# Patient Record
Sex: FEMALE | Race: WHITE | Employment: OTHER | URBAN - METROPOLITAN AREA
[De-identification: names, ages, dates, MRNs, and addresses within clinical notes are randomized per-mention and may not be internally consistent; named-entity substitution may affect disease eponyms.]

---

## 2016-12-07 LAB
CHOLESTEROL, TOTAL: 166 MG/DL
CHOLESTEROL/HDL RATIO: 3.2
HDLC SERPL-MCNC: 52 MG/DL (ref 35–70)
LDL CHOLESTEROL CALCULATED: 76 MG/DL (ref 0–160)
TRIGL SERPL-MCNC: 189 MG/DL
VLDLC SERPL CALC-MCNC: 38 MG/DL

## 2017-03-22 RX ORDER — CITALOPRAM 20 MG/1
20 TABLET ORAL DAILY
Qty: 30 TABLET | Refills: 0 | Status: SHIPPED | OUTPATIENT
Start: 2017-03-22 | End: 2017-05-01 | Stop reason: SDUPTHER

## 2017-04-10 ENCOUNTER — OFFICE VISIT (OUTPATIENT)
Dept: PRIMARY CARE CLINIC | Age: 77
End: 2017-04-10
Payer: MEDICARE

## 2017-04-10 VITALS
DIASTOLIC BLOOD PRESSURE: 72 MMHG | BODY MASS INDEX: 39.2 KG/M2 | WEIGHT: 213 LBS | HEART RATE: 64 BPM | RESPIRATION RATE: 14 BRPM | HEIGHT: 62 IN | SYSTOLIC BLOOD PRESSURE: 112 MMHG

## 2017-04-10 DIAGNOSIS — Z13.0 SCREENING FOR DEFICIENCY ANEMIA: ICD-10-CM

## 2017-04-10 DIAGNOSIS — Z00.00 ENCOUNTER FOR GENERAL ADULT MEDICAL EXAMINATION W/O ABNORMAL FINDINGS: Primary | ICD-10-CM

## 2017-04-10 DIAGNOSIS — E53.8 VITAMIN B 12 DEFICIENCY: ICD-10-CM

## 2017-04-10 DIAGNOSIS — R73.09 ELEVATED GLUCOSE: ICD-10-CM

## 2017-04-10 DIAGNOSIS — F41.8 DEPRESSION WITH ANXIETY: ICD-10-CM

## 2017-04-10 DIAGNOSIS — R53.83 OTHER FATIGUE: ICD-10-CM

## 2017-04-10 DIAGNOSIS — E78.2 MIXED HYPERLIPIDEMIA: ICD-10-CM

## 2017-04-10 DIAGNOSIS — I10 ESSENTIAL HYPERTENSION: ICD-10-CM

## 2017-04-10 PROCEDURE — 99204 OFFICE O/P NEW MOD 45 MIN: CPT | Performed by: NURSE PRACTITIONER

## 2017-04-10 RX ORDER — LISINOPRIL 20 MG/1
20 TABLET ORAL DAILY
Qty: 90 TABLET | Refills: 3 | Status: SHIPPED | OUTPATIENT
Start: 2017-04-10 | End: 2017-10-30 | Stop reason: SDUPTHER

## 2017-04-10 RX ORDER — IBUPROFEN 800 MG/1
800 TABLET ORAL EVERY 8 HOURS PRN
Qty: 120 TABLET | Refills: 3 | Status: SHIPPED | OUTPATIENT
Start: 2017-04-10 | End: 2018-06-04 | Stop reason: SDUPTHER

## 2017-04-10 RX ORDER — FERROUS SULFATE 325(65) MG
325 TABLET ORAL
COMMUNITY
End: 2017-07-31 | Stop reason: SDUPTHER

## 2017-04-10 RX ORDER — FUROSEMIDE 40 MG/1
TABLET ORAL
COMMUNITY
Start: 2017-03-18 | End: 2017-06-09 | Stop reason: SDUPTHER

## 2017-04-10 RX ORDER — CYANOCOBALAMIN 1000 UG/ML
1000 INJECTION INTRAMUSCULAR; SUBCUTANEOUS ONCE
COMMUNITY
End: 2017-08-21 | Stop reason: SDUPTHER

## 2017-04-10 RX ORDER — IBUPROFEN 800 MG/1
800 TABLET ORAL EVERY 6 HOURS PRN
COMMUNITY
End: 2017-04-10 | Stop reason: SDUPTHER

## 2017-04-10 RX ORDER — DICYCLOMINE HYDROCHLORIDE 10 MG/1
10 CAPSULE ORAL
COMMUNITY
End: 2018-05-18 | Stop reason: ALTCHOICE

## 2017-04-10 RX ORDER — PRAVASTATIN SODIUM 40 MG
TABLET ORAL
COMMUNITY
Start: 2017-04-01 | End: 2017-06-09 | Stop reason: SDUPTHER

## 2017-04-10 RX ORDER — POTASSIUM CHLORIDE 1500 MG/1
TABLET, EXTENDED RELEASE ORAL
COMMUNITY
Start: 2017-03-30 | End: 2017-06-22 | Stop reason: SDUPTHER

## 2017-04-10 RX ORDER — LISINOPRIL 20 MG/1
TABLET ORAL
COMMUNITY
Start: 2017-03-17 | End: 2017-04-10 | Stop reason: SDUPTHER

## 2017-04-10 RX ORDER — ATENOLOL 50 MG/1
TABLET ORAL
COMMUNITY
Start: 2017-03-17 | End: 2017-09-08 | Stop reason: ALTCHOICE

## 2017-04-10 ASSESSMENT — PATIENT HEALTH QUESTIONNAIRE - PHQ9
2. FEELING DOWN, DEPRESSED OR HOPELESS: 0
SUM OF ALL RESPONSES TO PHQ QUESTIONS 1-9: 0
1. LITTLE INTEREST OR PLEASURE IN DOING THINGS: 0
SUM OF ALL RESPONSES TO PHQ9 QUESTIONS 1 & 2: 0

## 2017-04-10 ASSESSMENT — ENCOUNTER SYMPTOMS
SHORTNESS OF BREATH: 0
BACK PAIN: 0
ORTHOPNEA: 0
BLURRED VISION: 0
COUGH: 0
ABDOMINAL PAIN: 0

## 2017-04-21 ENCOUNTER — TELEPHONE (OUTPATIENT)
Dept: PRIMARY CARE CLINIC | Age: 77
End: 2017-04-21

## 2017-05-03 RX ORDER — CITALOPRAM 20 MG/1
20 TABLET ORAL DAILY
Qty: 30 TABLET | Refills: 1 | Status: SHIPPED | OUTPATIENT
Start: 2017-05-03 | End: 2017-06-09 | Stop reason: SDUPTHER

## 2017-06-07 ENCOUNTER — HOSPITAL ENCOUNTER (OUTPATIENT)
Age: 77
Discharge: HOME | End: 2017-06-07
Payer: MEDICARE

## 2017-06-07 DIAGNOSIS — I10 ESSENTIAL HYPERTENSION: ICD-10-CM

## 2017-06-07 DIAGNOSIS — R73.09 ELEVATED GLUCOSE: ICD-10-CM

## 2017-06-07 DIAGNOSIS — Z13.0 SCREENING FOR DEFICIENCY ANEMIA: ICD-10-CM

## 2017-06-07 DIAGNOSIS — R53.83 OTHER FATIGUE: ICD-10-CM

## 2017-06-07 DIAGNOSIS — E78.2 MIXED HYPERLIPIDEMIA: ICD-10-CM

## 2017-06-07 DIAGNOSIS — E53.8 VITAMIN B 12 DEFICIENCY: ICD-10-CM

## 2017-06-07 DIAGNOSIS — Z00.00 ENCOUNTER FOR GENERAL ADULT MEDICAL EXAMINATION W/O ABNORMAL FINDINGS: ICD-10-CM

## 2017-06-07 LAB
ALBUMIN SERPL-MCNC: 3.6 G/DL (ref 3.5–5.2)
ALBUMIN/GLOBULIN RATIO: 1.2 (ref 1–2.5)
ALP BLD-CCNC: 105 U/L (ref 35–104)
ALT SERPL-CCNC: 7 U/L (ref 5–33)
ANION GAP SERPL CALCULATED.3IONS-SCNC: 12 MMOL/L (ref 9–17)
AST SERPL-CCNC: 13 U/L
BILIRUB SERPL-MCNC: 0.48 MG/DL (ref 0.3–1.2)
BUN BLDV-MCNC: 13 MG/DL (ref 8–23)
BUN/CREAT BLD: ABNORMAL (ref 9–20)
CALCIUM SERPL-MCNC: 8.6 MG/DL (ref 8.6–10.4)
CHLORIDE BLD-SCNC: 98 MMOL/L (ref 98–107)
CHOLESTEROL/HDL RATIO: 3.3
CHOLESTEROL: 160 MG/DL
CO2: 26 MMOL/L (ref 20–31)
CREAT SERPL-MCNC: 0.96 MG/DL (ref 0.5–0.9)
ESTIMATED AVERAGE GLUCOSE: 128 MG/DL
GFR AFRICAN AMERICAN: >60 ML/MIN
GFR NON-AFRICAN AMERICAN: 56 ML/MIN
GFR SERPL CREATININE-BSD FRML MDRD: ABNORMAL ML/MIN/{1.73_M2}
GFR SERPL CREATININE-BSD FRML MDRD: ABNORMAL ML/MIN/{1.73_M2}
GLUCOSE BLD-MCNC: 111 MG/DL (ref 70–99)
HBA1C MFR BLD: 6.1 % (ref 4–6)
HCT VFR BLD CALC: 36.2 % (ref 36–46)
HDLC SERPL-MCNC: 49 MG/DL
HEMOGLOBIN: 12.1 G/DL (ref 12–16)
LDL CHOLESTEROL: 76 MG/DL (ref 0–130)
MCH RBC QN AUTO: 27.8 PG (ref 26–34)
MCHC RBC AUTO-ENTMCNC: 33.3 G/DL (ref 31–37)
MCV RBC AUTO: 83.4 FL (ref 80–100)
PDW BLD-RTO: 14 % (ref 12.5–15.4)
PLATELET # BLD: 244 K/UL (ref 140–450)
PMV BLD AUTO: 9.9 FL (ref 6–12)
POTASSIUM SERPL-SCNC: 3.7 MMOL/L (ref 3.7–5.3)
RBC # BLD: 4.34 M/UL (ref 4–5.2)
SODIUM BLD-SCNC: 136 MMOL/L (ref 135–144)
TOTAL PROTEIN: 6.7 G/DL (ref 6.4–8.3)
TRIGL SERPL-MCNC: 173 MG/DL
TSH SERPL DL<=0.05 MIU/L-ACNC: 1.8 MIU/L (ref 0.3–5)
VITAMIN B-12: 623 PG/ML (ref 211–946)
VLDLC SERPL CALC-MCNC: ABNORMAL MG/DL (ref 1–30)
WBC # BLD: 9.9 K/UL (ref 3.5–11)

## 2017-06-07 PROCEDURE — 83036 HEMOGLOBIN GLYCOSYLATED A1C: CPT

## 2017-06-07 PROCEDURE — 82607 VITAMIN B-12: CPT

## 2017-06-07 PROCEDURE — 80053 COMPREHEN METABOLIC PANEL: CPT

## 2017-06-07 PROCEDURE — 80061 LIPID PANEL: CPT

## 2017-06-07 PROCEDURE — 85027 COMPLETE CBC AUTOMATED: CPT

## 2017-06-07 PROCEDURE — 36415 COLL VENOUS BLD VENIPUNCTURE: CPT

## 2017-06-07 PROCEDURE — 84443 ASSAY THYROID STIM HORMONE: CPT

## 2017-06-09 RX ORDER — FUROSEMIDE 40 MG/1
40 TABLET ORAL DAILY
Qty: 90 TABLET | Refills: 1 | Status: SHIPPED | OUTPATIENT
Start: 2017-06-09 | End: 2018-03-01 | Stop reason: SDUPTHER

## 2017-06-09 RX ORDER — CITALOPRAM 20 MG/1
20 TABLET ORAL DAILY
Qty: 90 TABLET | Refills: 1 | Status: SHIPPED | OUTPATIENT
Start: 2017-06-09 | End: 2018-04-15 | Stop reason: SDUPTHER

## 2017-06-09 RX ORDER — PRAVASTATIN SODIUM 40 MG
40 TABLET ORAL DAILY
Qty: 90 TABLET | Refills: 1 | Status: SHIPPED | OUTPATIENT
Start: 2017-06-09 | End: 2017-10-30 | Stop reason: SDUPTHER

## 2017-06-14 ENCOUNTER — TELEPHONE (OUTPATIENT)
Dept: PRIMARY CARE CLINIC | Age: 77
End: 2017-06-14

## 2017-06-22 ENCOUNTER — OFFICE VISIT (OUTPATIENT)
Dept: PRIMARY CARE CLINIC | Age: 77
End: 2017-06-22
Payer: MEDICARE

## 2017-06-22 VITALS
HEART RATE: 74 BPM | BODY MASS INDEX: 36.14 KG/M2 | HEIGHT: 63 IN | DIASTOLIC BLOOD PRESSURE: 74 MMHG | RESPIRATION RATE: 15 BRPM | WEIGHT: 204 LBS | SYSTOLIC BLOOD PRESSURE: 130 MMHG

## 2017-06-22 DIAGNOSIS — R21 RASH: ICD-10-CM

## 2017-06-22 DIAGNOSIS — I10 ESSENTIAL HYPERTENSION: Primary | ICD-10-CM

## 2017-06-22 PROCEDURE — 99214 OFFICE O/P EST MOD 30 MIN: CPT | Performed by: INTERNAL MEDICINE

## 2017-06-22 RX ORDER — POTASSIUM CHLORIDE 1500 MG/1
20 TABLET, EXTENDED RELEASE ORAL DAILY
Qty: 60 TABLET | Refills: 3 | Status: SHIPPED | OUTPATIENT
Start: 2017-06-22 | End: 2018-08-22 | Stop reason: SDUPTHER

## 2017-06-22 ASSESSMENT — ENCOUNTER SYMPTOMS
TROUBLE SWALLOWING: 0
NAUSEA: 0
SHORTNESS OF BREATH: 0
BACK PAIN: 0
VOMITING: 0
ABDOMINAL PAIN: 0
EYE REDNESS: 0
COUGH: 0
EYE DISCHARGE: 0
SORE THROAT: 0

## 2017-07-17 ENCOUNTER — OFFICE VISIT (OUTPATIENT)
Dept: PRIMARY CARE CLINIC | Age: 77
End: 2017-07-17
Payer: MEDICARE

## 2017-07-17 VITALS
HEIGHT: 63 IN | BODY MASS INDEX: 36.32 KG/M2 | SYSTOLIC BLOOD PRESSURE: 142 MMHG | HEART RATE: 74 BPM | RESPIRATION RATE: 14 BRPM | TEMPERATURE: 98.6 F | WEIGHT: 205 LBS | DIASTOLIC BLOOD PRESSURE: 82 MMHG

## 2017-07-17 DIAGNOSIS — R21 RASH AND NONSPECIFIC SKIN ERUPTION: Primary | ICD-10-CM

## 2017-07-17 PROCEDURE — 99213 OFFICE O/P EST LOW 20 MIN: CPT | Performed by: FAMILY MEDICINE

## 2017-07-17 RX ORDER — TRIAMCINOLONE ACETONIDE 1 MG/G
CREAM TOPICAL
Qty: 80 G | Refills: 2 | Status: SHIPPED | OUTPATIENT
Start: 2017-07-17 | End: 2018-10-24 | Stop reason: ALTCHOICE

## 2017-07-17 RX ORDER — PERMETHRIN 50 MG/G
CREAM TOPICAL
Qty: 60 G | Refills: 1 | Status: SHIPPED | OUTPATIENT
Start: 2017-07-17 | End: 2018-10-24 | Stop reason: ALTCHOICE

## 2017-07-17 ASSESSMENT — ENCOUNTER SYMPTOMS
COUGH: 0
SHORTNESS OF BREATH: 0
VOMITING: 0
NAUSEA: 0

## 2017-07-20 ENCOUNTER — TELEPHONE (OUTPATIENT)
Dept: PRIMARY CARE CLINIC | Age: 77
End: 2017-07-20

## 2017-07-23 ENCOUNTER — PATIENT MESSAGE (OUTPATIENT)
Dept: PRIMARY CARE CLINIC | Age: 77
End: 2017-07-23

## 2017-07-31 RX ORDER — FERROUS SULFATE 325(65) MG
325 TABLET ORAL
Qty: 30 TABLET | Refills: 5 | Status: SHIPPED | OUTPATIENT
Start: 2017-07-31 | End: 2018-03-01 | Stop reason: SDUPTHER

## 2017-08-21 RX ORDER — CYANOCOBALAMIN 1000 UG/ML
1000 INJECTION INTRAMUSCULAR; SUBCUTANEOUS ONCE
Qty: 1 ML | Refills: 5 | Status: SHIPPED | OUTPATIENT
Start: 2017-08-21 | End: 2017-11-06 | Stop reason: SINTOL

## 2017-08-21 RX ORDER — CYANOCOBALAMIN 1000 UG/ML
1000 INJECTION INTRAMUSCULAR; SUBCUTANEOUS ONCE
Qty: 1 ML | Refills: 5 | Status: SHIPPED | OUTPATIENT
Start: 2017-08-21 | End: 2017-08-21 | Stop reason: SDUPTHER

## 2017-09-08 DIAGNOSIS — I10 ESSENTIAL HYPERTENSION: Primary | ICD-10-CM

## 2017-09-08 RX ORDER — METOPROLOL SUCCINATE 50 MG/1
50 TABLET, EXTENDED RELEASE ORAL DAILY
Qty: 30 TABLET | Refills: 3 | Status: SHIPPED | OUTPATIENT
Start: 2017-09-08 | End: 2017-10-30 | Stop reason: SDUPTHER

## 2017-10-10 ENCOUNTER — TELEPHONE (OUTPATIENT)
Dept: PRIMARY CARE CLINIC | Age: 77
End: 2017-10-10

## 2017-10-10 NOTE — TELEPHONE ENCOUNTER
[facility] called and said that pt's clearance showed modification in medical treatment was required but no instructions per Yael Maldonado. Dr Heriberto Dance reviewed and okayed clearance and I re-faxed to [facility].

## 2017-10-30 DIAGNOSIS — E78.2 MIXED HYPERLIPIDEMIA: Primary | ICD-10-CM

## 2017-10-30 DIAGNOSIS — I10 ESSENTIAL HYPERTENSION: ICD-10-CM

## 2017-10-30 RX ORDER — METOPROLOL SUCCINATE 50 MG/1
50 TABLET, EXTENDED RELEASE ORAL DAILY
Qty: 90 TABLET | Refills: 3 | Status: SHIPPED | OUTPATIENT
Start: 2017-10-30 | End: 2019-02-19 | Stop reason: SDUPTHER

## 2017-10-30 RX ORDER — LISINOPRIL 20 MG/1
20 TABLET ORAL DAILY
Qty: 90 TABLET | Refills: 3 | Status: SHIPPED | OUTPATIENT
Start: 2017-10-30 | End: 2018-06-22 | Stop reason: SDUPTHER

## 2017-10-30 RX ORDER — PRAVASTATIN SODIUM 40 MG
40 TABLET ORAL DAILY
Qty: 90 TABLET | Refills: 1 | Status: SHIPPED | OUTPATIENT
Start: 2017-10-30 | End: 2018-07-05 | Stop reason: SDUPTHER

## 2017-11-06 ENCOUNTER — OFFICE VISIT (OUTPATIENT)
Dept: PRIMARY CARE CLINIC | Age: 77
End: 2017-11-06
Payer: MEDICARE

## 2017-11-06 VITALS
WEIGHT: 184.5 LBS | SYSTOLIC BLOOD PRESSURE: 148 MMHG | DIASTOLIC BLOOD PRESSURE: 88 MMHG | BODY MASS INDEX: 31.5 KG/M2 | HEIGHT: 64 IN | RESPIRATION RATE: 17 BRPM | HEART RATE: 78 BPM

## 2017-11-06 DIAGNOSIS — R79.9 ABNORMAL FINDING OF BLOOD CHEMISTRY: ICD-10-CM

## 2017-11-06 DIAGNOSIS — E78.2 MIXED HYPERLIPIDEMIA: ICD-10-CM

## 2017-11-06 DIAGNOSIS — I10 ESSENTIAL HYPERTENSION: Primary | ICD-10-CM

## 2017-11-06 DIAGNOSIS — E53.8 VITAMIN B 12 DEFICIENCY: ICD-10-CM

## 2017-11-06 DIAGNOSIS — D50.9 IRON DEFICIENCY ANEMIA, UNSPECIFIED IRON DEFICIENCY ANEMIA TYPE: ICD-10-CM

## 2017-11-06 PROCEDURE — 99214 OFFICE O/P EST MOD 30 MIN: CPT | Performed by: NURSE PRACTITIONER

## 2017-11-06 ASSESSMENT — ENCOUNTER SYMPTOMS
ABDOMINAL PAIN: 0
BLURRED VISION: 0
SHORTNESS OF BREATH: 0
ORTHOPNEA: 0
COUGH: 0
BACK PAIN: 0

## 2017-11-06 NOTE — PROGRESS NOTES
[facility] PHYSICIANS  [facility] INTERNAL MEDICINE  [address]  [address]  [place] [address]  Dept: [phone]  Dept Fax: [phone]    Meda Krabbe is a 65 y.o. female who presents today for her medical conditions/complaints as noted below. Meda Krabbe is c/o of Hypertension (6 month recheck ) and Genital Warts        HPI:     Has not been sexually active, since her  has passed  Has a new partner, would like to verify that she does not have anything contagious      Hypertension   This is a chronic problem. The current episode started more than 1 year ago. The problem is unchanged. The problem is controlled. Pertinent negatives include no anxiety, blurred vision, chest pain, headaches, malaise/fatigue, neck pain, orthopnea, palpitations, peripheral edema, PND, shortness of breath or sweats. There are no associated agents to hypertension. Risk factors for coronary artery disease include diabetes mellitus, obesity and post-menopausal state. Past treatments include ACE inhibitors and beta blockers. The current treatment provides significant improvement. There are no compliance problems.         Past Medical History:   Diagnosis Date    Depression     Hyperlipidemia     Hypertension       Past Surgical History:   Procedure Laterality Date    APPENDECTOMY      HYSTERECTOMY         Family History   Problem Relation Age of Onset    Heart Disease Mother     High Blood Pressure Mother     Cancer Father     Colon Cancer Father        Social History   Substance Use Topics    Smoking status: Never Smoker    Smokeless tobacco: Never Used    Alcohol use No      Current Outpatient Prescriptions   Medication Sig Dispense Refill    metoprolol succinate (TOPROL XL) 50 MG extended release tablet Take 1 tablet by mouth daily 90 tablet 3    metFORMIN (GLUCOPHAGE) 500 MG tablet Take 1 tablet by mouth 2 times daily (with meals) 900 tablet 3    pravastatin (PRAVACHOL) 40 MG tablet Take 1 tablet by mouth daily 90 tablet 1    lisinopril (PRINIVIL;ZESTRIL) 20 MG tablet Take 1 tablet by mouth daily 90 tablet 3    ferrous sulfate 325 (65 Fe) MG tablet Take 1 tablet by mouth daily (with breakfast) 30 tablet 5    permethrin (ELIMITE) 5 % cream Apply topically as directed 60 g 1    triamcinolone (KENALOG) 0.1 % cream Apply topically 2 times daily. 80 g 2    KLOR-CON M20 20 MEQ extended release tablet Take 1 tablet by mouth daily 60 tablet 3    furosemide (LASIX) 40 MG tablet Take 1 tablet by mouth daily 90 tablet 1    citalopram (CELEXA) 20 MG tablet Take 1 tablet by mouth daily 90 tablet 1    dicyclomine (BENTYL) 10 MG capsule Take 10 mg by mouth 4 times daily (before meals and nightly)      ibuprofen (ADVIL;MOTRIN) 800 MG tablet Take 1 tablet by mouth every 8 hours as needed for Pain 120 tablet 3     No current facility-administered medications for this visit. Allergies   Allergen Reactions    Codeine        Health Maintenance   Topic Date Due    DTaP/Tdap/Td vaccine (1 - Tdap) 06/22/2018 (Originally 4/16/1959)    Pneumococcal low/med risk (2 of 2 - PPSV23) 06/15/2018    Lipid screen  06/07/2022    Zostavax vaccine  Completed    DEXA (modify frequency per FRAX score)  Completed    Flu vaccine  Completed       Subjective:      Review of Systems   Constitutional: Negative for chills, fatigue, fever and malaise/fatigue. HENT: Negative for congestion. Eyes: Negative for blurred vision. Respiratory: Negative for cough and shortness of breath. Cardiovascular: Negative for chest pain, palpitations, orthopnea and PND. Gastrointestinal: Negative for abdominal pain. Genitourinary: Negative for dysuria. Musculoskeletal: Negative for back pain and neck pain. Neurological: Negative for dizziness, numbness and headaches. Psychiatric/Behavioral: The patient is not nervous/anxious. Objective:     Physical Exam   Constitutional: She is oriented to person, place, and time.  She appears Specific Question:   No of Hours? Answer:   na    Comprehensive Metabolic Panel     Standing Status:   Future     Standing Expiration Date:   11/7/2018    CBC     Standing Status:   Future     Standing Expiration Date:   11/6/2018    Hemoglobin A1C     Standing Status:   Future     Standing Expiration Date:   11/6/2018    Lipid Panel     Standing Status:   Future     Standing Expiration Date:   11/6/2018     Order Specific Question:   Is Patient Fasting?/# of Hours     Answer:   12         Patient given educational materials - see patient instructions. Discussed use, benefit, and side effects of prescribed medications. All patient questions answered. Pt voiced understanding. Reviewed health maintenance. Instructed to continue current medications, diet and exercise. Patient agreed with treatment plan. Follow up as directed.       Electronically signed by Lupe Keane CNP on 11/6/2017 at 1:26 PM

## 2017-11-10 ENCOUNTER — TELEPHONE (OUTPATIENT)
Dept: PHARMACY | Facility: CLINIC | Age: 77
End: 2017-11-10

## 2017-11-10 NOTE — TELEPHONE ENCOUNTER
CLINICAL PHARMACY: ADHERENCE REVIEW    Identified care gap per [employer]: Metformin 500mg adherence  Per records, appears 30-day supply last filled 8/28/17    Notes: Per Reconcile Medication Dispenses in Care Path:  [address]:  Metformin 500mg last filled on 10/24/17 for a 30-day supply billed thru patient's [employer]      No patient out reach planned at this time.

## 2018-01-12 ENCOUNTER — TELEPHONE (OUTPATIENT)
Dept: PRIMARY CARE CLINIC | Age: 78
End: 2018-01-12

## 2018-01-12 DIAGNOSIS — R07.81 RIB PAIN ON LEFT SIDE: Primary | ICD-10-CM
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
## 2018-01-12 NOTE — TELEPHONE ENCOUNTER
Patient fell last  Saturday on the sidewalk in front of her apartment, she fell and scraped up bilateral  knees, hit chest on sidewalk and has noticed a bruise under left breast more on the outer aspect, and is also painful to the touch. She was asking if you would order an xray of the bruised area or would you want her to be seen. If you order the xray, she would like order faxed to [facility].  Please advise

## 2018-01-13 ENCOUNTER — HOSPITAL ENCOUNTER (OUTPATIENT)
Age: 78
Discharge: HOME | End: 2018-01-13
Payer: MEDICARE

## 2018-01-13 ENCOUNTER — HOSPITAL ENCOUNTER (OUTPATIENT)
Dept: GENERAL RADIOLOGY | Age: 78
Discharge: HOME | End: 2018-01-13
Payer: MEDICARE
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
## 2018-01-13 DIAGNOSIS — R07.81 RIB PAIN ON LEFT SIDE: ICD-10-CM

## 2018-01-13 PROCEDURE — 71046 X-RAY EXAM CHEST 2 VIEWS: CPT

## 2018-03-01 RX ORDER — PNV NO.95/FERROUS FUM/FOLIC AC 28MG-0.8MG
TABLET ORAL
Qty: 30 TABLET | Refills: 5 | Status: ON HOLD | OUTPATIENT
Start: 2018-03-01 | End: 2018-11-06

## 2018-03-01 RX ORDER — FUROSEMIDE 40 MG/1
TABLET ORAL
Qty: 90 TABLET | Refills: 1 | Status: SHIPPED | OUTPATIENT
Start: 2018-03-01 | End: 2018-06-16 | Stop reason: SDUPTHER

## 2018-04-16 RX ORDER — CITALOPRAM 20 MG/1
TABLET ORAL
Qty: 90 TABLET | Refills: 1 | Status: SHIPPED | OUTPATIENT
Start: 2018-04-16 | End: 2018-10-16 | Stop reason: SDUPTHER

## 2018-05-10 ENCOUNTER — HOSPITAL ENCOUNTER (OUTPATIENT)
Age: 78
Discharge: HOME | End: 2018-05-10
Payer: MEDICARE

## 2018-05-10 DIAGNOSIS — D50.9 IRON DEFICIENCY ANEMIA, UNSPECIFIED IRON DEFICIENCY ANEMIA TYPE: ICD-10-CM

## 2018-05-10 DIAGNOSIS — E53.8 VITAMIN B 12 DEFICIENCY: ICD-10-CM

## 2018-05-10 DIAGNOSIS — R79.9 ABNORMAL FINDING OF BLOOD CHEMISTRY: ICD-10-CM

## 2018-05-10 DIAGNOSIS — E78.2 MIXED HYPERLIPIDEMIA: ICD-10-CM

## 2018-05-10 DIAGNOSIS — I10 ESSENTIAL HYPERTENSION: ICD-10-CM

## 2018-05-10 LAB
ALBUMIN SERPL-MCNC: 3.6 G/DL (ref 3.5–5.2)
ALBUMIN/GLOBULIN RATIO: 1.1 (ref 1–2.5)
ALP BLD-CCNC: 119 U/L (ref 35–104)
ALT SERPL-CCNC: 8 U/L (ref 5–33)
ANION GAP SERPL CALCULATED.3IONS-SCNC: 13 MMOL/L (ref 9–17)
AST SERPL-CCNC: 14 U/L
BILIRUB SERPL-MCNC: 0.38 MG/DL (ref 0.3–1.2)
BUN BLDV-MCNC: 21 MG/DL (ref 8–23)
BUN/CREAT BLD: ABNORMAL (ref 9–20)
CALCIUM SERPL-MCNC: 8.4 MG/DL (ref 8.6–10.4)
CHLORIDE BLD-SCNC: 97 MMOL/L (ref 98–107)
CHOLESTEROL/HDL RATIO: 2.8
CHOLESTEROL: 158 MG/DL
CO2: 27 MMOL/L (ref 20–31)
CREAT SERPL-MCNC: 1.33 MG/DL (ref 0.5–0.9)
GFR AFRICAN AMERICAN: 47 ML/MIN
GFR NON-AFRICAN AMERICAN: 38 ML/MIN
GFR SERPL CREATININE-BSD FRML MDRD: ABNORMAL ML/MIN/{1.73_M2}
GFR SERPL CREATININE-BSD FRML MDRD: ABNORMAL ML/MIN/{1.73_M2}
GLUCOSE BLD-MCNC: 86 MG/DL (ref 70–99)
HCT VFR BLD CALC: 36.4 % (ref 36.3–47.1)
HDLC SERPL-MCNC: 57 MG/DL
HEMOGLOBIN: 11.7 G/DL (ref 11.9–15.1)
IRON SATURATION: 25 % (ref 20–55)
IRON: 68 UG/DL (ref 37–145)
LDL CHOLESTEROL: 70 MG/DL (ref 0–130)
MCH RBC QN AUTO: 27.8 PG (ref 25.2–33.5)
MCHC RBC AUTO-ENTMCNC: 32.1 G/DL (ref 28.4–34.8)
MCV RBC AUTO: 86.5 FL (ref 82.6–102.9)
NRBC AUTOMATED: 0 PER 100 WBC
PDW BLD-RTO: 12.7 % (ref 11.8–14.4)
PLATELET # BLD: 280 K/UL (ref 138–453)
PMV BLD AUTO: 11.7 FL (ref 8.1–13.5)
POTASSIUM SERPL-SCNC: 3.6 MMOL/L (ref 3.7–5.3)
RBC # BLD: 4.21 M/UL (ref 3.95–5.11)
SODIUM BLD-SCNC: 137 MMOL/L (ref 135–144)
TOTAL IRON BINDING CAPACITY: 268 UG/DL (ref 250–450)
TOTAL PROTEIN: 7 G/DL (ref 6.4–8.3)
TRIGL SERPL-MCNC: 154 MG/DL
UNSATURATED IRON BINDING CAPACITY: 200 UG/DL (ref 112–347)
VITAMIN B-12: 510 PG/ML (ref 232–1245)
VLDLC SERPL CALC-MCNC: ABNORMAL MG/DL (ref 1–30)
WBC # BLD: 11.1 K/UL (ref 3.5–11.3)

## 2018-05-10 PROCEDURE — 83036 HEMOGLOBIN GLYCOSYLATED A1C: CPT

## 2018-05-10 PROCEDURE — 80053 COMPREHEN METABOLIC PANEL: CPT

## 2018-05-10 PROCEDURE — 82607 VITAMIN B-12: CPT

## 2018-05-10 PROCEDURE — 85027 COMPLETE CBC AUTOMATED: CPT

## 2018-05-10 PROCEDURE — 80061 LIPID PANEL: CPT

## 2018-05-10 PROCEDURE — 36415 COLL VENOUS BLD VENIPUNCTURE: CPT

## 2018-05-10 PROCEDURE — 83550 IRON BINDING TEST: CPT

## 2018-05-10 PROCEDURE — 83540 ASSAY OF IRON: CPT

## 2018-05-11 LAB
ESTIMATED AVERAGE GLUCOSE: 114 MG/DL
HBA1C MFR BLD: 5.6 % (ref 4–6)

## 2018-05-14 DIAGNOSIS — E78.2 MIXED HYPERLIPIDEMIA: ICD-10-CM

## 2018-05-18 ENCOUNTER — OFFICE VISIT (OUTPATIENT)
Dept: PRIMARY CARE CLINIC | Age: 78
End: 2018-05-18
Payer: MEDICARE

## 2018-05-18 ENCOUNTER — TELEPHONE (OUTPATIENT)
Dept: PRIMARY CARE CLINIC | Age: 78
End: 2018-05-18

## 2018-05-18 VITALS
BODY MASS INDEX: 33.59 KG/M2 | HEIGHT: 65 IN | DIASTOLIC BLOOD PRESSURE: 84 MMHG | SYSTOLIC BLOOD PRESSURE: 128 MMHG | OXYGEN SATURATION: 98 % | RESPIRATION RATE: 17 BRPM | HEART RATE: 87 BPM | WEIGHT: 201.6 LBS

## 2018-05-18 DIAGNOSIS — E78.2 MIXED HYPERLIPIDEMIA: ICD-10-CM

## 2018-05-18 DIAGNOSIS — K58.0 IRRITABLE BOWEL SYNDROME WITH DIARRHEA: ICD-10-CM

## 2018-05-18 DIAGNOSIS — I10 ESSENTIAL HYPERTENSION: Primary | ICD-10-CM

## 2018-05-18 DIAGNOSIS — E53.8 VITAMIN B 12 DEFICIENCY: ICD-10-CM

## 2018-05-18 DIAGNOSIS — G89.29 CHRONIC RIGHT SHOULDER PAIN: ICD-10-CM

## 2018-05-18 DIAGNOSIS — M25.511 CHRONIC RIGHT SHOULDER PAIN: ICD-10-CM

## 2018-05-18 PROCEDURE — 99214 OFFICE O/P EST MOD 30 MIN: CPT | Performed by: NURSE PRACTITIONER

## 2018-05-18 ASSESSMENT — PATIENT HEALTH QUESTIONNAIRE - PHQ9
2. FEELING DOWN, DEPRESSED OR HOPELESS: 1
SUM OF ALL RESPONSES TO PHQ QUESTIONS 1-9: 1
1. LITTLE INTEREST OR PLEASURE IN DOING THINGS: 0
SUM OF ALL RESPONSES TO PHQ9 QUESTIONS 1 & 2: 1

## 2018-05-18 ASSESSMENT — ENCOUNTER SYMPTOMS
NAUSEA: 0
ABDOMINAL PAIN: 0
BACK PAIN: 0
COUGH: 0
DIARRHEA: 1
SHORTNESS OF BREATH: 0

## 2018-05-21 DIAGNOSIS — K58.0 IRRITABLE BOWEL SYNDROME WITH DIARRHEA: ICD-10-CM

## 2018-05-25 ENCOUNTER — TELEPHONE (OUTPATIENT)
Dept: PRIMARY CARE CLINIC | Age: 78
End: 2018-05-25

## 2018-06-04 RX ORDER — IBUPROFEN 800 MG/1
TABLET ORAL
Qty: 120 TABLET | Refills: 3 | Status: ON HOLD | OUTPATIENT
Start: 2018-06-04 | End: 2018-11-07 | Stop reason: HOSPADM

## 2018-06-18 RX ORDER — FUROSEMIDE 40 MG/1
TABLET ORAL
Qty: 90 TABLET | Refills: 1 | Status: SHIPPED | OUTPATIENT
Start: 2018-06-18 | End: 2019-02-19 | Stop reason: SDUPTHER

## 2018-06-22 RX ORDER — LISINOPRIL 20 MG/1
TABLET ORAL
Qty: 90 TABLET | Refills: 3 | Status: SHIPPED | OUTPATIENT
Start: 2018-06-22 | End: 2019-03-13 | Stop reason: SDUPTHER

## 2018-07-05 DIAGNOSIS — E78.2 MIXED HYPERLIPIDEMIA: ICD-10-CM

## 2018-07-06 RX ORDER — PRAVASTATIN SODIUM 40 MG
40 TABLET ORAL DAILY
Qty: 90 TABLET | Refills: 1 | Status: SHIPPED | OUTPATIENT
Start: 2018-07-06 | End: 2019-03-13 | Stop reason: SDUPTHER

## 2018-07-16 ENCOUNTER — OFFICE VISIT (OUTPATIENT)
Dept: PRIMARY CARE CLINIC | Age: 78
End: 2018-07-16
Payer: MEDICARE

## 2018-07-16 VITALS
HEART RATE: 76 BPM | RESPIRATION RATE: 16 BRPM | DIASTOLIC BLOOD PRESSURE: 76 MMHG | OXYGEN SATURATION: 95 % | TEMPERATURE: 98.2 F | SYSTOLIC BLOOD PRESSURE: 136 MMHG

## 2018-07-16 DIAGNOSIS — R53.1 GENERALIZED WEAKNESS: ICD-10-CM
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
## 2018-07-16 DIAGNOSIS — J06.9 UPPER RESPIRATORY TRACT INFECTION, UNSPECIFIED TYPE: Primary | ICD-10-CM

## 2018-07-16 PROCEDURE — 99214 OFFICE O/P EST MOD 30 MIN: CPT | Performed by: NURSE PRACTITIONER

## 2018-07-16 RX ORDER — SULFAMETHOXAZOLE AND TRIMETHOPRIM 800; 160 MG/1; MG/1
1 TABLET ORAL 2 TIMES DAILY
Qty: 20 TABLET | Refills: 0 | Status: SHIPPED | OUTPATIENT
Start: 2018-07-16 | End: 2018-07-26

## 2018-07-16 ASSESSMENT — ENCOUNTER SYMPTOMS
SINUS PRESSURE: 1
SHORTNESS OF BREATH: 0
BACK PAIN: 0
ABDOMINAL PAIN: 0
COUGH: 0
SINUS PAIN: 1

## 2018-07-16 NOTE — PROGRESS NOTES
3    citalopram (CELEXA) 20 MG tablet TAKE ONE TABLET BY MOUTH ONCE DAILY 90 tablet 1    Ferrous Sulfate (IRON) 325 (65 Fe) MG TABS TAKE ONE TABLET BY MOUTH ONCE DAILY WITH BREAKFAST 30 tablet 5    metoprolol succinate (TOPROL XL) 50 MG extended release tablet Take 1 tablet by mouth daily 90 tablet 3    permethrin (ELIMITE) 5 % cream Apply topically as directed 60 g 1    triamcinolone (KENALOG) 0.1 % cream Apply topically 2 times daily. 80 g 2    KLOR-CON M20 20 MEQ extended release tablet Take 1 tablet by mouth daily 60 tablet 3     No current facility-administered medications for this visit. Allergies   Allergen Reactions    Codeine        Health Maintenance   Topic Date Due    DTaP/Tdap/Td vaccine (1 - Tdap) 04/16/1958    Shingles Vaccine (1 of 2 - 2 Dose Series) 06/10/2015    Flu vaccine (1) 09/01/2018    Potassium monitoring  05/10/2019    Creatinine monitoring  05/10/2019    DEXA (modify frequency per FRAX score)  Completed    Pneumococcal low/med risk  Completed       Subjective:      Review of Systems   Constitutional: Positive for fatigue. Negative for chills and fever. HENT: Positive for congestion, ear pain, postnasal drip, sinus pain and sinus pressure. Respiratory: Negative for cough and shortness of breath. Cardiovascular: Negative for chest pain and palpitations. Gastrointestinal: Negative for abdominal pain. Genitourinary: Negative for dysuria. Musculoskeletal: Positive for gait problem. Negative for back pain. Neurological: Positive for weakness. Negative for dizziness and numbness. Psychiatric/Behavioral: Negative for self-injury, sleep disturbance and suicidal ideas. The patient is not nervous/anxious. Objective:     Physical Exam   Constitutional: She is oriented to person, place, and time. She appears well-developed and well-nourished. HENT:   Head: Normocephalic and atraumatic.    Nose: Right sinus exhibits maxillary sinus tenderness and frontal

## 2018-07-19 ENCOUNTER — TELEPHONE (OUTPATIENT)
Dept: PRIMARY CARE CLINIC | Age: 78
End: 2018-07-19

## 2018-07-19 RX ORDER — BENZONATATE 100 MG/1
100 CAPSULE ORAL 3 TIMES DAILY PRN
Qty: 30 CAPSULE | Refills: 1 | Status: SHIPPED | OUTPATIENT
Start: 2018-07-19 | End: 2018-07-26

## 2018-07-19 NOTE — TELEPHONE ENCOUNTER
Patient called, was last seen on 07/16/2018. Patient stated that she still has the cough and that it gets worse when she lays down. Patient stated that she is still taking the medication but the cough is not improving. Please advise, thank you.

## 2018-08-13 ENCOUNTER — OFFICE VISIT (OUTPATIENT)
Dept: PRIMARY CARE CLINIC | Age: 78
End: 2018-08-13
Payer: MEDICARE

## 2018-08-13 VITALS
DIASTOLIC BLOOD PRESSURE: 64 MMHG | WEIGHT: 198 LBS | HEART RATE: 66 BPM | BODY MASS INDEX: 32.99 KG/M2 | SYSTOLIC BLOOD PRESSURE: 116 MMHG | HEIGHT: 65 IN | OXYGEN SATURATION: 96 %

## 2018-08-13 DIAGNOSIS — M79.672 LEFT FOOT PAIN: Primary | ICD-10-CM

## 2018-08-13 PROCEDURE — 99213 OFFICE O/P EST LOW 20 MIN: CPT | Performed by: NURSE PRACTITIONER

## 2018-08-13 ASSESSMENT — ENCOUNTER SYMPTOMS
CHEST TIGHTNESS: 0
COLOR CHANGE: 0
SHORTNESS OF BREATH: 0

## 2018-08-13 NOTE — PROGRESS NOTES
Bogdan Strickland is a 77 y.o. female who presents today for her medical conditions/complaints as noted below. Bogdan Strickland is here today c/o Foot Swelling (L foot edema for 1 week. Has tried epsom salts, and elevation with no relief. Takes diuretic daily. )      HPI:     Foot Pain   This is a new problem. The current episode started in the past 7 days. The problem occurs constantly. The problem has been unchanged. Associated symptoms include arthralgias and joint swelling. Pertinent negatives include no chest pain, chills, fever, myalgias, numbness or weakness. The symptoms are aggravated by standing and walking. She has tried acetaminophen, NSAIDs and rest for the symptoms. The treatment provided no relief. Ashlyn Marrero presents today for left ankle and foot swelling. She states her foot has been swollen for the past week. She takes a diuretic daily. She has tried elevation, epsom salts and massage without relief. She states there is pain with ambulation. She denies any calf tenderness or swelling. She states she has a history of arthritis in her feet. She states she has a history of gout flare of the left great tow. She denies allopurinol daily. She denies any recent injury or trauma. She has a history of renal impairment. Subjective:   Review of Systems   Constitutional: Negative for chills and fever. Respiratory: Negative for chest tightness and shortness of breath. Cardiovascular: Negative for chest pain, palpitations and leg swelling. Musculoskeletal: Positive for arthralgias, gait problem and joint swelling. Negative for myalgias. Skin: Negative for color change. Neurological: Negative for weakness and numbness. Objective:   /64 (Site: Left Arm, Position: Sitting, Cuff Size: Medium Adult)   Pulse 66   Ht 5' 5\" (1.651 m)   Wt 198 lb (89.8 kg)   SpO2 96%   BMI 32.95 kg/m²     Physical Exam   Constitutional: She is oriented to person, place, and time.  She appears

## 2018-08-13 NOTE — PATIENT INSTRUCTIONS
Patient Education        Foot Pain: Care Instructions  Your Care Instructions  Foot injuries that cause pain and swelling are fairly common. Almost all sports or home repair projects can cause a misstep that ends up as foot pain. Normal wear and tear, especially as you get older, also can cause foot pain. Most minor foot injuries will heal on their own, and home treatment is usually all you need to do. If you have a severe injury, you may need tests and treatment. Follow-up care is a key part of your treatment and safety. Be sure to make and go to all appointments, and call your doctor if you are having problems. It's also a good idea to know your test results and keep a list of the medicines you take. How can you care for yourself at home? · Take pain medicines exactly as directed. ¨ If the doctor gave you a prescription medicine for pain, take it as prescribed. ¨ If you are not taking a prescription pain medicine, ask your doctor if you can take an over-the-counter medicine. · Rest and protect your foot. Take a break from any activity that may cause pain. · Put ice or a cold pack on your foot for 10 to 20 minutes at a time. Put a thin cloth between the ice and your skin. · Prop up the sore foot on a pillow when you ice it or anytime you sit or lie down during the next 3 days. Try to keep it above the level of your heart. This will help reduce swelling. · Your doctor may recommend that you wrap your foot with an elastic bandage. Keep your foot wrapped for as long as your doctor advises. · If your doctor recommends crutches, use them as directed. · Wear roomy footwear. · As soon as pain and swelling end, begin gentle exercises of your foot. Your doctor can tell you which exercises will help. When should you call for help? Call 911 anytime you think you may need emergency care.  For example, call if:    · Your foot turns pale, white, blue, or cold.    Call your doctor now or seek immediate medical care

## 2018-08-14 DIAGNOSIS — M79.672 LEFT FOOT PAIN: Primary | ICD-10-CM

## 2018-08-14 DIAGNOSIS — M79.89 SWELLING OF LEFT FOOT: ICD-10-CM

## 2018-08-14 DIAGNOSIS — R29.91 ABNORMAL FINDING OF FOOT: ICD-10-CM

## 2018-08-21 ENCOUNTER — TELEPHONE (OUTPATIENT)
Dept: PRIMARY CARE CLINIC | Age: 78
End: 2018-08-21

## 2018-08-21 DIAGNOSIS — R29.91 ABNORMAL FINDING OF FOOT: ICD-10-CM

## 2018-08-21 DIAGNOSIS — M79.672 LEFT FOOT PAIN: Primary | ICD-10-CM

## 2018-08-21 DIAGNOSIS — M79.89 FOOT SWELLING: ICD-10-CM

## 2018-08-22 DIAGNOSIS — I10 ESSENTIAL HYPERTENSION: ICD-10-CM

## 2018-08-23 RX ORDER — POTASSIUM CHLORIDE 1500 MG/1
TABLET, EXTENDED RELEASE ORAL
Qty: 60 TABLET | Refills: 3 | Status: SHIPPED | OUTPATIENT
Start: 2018-08-23 | End: 2019-03-13 | Stop reason: SDUPTHER

## 2018-08-29 ENCOUNTER — OFFICE VISIT (OUTPATIENT)
Dept: PODIATRY | Age: 78
End: 2018-08-29
Payer: MEDICARE

## 2018-08-29 VITALS — BODY MASS INDEX: 32.99 KG/M2 | HEIGHT: 65 IN | WEIGHT: 198 LBS

## 2018-08-29 DIAGNOSIS — M79.672 PAIN IN LEFT FOOT: ICD-10-CM

## 2018-08-29 DIAGNOSIS — M19.072 ARTHRITIS OF LEFT FOOT: Primary | ICD-10-CM

## 2018-08-29 PROCEDURE — 99203 OFFICE O/P NEW LOW 30 MIN: CPT | Performed by: PODIATRIST

## 2018-08-29 NOTE — PROGRESS NOTES
[address] [facility] [address] [facility]  [place] [address]  Dept: [phone]    NEW PATIENT PROGRESS NOTE  Date of patient's visit: 8/29/2018  Patient's Name:  Altagracia Loera YOB: 1940            Patient Care Team:  KAREEM Dial CNP as PCP - General (Nurse Practitioner)  KAREEM Dial CNP as PCP - [facility] Attributed Provider        Chief Complaint   Patient presents with    New Patient    Foot Pain     left foot    Foot Swelling     left foot         HPI: Altagracia Loera is a 77 y.o. female who presents to the office today complaining of left foot pain and swelling. Symptoms began 3 week(s) ago. Patient relates pain is Present. Pain is rated 10 out of 10 and is described as constant. Treatments prior to today's visit include: visit to pcp/x-rays at [facility]. Currently denies F/C/N/V. Allergies   Allergen Reactions    Codeine        Past Medical History:   Diagnosis Date    Depression     Hyperlipidemia     Hypertension        Prior to Admission medications    Medication Sig Start Date End Date Taking?  Authorizing Provider   KLOR-CON M20 20 MEQ extended release tablet TAKE ONE TABLET BY MOUTH DAILY 8/23/18  Yes KAREEM Dial CNP   pravastatin (PRAVACHOL) 40 MG tablet Take 1 tablet by mouth daily 7/6/18  Yes KAREEM Dial CNP   lisinopril (PRINIVIL;ZESTRIL) 20 MG tablet TAKE ONE TABLET BY MOUTH ONCE DAILY 6/22/18  Yes KAREEM Gaytan CNP   furosemide (LASIX) 40 MG tablet TAKE 1 TABLET BY MOUTH ONCE DAILY 6/18/18  Yes KAREEM Dial CNP   ibuprofen (ADVIL;MOTRIN) 800 MG tablet TAKE ONE TABLET BY MOUTH EVERY 8 HOURS AS NEEDED FOR PAIN 6/4/18  Yes KAREEM Gaytan CNP   metFORMIN (GLUCOPHAGE) 500 MG tablet Take 1 tablet by mouth 2 times daily (with meals) 5/14/18  Yes KAREEM Dial CNP   citalopram (CELEXA) 20 MG tablet TAKE ONE TABLET BY MOUTH ONCE DAILY 4/16/18  Yes

## 2018-09-05 ENCOUNTER — OFFICE VISIT (OUTPATIENT)
Dept: PODIATRY | Age: 78
End: 2018-09-05
Payer: MEDICARE

## 2018-09-05 VITALS — BODY MASS INDEX: 32.99 KG/M2 | WEIGHT: 198 LBS | HEIGHT: 65 IN

## 2018-09-05 DIAGNOSIS — M79.672 PAIN IN LEFT FOOT: ICD-10-CM

## 2018-09-05 DIAGNOSIS — M19.072 ARTHRITIS OF LEFT FOOT: Primary | ICD-10-CM

## 2018-09-05 PROCEDURE — 99213 OFFICE O/P EST LOW 20 MIN: CPT | Performed by: PODIATRIST

## 2018-09-12 ENCOUNTER — OFFICE VISIT (OUTPATIENT)
Dept: PRIMARY CARE CLINIC | Age: 78
End: 2018-09-12
Payer: MEDICARE

## 2018-09-12 VITALS
HEART RATE: 68 BPM | WEIGHT: 195 LBS | HEIGHT: 64 IN | DIASTOLIC BLOOD PRESSURE: 80 MMHG | OXYGEN SATURATION: 98 % | SYSTOLIC BLOOD PRESSURE: 152 MMHG | BODY MASS INDEX: 33.29 KG/M2 | RESPIRATION RATE: 15 BRPM

## 2018-09-12 DIAGNOSIS — E11.9 TYPE 2 DIABETES MELLITUS WITHOUT COMPLICATION, WITHOUT LONG-TERM CURRENT USE OF INSULIN (HCC): Primary | ICD-10-CM

## 2018-09-12 DIAGNOSIS — Z23 NEED FOR INFLUENZA VACCINATION: ICD-10-CM

## 2018-09-12 DIAGNOSIS — M25.552 LEFT HIP PAIN: ICD-10-CM

## 2018-09-12 DIAGNOSIS — I10 ESSENTIAL HYPERTENSION: ICD-10-CM

## 2018-09-12 PROCEDURE — 99214 OFFICE O/P EST MOD 30 MIN: CPT | Performed by: NURSE PRACTITIONER

## 2018-09-12 PROCEDURE — 90688 IIV4 VACCINE SPLT 0.5 ML IM: CPT | Performed by: NURSE PRACTITIONER

## 2018-09-12 PROCEDURE — G0008 ADMIN INFLUENZA VIRUS VAC: HCPCS | Performed by: NURSE PRACTITIONER

## 2018-09-12 RX ORDER — B-COMPLEX WITH VITAMIN C
1 TABLET ORAL DAILY
Qty: 30 TABLET | Refills: 0 | Status: SHIPPED | OUTPATIENT
Start: 2018-09-12 | End: 2018-10-10 | Stop reason: SDUPTHER

## 2018-09-12 RX ORDER — BLOOD PRESSURE TEST KIT
1 KIT MISCELLANEOUS DAILY
Qty: 1 KIT | Refills: 0 | Status: SHIPPED | OUTPATIENT
Start: 2018-09-12

## 2018-09-12 ASSESSMENT — PATIENT HEALTH QUESTIONNAIRE - PHQ9
1. LITTLE INTEREST OR PLEASURE IN DOING THINGS: 0
SUM OF ALL RESPONSES TO PHQ QUESTIONS 1-9: 0
SUM OF ALL RESPONSES TO PHQ9 QUESTIONS 1 & 2: 0
SUM OF ALL RESPONSES TO PHQ QUESTIONS 1-9: 0
2. FEELING DOWN, DEPRESSED OR HOPELESS: 0

## 2018-09-12 ASSESSMENT — ENCOUNTER SYMPTOMS
SHORTNESS OF BREATH: 0
COUGH: 0
BACK PAIN: 0
ABDOMINAL PAIN: 0

## 2018-09-12 NOTE — PROGRESS NOTES
facility-administered medications for this visit. Allergies   Allergen Reactions    Codeine        Health Maintenance   Topic Date Due    DTaP/Tdap/Td vaccine (1 - Tdap) 04/16/1958    Shingles Vaccine (1 of 2 - 2 Dose Series) 06/10/2015    Flu vaccine (1) 09/01/2018    Potassium monitoring  05/10/2019    Creatinine monitoring  05/10/2019    DEXA (modify frequency per FRAX score)  Completed    Pneumococcal low/med risk  Completed       Subjective:      Review of Systems   Constitutional: Negative for chills, fatigue and fever. HENT: Negative for congestion. Eyes: Negative for visual disturbance. Respiratory: Negative for cough and shortness of breath. Cardiovascular: Negative for chest pain and palpitations. Gastrointestinal: Negative for abdominal pain. Genitourinary: Negative for dysuria. Musculoskeletal: Positive for arthralgias. Negative for back pain. Neurological: Negative for dizziness and numbness. Psychiatric/Behavioral: Negative for self-injury, sleep disturbance and suicidal ideas. The patient is not nervous/anxious. Objective:     Physical Exam   Constitutional: She is oriented to person, place, and time. She appears well-developed and well-nourished. HENT:   Head: Normocephalic and atraumatic. Eyes: Pupils are equal, round, and reactive to light. Neck: Normal range of motion. Neck supple. Cardiovascular: Normal rate, regular rhythm and normal heart sounds. Pulmonary/Chest: Effort normal and breath sounds normal.   Abdominal: Soft. Bowel sounds are normal. There is no tenderness. Musculoskeletal:        Left hip: She exhibits decreased range of motion, decreased strength and tenderness. Neurological: She is alert and oriented to person, place, and time. Skin: Skin is warm and dry. Psychiatric: She has a normal mood and affect. Her behavior is normal. Judgment and thought content normal.   Nursing note and vitals reviewed.     Bilateral feet in poor repair. Decreased sensation with microfilament testing in all points. Pedal pulses +2. No open lesions. BP (!) 152/80   Pulse 68   Resp 15   Ht 5' 4\" (1.626 m)   Wt 195 lb (88.5 kg)   SpO2 98%   BMI 33.47 kg/m²     Assessment:       Diagnosis Orders   1. Type 2 diabetes mellitus without complication, without long-term current use of insulin ([facility] [address]     2. Need for influenza vaccination  INFLUENZA, QUADV, 3 YRS AND OLDER, IM, MDV, 0.5ML (FLUZONE QUADV)   3. Left hip pain  XR HIP LEFT (2-3 VIEWS)   4. Essential hypertension               Plan:      Return in about 6 months (around 3/12/2019) for hypertension, Diabetes. 1. DM- Foot exam completed, encouraged her to have podiatry cut her toenails. Follow up in six months for recheck with repeat labs. 2. HTN- Stable. Continue current meds. Follow up in six months/as needed. 3. Left hip pain- Rx given for xray left hip, follow up pending results. Of the 25 minute duration appointment visit, Ovidio Bennett [facility] spent at least 50% of the face-to-face time in counseling, explanation of diagnosis, planning of further management, and answering all questions. Orders Placed This Encounter   Procedures    XR HIP LEFT (2-3 VIEWS)     Standing Status:   Future     Standing Expiration Date:   2019     Order Specific Question:   Reason for exam:     Answer:   left hip pain    INFLUENZA, QUADV, 3 YRS AND OLDER, IM, MDV, 0.5ML (FLUZONE QUADV)     Orders Placed This Encounter   Medications    Calcium Carbonate-Vitamin D (OYSTER SHELL CALCIUM/D) 500-200 MG-UNIT TABS     Sig: Take 1 tablet by mouth daily     Dispense:  30 tablet     Refill:  0    Blood Pressure KIT     Si kit by Does not apply route daily     Dispense:  1 kit     Refill:  0       Patient given educational materials - see patient instructions. Discussed use, benefit, and side effects of prescribed medications. All patient questions answered. Pt voiced understanding.  Reviewed health maintenance. Instructed to continue current medications, diet and exercise. Patient agreed with treatment plan. Follow up as directed.      Electronically signed by KAREEM Mari CNP on 9/12/2018 at 1:11 PM

## 2018-09-18 ENCOUNTER — TELEPHONE (OUTPATIENT)
Dept: PRIMARY CARE CLINIC | Age: 78
End: 2018-09-18

## 2018-09-19 DIAGNOSIS — M25.552 LEFT HIP PAIN: ICD-10-CM

## 2018-09-19 DIAGNOSIS — M25.552 LEFT HIP PAIN: Primary | ICD-10-CM

## 2018-09-20 ENCOUNTER — CARE COORDINATION (OUTPATIENT)
Dept: CARE COORDINATION | Age: 78
End: 2018-09-20

## 2018-09-21 ENCOUNTER — CARE COORDINATION (OUTPATIENT)
Dept: CARE COORDINATION | Age: 78
End: 2018-09-21

## 2018-09-21 NOTE — CARE COORDINATION
Patient returned phone call. Writer explained the reason of my call. She reports she's doing ok. Writer explained, about care coordination and interdisciplinary team. Reports she's waiting a few weeks to save money for her orthopedic appointment. Discussed  assistance and can help explore financial assistance options. She declined the offer, stating she's fine, financially ok and doesn't need help. Writer encouraged her to call back with any questions or needs.

## 2018-10-03 ENCOUNTER — OFFICE VISIT (OUTPATIENT)
Dept: PODIATRY | Age: 78
End: 2018-10-03
Payer: MEDICARE

## 2018-10-03 VITALS — BODY MASS INDEX: 32.99 KG/M2 | WEIGHT: 198 LBS | HEIGHT: 65 IN

## 2018-10-03 DIAGNOSIS — M25.552 LEFT HIP PAIN: Primary | ICD-10-CM

## 2018-10-03 DIAGNOSIS — M79.672 PAIN IN BOTH FEET: ICD-10-CM

## 2018-10-03 DIAGNOSIS — B35.1 DERMATOPHYTOSIS OF NAIL: ICD-10-CM

## 2018-10-03 DIAGNOSIS — I73.9 PVD (PERIPHERAL VASCULAR DISEASE) (HCC): ICD-10-CM

## 2018-10-03 DIAGNOSIS — M79.671 PAIN IN BOTH FEET: ICD-10-CM

## 2018-10-03 DIAGNOSIS — E11.51 TYPE II DIABETES MELLITUS WITH PERIPHERAL CIRCULATORY DISORDER (HCC): Primary | ICD-10-CM

## 2018-10-03 PROCEDURE — A5500 DIAB SHOE FOR DENSITY INSERT: HCPCS | Performed by: PODIATRIST

## 2018-10-03 PROCEDURE — A5513 MULTI DEN INSERT CUSTOM MOLD: HCPCS | Performed by: PODIATRIST

## 2018-10-03 PROCEDURE — 11721 DEBRIDE NAIL 6 OR MORE: CPT | Performed by: PODIATRIST

## 2018-10-03 NOTE — PROGRESS NOTES
[x] [x] [x] [x] [x] [x] [x] [x] [x] [x]  5 4 3 2 1 1 2 3 4 5                          Right                                        Left    Incurvation/Ingrowin   [] [] [] [] [] [] [] [] [] []  5 4 3 2 1 1 2 3 4 5                         Right                                        Left    Inflammation/Pain   [x] [x] [x] [x] [x] [x] [x] [x] [x] [x]  5 4 3 2 1 1 2 3 4 5                         Right                                        Left      Dermatologic Exam:  Skin lesion/ulceration Absent . Skin No rashes or nodules noted. .       Musculoskeletal:     1st MPJ ROM decreased, Bilateral.  Muscle strength 5/5, Bilateral.  Pain present upon palpation of toenails 1-5, Bilateral. decreased medial longitudinal arch, Bilateral.  Ankle ROM decreased,Bilateral.    Dorsally contracted digits present digits 2, Bilateral.     Vascular: DP and PT pulses palpable 1/4, Bilateral.  CFT <5 seconds, Bilateral.  Hair growth absent to the level of the digits, Bilateral.  Edema present, Bilateral.  Varicosities absent, Bilateral. Erythema absent, Bilateral    Neurological: Sensation diminshed to light touch to level of digits, Bilateral.  Protective sensation intact 6/10 sites via 5.07/10g [place]-Ravi Monofilament, Bilateral.  negative Tinel's, Bilateral.  negative Valleix sign, Bilateral.      Integument: Warm, dry, supple, Bilateral.  Open lesion absent, Bilateral.  Interdigital maceration absent to web spaces 4, Bilateral.  Nails 1-5 left and 1-5 right thickened > 3.0 mm, dystrophic and crumbly, discolored with subungual debris.   Fissures absent, Bilateral.     Visual inspection:  Deformity: hammertoe deformity pierre feet  amputation: absent  Skin lesions: absent  Edema: right- 2+ pitting edema, left- 2+ pitting edema    Sensory exam:  Monofilament sensation: abnormal - 6/10 via SW 5.07/10g monofilament to the plantar foot bilateral feet    Pulses: abnormal - 1/4 dorsalis pedis pulse and 1/4 Posterior tibial pulse, here for diabetic shoe and insert despensing. Relates to no pedal pain. Denies N/F/V/C/SOB/Cp    Objective:  Shoes dispensed today. A:  Diabetic with neuropathy, PVD, hammertoe right and left 2nd    Plan:  Dispensed extra depth diabetic shoes X2 off the shelf depth inlay shoes to accomodate multidensity inserts right and left. Dispensed custom diabetic multidensity inserts X 2. Inserts are custom molded to patient's feet by heat molding with direct contact to patients foot. Devices are multidensity in nature in compliance with CMS guidelines. . Instruct patient to change inserts at 4 month intervals. Reviewed with patient CMS Medicare DMEPOS Supplier Standards and patient signed and acknowledge receipt of these standards. Instruct patient to wear shoes only indoors until certain shoes are comfortable and to wear only one hour the first day then increase at hourly increments the first week. . Patient advised to call our office if there is any problems with the shoes or inserts. Patient to be seen in 4 weeks for follow-up care. 1 pair of shoes and 3 pairs of inserts were dispensed. Break in discussed.    10/3/2018    Electronically signed by Fiona Jernigan DPM on 10/3/2018 at 10:38 AM  10/3/2018

## 2018-10-05 ENCOUNTER — HOSPITAL ENCOUNTER (OUTPATIENT)
Dept: GENERAL RADIOLOGY | Age: 78
Discharge: HOME | End: 2018-10-07
Payer: MEDICARE

## 2018-10-05 ENCOUNTER — OFFICE VISIT (OUTPATIENT)
Dept: ORTHOPEDIC SURGERY | Age: 78
End: 2018-10-05
Payer: MEDICARE

## 2018-10-05 ENCOUNTER — HOSPITAL ENCOUNTER (OUTPATIENT)
Age: 78
Discharge: HOME | End: 2018-10-07
Payer: MEDICARE

## 2018-10-05 VITALS — BODY MASS INDEX: 32.44 KG/M2 | HEIGHT: 64 IN | WEIGHT: 190 LBS

## 2018-10-05 DIAGNOSIS — M25.552 LEFT HIP PAIN: ICD-10-CM

## 2018-10-05 DIAGNOSIS — M25.552 LEFT HIP PAIN: Primary | ICD-10-CM

## 2018-10-05 PROCEDURE — 73502 X-RAY EXAM HIP UNI 2-3 VIEWS: CPT

## 2018-10-05 PROCEDURE — 99203 OFFICE O/P NEW LOW 30 MIN: CPT | Performed by: ORTHOPAEDIC SURGERY

## 2018-10-10 RX ORDER — B-COMPLEX WITH VITAMIN C
TABLET ORAL
Qty: 30 TABLET | Refills: 0 | Status: SHIPPED | OUTPATIENT
Start: 2018-10-10 | End: 2019-03-13 | Stop reason: SDUPTHER

## 2018-10-10 NOTE — TELEPHONE ENCOUNTER
Last OV 9/12/2018    Health Maintenance   Topic Date Due    DTaP/Tdap/Td vaccine (1 - Tdap) 04/16/1958    Shingles Vaccine (1 of 2 - 2 Dose Series) 06/10/2015    Potassium monitoring  05/10/2019    Creatinine monitoring  05/10/2019    DEXA (modify frequency per FRAX score)  Completed    Flu vaccine  Completed    Pneumococcal low/med risk  Completed             (applicable per patient's age: Cancer Screenings, Depression Screening, Fall Risk Screening, Immunizations)    Hemoglobin A1C (%)   Date Value   05/10/2018 5.6   06/07/2017 6.1 (H)     LDL Cholesterol (mg/dL)   Date Value   05/10/2018 70     LDL Calculated (mg/dL)   Date Value   12/07/2016 76     AST (U/L)   Date Value   05/10/2018 14     ALT (U/L)   Date Value   05/10/2018 8     BUN (mg/dL)   Date Value   05/10/2018 21      (goal A1C is < 7)   (goal LDL is <100) need 30-50% reduction from baseline     BP Readings from Last 3 Encounters:   09/12/18 (!) 152/80   08/13/18 116/64   07/16/18 136/76    (goal /80)      All Future Testing planned in [facility]:      Next Visit Date:  Future Appointments  Date Time Provider Kennedy Tucker   1/9/2019 10:00 AM JETHRO Muro [id number]   3/13/2019 1:20 PM KAREEM De La Cruz CNP            Patient Active Problem List:     Depression with anxiety     Essential hypertension     Mixed hyperlipidemia     Vitamin B 12 deficiency

## 2018-10-16 ENCOUNTER — TELEPHONE (OUTPATIENT)
Dept: PRIMARY CARE CLINIC | Age: 78
End: 2018-10-16

## 2018-10-16 NOTE — TELEPHONE ENCOUNTER
Per patient request, order for walker was sent along with face to face to [facility] equipment [phone]

## 2018-10-17 RX ORDER — CITALOPRAM 20 MG/1
TABLET ORAL
Qty: 90 TABLET | Refills: 1 | Status: SHIPPED | OUTPATIENT
Start: 2018-10-17 | End: 2019-03-13 | Stop reason: SDUPTHER

## 2018-10-24 ENCOUNTER — HOSPITAL ENCOUNTER (OUTPATIENT)
Dept: PREADMISSION TESTING | Age: 78
Discharge: HOME | End: 2018-10-28
Payer: MEDICARE

## 2018-10-24 VITALS
RESPIRATION RATE: 20 BRPM | HEART RATE: 72 BPM | TEMPERATURE: 98.1 F | WEIGHT: 190 LBS | OXYGEN SATURATION: 98 % | DIASTOLIC BLOOD PRESSURE: 75 MMHG | SYSTOLIC BLOOD PRESSURE: 138 MMHG | BODY MASS INDEX: 32.44 KG/M2 | HEIGHT: 64 IN

## 2018-10-24 DIAGNOSIS — M16.12 PRIMARY OSTEOARTHRITIS OF LEFT HIP: Primary | ICD-10-CM

## 2018-10-24 LAB
-: ABNORMAL
ABSOLUTE EOS #: 0.1 K/UL (ref 0–0.4)
ABSOLUTE IMMATURE GRANULOCYTE: ABNORMAL K/UL (ref 0–0.3)
ABSOLUTE LYMPH #: 2.5 K/UL (ref 1–4.8)
ABSOLUTE MONO #: 1 K/UL (ref 0.1–1.3)
AMORPHOUS: ABNORMAL
ANION GAP SERPL CALCULATED.3IONS-SCNC: 12 MMOL/L (ref 9–17)
BACTERIA: ABNORMAL
BASOPHILS # BLD: 1 % (ref 0–2)
BASOPHILS ABSOLUTE: 0.1 K/UL (ref 0–0.2)
BILIRUBIN URINE: ABNORMAL
BUN BLDV-MCNC: 13 MG/DL (ref 8–23)
BUN/CREAT BLD: ABNORMAL (ref 9–20)
CALCIUM SERPL-MCNC: 9.5 MG/DL (ref 8.6–10.4)
CASTS UA: ABNORMAL /LPF
CHLORIDE BLD-SCNC: 98 MMOL/L (ref 98–107)
CO2: 30 MMOL/L (ref 20–31)
COLOR: ABNORMAL
COMMENT UA: ABNORMAL
CREAT SERPL-MCNC: 1.17 MG/DL (ref 0.5–0.9)
CRYSTALS, UA: ABNORMAL /HPF
DIFFERENTIAL TYPE: ABNORMAL
EKG ATRIAL RATE: 65 BPM
EKG P AXIS: 42 DEGREES
EKG P-R INTERVAL: 216 MS
EKG Q-T INTERVAL: 440 MS
EKG QRS DURATION: 80 MS
EKG QTC CALCULATION (BAZETT): 457 MS
EKG R AXIS: -27 DEGREES
EKG T AXIS: 56 DEGREES
EKG VENTRICULAR RATE: 65 BPM
EOSINOPHILS RELATIVE PERCENT: 1 % (ref 0–4)
EPITHELIAL CELLS UA: ABNORMAL /HPF
GFR AFRICAN AMERICAN: 54 ML/MIN
GFR NON-AFRICAN AMERICAN: 45 ML/MIN
GFR SERPL CREATININE-BSD FRML MDRD: ABNORMAL ML/MIN/{1.73_M2}
GFR SERPL CREATININE-BSD FRML MDRD: ABNORMAL ML/MIN/{1.73_M2}
GLUCOSE BLD-MCNC: 107 MG/DL (ref 70–99)
GLUCOSE URINE: NEGATIVE
HCT VFR BLD CALC: 36.5 % (ref 36–46)
HEMOGLOBIN: 12 G/DL (ref 12–16)
IMMATURE GRANULOCYTES: ABNORMAL %
KETONES, URINE: ABNORMAL
LEUKOCYTE ESTERASE, URINE: ABNORMAL
LYMPHOCYTES # BLD: 22 % (ref 24–44)
MCH RBC QN AUTO: 27.5 PG (ref 26–34)
MCHC RBC AUTO-ENTMCNC: 32.8 G/DL (ref 31–37)
MCV RBC AUTO: 83.9 FL (ref 80–100)
MONOCYTES # BLD: 9 % (ref 1–7)
MUCUS: ABNORMAL
NITRITE, URINE: NEGATIVE
NRBC AUTOMATED: ABNORMAL PER 100 WBC
OTHER OBSERVATIONS UA: ABNORMAL
PDW BLD-RTO: 13.7 % (ref 11.5–14.9)
PH UA: 6 (ref 5–8)
PLATELET # BLD: 306 K/UL (ref 150–450)
PLATELET ESTIMATE: ABNORMAL
PMV BLD AUTO: 9.7 FL (ref 6–12)
POTASSIUM SERPL-SCNC: 4.2 MMOL/L (ref 3.7–5.3)
PROTEIN UA: ABNORMAL
RBC # BLD: 4.36 M/UL (ref 4–5.2)
RBC # BLD: ABNORMAL 10*6/UL
RBC UA: ABNORMAL /HPF
RENAL EPITHELIAL, UA: ABNORMAL /HPF
SEG NEUTROPHILS: 67 % (ref 36–66)
SEGMENTED NEUTROPHILS ABSOLUTE COUNT: 7.7 K/UL (ref 1.3–9.1)
SODIUM BLD-SCNC: 140 MMOL/L (ref 135–144)
SPECIFIC GRAVITY UA: 1.02 (ref 1–1.03)
TRICHOMONAS: ABNORMAL
TURBIDITY: ABNORMAL
URINE HGB: ABNORMAL
UROBILINOGEN, URINE: NORMAL
WBC # BLD: 11.5 K/UL (ref 3.5–11)
WBC # BLD: ABNORMAL 10*3/UL
WBC UA: ABNORMAL /HPF
YEAST: ABNORMAL

## 2018-10-24 PROCEDURE — 87186 SC STD MICRODIL/AGAR DIL: CPT

## 2018-10-24 PROCEDURE — 85025 COMPLETE CBC W/AUTO DIFF WBC: CPT

## 2018-10-24 PROCEDURE — 87086 URINE CULTURE/COLONY COUNT: CPT

## 2018-10-24 PROCEDURE — 87641 MR-STAPH DNA AMP PROBE: CPT

## 2018-10-24 PROCEDURE — 93005 ELECTROCARDIOGRAM TRACING: CPT

## 2018-10-24 PROCEDURE — 36415 COLL VENOUS BLD VENIPUNCTURE: CPT

## 2018-10-24 PROCEDURE — 80048 BASIC METABOLIC PNL TOTAL CA: CPT

## 2018-10-24 PROCEDURE — 87088 URINE BACTERIA CULTURE: CPT

## 2018-10-24 PROCEDURE — 81001 URINALYSIS AUTO W/SCOPE: CPT

## 2018-10-24 PROCEDURE — 87077 CULTURE AEROBIC IDENTIFY: CPT

## 2018-10-24 PROCEDURE — 86901 BLOOD TYPING SEROLOGIC RH(D): CPT

## 2018-10-24 PROCEDURE — 86900 BLOOD TYPING SEROLOGIC ABO: CPT

## 2018-10-24 PROCEDURE — 86850 RBC ANTIBODY SCREEN: CPT

## 2018-10-24 ASSESSMENT — HOOS JR
BENDING TO THE FLOOR TO PICK UP OBJECT: 3
WALKING ON UNEVEN SURFACE: 2
GOING UP OR DOWN STAIRS: 0
LYING IN BED (TURNING OVER, MAINTAINING HIP POSITION): 4
HOOS JR RAW SCORE: 13
RISING FROM SITTING: 3
SITTING: 1

## 2018-10-24 ASSESSMENT — PROMIS GLOBAL HEALTH SCALE
IN GENERAL, WOULD YOU SAY YOUR QUALITY OF LIFE IS...[ON A SCALE OF 1 (POOR) TO 5 (EXCELLENT)]: 2
IN GENERAL, HOW WOULD YOU RATE YOUR SATISFACTION WITH YOUR SOCIAL ACTIVITIES AND RELATIONSHIPS [ON A SCALE OF 1 (POOR) TO 5 (EXCELLENT)]?: 3
SUM OF RESPONSES TO QUESTIONS 3, 6, 7, & 8: 14
WHO IS THE PERSON COMPLETING THE PROMIS V1.1 SURVEY?: 0
IN GENERAL, HOW WOULD YOU RATE YOUR MENTAL HEALTH, INCLUDING YOUR MOOD AND YOUR ABILITY TO THINK [ON A SCALE OF 1 (POOR) TO 5 (EXCELLENT)]?: 3
SUM OF RESPONSES TO QUESTIONS 2, 4, 5, & 10: 11
IN THE PAST 7 DAYS, HOW WOULD YOU RATE YOUR FATIGUE ON AVERAGE [ON A SCALE FROM 1 (NONE) TO 5 (VERY SEVERE)]?: 4
IN GENERAL, WOULD YOU SAY YOUR HEALTH IS...[ON A SCALE OF 1 (POOR) TO 5 (EXCELLENT)]: 2
HOW IS THE PROMIS V1.1 BEING ADMINISTERED?: 0
IN GENERAL, PLEASE RATE HOW WELL YOU CARRY OUT YOUR USUAL SOCIAL ACTIVITIES (INCLUDES ACTIVITIES AT HOME, AT WORK, AND IN YOUR COMMUNITY, AND RESPONSIBILITIES AS A PARENT, CHILD, SPOUSE, EMPLOYEE, FRIEND, ETC) [ON A SCALE OF 1 (POOR) TO 5 (EXCELLENT)]?: 2
IN THE PAST 7 DAYS, HOW WOULD YOU RATE YOUR PAIN ON AVERAGE [ON A SCALE FROM 0 (NO PAIN) TO 10 (WORST IMAGINABLE PAIN)]?: 7
IN GENERAL, HOW WOULD YOU RATE YOUR PHYSICAL HEALTH [ON A SCALE OF 1 (POOR) TO 5 (EXCELLENT)]?: 2
TO WHAT EXTENT ARE YOU ABLE TO CARRY OUT YOUR EVERYDAY PHYSICAL ACTIVITIES SUCH AS WALKING, CLIMBING STAIRS, CARRYING GROCERIES, OR MOVING A CHAIR [ON A SCALE OF 1 (NOT AT ALL) TO 5 (COMPLETELY)]?: 1
IN THE PAST 7 DAYS, HOW OFTEN HAVE YOU BEEN BOTHERED BY EMOTIONAL PROBLEMS, SUCH AS FEELING ANXIOUS, DEPRESSED, OR IRRITABLE [ON A SCALE FROM 1 (NEVER) TO 5 (ALWAYS)]?: 3

## 2018-10-24 NOTE — H&P
Patient in wheel chair. EXTREMITIES:  Symmetrical, no pretibial edema. Jesss sign negative. No discoloration or ulcerations. NEUROLOGIC:  The patient is conscious, alert, oriented, No apparent focal sensory or motor deficits. PROVISIONAL DIAGNOSES / SURGERY:         Left hip pain                Severe left hip osteoarthritis.         HIP TOTAL ARTHROPLASTY MINIMALLY INVASIVE-Left    Patient Active Problem List    Diagnosis Date Noted    Depression with anxiety 04/10/2017    Essential hypertension 04/10/2017    Mixed hyperlipidemia 04/10/2017    Vitamin B 12 deficiency 04/10/2017           KAREEM Pacheco - CNP on 10/24/2018 at 4:28 PM

## 2018-10-25 LAB
ABO/RH: NORMAL
ANTIBODY SCREEN: NEGATIVE
ARM BAND NUMBER: NORMAL
BLOOD BANK COMMENT: NORMAL
EXPIRATION DATE: NORMAL
MRSA, DNA, NASAL: NORMAL
SPECIMEN DESCRIPTION: NORMAL

## 2018-10-26 ENCOUNTER — ANESTHESIA EVENT (OUTPATIENT)
Dept: OPERATING ROOM | Age: 78
DRG: 470 | End: 2018-10-26
Payer: MEDICARE

## 2018-10-26 ENCOUNTER — TELEPHONE (OUTPATIENT)
Dept: ORTHOPEDIC SURGERY | Age: 78
End: 2018-10-26

## 2018-10-26 DIAGNOSIS — R82.90 ABNORMAL FINDING ON URINALYSIS: Primary | ICD-10-CM

## 2018-10-26 RX ORDER — SODIUM CHLORIDE 0.9 % (FLUSH) 0.9 %
10 SYRINGE (ML) INJECTION PRN
Status: CANCELLED | OUTPATIENT
Start: 2018-10-26

## 2018-10-26 RX ORDER — SULFAMETHOXAZOLE AND TRIMETHOPRIM 800; 160 MG/1; MG/1
1 TABLET ORAL 2 TIMES DAILY
Qty: 20 TABLET | Refills: 0 | Status: SHIPPED | OUTPATIENT
Start: 2018-10-26 | End: 2018-11-05

## 2018-10-26 RX ORDER — SODIUM CHLORIDE 9 MG/ML
INJECTION, SOLUTION INTRAVENOUS CONTINUOUS
Status: CANCELLED | OUTPATIENT
Start: 2018-10-26

## 2018-10-26 RX ORDER — LIDOCAINE HYDROCHLORIDE 10 MG/ML
1 INJECTION, SOLUTION EPIDURAL; INFILTRATION; INTRACAUDAL; PERINEURAL
Status: CANCELLED | OUTPATIENT
Start: 2018-10-26 | End: 2018-10-26

## 2018-10-26 RX ORDER — SODIUM CHLORIDE 0.9 % (FLUSH) 0.9 %
10 SYRINGE (ML) INJECTION EVERY 12 HOURS SCHEDULED
Status: CANCELLED | OUTPATIENT
Start: 2018-10-26

## 2018-10-26 NOTE — TELEPHONE ENCOUNTER
Left message for pt. to call. Having L TAHIR 11/6/18. Sent Rx. to [facility] already in [facility], unless not where she wants.

## 2018-10-28 LAB
CULTURE: ABNORMAL
CULTURE: ABNORMAL
Lab: ABNORMAL
ORGANISM: ABNORMAL
ORGANISM: ABNORMAL
SPECIMEN DESCRIPTION: ABNORMAL
STATUS: ABNORMAL

## 2018-10-30 ASSESSMENT — PROMIS GLOBAL HEALTH SCALE
IN THE PAST 7 DAYS, HOW WOULD YOU RATE YOUR PAIN ON AVERAGE [ON A SCALE FROM 0 (NO PAIN) TO 10 (WORST IMAGINABLE PAIN)]?: 8
TO WHAT EXTENT ARE YOU ABLE TO CARRY OUT YOUR EVERYDAY PHYSICAL ACTIVITIES SUCH AS WALKING, CLIMBING STAIRS, CARRYING GROCERIES, OR MOVING A CHAIR [ON A SCALE OF 1 (NOT AT ALL) TO 5 (COMPLETELY)]?: 2
IN GENERAL, WOULD YOU SAY YOUR QUALITY OF LIFE IS...[ON A SCALE OF 1 (POOR) TO 5 (EXCELLENT)]: 2
IN GENERAL, HOW WOULD YOU RATE YOUR MENTAL HEALTH, INCLUDING YOUR MOOD AND YOUR ABILITY TO THINK [ON A SCALE OF 1 (POOR) TO 5 (EXCELLENT)]?: 3
IN THE PAST 7 DAYS, HOW OFTEN HAVE YOU BEEN BOTHERED BY EMOTIONAL PROBLEMS, SUCH AS FEELING ANXIOUS, DEPRESSED, OR IRRITABLE [ON A SCALE FROM 1 (NEVER) TO 5 (ALWAYS)]?: 3
IN THE PAST 7 DAYS, HOW WOULD YOU RATE YOUR FATIGUE ON AVERAGE [ON A SCALE FROM 1 (NONE) TO 5 (VERY SEVERE)]?: 4
IN GENERAL, PLEASE RATE HOW WELL YOU CARRY OUT YOUR USUAL SOCIAL ACTIVITIES (INCLUDES ACTIVITIES AT HOME, AT WORK, AND IN YOUR COMMUNITY, AND RESPONSIBILITIES AS A PARENT, CHILD, SPOUSE, EMPLOYEE, FRIEND, ETC) [ON A SCALE OF 1 (POOR) TO 5 (EXCELLENT)]?: 3
WHO IS THE PERSON COMPLETING THE PROMIS V1.1 SURVEY?: 0
IN GENERAL, HOW WOULD YOU RATE YOUR PHYSICAL HEALTH [ON A SCALE OF 1 (POOR) TO 5 (EXCELLENT)]?: 2
IN GENERAL, WOULD YOU SAY YOUR HEALTH IS...[ON A SCALE OF 1 (POOR) TO 5 (EXCELLENT)]: 2
IN GENERAL, HOW WOULD YOU RATE YOUR SATISFACTION WITH YOUR SOCIAL ACTIVITIES AND RELATIONSHIPS [ON A SCALE OF 1 (POOR) TO 5 (EXCELLENT)]?: 2
HOW IS THE PROMIS V1.1 BEING ADMINISTERED?: 2

## 2018-10-30 ASSESSMENT — HOOS JR
RISING FROM SITTING: 2
WALKING ON UNEVEN SURFACE: 3
LYING IN BED (TURNING OVER, MAINTAINING HIP POSITION): 3
SITTING: 2
GOING UP OR DOWN STAIRS: 0
BENDING TO THE FLOOR TO PICK UP OBJECT: 2

## 2018-11-06 ENCOUNTER — HOSPITAL ENCOUNTER (INPATIENT)
Age: 78
LOS: 3 days | Discharge: HOME HEALTH | DRG: 470 | End: 2018-11-09
Attending: ORTHOPAEDIC SURGERY | Admitting: ORTHOPAEDIC SURGERY
Payer: MEDICARE

## 2018-11-06 ENCOUNTER — APPOINTMENT (OUTPATIENT)
Dept: GENERAL RADIOLOGY | Age: 78
DRG: 470 | End: 2018-11-06
Attending: ORTHOPAEDIC SURGERY
Payer: MEDICARE

## 2018-11-06 ENCOUNTER — ANESTHESIA (OUTPATIENT)
Dept: OPERATING ROOM | Age: 78
DRG: 470 | End: 2018-11-06
Payer: MEDICARE

## 2018-11-06 VITALS
TEMPERATURE: 83.5 F | RESPIRATION RATE: 16 BRPM | DIASTOLIC BLOOD PRESSURE: 60 MMHG | SYSTOLIC BLOOD PRESSURE: 145 MMHG | OXYGEN SATURATION: 100 %

## 2018-11-06 DIAGNOSIS — Z98.890 STATUS POST SURGERY: ICD-10-CM

## 2018-11-06 DIAGNOSIS — M16.12 PRIMARY OSTEOARTHRITIS OF LEFT HIP: Primary | ICD-10-CM

## 2018-11-06 LAB
GLUCOSE BLD-MCNC: 106 MG/DL (ref 65–105)
GLUCOSE BLD-MCNC: 175 MG/DL (ref 65–105)
GLUCOSE BLD-MCNC: 177 MG/DL (ref 65–105)
GLUCOSE BLD-MCNC: 184 MG/DL (ref 65–105)
GLUCOSE BLD-MCNC: 197 MG/DL (ref 65–105)

## 2018-11-06 PROCEDURE — 27130 TOTAL HIP ARTHROPLASTY: CPT | Performed by: ORTHOPAEDIC SURGERY

## 2018-11-06 PROCEDURE — 6360000002 HC RX W HCPCS: Performed by: ORTHOPAEDIC SURGERY

## 2018-11-06 PROCEDURE — 2500000003 HC RX 250 WO HCPCS: Performed by: ORTHOPAEDIC SURGERY

## 2018-11-06 PROCEDURE — 6370000000 HC RX 637 (ALT 250 FOR IP): Performed by: INTERNAL MEDICINE

## 2018-11-06 PROCEDURE — 3700000000 HC ANESTHESIA ATTENDED CARE: Performed by: ORTHOPAEDIC SURGERY

## 2018-11-06 PROCEDURE — G8979 MOBILITY GOAL STATUS: HCPCS

## 2018-11-06 PROCEDURE — C1776 JOINT DEVICE (IMPLANTABLE): HCPCS | Performed by: ORTHOPAEDIC SURGERY

## 2018-11-06 PROCEDURE — 51701 INSERT BLADDER CATHETER: CPT

## 2018-11-06 PROCEDURE — 6370000000 HC RX 637 (ALT 250 FOR IP): Performed by: ORTHOPAEDIC SURGERY

## 2018-11-06 PROCEDURE — 2580000003 HC RX 258: Performed by: ANESTHESIOLOGY

## 2018-11-06 PROCEDURE — 2709999900 HC NON-CHARGEABLE SUPPLY: Performed by: ORTHOPAEDIC SURGERY

## 2018-11-06 PROCEDURE — 2500000003 HC RX 250 WO HCPCS: Performed by: ANESTHESIOLOGY

## 2018-11-06 PROCEDURE — 97530 THERAPEUTIC ACTIVITIES: CPT

## 2018-11-06 PROCEDURE — 0SRB02Z REPLACEMENT OF LEFT HIP JOINT WITH METAL ON POLYETHYLENE SYNTHETIC SUBSTITUTE, OPEN APPROACH: ICD-10-PCS | Performed by: ORTHOPAEDIC SURGERY

## 2018-11-06 PROCEDURE — 94664 DEMO&/EVAL PT USE INHALER: CPT

## 2018-11-06 PROCEDURE — 2720000010 HC SURG SUPPLY STERILE: Performed by: ORTHOPAEDIC SURGERY

## 2018-11-06 PROCEDURE — G8978 MOBILITY CURRENT STATUS: HCPCS

## 2018-11-06 PROCEDURE — 6360000002 HC RX W HCPCS: Performed by: ANESTHESIOLOGY

## 2018-11-06 PROCEDURE — 73501 X-RAY EXAM HIP UNI 1 VIEW: CPT

## 2018-11-06 PROCEDURE — 3209999900 FLUORO FOR SURGICAL PROCEDURES

## 2018-11-06 PROCEDURE — 2580000003 HC RX 258: Performed by: ORTHOPAEDIC SURGERY

## 2018-11-06 PROCEDURE — 3600000014 HC SURGERY LEVEL 4 ADDTL 15MIN: Performed by: ORTHOPAEDIC SURGERY

## 2018-11-06 PROCEDURE — 97161 PT EVAL LOW COMPLEX 20 MIN: CPT

## 2018-11-06 PROCEDURE — 51798 US URINE CAPACITY MEASURE: CPT

## 2018-11-06 PROCEDURE — 97165 OT EVAL LOW COMPLEX 30 MIN: CPT

## 2018-11-06 PROCEDURE — 82947 ASSAY GLUCOSE BLOOD QUANT: CPT

## 2018-11-06 PROCEDURE — 7100000001 HC PACU RECOVERY - ADDTL 15 MIN: Performed by: ORTHOPAEDIC SURGERY

## 2018-11-06 PROCEDURE — 1200000000 HC SEMI PRIVATE

## 2018-11-06 PROCEDURE — 3700000001 HC ADD 15 MINUTES (ANESTHESIA): Performed by: ORTHOPAEDIC SURGERY

## 2018-11-06 PROCEDURE — 3600000004 HC SURGERY LEVEL 4 BASE: Performed by: ORTHOPAEDIC SURGERY

## 2018-11-06 PROCEDURE — 7100000000 HC PACU RECOVERY - FIRST 15 MIN: Performed by: ORTHOPAEDIC SURGERY

## 2018-11-06 DEVICE — COCR FEM HD 40MM TYPE 1 STD: Type: IMPLANTABLE DEVICE | Site: HIP | Status: FUNCTIONAL

## 2018-11-06 DEVICE — Z DUP USE 2505776 SHELL ACET SZ G DIA58MM 4 H HIP PPS FIN LIMIT G7: Type: IMPLANTABLE DEVICE | Site: HIP | Status: FUNCTIONAL

## 2018-11-06 DEVICE — STEM FEM SZ 14 L148MM STD OFFSET DST HIP TI PPS TYP 1 TAPR: Type: IMPLANTABLE DEVICE | Site: HIP | Status: FUNCTIONAL

## 2018-11-06 DEVICE — LINER ACET SZ G DIA40MM HIP NEUT ARCOMXL G7: Type: IMPLANTABLE DEVICE | Site: HIP | Status: FUNCTIONAL

## 2018-11-06 RX ORDER — PROPOFOL 10 MG/ML
INJECTION, EMULSION INTRAVENOUS PRN
Status: DISCONTINUED | OUTPATIENT
Start: 2018-11-06 | End: 2018-11-06 | Stop reason: SDUPTHER

## 2018-11-06 RX ORDER — FENTANYL CITRATE 50 UG/ML
INJECTION, SOLUTION INTRAMUSCULAR; INTRAVENOUS PRN
Status: DISCONTINUED | OUTPATIENT
Start: 2018-11-06 | End: 2018-11-06 | Stop reason: SDUPTHER

## 2018-11-06 RX ORDER — ONDANSETRON 2 MG/ML
4 INJECTION INTRAMUSCULAR; INTRAVENOUS EVERY 6 HOURS PRN
Status: DISCONTINUED | OUTPATIENT
Start: 2018-11-06 | End: 2018-11-09 | Stop reason: HOSPADM

## 2018-11-06 RX ORDER — PRAVASTATIN SODIUM 40 MG
40 TABLET ORAL NIGHTLY
Status: DISCONTINUED | OUTPATIENT
Start: 2018-11-06 | End: 2018-11-09 | Stop reason: HOSPADM

## 2018-11-06 RX ORDER — MORPHINE SULFATE 2 MG/ML
2 INJECTION, SOLUTION INTRAMUSCULAR; INTRAVENOUS EVERY 5 MIN PRN
Status: DISCONTINUED | OUTPATIENT
Start: 2018-11-06 | End: 2018-11-06 | Stop reason: HOSPADM

## 2018-11-06 RX ORDER — CITALOPRAM 20 MG/1
20 TABLET ORAL DAILY
Status: DISCONTINUED | OUTPATIENT
Start: 2018-11-06 | End: 2018-11-09 | Stop reason: HOSPADM

## 2018-11-06 RX ORDER — SCOLOPAMINE TRANSDERMAL SYSTEM 1 MG/1
1 PATCH, EXTENDED RELEASE TRANSDERMAL ONCE
Status: DISCONTINUED | OUTPATIENT
Start: 2018-11-06 | End: 2018-11-06 | Stop reason: SDUPTHER

## 2018-11-06 RX ORDER — DOCUSATE SODIUM 100 MG/1
100 CAPSULE, LIQUID FILLED ORAL 2 TIMES DAILY
Status: DISCONTINUED | OUTPATIENT
Start: 2018-11-06 | End: 2018-11-09 | Stop reason: HOSPADM

## 2018-11-06 RX ORDER — GABAPENTIN 600 MG/1
300 TABLET ORAL ONCE
Status: DISCONTINUED | OUTPATIENT
Start: 2018-11-06 | End: 2018-11-06 | Stop reason: SDUPTHER

## 2018-11-06 RX ORDER — ROCURONIUM BROMIDE 10 MG/ML
INJECTION, SOLUTION INTRAVENOUS PRN
Status: DISCONTINUED | OUTPATIENT
Start: 2018-11-06 | End: 2018-11-06 | Stop reason: SDUPTHER

## 2018-11-06 RX ORDER — TRANEXAMIC ACID 100 MG/ML
INJECTION, SOLUTION INTRAVENOUS PRN
Status: DISCONTINUED | OUTPATIENT
Start: 2018-11-06 | End: 2018-11-06 | Stop reason: SDUPTHER

## 2018-11-06 RX ORDER — SODIUM CHLORIDE, SODIUM LACTATE, POTASSIUM CHLORIDE, CALCIUM CHLORIDE 600; 310; 30; 20 MG/100ML; MG/100ML; MG/100ML; MG/100ML
INJECTION, SOLUTION INTRAVENOUS CONTINUOUS PRN
Status: DISCONTINUED | OUTPATIENT
Start: 2018-11-06 | End: 2018-11-06 | Stop reason: SDUPTHER

## 2018-11-06 RX ORDER — ACETAMINOPHEN 325 MG/1
650 TABLET ORAL ONCE
Status: COMPLETED | OUTPATIENT
Start: 2018-11-06 | End: 2018-11-06

## 2018-11-06 RX ORDER — DEXAMETHASONE SODIUM PHOSPHATE 10 MG/ML
10 INJECTION INTRAMUSCULAR; INTRAVENOUS ONCE
Status: DISCONTINUED | OUTPATIENT
Start: 2018-11-06 | End: 2018-11-06 | Stop reason: SDUPTHER

## 2018-11-06 RX ORDER — KETOROLAC TROMETHAMINE 30 MG/ML
15 INJECTION, SOLUTION INTRAMUSCULAR; INTRAVENOUS EVERY 8 HOURS SCHEDULED
Status: DISCONTINUED | OUTPATIENT
Start: 2018-11-06 | End: 2018-11-07

## 2018-11-06 RX ORDER — SODIUM CHLORIDE 0.9 % (FLUSH) 0.9 %
10 SYRINGE (ML) INJECTION EVERY 12 HOURS SCHEDULED
Status: DISCONTINUED | OUTPATIENT
Start: 2018-11-06 | End: 2018-11-09 | Stop reason: HOSPADM

## 2018-11-06 RX ORDER — LIDOCAINE HYDROCHLORIDE 10 MG/ML
1 INJECTION, SOLUTION EPIDURAL; INFILTRATION; INTRACAUDAL; PERINEURAL
Status: DISCONTINUED | OUTPATIENT
Start: 2018-11-06 | End: 2018-11-06 | Stop reason: HOSPADM

## 2018-11-06 RX ORDER — OXYCODONE HYDROCHLORIDE 5 MG/1
10 TABLET ORAL EVERY 4 HOURS PRN
Status: DISCONTINUED | OUTPATIENT
Start: 2018-11-06 | End: 2018-11-06 | Stop reason: SDUPTHER

## 2018-11-06 RX ORDER — SODIUM CHLORIDE 0.9 % (FLUSH) 0.9 %
10 SYRINGE (ML) INJECTION EVERY 12 HOURS SCHEDULED
Status: DISCONTINUED | OUTPATIENT
Start: 2018-11-06 | End: 2018-11-06 | Stop reason: SDUPTHER

## 2018-11-06 RX ORDER — MORPHINE SULFATE 4 MG/ML
INJECTION, SOLUTION INTRAMUSCULAR; INTRAVENOUS PRN
Status: DISCONTINUED | OUTPATIENT
Start: 2018-11-06 | End: 2018-11-06 | Stop reason: SDUPTHER

## 2018-11-06 RX ORDER — MEPERIDINE HYDROCHLORIDE 50 MG/ML
12.5 INJECTION INTRAMUSCULAR; INTRAVENOUS; SUBCUTANEOUS EVERY 5 MIN PRN
Status: DISCONTINUED | OUTPATIENT
Start: 2018-11-06 | End: 2018-11-06 | Stop reason: HOSPADM

## 2018-11-06 RX ORDER — DIPHENHYDRAMINE HYDROCHLORIDE 50 MG/ML
12.5 INJECTION INTRAMUSCULAR; INTRAVENOUS
Status: DISCONTINUED | OUTPATIENT
Start: 2018-11-06 | End: 2018-11-06 | Stop reason: HOSPADM

## 2018-11-06 RX ORDER — METOPROLOL SUCCINATE 50 MG/1
50 TABLET, EXTENDED RELEASE ORAL DAILY
Status: DISCONTINUED | OUTPATIENT
Start: 2018-11-07 | End: 2018-11-09 | Stop reason: HOSPADM

## 2018-11-06 RX ORDER — DEXAMETHASONE SODIUM PHOSPHATE 10 MG/ML
10 INJECTION INTRAMUSCULAR; INTRAVENOUS ONCE
Status: COMPLETED | OUTPATIENT
Start: 2018-11-06 | End: 2018-11-06

## 2018-11-06 RX ORDER — ACETAMINOPHEN 500 MG
1000 TABLET ORAL EVERY 8 HOURS SCHEDULED
Status: COMPLETED | OUTPATIENT
Start: 2018-11-06 | End: 2018-11-07

## 2018-11-06 RX ORDER — CALCIUM CARBONATE/VITAMIN D3 250-3.125
2 TABLET ORAL DAILY
Status: DISCONTINUED | OUTPATIENT
Start: 2018-11-06 | End: 2018-11-09 | Stop reason: HOSPADM

## 2018-11-06 RX ORDER — SODIUM CHLORIDE 0.9 % (FLUSH) 0.9 %
10 SYRINGE (ML) INJECTION PRN
Status: DISCONTINUED | OUTPATIENT
Start: 2018-11-06 | End: 2018-11-06 | Stop reason: SDUPTHER

## 2018-11-06 RX ORDER — SODIUM CHLORIDE 0.9 % (FLUSH) 0.9 %
10 SYRINGE (ML) INJECTION EVERY 12 HOURS SCHEDULED
Status: DISCONTINUED | OUTPATIENT
Start: 2018-11-06 | End: 2018-11-06 | Stop reason: HOSPADM

## 2018-11-06 RX ORDER — ONDANSETRON 2 MG/ML
4 INJECTION INTRAMUSCULAR; INTRAVENOUS
Status: DISCONTINUED | OUTPATIENT
Start: 2018-11-06 | End: 2018-11-06 | Stop reason: HOSPADM

## 2018-11-06 RX ORDER — GABAPENTIN 300 MG/1
300 CAPSULE ORAL ONCE
Status: COMPLETED | OUTPATIENT
Start: 2018-11-06 | End: 2018-11-06

## 2018-11-06 RX ORDER — EPHEDRINE SULFATE 50 MG/ML
INJECTION, SOLUTION INTRAVENOUS PRN
Status: DISCONTINUED | OUTPATIENT
Start: 2018-11-06 | End: 2018-11-06 | Stop reason: SDUPTHER

## 2018-11-06 RX ORDER — KETOROLAC TROMETHAMINE 30 MG/ML
15 INJECTION, SOLUTION INTRAMUSCULAR; INTRAVENOUS EVERY 8 HOURS SCHEDULED
Status: DISCONTINUED | OUTPATIENT
Start: 2018-11-06 | End: 2018-11-06 | Stop reason: SDUPTHER

## 2018-11-06 RX ORDER — B-COMPLEX WITH VITAMIN C
1 TABLET ORAL DAILY
Status: DISCONTINUED | OUTPATIENT
Start: 2018-11-06 | End: 2018-11-06

## 2018-11-06 RX ORDER — SODIUM CHLORIDE 0.9 % (FLUSH) 0.9 %
10 SYRINGE (ML) INJECTION PRN
Status: DISCONTINUED | OUTPATIENT
Start: 2018-11-06 | End: 2018-11-06 | Stop reason: HOSPADM

## 2018-11-06 RX ORDER — ONDANSETRON 2 MG/ML
4 INJECTION INTRAMUSCULAR; INTRAVENOUS EVERY 6 HOURS PRN
Status: DISCONTINUED | OUTPATIENT
Start: 2018-11-06 | End: 2018-11-06 | Stop reason: SDUPTHER

## 2018-11-06 RX ORDER — METOPROLOL TARTRATE 5 MG/5ML
INJECTION INTRAVENOUS PRN
Status: DISCONTINUED | OUTPATIENT
Start: 2018-11-06 | End: 2018-11-06 | Stop reason: SDUPTHER

## 2018-11-06 RX ORDER — CALCIUM CHLORIDE 100 MG/ML
INJECTION INTRAVENOUS; INTRAVENTRICULAR PRN
Status: DISCONTINUED | OUTPATIENT
Start: 2018-11-06 | End: 2018-11-06 | Stop reason: HOSPADM

## 2018-11-06 RX ORDER — SODIUM CHLORIDE, SODIUM LACTATE, POTASSIUM CHLORIDE, CALCIUM CHLORIDE 600; 310; 30; 20 MG/100ML; MG/100ML; MG/100ML; MG/100ML
INJECTION, SOLUTION INTRAVENOUS CONTINUOUS
Status: DISCONTINUED | OUTPATIENT
Start: 2018-11-06 | End: 2018-11-07

## 2018-11-06 RX ORDER — LIDOCAINE HYDROCHLORIDE 10 MG/ML
INJECTION, SOLUTION EPIDURAL; INFILTRATION; INTRACAUDAL; PERINEURAL PRN
Status: DISCONTINUED | OUTPATIENT
Start: 2018-11-06 | End: 2018-11-06 | Stop reason: SDUPTHER

## 2018-11-06 RX ORDER — DOCUSATE SODIUM 100 MG/1
100 CAPSULE, LIQUID FILLED ORAL 2 TIMES DAILY
Status: DISCONTINUED | OUTPATIENT
Start: 2018-11-06 | End: 2018-11-06 | Stop reason: SDUPTHER

## 2018-11-06 RX ORDER — SCOLOPAMINE TRANSDERMAL SYSTEM 1 MG/1
1 PATCH, EXTENDED RELEASE TRANSDERMAL ONCE
Status: COMPLETED | OUTPATIENT
Start: 2018-11-06 | End: 2018-11-09

## 2018-11-06 RX ORDER — ACETAMINOPHEN 500 MG
1000 TABLET ORAL EVERY 8 HOURS SCHEDULED
Status: DISCONTINUED | OUTPATIENT
Start: 2018-11-06 | End: 2018-11-06 | Stop reason: SDUPTHER

## 2018-11-06 RX ORDER — OXYCODONE HYDROCHLORIDE 5 MG/1
5 TABLET ORAL EVERY 4 HOURS PRN
Status: DISCONTINUED | OUTPATIENT
Start: 2018-11-06 | End: 2018-11-09 | Stop reason: HOSPADM

## 2018-11-06 RX ORDER — OXYCODONE HYDROCHLORIDE 5 MG/1
5 TABLET ORAL EVERY 4 HOURS PRN
Status: DISCONTINUED | OUTPATIENT
Start: 2018-11-06 | End: 2018-11-06 | Stop reason: SDUPTHER

## 2018-11-06 RX ORDER — OXYCODONE HYDROCHLORIDE 5 MG/1
10 TABLET ORAL EVERY 4 HOURS PRN
Status: DISCONTINUED | OUTPATIENT
Start: 2018-11-06 | End: 2018-11-09 | Stop reason: HOSPADM

## 2018-11-06 RX ORDER — LABETALOL HYDROCHLORIDE 5 MG/ML
5 INJECTION, SOLUTION INTRAVENOUS EVERY 10 MIN PRN
Status: DISCONTINUED | OUTPATIENT
Start: 2018-11-06 | End: 2018-11-06 | Stop reason: HOSPADM

## 2018-11-06 RX ORDER — SODIUM CHLORIDE 9 MG/ML
INJECTION, SOLUTION INTRAVENOUS CONTINUOUS PRN
Status: DISCONTINUED | OUTPATIENT
Start: 2018-11-06 | End: 2018-11-06 | Stop reason: SDUPTHER

## 2018-11-06 RX ORDER — SODIUM CHLORIDE 0.9 % (FLUSH) 0.9 %
10 SYRINGE (ML) INJECTION PRN
Status: DISCONTINUED | OUTPATIENT
Start: 2018-11-06 | End: 2018-11-09 | Stop reason: HOSPADM

## 2018-11-06 RX ORDER — SODIUM CHLORIDE 9 MG/ML
INJECTION, SOLUTION INTRAVENOUS CONTINUOUS
Status: DISCONTINUED | OUTPATIENT
Start: 2018-11-06 | End: 2018-11-07

## 2018-11-06 RX ORDER — SODIUM CHLORIDE, SODIUM LACTATE, POTASSIUM CHLORIDE, CALCIUM CHLORIDE 600; 310; 30; 20 MG/100ML; MG/100ML; MG/100ML; MG/100ML
INJECTION, SOLUTION INTRAVENOUS CONTINUOUS
Status: DISCONTINUED | OUTPATIENT
Start: 2018-11-06 | End: 2018-11-06 | Stop reason: SDUPTHER

## 2018-11-06 RX ORDER — SUCCINYLCHOLINE CHLORIDE 20 MG/ML
INJECTION INTRAMUSCULAR; INTRAVENOUS PRN
Status: DISCONTINUED | OUTPATIENT
Start: 2018-11-06 | End: 2018-11-06 | Stop reason: SDUPTHER

## 2018-11-06 RX ORDER — ACETAMINOPHEN 325 MG/1
650 TABLET ORAL ONCE
Status: DISCONTINUED | OUTPATIENT
Start: 2018-11-06 | End: 2018-11-06 | Stop reason: SDUPTHER

## 2018-11-06 RX ORDER — GLYCOPYRROLATE 1 MG/5 ML
SYRINGE (ML) INTRAVENOUS PRN
Status: DISCONTINUED | OUTPATIENT
Start: 2018-11-06 | End: 2018-11-06 | Stop reason: SDUPTHER

## 2018-11-06 RX ADMIN — GABAPENTIN 300 MG: 300 CAPSULE ORAL at 07:01

## 2018-11-06 RX ADMIN — METOPROLOL TARTRATE 2 MG: 1 INJECTION, SOLUTION INTRAVENOUS at 08:50

## 2018-11-06 RX ADMIN — Medication 2 G: at 23:32

## 2018-11-06 RX ADMIN — LIDOCAINE HYDROCHLORIDE 5 ML: 10 INJECTION, SOLUTION EPIDURAL; INFILTRATION; INTRACAUDAL; PERINEURAL at 07:29

## 2018-11-06 RX ADMIN — MORPHINE SULFATE 4 MG: 4 INJECTION INTRAVENOUS at 08:10

## 2018-11-06 RX ADMIN — SODIUM CHLORIDE: 9 INJECTION, SOLUTION INTRAVENOUS at 10:26

## 2018-11-06 RX ADMIN — EPHEDRINE SULFATE 25 MG: 50 INJECTION INTRAMUSCULAR; INTRAVENOUS; SUBCUTANEOUS at 07:36

## 2018-11-06 RX ADMIN — Medication 2 G: at 15:40

## 2018-11-06 RX ADMIN — SODIUM CHLORIDE, POTASSIUM CHLORIDE, SODIUM LACTATE AND CALCIUM CHLORIDE: 600; 310; 30; 20 INJECTION, SOLUTION INTRAVENOUS at 22:00

## 2018-11-06 RX ADMIN — KETOROLAC TROMETHAMINE 15 MG: 30 INJECTION, SOLUTION INTRAMUSCULAR at 22:03

## 2018-11-06 RX ADMIN — ROCURONIUM BROMIDE 5 MG: 10 INJECTION INTRAVENOUS at 07:29

## 2018-11-06 RX ADMIN — PROPOFOL 100 MG: 10 INJECTION, EMULSION INTRAVENOUS at 07:29

## 2018-11-06 RX ADMIN — SODIUM CHLORIDE: 9 INJECTION, SOLUTION INTRAVENOUS at 07:02

## 2018-11-06 RX ADMIN — MORPHINE SULFATE 4 MG: 4 INJECTION INTRAVENOUS at 08:40

## 2018-11-06 RX ADMIN — ACETAMINOPHEN 650 MG: 325 TABLET, FILM COATED ORAL at 07:01

## 2018-11-06 RX ADMIN — PHENYLEPHRINE HYDROCHLORIDE 100 MCG: 10 INJECTION INTRAVENOUS at 07:59

## 2018-11-06 RX ADMIN — EPHEDRINE SULFATE 25 MG: 50 INJECTION INTRAMUSCULAR; INTRAVENOUS; SUBCUTANEOUS at 07:55

## 2018-11-06 RX ADMIN — PHENYLEPHRINE HYDROCHLORIDE 100 MCG: 10 INJECTION INTRAVENOUS at 08:15

## 2018-11-06 RX ADMIN — SUCCINYLCHOLINE CHLORIDE 100 MG: 20 INJECTION INTRAMUSCULAR; INTRAVENOUS at 07:29

## 2018-11-06 RX ADMIN — KETOROLAC TROMETHAMINE 15 MG: 30 INJECTION, SOLUTION INTRAMUSCULAR at 13:50

## 2018-11-06 RX ADMIN — ACETAMINOPHEN 1000 MG: 500 TABLET, FILM COATED ORAL at 22:03

## 2018-11-06 RX ADMIN — TRANEXAMIC ACID 1000 MG: 100 INJECTION, SOLUTION INTRAVENOUS at 07:50

## 2018-11-06 RX ADMIN — SODIUM CHLORIDE: 9 INJECTION, SOLUTION INTRAVENOUS at 07:26

## 2018-11-06 RX ADMIN — Medication 2 G: at 07:29

## 2018-11-06 RX ADMIN — Medication 0.4 MG: at 07:59

## 2018-11-06 RX ADMIN — SODIUM CHLORIDE, POTASSIUM CHLORIDE, SODIUM LACTATE AND CALCIUM CHLORIDE: 600; 310; 30; 20 INJECTION, SOLUTION INTRAVENOUS at 07:29

## 2018-11-06 RX ADMIN — PROPOFOL 50 MG: 10 INJECTION, EMULSION INTRAVENOUS at 08:10

## 2018-11-06 RX ADMIN — METFORMIN HYDROCHLORIDE 500 MG: 500 TABLET ORAL at 16:56

## 2018-11-06 RX ADMIN — SODIUM CHLORIDE, POTASSIUM CHLORIDE, SODIUM LACTATE AND CALCIUM CHLORIDE: 600; 310; 30; 20 INJECTION, SOLUTION INTRAVENOUS at 07:26

## 2018-11-06 RX ADMIN — SODIUM CHLORIDE, POTASSIUM CHLORIDE, SODIUM LACTATE AND CALCIUM CHLORIDE: 600; 310; 30; 20 INJECTION, SOLUTION INTRAVENOUS at 12:47

## 2018-11-06 RX ADMIN — PRAVASTATIN SODIUM 40 MG: 40 TABLET ORAL at 22:03

## 2018-11-06 RX ADMIN — FENTANYL CITRATE 150 MCG: 50 INJECTION, SOLUTION INTRAMUSCULAR; INTRAVENOUS at 07:29

## 2018-11-06 RX ADMIN — DEXAMETHASONE SODIUM PHOSPHATE 10 MG: 10 INJECTION INTRAMUSCULAR; INTRAVENOUS at 07:02

## 2018-11-06 RX ADMIN — ACETAMINOPHEN 1000 MG: 500 TABLET, FILM COATED ORAL at 13:50

## 2018-11-06 ASSESSMENT — PULMONARY FUNCTION TESTS
PIF_VALUE: 23
PIF_VALUE: 15
PIF_VALUE: 28
PIF_VALUE: 26
PIF_VALUE: 16
PIF_VALUE: 22
PIF_VALUE: 1
PIF_VALUE: 19
PIF_VALUE: 16
PIF_VALUE: 21
PIF_VALUE: 25
PIF_VALUE: 2
PIF_VALUE: 26
PIF_VALUE: 25
PIF_VALUE: 20
PIF_VALUE: 22
PIF_VALUE: 19
PIF_VALUE: 21
PIF_VALUE: 15
PIF_VALUE: 17
PIF_VALUE: 25
PIF_VALUE: 24
PIF_VALUE: 26
PIF_VALUE: 2
PIF_VALUE: 21
PIF_VALUE: 3
PIF_VALUE: 19
PIF_VALUE: 25
PIF_VALUE: 22
PIF_VALUE: 3
PIF_VALUE: 28
PIF_VALUE: 0
PIF_VALUE: 25
PIF_VALUE: 21
PIF_VALUE: 2
PIF_VALUE: 16
PIF_VALUE: 19
PIF_VALUE: 19
PIF_VALUE: 31
PIF_VALUE: 28
PIF_VALUE: 20
PIF_VALUE: 24
PIF_VALUE: 14
PIF_VALUE: 3
PIF_VALUE: 0
PIF_VALUE: 15
PIF_VALUE: 25
PIF_VALUE: 3
PIF_VALUE: 23
PIF_VALUE: 22
PIF_VALUE: 24
PIF_VALUE: 25
PIF_VALUE: 19
PIF_VALUE: 21
PIF_VALUE: 2
PIF_VALUE: 22
PIF_VALUE: 4
PIF_VALUE: 21
PIF_VALUE: 20
PIF_VALUE: 2
PIF_VALUE: 22
PIF_VALUE: 25
PIF_VALUE: 26
PIF_VALUE: 21
PIF_VALUE: 26
PIF_VALUE: 22
PIF_VALUE: 22
PIF_VALUE: 1
PIF_VALUE: 23
PIF_VALUE: 28
PIF_VALUE: 22
PIF_VALUE: 27
PIF_VALUE: 19
PIF_VALUE: 24
PIF_VALUE: 24
PIF_VALUE: 25
PIF_VALUE: 25
PIF_VALUE: 20
PIF_VALUE: 2
PIF_VALUE: 22
PIF_VALUE: 23
PIF_VALUE: 3
PIF_VALUE: 19
PIF_VALUE: 20
PIF_VALUE: 3
PIF_VALUE: 25
PIF_VALUE: 22
PIF_VALUE: 2
PIF_VALUE: 32
PIF_VALUE: 3
PIF_VALUE: 24
PIF_VALUE: 0
PIF_VALUE: 14
PIF_VALUE: 20
PIF_VALUE: 25
PIF_VALUE: 19
PIF_VALUE: 15
PIF_VALUE: 20
PIF_VALUE: 23

## 2018-11-06 ASSESSMENT — PAIN SCALES - GENERAL
PAINLEVEL_OUTOF10: 3
PAINLEVEL_OUTOF10: 0
PAINLEVEL_OUTOF10: 3
PAINLEVEL_OUTOF10: 5
PAINLEVEL_OUTOF10: 0
PAINLEVEL_OUTOF10: 0

## 2018-11-06 ASSESSMENT — LIFESTYLE VARIABLES: SMOKING_STATUS: 0

## 2018-11-06 ASSESSMENT — PAIN DESCRIPTION - LOCATION: LOCATION: HIP

## 2018-11-06 ASSESSMENT — ENCOUNTER SYMPTOMS
SHORTNESS OF BREATH: 0
STRIDOR: 0

## 2018-11-06 ASSESSMENT — PAIN DESCRIPTION - ORIENTATION: ORIENTATION: LEFT

## 2018-11-06 ASSESSMENT — PAIN DESCRIPTION - PAIN TYPE: TYPE: SURGICAL PAIN

## 2018-11-06 ASSESSMENT — PAIN - FUNCTIONAL ASSESSMENT: PAIN_FUNCTIONAL_ASSESSMENT: 0-10

## 2018-11-06 NOTE — DISCHARGE INSTR - COC
Continuity of Care Form    Patient Name: Miguel Lou   :  1940  MRN:  [id number]    Admit date:  2018  Discharge date: 18    Code Status Order: Full Code   Advance Directives:   Advance Care Flowsheet Documentation     Date/Time Healthcare Directive Type of Healthcare Directive Copy in [address] Agent's Name Healthcare Agent's Phone Number    18 1219  No, patient does not have an advance directive for healthcare treatment -- -- -- -- --          Admitting Physician:  Spring Monahan MD  PCP: KAREEM Slaughter CNP    Discharging Nurse: [facility] Unit/Room#: 2039/2039-01  Discharging Unit Phone Number: [phone]    Emergency Contact:   Extended Emergency Contact Information  Primary Emergency Contact: Ese Verdugo   [address] Phone: [phone]  Work Phone: [phone]  Mobile Phone: [phone]  Relation: Child  Secondary Emergency Contact: Khushi Zuñiga[address] Phone: [phone]  Work Phone: [phone]  Mobile Phone: [phone]  Relation: Brother/Sister    Past Surgical History:  Past Surgical History:   Procedure Laterality Date    APPENDECTOMY      CATARACT REMOVAL WITH IMPLANT Bilateral     COLONOSCOPY      EYE SURGERY      HYSTERECTOMY      JOINT REPLACEMENT         Immunization History:   Immunization History   Administered Date(s) Administered    Influenza Virus Vaccine 10/05/2017    Influenza, Benedetta Arm, 3 Years and older, IM (Fluzone 3 yrs and older or Afluria 5 yrs and older) 2018    Pneumococcal 13-valent Conjugate ([place]) 06/15/2017    Pneumococcal Polysaccharide ([id number]) 2018    Zoster Live (Zostavax) 04/10/2015       Active Problems:  Patient Active Problem List   Diagnosis Code    Depression with anxiety F41.8    Essential hypertension I10    Mixed hyperlipidemia E78.2    Vitamin B 12 deficiency E53.8    Primary osteoarthritis of tenderness, swelling, or redness.  Shortness of breath or chest pain.  Any incision or surgical-related concerns.  Call surgeon with concerns PRIOR TO sending patient to hospital.    Normal Conditions:   Swelling in the operative leg: this should reduce over time.  Some post-operative pain.  Constipation related to pain medications/decreased mobility. (Increase fiber & water intake.)    Slight warmth of operative leg.  Fatigue and moderate pain after therapy.  Numbness near the incision site.  NOTE: Ensure/Remind patient to go to their follow-up appointment with their orthopedic surgeon, which is usually scheduled for 7-10 days after the surgery. RN SIGNATURE:  Electronically signed by Em Lyon RN on 11/9/18 at 12:18 PM    CASE MANAGEMENT/SOCIAL WORK SECTION    Inpatient Status Date: 11/6/18    Readmission Risk Assessment Score:  Readmission Risk              Risk of Unplanned Readmission:        4           Discharging to Facility/ [address]  Phone [phone]  · Fax  [phone]  ·       / signature: Electronically signed by Thelma Finley RN on 11/6/18 at 2:44 PM    PHYSICIAN SECTION    Prognosis: Good    Condition at Discharge: Stable    Rehab Potential (if transferring to Rehab): Good    Recommended Labs or Other Treatments After Discharge: PT/OT    Physician Certification: I certify the above information and transfer of Arturo Cano  is necessary for the continuing treatment of the diagnosis listed and that she requires [address] for less 30 days.      Update Admission H&P: No change in H&P    PHYSICIAN SIGNATURE:  Electronically signed by Wesley Santoyo MD on 11/7/18 at 12:42 PM

## 2018-11-06 NOTE — ANESTHESIA PRE PROCEDURE
Last Dose    ceFAZolin (ANCEF) 2 g in dextrose 5 % 50 mL IVPB  2 g Intravenous On Call to 16 Dian Kinsey MD        scopolamine (TRANSDERM-SCOP) transdermal patch 1 patch  1 patch Transdermal Once Stephanie Shah MD        gabapentin (NEURONTIN) capsule 300 mg  300 mg Oral Once Stephanie Shah MD        dexamethasone (DECADRON) injection 10 mg  10 mg Intravenous Once Stephanie Shah MD        acetaminophen (TYLENOL) tablet 650 mg  650 mg Oral Once Stephanie Shah MD        0.9 % sodium chloride infusion   Intravenous Continuous Stefany Worthy MD        lidocaine PF 1 % injection 1 mL  1 mL Intradermal Once PRN Stefany Worthy MD        sodium chloride flush 0.9 % injection 10 mL  10 mL Intravenous 2 times per day Stefany Worthy MD        sodium chloride flush 0.9 % injection 10 mL  10 mL Intravenous PRN Stefany Worthy MD           Allergies:     Allergies   Allergen Reactions    Codeine        Problem List:    Patient Active Problem List   Diagnosis Code    Depression with anxiety F41.8    Essential hypertension I10    Mixed hyperlipidemia E78.2    Vitamin B 12 deficiency E53.8       Past Medical History:        Diagnosis Date    Anemia     CHF (congestive heart failure) ([facility] [address]     QUESTIONABLE    Depression     Diabetes mellitus ([facility] [address]     Hiatal hernia     History of blood transfusion     Hyperlipidemia     Hypertension     PONV (postoperative nausea and vomiting)     Rectal urgency     Stress incontinence, female        Past Surgical History:        Procedure Laterality Date    APPENDECTOMY      CATARACT REMOVAL WITH IMPLANT Bilateral     COLONOSCOPY      EYE SURGERY      HYSTERECTOMY         Social History:    Social History   Substance Use Topics    Smoking status: Never Smoker    Smokeless tobacco: Never Used    Alcohol use No                                Counseling given: Not Answered      Vital Signs (Current):   Vitals:    11/06/18 0635   BP: (!) 144/72

## 2018-11-06 NOTE — PROGRESS NOTES
Mobility  Supine to Sit: Minimal assistance  Sit to Supine: Minimal assistance       Balance  Sitting Balance: Independent  Standing Balance: Contact guard assistance  Standing Balance  Sit to stand: Contact guard assistance  Stand to sit: Contact guard assistance  Functional Mobility  Functional - Mobility Device: Rolling Walker  Activity:  (~14 ft within room.)  Assist Level: Contact guard assistance (VC for use of RW)  Bed mobility  Supine to Sit: Minimal assistance  Sit to Supine: Minimal assistance  Scooting: Minimal assistance     Transfers  Sit to stand: Contact guard assistance  Stand to sit: Contact guard assistance  Functional Activity Tolerance  Functional Activity Tolerance: Tolerates < 10 Min exercise, no significant change in vital signs  Additional Comments: Pt became nauseous and dizzy after walking ~12 ft with RW. Assessment  Performance deficits / Impairments: Decreased ADL status, Decreased functional mobility , Decreased strength, Decreased ROM, Decreased endurance, Decreased balance, Decreased coordination  Assessment: Pt required Min A for bed mobility. Pt required CGA for sit to stand/ambulation. Pt became nauseous and dizzy and required a rest break prior to ambulating to bed. Pt required Total A for donning socks this date. Pt reported that her daughter will be living with her for a week post discharge. Treatment Diagnosis: Impaired self-care status  Prognosis: Good  Decision Making: Low Complexity  Patient Education: OT POC, Discharge Recommendation, Use of AE/DME  REQUIRES OT FOLLOW UP: Yes  Discharge Recommendations: 24 hour supervision or assist  Activity Tolerance: Patient limited by fatigue     Goals  Patient Goals   Patient goals : Pt desires to return to home. Short term goals  Time Frame for Short term goals: By Discharge  Short term goal 1: Pt will actively participate in [address] (L Hip).   Short term goal 2: Pt will V/D Good understanding of TOTAL JOINT

## 2018-11-06 NOTE — ANESTHESIA POSTPROCEDURE EVALUATION
POST- ANESTHESIA EVALUATION       Pt Name: Janneth Lane  MRN: [id number]  YOB: 1940  Date of evaluation: 11/6/2018  Time:  11:54 AM      BP (!) 100/59   Pulse 85   Temp 97.7 °F (36.5 °C) (Axillary)   Resp 14   Ht 5' 4\" (1.626 m)   Wt 190 lb (86.2 kg)   SpO2 95%   BMI 32.61 kg/m²      Consciousness Level  Awake  Cardiopulmonary Status  Stable  Pain Adequately Treated YES  Nausea / Vomiting  NO  Adequate Hydration  YES  Anesthesia Related Complications NONE      Electronically signed by Caleb Delgado MD on 11/6/2018 at 11:54 AM       Department of Anesthesiology  Postprocedure Note    Patient: Janneth Lane  MRN: [id number]  YOB: 1940  Date of evaluation: 11/6/2018  Time:  11:54 AM     Procedure Summary     Date:  11/06/18 Room / Location:  [address] [place] [address] Mili [address] / [facility] Mili [address]    Anesthesia Start:  0726 Anesthesia Stop:  8823    Procedure:  HIP TOTAL ARTHROPLASTY MINIMALLY INVASIVE ASI (Left Hip) Diagnosis:  (DJD LEFT HIP)    Surgeon:  Martha Mcneill MD Responsible Provider:  Caleb Delgado MD    Anesthesia Type:  general ASA Status:  3          Anesthesia Type: general    Lowell Phase I: Lowell Score: 8    Lowell Phase II:      Last vitals: Reviewed and per EMR flowsheets.        Anesthesia Post Evaluation

## 2018-11-06 NOTE — PROGRESS NOTES
bars in shower, Shower chair, Hand-held shower  Bathroom Accessibility: Walker accessible  Home Equipment: Rolling walker, Cane, Alert Button, Long-handled shoehorn  Receives Help From: Family, Friend(s)  ADL Assistance: Independent  Homemaking Assistance: Independent  Homemaking Responsibilities: Yes  Ambulation Assistance: Independent (Uses RW. Pt stated she has had one fall in the past month. ,)  Active : Yes  Mode of Transportation: Car  Occupation: Retired  Type of occupation: [employer]       Objective          AROM RLE (degrees)  RLE AROM: WNL  AROM LLE (degrees)  LLE AROM : WFL (assisted)  L Hip Flexion 0-125: 70  L Hip ABduction 0-45: 20  Strength RLE  Strength RLE: WNL  Comment: 4/5  Strength LLE  Strength LLE: WFL  Comment: hip strength not tested  L Knee Flexion: 3+/5  L Knee Extension: 3+/5  L Ankle Dorsiflexion: 3+/5        Bed mobility  Supine to Sit: Minimal assistance  Sit to Supine: Minimal assistance  Scooting: Minimal assistance  Comment: pt sat bedside x 5min  Transfers  Sit to Stand: Contact guard assistance  Stand to sit: Contact guard assistance  Bed to Chair: Contact guard assistance; Independent  Ambulation  Ambulation?: Yes  Ambulation 1  Surface: level tile  Device: Rolling Walker  Assistance: Contact guard assistance  Quality of Gait: decreased toe-off  Distance: 12ft  Comments: pt needs constant verbal cues to not get too far into the walker     Balance  Sitting - Static: Good  Sitting - Dynamic: Fair;+ (SBA)  Standing - Static: Fair (CGA with RW)  Standing - Dynamic: Fair (CGA with RW)        Assessment   Body structures, Functions, Activity limitations: Decreased functional mobility ; Decreased ROM; Decreased strength;Decreased balance;Decreased high-level IADLs  Assessment: Impaired mobikity due to L TAHIR  Decision Making: Low Complexity  History: none  Exam: decreased gait, ROM, strength  Clinical Presentation: stable  REQUIRES PT FOLLOW UP: Yes  Activity Tolerance  Activity

## 2018-11-07 LAB
-: ABNORMAL
AMORPHOUS: ABNORMAL
ANION GAP SERPL CALCULATED.3IONS-SCNC: 13 MMOL/L (ref 9–17)
BACTERIA: ABNORMAL
BILIRUBIN URINE: ABNORMAL
BUN BLDV-MCNC: 33 MG/DL (ref 8–23)
BUN/CREAT BLD: ABNORMAL (ref 9–20)
CALCIUM SERPL-MCNC: 8.4 MG/DL (ref 8.6–10.4)
CASTS UA: ABNORMAL /LPF
CHLORIDE BLD-SCNC: 98 MMOL/L (ref 98–107)
CO2: 19 MMOL/L (ref 20–31)
COLOR: YELLOW
COMMENT UA: ABNORMAL
CREAT SERPL-MCNC: 2.08 MG/DL (ref 0.5–0.9)
CRYSTALS, UA: ABNORMAL /HPF
EPITHELIAL CELLS UA: ABNORMAL /HPF
GFR AFRICAN AMERICAN: 28 ML/MIN
GFR NON-AFRICAN AMERICAN: 23 ML/MIN
GFR SERPL CREATININE-BSD FRML MDRD: ABNORMAL ML/MIN/{1.73_M2}
GFR SERPL CREATININE-BSD FRML MDRD: ABNORMAL ML/MIN/{1.73_M2}
GLUCOSE BLD-MCNC: 146 MG/DL (ref 65–105)
GLUCOSE BLD-MCNC: 147 MG/DL (ref 65–105)
GLUCOSE BLD-MCNC: 156 MG/DL (ref 70–99)
GLUCOSE BLD-MCNC: 162 MG/DL (ref 65–105)
GLUCOSE URINE: NEGATIVE
HCT VFR BLD CALC: 25.1 % (ref 36–46)
HEMOGLOBIN: 8 G/DL (ref 12–16)
KETONES, URINE: ABNORMAL
LEUKOCYTE ESTERASE, URINE: NEGATIVE
MUCUS: ABNORMAL
NITRITE, URINE: NEGATIVE
OTHER OBSERVATIONS UA: ABNORMAL
PH UA: 5 (ref 5–8)
POTASSIUM SERPL-SCNC: 5.5 MMOL/L (ref 3.7–5.3)
PROTEIN UA: NEGATIVE
RBC UA: ABNORMAL /HPF
RENAL EPITHELIAL, UA: ABNORMAL /HPF
SODIUM BLD-SCNC: 130 MMOL/L (ref 135–144)
SPECIFIC GRAVITY UA: 1.02 (ref 1–1.03)
TRICHOMONAS: ABNORMAL
TURBIDITY: CLEAR
URINE HGB: NEGATIVE
UROBILINOGEN, URINE: NORMAL
WBC UA: ABNORMAL /HPF
YEAST: ABNORMAL

## 2018-11-07 PROCEDURE — 99232 SBSQ HOSP IP/OBS MODERATE 35: CPT | Performed by: INTERNAL MEDICINE

## 2018-11-07 PROCEDURE — 81001 URINALYSIS AUTO W/SCOPE: CPT

## 2018-11-07 PROCEDURE — 85018 HEMOGLOBIN: CPT

## 2018-11-07 PROCEDURE — 6360000002 HC RX W HCPCS: Performed by: ORTHOPAEDIC SURGERY

## 2018-11-07 PROCEDURE — 99024 POSTOP FOLLOW-UP VISIT: CPT | Performed by: ORTHOPAEDIC SURGERY

## 2018-11-07 PROCEDURE — 36415 COLL VENOUS BLD VENIPUNCTURE: CPT

## 2018-11-07 PROCEDURE — 80048 BASIC METABOLIC PNL TOTAL CA: CPT

## 2018-11-07 PROCEDURE — 6370000000 HC RX 637 (ALT 250 FOR IP): Performed by: ORTHOPAEDIC SURGERY

## 2018-11-07 PROCEDURE — 97530 THERAPEUTIC ACTIVITIES: CPT

## 2018-11-07 PROCEDURE — 85014 HEMATOCRIT: CPT

## 2018-11-07 PROCEDURE — 97535 SELF CARE MNGMENT TRAINING: CPT

## 2018-11-07 PROCEDURE — 2580000003 HC RX 258: Performed by: INTERNAL MEDICINE

## 2018-11-07 PROCEDURE — 1200000000 HC SEMI PRIVATE

## 2018-11-07 PROCEDURE — 97110 THERAPEUTIC EXERCISES: CPT

## 2018-11-07 PROCEDURE — 2580000003 HC RX 258: Performed by: ORTHOPAEDIC SURGERY

## 2018-11-07 PROCEDURE — 6370000000 HC RX 637 (ALT 250 FOR IP): Performed by: INTERNAL MEDICINE

## 2018-11-07 PROCEDURE — 82947 ASSAY GLUCOSE BLOOD QUANT: CPT

## 2018-11-07 PROCEDURE — 51702 INSERT TEMP BLADDER CATH: CPT

## 2018-11-07 PROCEDURE — 97116 GAIT TRAINING THERAPY: CPT

## 2018-11-07 RX ORDER — OXYCODONE HYDROCHLORIDE 5 MG/1
5 TABLET ORAL EVERY 4 HOURS PRN
Qty: 40 TABLET | Refills: 0 | Status: SHIPPED | OUTPATIENT
Start: 2018-11-07 | End: 2018-11-14

## 2018-11-07 RX ORDER — 0.9 % SODIUM CHLORIDE 0.9 %
500 INTRAVENOUS SOLUTION INTRAVENOUS ONCE
Status: COMPLETED | OUTPATIENT
Start: 2018-11-07 | End: 2018-11-07

## 2018-11-07 RX ORDER — SODIUM POLYSTYRENE SULFONATE 15 G/60ML
15 SUSPENSION ORAL; RECTAL ONCE
Status: DISCONTINUED | OUTPATIENT
Start: 2018-11-07 | End: 2018-11-07 | Stop reason: CLARIF

## 2018-11-07 RX ORDER — SODIUM CHLORIDE 9 MG/ML
INJECTION, SOLUTION INTRAVENOUS CONTINUOUS
Status: DISCONTINUED | OUTPATIENT
Start: 2018-11-07 | End: 2018-11-09 | Stop reason: HOSPADM

## 2018-11-07 RX ADMIN — CITALOPRAM HYDROBROMIDE 20 MG: 20 TABLET ORAL at 08:28

## 2018-11-07 RX ADMIN — PRAVASTATIN SODIUM 40 MG: 40 TABLET ORAL at 21:59

## 2018-11-07 RX ADMIN — SODIUM CHLORIDE: 9 INJECTION, SOLUTION INTRAVENOUS at 15:24

## 2018-11-07 RX ADMIN — METOPROLOL SUCCINATE 50 MG: 50 TABLET, EXTENDED RELEASE ORAL at 08:28

## 2018-11-07 RX ADMIN — SODIUM CHLORIDE, POTASSIUM CHLORIDE, SODIUM LACTATE AND CALCIUM CHLORIDE: 600; 310; 30; 20 INJECTION, SOLUTION INTRAVENOUS at 03:24

## 2018-11-07 RX ADMIN — ONDANSETRON 4 MG: 2 INJECTION INTRAMUSCULAR; INTRAVENOUS at 15:50

## 2018-11-07 RX ADMIN — DOCUSATE SODIUM 100 MG: 100 CAPSULE, LIQUID FILLED ORAL at 21:59

## 2018-11-07 RX ADMIN — KETOROLAC TROMETHAMINE 15 MG: 30 INJECTION, SOLUTION INTRAMUSCULAR at 05:08

## 2018-11-07 RX ADMIN — DOCUSATE SODIUM 100 MG: 100 CAPSULE, LIQUID FILLED ORAL at 08:27

## 2018-11-07 RX ADMIN — ACETAMINOPHEN 1000 MG: 500 TABLET, FILM COATED ORAL at 21:59

## 2018-11-07 RX ADMIN — CALCIUM CARBONATE-CHOLECALCIFEROL TAB 250 MG-125 UNIT 500 MG: 250-125 TAB at 08:27

## 2018-11-07 RX ADMIN — SODIUM CHLORIDE: 9 INJECTION, SOLUTION INTRAVENOUS at 09:15

## 2018-11-07 RX ADMIN — ACETAMINOPHEN 1000 MG: 500 TABLET, FILM COATED ORAL at 13:44

## 2018-11-07 RX ADMIN — SODIUM CHLORIDE 500 ML: 0.9 INJECTION, SOLUTION INTRAVENOUS at 13:45

## 2018-11-07 RX ADMIN — ONDANSETRON 4 MG: 2 INJECTION INTRAMUSCULAR; INTRAVENOUS at 05:18

## 2018-11-07 RX ADMIN — ACETAMINOPHEN 1000 MG: 500 TABLET, FILM COATED ORAL at 05:08

## 2018-11-07 RX ADMIN — PATIROMER 8.4 G: 8.4 POWDER, FOR SUSPENSION ORAL at 11:20

## 2018-11-07 ASSESSMENT — PAIN SCALES - GENERAL
PAINLEVEL_OUTOF10: 0

## 2018-11-07 NOTE — FLOWSHEET NOTE
Patient requested A.D to review with her daughter. Patient instructed to notify spiritual care via nursing if she has any questions. Patient has good family support.  offered spiritual support.      11/06/18 1800   Advance Directives (For Healthcare)   Pre-existing DNR Comfort Care/DNR Arrest/DNI Order No   Healthcare Directive No, patient does not have an advance directive for healthcare treatment   Information on Healthcare Directives Requested Yes   Patient Requests Assistance Other (Comment)  (Patient would like to review document with her daughter.)   Advance Directives Documents given   Visited by Lindsey Gordillo

## 2018-11-07 NOTE — CONSULTS
DAILY 10/17/18  Yes Estevan Julian, APRN - CNP   Calcium Carbonate-Vitamin D (OYSTER SHELL CALCIUM/D) 500-200 MG-UNIT TABS TAKE 1 TABLET BY MOUTH ONCE DAILY 10/10/18  Yes Estevan Julian, KAREEM - CNP   KLOR-CON M20 20 MEQ extended release tablet TAKE ONE TABLET BY MOUTH DAILY 8/23/18  Yes Estevanmalorie Julian, APRN - CNP   pravastatin (PRAVACHOL) 40 MG tablet Take 1 tablet by mouth daily 7/6/18  Yes Estevan Frames, APRN - CNP   lisinopril (PRINIVIL;ZESTRIL) 20 MG tablet TAKE ONE TABLET BY MOUTH ONCE DAILY 6/22/18  Yes KAREEM Spears - CNP   furosemide (LASIX) 40 MG tablet TAKE 1 TABLET BY MOUTH ONCE DAILY 6/18/18  Yes Estevanmalorie Julian, APRN - CNP   metFORMIN (GLUCOPHAGE) 500 MG tablet Take 1 tablet by mouth 2 times daily (with meals) 5/14/18  Yes Estevan Julian, APRN - CNP   metoprolol succinate (TOPROL XL) 50 MG extended release tablet Take 1 tablet by mouth daily 10/30/17  Yes Estevanmalorie Julian, KAREEM - CNP   Blood Pressure KIT 1 kit by Does not apply route daily 9/12/18   Estevan Julian, KAREEM - CNP   ibuprofen (ADVIL;MOTRIN) 800 MG tablet TAKE ONE TABLET BY MOUTH EVERY 8 HOURS AS NEEDED FOR PAIN 6/4/18   KAREEM Esparza CNP        Allergies:     Codeine    Social History:     Tobacco:    reports that she has never smoked. She has never used smokeless tobacco.  Alcohol:      reports that she does not drink alcohol. Drug Use:  reports that she does not use drugs. Family History:     Family History   Problem Relation Age of Onset    Heart Disease Mother     High Blood Pressure Mother     Cancer Father     Colon Cancer Father        Review of Systems:     Positive and Negative as described in HPI. CONSTITUTIONAL:  negative for fevers, chills, sweats, fatigue, weight loss  HEENT:  negative for vision, hearing changes, runny nose, throat pain  RESPIRATORY:  negative for shortness of breath, cough, congestion, wheezing.   CARDIOVASCULAR:  negative for chest pain, palpitations. GASTROINTESTINAL:  negative for nausea, vomiting, diarrhea, constipation, change in bowel habits, abdominal pain   GENITOURINARY:  negative for difficulty of urination, burning with urination, frequency   INTEGUMENT:  negative for rash, skin lesions, easy bruising   HEMATOLOGIC/LYMPHATIC:  negative for swelling/edema   ALLERGIC/IMMUNOLOGIC:  negative for urticaria , itching  ENDOCRINE:  negative increase in drinking, increase in urination, hot or cold intolerance  MUSCULOSKELETAL:  Post hip surg left    NEUROLOGICAL:  negative for headaches, dizziness, lightheadedness, numbness, pain, tingling extremities  BEHAVIOR/PSYCH:  negative for depression, anxiety    Physical Exam:     BP (!) 103/58   Pulse 70   Temp 97.3 °F (36.3 °C) (Oral)   Resp 16   Ht 5' 4\" (1.626 m)   Wt 190 lb (86.2 kg)   SpO2 97%   BMI 32.61 kg/m²   Temp (24hrs), Av.2 °F (36.2 °C), Min:83.5 °F (28.6 °C), Max:98.1 °F (36.7 °C)    Recent Labs      18   0651  18   0916  18   1123  18   1625   POCGLU  106*  177*  184*  175*       Intake/Output Summary (Last 24 hours) at 18 2123  Last data filed at 18 0845   Gross per 24 hour   Intake             1310 ml   Output              700 ml   Net              610 ml       General Appearance:  alert, well appearing, and in no acute distress  Mental status: oriented to person, place, and time with normal affect  Head:  normocephalic, atraumatic. Eye: no icterus, redness, pupils equal and reactive, extraocular eye movements intact, conjunctiva clear  Ear: normal external ear, no discharge, hearing intact  Nose:  no drainage noted  Mouth: mucous membranes moist  Neck: supple, no carotid bruits, thyroid not palpable  Lungs: Bilateral equal air entry, clear to ausculation, no wheezing, rales or rhonchi, normal effort  Cardiovascular: normal rate, regular rhythm, no murmur, gallop, rub.   Abdomen: Soft, nontender, nondistended, normal bowel sounds, no

## 2018-11-07 NOTE — CONSULTS
DAILY 10/17/18  Yes KAREEM Isaacs CNP   Calcium Carbonate-Vitamin D (OYSTER SHELL CALCIUM/D) 500-200 MG-UNIT TABS TAKE 1 TABLET BY MOUTH ONCE DAILY 10/10/18  Yes KAREEM Isaacs CNP   KLOR-CON M20 20 MEQ extended release tablet TAKE ONE TABLET BY MOUTH DAILY 8/23/18  Yes KAREEM Isaacs CNP   pravastatin (PRAVACHOL) 40 MG tablet Take 1 tablet by mouth daily 7/6/18  Yes KAREEM Isaacs CNP   lisinopril (PRINIVIL;ZESTRIL) 20 MG tablet TAKE ONE TABLET BY MOUTH ONCE DAILY 6/22/18  Yes KAREEM Tolbert CNP   furosemide (LASIX) 40 MG tablet TAKE 1 TABLET BY MOUTH ONCE DAILY 6/18/18  Yes KAREEM Isaacs CNP   metFORMIN (GLUCOPHAGE) 500 MG tablet Take 1 tablet by mouth 2 times daily (with meals) 5/14/18  Yes KAREEM Isaacs CNP   metoprolol succinate (TOPROL XL) 50 MG extended release tablet Take 1 tablet by mouth daily 10/30/17  Yes KAREEM Isaacs CNP   Blood Pressure KIT 1 kit by Does not apply route daily 9/12/18   KAREEM Isaacs CNP   ibuprofen (ADVIL;MOTRIN) 800 MG tablet TAKE ONE TABLET BY MOUTH EVERY 8 HOURS AS NEEDED FOR PAIN 6/4/18   KAREEM Alegre CNP        Allergies:     Codeine    Social History:     Tobacco:    reports that she has never smoked. She has never used smokeless tobacco.  Alcohol:      reports that she does not drink alcohol. Drug Use:  reports that she does not use drugs. Family History:     Family History   Problem Relation Age of Onset    Heart Disease Mother     High Blood Pressure Mother     Cancer Father     Colon Cancer Father        Review of Systems:     Positive and Negative as described in HPI. CONSTITUTIONAL:  negative for fevers, chills, sweats, fatigue, weight loss  HEENT:  negative for vision, hearing changes, runny nose, throat pain  RESPIRATORY:  negative for shortness of breath, cough, congestion, wheezing.   CARDIOVASCULAR:  negative for chest pain,

## 2018-11-07 NOTE — PROGRESS NOTES
[facility]    INPATIENT OCCUPATIONAL THERAPY  PROGRESS NOTE  Date: 2018  Patient Name: Regina Duff      Room:   MRN: [id number]    : 1940  (74 y.o.) Gender: female     Discharge Recommendations:  24 hour supervision or assist       Referring Practitioner: .  Diagnosis: L TAHIR on 18  General  Chart Reviewed: Yes  Patient assessed for rehabilitation services?: Yes  Family / Caregiver Present: Yes (daughter in AM; daughter/sister in PM)  Referring Practitioner: .  Diagnosis: L TAHIR on 18    Restrictions  Restrictions/Precautions: Surgical Protocols, Fall Risk  Implants present? : Metal implants (L TAHIR)  Required Braces or Orthoses?: No      Subjective  Patient Currently in Pain: Denies  Overall Orientation Status: Within Functional Limits  Patient Observation  Observations: IV RUE, Noe Catheter; not likely to d/c today     Objective  Cognition  Overall Cognitive Status: WFL (Pt had post-op confusion intermittently throughout tx.  Per daughter pt is not at baseline but demo increased cognition as the day progressed)    Bed mobility  Supine to Sit: Contact Guard  Comments: initial VC for sequence c G return in PM  Scooting: SBassistance to EOB    Balance  Sitting Balance: Independent  Standing Balance: Contact guard assistance  Standing Balance  Time: 2-3min x4; 5min2  Activity: ADLs, mobility   Sit to stand: Contact guard assistance  Stand to sit: Contact guard assistance  Comment: VC for sequence in AM c f+ return in PM tx; MAx VC for upright posture in AM, MinVC in PM demo some return  Functional Mobility  Functional - Mobility Device: Rolling Walker  Activity: Other  Assist Level: Contact guard assistance (VC for use of RW)  Functional Mobility Comments: from pt room to [employer] for stand tolerance/cognition   ADL  Feeding: Setup  Grooming: Contact guard assistance;Minimal assistance (For hair; pt goes to salon prior to admission. )  UE Bathing: Stand by Rolling walker [x]    Elevated Toilet Seat (pending)      [x] Toilet Safety Rails (pending) []       []  []           Assessment  Performance deficits / Impairments: Decreased ADL status; Decreased functional mobility ; Decreased strength;Decreased ROM; Decreased endurance;Decreased balance;Decreased coordination  Assessment: No dizziness noted today; pt is currently receiving IV fluids and retaining urine and will not d/c today; remains somewhat confused, is at baseline, but per daughter pt demo'ing concerning levels of cognition this date  Prognosis: Good  Discharge Recommendations: 24 hour supervision or assist  Activity Tolerance: Patient limited by fatigue  Safety Devices in place: Yes  Type of devices: Left c PT dept in [address] [place] [address]  Initially in place: No  Equipment Recommendations  Equipment Needed:  (TBD)          Patient Education:  Patient Education: OT POC, Discharge Recommendation, Use of AE/DME, post op recovery, environmental factors  Learner:family and patient  Method: demonstration and explanation       Outcome: needs reinforcement     Plan  Safety Devices  Safety Devices in place: Yes  Type of devices: All fall risk precautions in place, Call light within reach, Left in bed, Bed alarm in place  Restraints  Initially in place: No  Plan  Times per week: 5-7  Times per day: Twice a day  Current Treatment Recommendations: Strengthening, ROM, Functional Mobility Training, Endurance Training, Equipment Evaluation, Education, & procurement, Safety Education & Training, Patient/Caregiver Education & Training, Self-Care / ADL, Home Management Training, Balance Training      Goals  Short term goals  Time Frame for Short term goals: By Discharge  Short term goal 1: Pt will actively participate in [address] (L Hip). Short term goal 2: Pt will V/D Good understanding of TOTAL JOINT PROGRAM (L Hip).    Short term goal 3: Pt will V/D Good understanding of AE/DME/Modified techniques for functional ADL's. Short term goal 4: Pt will actively participate in 30 minutes of therapeutic exercise/activity to promtoe increased independence and safety with self-care and mobility.         11/07/18 0815 11/07/18 1430   OT Individual Minutes   Time In 1335 0815   Time Out 1400 0915   Minutes 25 60         Electronically signed by JT Hameed on 11/7/18 at 2:41 PM

## 2018-11-08 LAB
ANION GAP SERPL CALCULATED.3IONS-SCNC: 10 MMOL/L (ref 9–17)
BUN BLDV-MCNC: 33 MG/DL (ref 8–23)
BUN/CREAT BLD: ABNORMAL (ref 9–20)
CALCIUM SERPL-MCNC: 8.3 MG/DL (ref 8.6–10.4)
CHLORIDE BLD-SCNC: 99 MMOL/L (ref 98–107)
CO2: 19 MMOL/L (ref 20–31)
CREAT SERPL-MCNC: 1.65 MG/DL (ref 0.5–0.9)
GFR AFRICAN AMERICAN: 36 ML/MIN
GFR NON-AFRICAN AMERICAN: 30 ML/MIN
GFR SERPL CREATININE-BSD FRML MDRD: ABNORMAL ML/MIN/{1.73_M2}
GFR SERPL CREATININE-BSD FRML MDRD: ABNORMAL ML/MIN/{1.73_M2}
GLUCOSE BLD-MCNC: 142 MG/DL (ref 65–105)
GLUCOSE BLD-MCNC: 148 MG/DL (ref 70–99)
GLUCOSE BLD-MCNC: 153 MG/DL (ref 65–105)
GLUCOSE BLD-MCNC: 164 MG/DL (ref 65–105)
POTASSIUM SERPL-SCNC: 5.1 MMOL/L (ref 3.7–5.3)
SODIUM BLD-SCNC: 128 MMOL/L (ref 135–144)

## 2018-11-08 PROCEDURE — 2580000003 HC RX 258: Performed by: INTERNAL MEDICINE

## 2018-11-08 PROCEDURE — 97530 THERAPEUTIC ACTIVITIES: CPT

## 2018-11-08 PROCEDURE — 36415 COLL VENOUS BLD VENIPUNCTURE: CPT

## 2018-11-08 PROCEDURE — 99232 SBSQ HOSP IP/OBS MODERATE 35: CPT | Performed by: INTERNAL MEDICINE

## 2018-11-08 PROCEDURE — 2580000003 HC RX 258: Performed by: ORTHOPAEDIC SURGERY

## 2018-11-08 PROCEDURE — 6370000000 HC RX 637 (ALT 250 FOR IP): Performed by: ORTHOPAEDIC SURGERY

## 2018-11-08 PROCEDURE — 82947 ASSAY GLUCOSE BLOOD QUANT: CPT

## 2018-11-08 PROCEDURE — 80048 BASIC METABOLIC PNL TOTAL CA: CPT

## 2018-11-08 PROCEDURE — 1200000000 HC SEMI PRIVATE

## 2018-11-08 PROCEDURE — 97110 THERAPEUTIC EXERCISES: CPT

## 2018-11-08 PROCEDURE — 97116 GAIT TRAINING THERAPY: CPT

## 2018-11-08 PROCEDURE — 97535 SELF CARE MNGMENT TRAINING: CPT

## 2018-11-08 PROCEDURE — 6370000000 HC RX 637 (ALT 250 FOR IP): Performed by: INTERNAL MEDICINE

## 2018-11-08 RX ORDER — TRAMADOL HYDROCHLORIDE 50 MG/1
100 TABLET ORAL EVERY 6 HOURS PRN
Status: DISCONTINUED | OUTPATIENT
Start: 2018-11-08 | End: 2018-11-09 | Stop reason: HOSPADM

## 2018-11-08 RX ORDER — TRAMADOL HYDROCHLORIDE 50 MG/1
50 TABLET ORAL EVERY 6 HOURS PRN
Status: DISCONTINUED | OUTPATIENT
Start: 2018-11-08 | End: 2018-11-09 | Stop reason: HOSPADM

## 2018-11-08 RX ADMIN — SODIUM CHLORIDE: 9 INJECTION, SOLUTION INTRAVENOUS at 00:46

## 2018-11-08 RX ADMIN — TRAMADOL HYDROCHLORIDE 50 MG: 50 TABLET, FILM COATED ORAL at 16:55

## 2018-11-08 RX ADMIN — CITALOPRAM HYDROBROMIDE 20 MG: 20 TABLET ORAL at 09:39

## 2018-11-08 RX ADMIN — DOCUSATE SODIUM 100 MG: 100 CAPSULE, LIQUID FILLED ORAL at 23:41

## 2018-11-08 RX ADMIN — DOCUSATE SODIUM 100 MG: 100 CAPSULE, LIQUID FILLED ORAL at 09:40

## 2018-11-08 RX ADMIN — Medication 10 ML: at 23:41

## 2018-11-08 RX ADMIN — METOPROLOL SUCCINATE 50 MG: 50 TABLET, EXTENDED RELEASE ORAL at 09:39

## 2018-11-08 RX ADMIN — SODIUM CHLORIDE: 9 INJECTION, SOLUTION INTRAVENOUS at 09:45

## 2018-11-08 RX ADMIN — OXYCODONE HYDROCHLORIDE 10 MG: 5 TABLET ORAL at 19:35

## 2018-11-08 RX ADMIN — PRAVASTATIN SODIUM 40 MG: 40 TABLET ORAL at 23:41

## 2018-11-08 RX ADMIN — CALCIUM CARBONATE-CHOLECALCIFEROL TAB 250 MG-125 UNIT 500 MG: 250-125 TAB at 09:39

## 2018-11-08 RX ADMIN — SODIUM CHLORIDE: 9 INJECTION, SOLUTION INTRAVENOUS at 20:06

## 2018-11-08 ASSESSMENT — PAIN DESCRIPTION - ORIENTATION: ORIENTATION: LEFT

## 2018-11-08 ASSESSMENT — PAIN SCALES - GENERAL
PAINLEVEL_OUTOF10: 8
PAINLEVEL_OUTOF10: 5
PAINLEVEL_OUTOF10: 7

## 2018-11-08 ASSESSMENT — PAIN DESCRIPTION - LOCATION: LOCATION: HIP

## 2018-11-08 ASSESSMENT — PAIN DESCRIPTION - PAIN TYPE: TYPE: SURGICAL PAIN

## 2018-11-08 NOTE — PROGRESS NOTES
Patient is unable to take aspirin. Mireille San, patient's daughter, stated that patient has a history of a hiatal hernia and it will occasionally bleed. Patient's primary physician told her not to take aspirin. Will talk with Dr. Trena Jones in the morning regarding switching her to another anticoagulant.

## 2018-11-08 NOTE — CONSULTS
effort  Cardiovascular: normal rate, regular rhythm, no murmur, gallop, rub. Abdomen: Soft, nontender, nondistended, normal bowel sounds, no hepatomegaly or splenomegaly  Neurologic: There are no new focal motor or sensory deficits, normal muscle tone and bulk, no abnormal sensation, normal speech, cranial nerves II through XII grossly intact  Skin: No gross lesions, rashes, bruising or bleeding on exposed skin area  Extremities:post total left hip surg  Psych: normal affect    Investigations:      Laboratory Testing:  Recent Results (from the past 24 hour(s))   POC Glucose Fingerstick    Collection Time: 11/07/18 11:09 AM   Result Value Ref Range    POC Glucose 147 (H) 65 - 105 mg/dL   POC Glucose Fingerstick    Collection Time: 11/07/18  4:55 PM   Result Value Ref Range    POC Glucose 146 (H) 65 - 105 mg/dL   POC Glucose Fingerstick    Collection Time: 11/07/18  8:33 PM   Result Value Ref Range    POC Glucose 162 (H) 65 - 105 mg/dL   Basic Metabolic Panel    Collection Time: 11/08/18  7:01 AM   Result Value Ref Range    Glucose 148 (H) 70 - 99 mg/dL    BUN 33 (H) 8 - 23 mg/dL    CREATININE 1.65 (H) 0.50 - 0.90 mg/dL    Bun/Cre Ratio NOT REPORTED 9 - 20    Calcium 8.3 (L) 8.6 - 10.4 mg/dL    Sodium 128 (L) 135 - 144 mmol/L    Potassium 5.1 3.7 - 5.3 mmol/L    Chloride 99 98 - 107 mmol/L    CO2 19 (L) 20 - 31 mmol/L    Anion Gap 10 9 - 17 mmol/L    GFR Non-African American 30 (L) >60 mL/min    GFR  36 (L) >60 mL/min    GFR Comment          GFR Staging NOT REPORTED        Imaging/Diagonstics:      Assessment :      Primary Problem  <principal problem not specified>    Active Hospital Problems    Diagnosis Date Noted    Primary osteoarthritis of left hip [M16.12] 11/06/2018       Plan:   tpt with obesity and htn  And dm 2  With oa  Had total left hip done today  Pain conrolled  meds rev  reconsiled  ot pt  dvt prophyl  1. Nov 7  2. rosalia cr is  2 basline is 1  3. Hyperkalemia  4.  Will stop ketorolac

## 2018-11-09 VITALS
OXYGEN SATURATION: 97 % | HEIGHT: 64 IN | DIASTOLIC BLOOD PRESSURE: 55 MMHG | TEMPERATURE: 97.5 F | RESPIRATION RATE: 16 BRPM | HEART RATE: 91 BPM | WEIGHT: 190 LBS | SYSTOLIC BLOOD PRESSURE: 141 MMHG | BODY MASS INDEX: 32.44 KG/M2

## 2018-11-09 LAB
ANION GAP SERPL CALCULATED.3IONS-SCNC: 12 MMOL/L (ref 9–17)
BUN BLDV-MCNC: 39 MG/DL (ref 8–23)
BUN/CREAT BLD: ABNORMAL (ref 9–20)
CALCIUM SERPL-MCNC: 8.1 MG/DL (ref 8.6–10.4)
CHLORIDE BLD-SCNC: 101 MMOL/L (ref 98–107)
CO2: 15 MMOL/L (ref 20–31)
CREAT SERPL-MCNC: 1.11 MG/DL (ref 0.5–0.9)
GFR AFRICAN AMERICAN: 57 ML/MIN
GFR NON-AFRICAN AMERICAN: 47 ML/MIN
GFR SERPL CREATININE-BSD FRML MDRD: ABNORMAL ML/MIN/{1.73_M2}
GFR SERPL CREATININE-BSD FRML MDRD: ABNORMAL ML/MIN/{1.73_M2}
GLUCOSE BLD-MCNC: 159 MG/DL (ref 70–99)
GLUCOSE BLD-MCNC: 162 MG/DL (ref 65–105)
GLUCOSE BLD-MCNC: 175 MG/DL (ref 65–105)
POTASSIUM SERPL-SCNC: 4.6 MMOL/L (ref 3.7–5.3)
SODIUM BLD-SCNC: 128 MMOL/L (ref 135–144)

## 2018-11-09 PROCEDURE — 6360000002 HC RX W HCPCS: Performed by: ORTHOPAEDIC SURGERY

## 2018-11-09 PROCEDURE — 2580000003 HC RX 258: Performed by: INTERNAL MEDICINE

## 2018-11-09 PROCEDURE — 82947 ASSAY GLUCOSE BLOOD QUANT: CPT

## 2018-11-09 PROCEDURE — 6370000000 HC RX 637 (ALT 250 FOR IP): Performed by: INTERNAL MEDICINE

## 2018-11-09 PROCEDURE — 36415 COLL VENOUS BLD VENIPUNCTURE: CPT

## 2018-11-09 PROCEDURE — 6370000000 HC RX 637 (ALT 250 FOR IP): Performed by: ORTHOPAEDIC SURGERY

## 2018-11-09 PROCEDURE — 99232 SBSQ HOSP IP/OBS MODERATE 35: CPT | Performed by: INTERNAL MEDICINE

## 2018-11-09 PROCEDURE — 80048 BASIC METABOLIC PNL TOTAL CA: CPT

## 2018-11-09 RX ADMIN — ONDANSETRON 4 MG: 2 INJECTION INTRAMUSCULAR; INTRAVENOUS at 10:04

## 2018-11-09 RX ADMIN — OXYCODONE HYDROCHLORIDE 10 MG: 5 TABLET ORAL at 08:54

## 2018-11-09 RX ADMIN — CALCIUM CARBONATE-CHOLECALCIFEROL TAB 250 MG-125 UNIT 500 MG: 250-125 TAB at 09:11

## 2018-11-09 RX ADMIN — DOCUSATE SODIUM 100 MG: 100 CAPSULE, LIQUID FILLED ORAL at 09:11

## 2018-11-09 RX ADMIN — CITALOPRAM HYDROBROMIDE 20 MG: 20 TABLET ORAL at 09:11

## 2018-11-09 RX ADMIN — SODIUM CHLORIDE: 9 INJECTION, SOLUTION INTRAVENOUS at 10:04

## 2018-11-09 RX ADMIN — METOPROLOL SUCCINATE 50 MG: 50 TABLET, EXTENDED RELEASE ORAL at 09:11

## 2018-11-09 ASSESSMENT — PAIN SCALES - GENERAL: PAINLEVEL_OUTOF10: 8

## 2018-11-09 NOTE — PROGRESS NOTES
Physical Therapy  DATE: 2018    NAME: Genoveva Lancaster  MRN: [id number]   : 1940    Patient not seen this date for Physical Therapy due to:  [] Blood transfusion in progress  [] Hemodialysis  []  Patient Declined  [] Spine Precautions   [] Strict Bedrest  [] Surgery/ Procedure  [] Testing      [x] Other Cx, at this time, pt is dizzy and nauseated, JERSON Jefferson is going to give pt medication. [] PT being discontinued at this time. Patient independent. No further needs. [] PT being discontinued at this time as the patient has been transferred to palliative care. No further needs.     Cariot Hernandez, PTA

## 2018-11-09 NOTE — CARE COORDINATION
Joint Replacement Navigator Daily Rounding Note    Admission Date:   11/6/2018 Silva Marsh is a 77 y.o.  female    Post Op Day # 3    Labs:         Recent Labs      11/07/18   0652   HGB  8.0*       Recent Labs      11/07/18   0652  11/08/18   0701  11/09/18   0753   NA  130*  128*  128*   K  5.5*  5.1  4.6   CL  98  99  101   CO2  19*  19*  15*   BUN  33*  33*  39*   CREATININE  2.08*  1.65*  1.11*   GLUCOSE  156*  148*  159*         Met with:     Patient and Family  Patient's LOC:   alert and oriented X3  Patient progress   Ready to discharge today  Patient's Pain:   Patient rates pain tolerable  Oral Intake:    good  Output:    adequate   Noe in:   no  ON-Q:    no    Walker/DME:  Walker/DME needed:  Yes  DME needed:    walker   DME Agency:    [facility] already delivered    Anticoagulation:  Anticoagulation:  ASA  Prescription in chart:  no     Continuity of Care: The [address] form is on the chart. The [address] form is signed by the physician. Discharge Planning:  Confirmed with patient that discharge plan is Home with [address] [place] [address]. Agency/Facility chosen: [facility]  Awaiting pre-certification no  Anticipated discharge date: 11/9    Patient's questions and concerns were answered. Actively listened and provided reassurance.      Electronically signed by: Tamar Rivera RN on 11/9/2018 at 10:56 AM

## 2018-11-09 NOTE — PROGRESS NOTES
CLINICAL PHARMACY NOTE: MEDS TO [address] Select Patient?: Yes  Total # of Prescriptions Filled: 1   The following medications were delivered to the patient:  Oxycodone 5mg  Total # of Interventions Completed: 0  Time Spent (min): 15    Additional Documentation:  Collected copay

## 2018-11-09 NOTE — PROGRESS NOTES
Physical Therapy  DATE: 2018    NAME: Don Woodruff  MRN: [id number]   : 1940    Patient not seen this date for Physical Therapy due to:  [] Blood transfusion in progress  [] Hemodialysis  []  Patient Declined  [] Spine Precautions   [] Strict Bedrest  [] Surgery/ Procedure  [] Testing      [x] Other  Cx, pt requesting pain medications first.      [] PT being discontinued at this time. Patient independent. No further needs. [] PT being discontinued at this time as the patient has been transferred to palliative care. No further needs.     Marycarmen Vicente, PTA

## 2018-11-09 NOTE — CONSULTS
[facility]    CONSULTATION / HISTORY AND PHYSICAL EXAMINATION            Date:   11/9/2018  Patient name:  Jaimie Owens  Date of admission:  11/6/2018  6:05 AM  MRN:   [id number]  Account:  [de-identified]  YOB: 1940  PCP:    KAREEM Leo CNP  Room:   2039/2039-01  Code Status:    Full Code    Physician Requesting Consult: Jaki Braxton MD    Reason for Consult:  Medical mx      Chief Complaint:     No chief complaint on file.  hip pain    History Obtained From:     Post hip surg      History of Present Illness:     Diabetes   Duration more than 7 years  Modifying factors on Glucophage and other med  Severity uncontrolled sever  Associated signs and symtoms neuropathy/ckd/ CAD. aggravated with sugar diet and better with low sugar diet       HTN  Onset more than 2 years ago  hawk mild to mod  Controlled with current po meds  Not associated with headaches or blurry vision  No chest pain    Hip pain post surg   cr better   was on bactrim     Past Medical History:     Past Medical History:   Diagnosis Date    Anemia     CHF [facility]congestive heart failure) ([facility])     QUESTIONABLE    Depression     [facility] mellitus [facility] [address]     Hiatal hernia     History of blood transfusion     Hyperlipidemia     Hypertension     PONV (postoperative nausea and vomiting)     Rectal urgency     Stress incontinence, female         Past Surgical History:     Past Surgical History:   Procedure Laterality Date    APPENDECTOMY      CATARACT REMOVAL WITH IMPLANT Bilateral     COLONOSCOPY      EYE SURGERY      HYSTERECTOMY      JOINT REPLACEMENT      MN TOTAL HIP ARTHROPLASTY Left 11/6/2018    HIP TOTAL ARTHROPLASTY MINIMALLY INVASIVE ASI performed by Jaki Braxton MD at [address]        Medications Prior to Admission:     Prior to Admission medications    Medication Sig Start Date End Date Taking?  Authorizing Provider   oxyCODONE (ROXICODONE) 5 MG negative for vision, hearing changes, runny nose, throat pain  RESPIRATORY:  negative for shortness of breath, cough, congestion, wheezing. CARDIOVASCULAR:  negative for chest pain, palpitations. GASTROINTESTINAL:  negative for nausea, vomiting, diarrhea, constipation, change in bowel habits, abdominal pain   GENITOURINARY:  negative for difficulty of urination, burning with urination, frequency   INTEGUMENT:  negative for rash, skin lesions, easy bruising   HEMATOLOGIC/LYMPHATIC:  negative for swelling/edema   ALLERGIC/IMMUNOLOGIC:  negative for urticaria , itching  ENDOCRINE:  negative increase in drinking, increase in urination, hot or cold intolerance  MUSCULOSKELETAL:  Post hip surg left    NEUROLOGICAL:  negative for headaches, dizziness, lightheadedness, numbness, pain, tingling extremities  BEHAVIOR/PSYCH:  negative for depression, anxiety    Physical Exam:     BP (!) 141/55   Pulse 91   Temp 97.5 °F (36.4 °C) (Oral)   Resp 16   Ht 5' 4\" (1.626 m)   Wt 190 lb (86.2 kg)   SpO2 97%   BMI 32.61 kg/m²   Temp (24hrs), Av.9 °F (36.6 °C), Min:97.5 °F (36.4 °C), Max:98.2 °F (36.8 °C)    Recent Labs      18   1131  18   1642  18   2111  18   0638   POCGLU  142*  164*  153*  162*       Intake/Output Summary (Last 24 hours) at 18 1119  Last data filed at 18 0548   Gross per 24 hour   Intake          1956.75 ml   Output              201 ml   Net          1755.75 ml       General Appearance:  alert, well appearing, and in no acute distress  Mental status: oriented to person, place, and time with normal affect  Head:  normocephalic, atraumatic.   Eye: no icterus, redness, pupils equal and reactive, extraocular eye movements intact, conjunctiva clear  Ear: normal external ear, no discharge, hearing intact  Nose:  no drainage noted  Mouth: mucous membranes moist  Neck: supple, no carotid bruits, thyroid not palpable  Lungs: Bilateral equal air entry, clear to ausculation, no

## 2018-11-12 ENCOUNTER — TELEPHONE (OUTPATIENT)
Dept: ORTHOPEDIC SURGERY | Age: 78
End: 2018-11-12

## 2018-11-12 NOTE — TELEPHONE ENCOUNTER
Asael Munoz called and states that Marilyn Manriquez needs to be put in a facility. She is not eating and she can't get her to stand. Pain is not to bad. Asael Munoz started crying on the phone because Paola Tian her pants and she can't stand her up to clean her up. Patient is a \"nguyen heather\" chair that she had to sleep in because Asael Munoz could not get her to stand up to go to bed. Physical therapist should be there in 15 minutes so she is going to try to get them to help. States that the wound vac stopped working right and that it started beeping all night so [employer] told her to shut it off for the night so she could sleep. Asael Munoz states that the sx site looks fine. No fever or chills. She is acting disoriented, eg Tonia's hair is green(it's not), dogs barking (it's not), writing on the wall ( there is none). Friday at 3:00pm after leaving the hospital patient was trying to step up on the curb to get into the house and went down on her left knee. They had to call 911 because they couldn't get her up. Asael Munoz states there seems to be no problems with the knee and no new issues with her hip. She will only get up and walk with the therapist there. States the right arm is sore and swollen. States that she was complaining about that in the hospital and that her fingers are cold. Asael Munoz would like her to go to a facility, she called the insurance company and they said because she got sent home that they would not give her a facility to go to. She does not want to go to [facility]. Daughter is very emotional and doesn't know what to do.   Left [facility] on 11/6/18, discharged from hospital on 11/9/18

## 2018-11-12 NOTE — TELEPHONE ENCOUNTER
Spoke with Ilene Granda again about Florence Hartmann, she was admitted to [address] [place] [address]. She is anemic and her hemoglobin was 4. They are going to put a pic line in because they cannot use any of her veins and she needs a blood transfusion. After she will go to ICU. They also believe she had a slight stroke.

## 2018-11-19 ENCOUNTER — TELEPHONE (OUTPATIENT)
Dept: ORTHOPEDIC SURGERY | Age: 78
End: 2018-11-19

## 2018-11-27 ENCOUNTER — TELEPHONE (OUTPATIENT)
Dept: PHARMACY | Facility: CLINIC | Age: 78
End: 2018-11-27

## 2018-12-12 ENCOUNTER — TELEPHONE (OUTPATIENT)
Dept: ONCOLOGY | Age: 78
End: 2018-12-12

## 2018-12-12 ENCOUNTER — OFFICE VISIT (OUTPATIENT)
Dept: ONCOLOGY | Age: 78
End: 2018-12-12
Payer: MEDICARE

## 2018-12-12 ENCOUNTER — HOSPITAL ENCOUNTER (OUTPATIENT)
Age: 78
Discharge: HOME | End: 2018-12-12
Payer: MEDICARE

## 2018-12-12 VITALS
HEART RATE: 108 BPM | BODY MASS INDEX: 33.66 KG/M2 | TEMPERATURE: 98.1 F | DIASTOLIC BLOOD PRESSURE: 74 MMHG | HEIGHT: 63 IN | WEIGHT: 190 LBS | SYSTOLIC BLOOD PRESSURE: 130 MMHG

## 2018-12-12 DIAGNOSIS — D64.9 ANEMIA, UNSPECIFIED TYPE: ICD-10-CM

## 2018-12-12 DIAGNOSIS — D72.829 LEUKOCYTOSIS, UNSPECIFIED TYPE: ICD-10-CM

## 2018-12-12 DIAGNOSIS — E53.8 VITAMIN B 12 DEFICIENCY: ICD-10-CM

## 2018-12-12 DIAGNOSIS — D64.9 ANEMIA, UNSPECIFIED TYPE: Primary | ICD-10-CM

## 2018-12-12 LAB
ABSOLUTE RETIC #: 0.07 M/UL (ref 0.03–0.08)
ALBUMIN SERPL-MCNC: 3 G/DL (ref 3.5–5.2)
ALBUMIN/GLOBULIN RATIO: 0.8 (ref 1–2.5)
ALP BLD-CCNC: 118 U/L (ref 35–104)
ALT SERPL-CCNC: 6 U/L (ref 5–33)
ANION GAP SERPL CALCULATED.3IONS-SCNC: 12 MMOL/L (ref 9–17)
AST SERPL-CCNC: 12 U/L
BILIRUB SERPL-MCNC: 0.48 MG/DL (ref 0.3–1.2)
BUN BLDV-MCNC: 14 MG/DL (ref 8–23)
BUN/CREAT BLD: ABNORMAL (ref 9–20)
CALCIUM SERPL-MCNC: 8.6 MG/DL (ref 8.6–10.4)
CHLORIDE BLD-SCNC: 93 MMOL/L (ref 98–107)
CO2: 27 MMOL/L (ref 20–31)
CREAT SERPL-MCNC: 0.88 MG/DL (ref 0.5–0.9)
FERRITIN: 340 UG/L (ref 13–150)
FOLATE: 6.7 NG/ML
GFR AFRICAN AMERICAN: >60 ML/MIN
GFR NON-AFRICAN AMERICAN: >60 ML/MIN
GFR SERPL CREATININE-BSD FRML MDRD: ABNORMAL ML/MIN/{1.73_M2}
GFR SERPL CREATININE-BSD FRML MDRD: ABNORMAL ML/MIN/{1.73_M2}
GLUCOSE BLD-MCNC: 145 MG/DL (ref 70–99)
HAPTOGLOBIN: 376 MG/DL (ref 30–200)
IMMATURE RETIC FRACT: 18.8 % (ref 2.7–18.3)
IRON SATURATION: 9 % (ref 20–55)
IRON: 14 UG/DL (ref 37–145)
LACTATE DEHYDROGENASE: 280 U/L (ref 135–214)
POTASSIUM SERPL-SCNC: 4 MMOL/L (ref 3.7–5.3)
RETIC %: 1.9 % (ref 0.5–1.9)
RETIC HEMOGLOBIN: 30 PG (ref 28.2–35.7)
SODIUM BLD-SCNC: 132 MMOL/L (ref 135–144)
TOTAL IRON BINDING CAPACITY: 153 UG/DL (ref 250–450)
TOTAL PROTEIN: 6.6 G/DL (ref 6.4–8.3)
TRANSFERRIN: 123 MG/DL (ref 200–360)
UNSATURATED IRON BINDING CAPACITY: 139 UG/DL (ref 112–347)
VITAMIN B-12: 609 PG/ML (ref 232–1245)

## 2018-12-12 PROCEDURE — 99211 OFF/OP EST MAY X REQ PHY/QHP: CPT | Performed by: INTERNAL MEDICINE

## 2018-12-12 PROCEDURE — 85045 AUTOMATED RETICULOCYTE COUNT: CPT

## 2018-12-12 PROCEDURE — 82607 VITAMIN B-12: CPT

## 2018-12-12 PROCEDURE — 99214 OFFICE O/P EST MOD 30 MIN: CPT | Performed by: INTERNAL MEDICINE

## 2018-12-12 PROCEDURE — 84165 PROTEIN E-PHORESIS SERUM: CPT

## 2018-12-12 PROCEDURE — 82746 ASSAY OF FOLIC ACID SERUM: CPT

## 2018-12-12 PROCEDURE — 80053 COMPREHEN METABOLIC PANEL: CPT

## 2018-12-12 PROCEDURE — 83550 IRON BINDING TEST: CPT

## 2018-12-12 PROCEDURE — 83010 ASSAY OF HAPTOGLOBIN QUANT: CPT

## 2018-12-12 PROCEDURE — 83540 ASSAY OF IRON: CPT

## 2018-12-12 PROCEDURE — 83615 LACTATE (LD) (LDH) ENZYME: CPT

## 2018-12-12 PROCEDURE — 86334 IMMUNOFIX E-PHORESIS SERUM: CPT

## 2018-12-12 PROCEDURE — 84466 ASSAY OF TRANSFERRIN: CPT

## 2018-12-12 PROCEDURE — 82728 ASSAY OF FERRITIN: CPT

## 2018-12-12 PROCEDURE — 84155 ASSAY OF PROTEIN SERUM: CPT

## 2018-12-12 PROCEDURE — 36415 COLL VENOUS BLD VENIPUNCTURE: CPT

## 2018-12-12 PROCEDURE — 85025 COMPLETE CBC W/AUTO DIFF WBC: CPT

## 2018-12-12 RX ORDER — DICLOFENAC SODIUM 1 MG/ML
1 SOLUTION/ DROPS OPHTHALMIC 4 TIMES DAILY
COMMUNITY
End: 2019-01-17

## 2018-12-12 RX ORDER — DIPHENHYDRAMINE HCL 25 MG
25 TABLET ORAL EVERY 6 HOURS PRN
COMMUNITY
End: 2019-01-17

## 2018-12-12 RX ORDER — DOCUSATE SODIUM 100 MG/1
100 CAPSULE, LIQUID FILLED ORAL 2 TIMES DAILY PRN
COMMUNITY
End: 2020-01-30 | Stop reason: SDUPTHER

## 2018-12-12 RX ORDER — BETHANECHOL CHLORIDE 10 MG/1
10 TABLET ORAL 3 TIMES DAILY
COMMUNITY
End: 2019-02-19 | Stop reason: SDUPTHER

## 2018-12-12 NOTE — PROGRESS NOTES
mouth daily 90 tablet 1    lisinopril (PRINIVIL;ZESTRIL) 20 MG tablet TAKE ONE TABLET BY MOUTH ONCE DAILY 90 tablet 3    furosemide (LASIX) 40 MG tablet TAKE 1 TABLET BY MOUTH ONCE DAILY 90 tablet 1    metFORMIN (GLUCOPHAGE) 500 MG tablet Take 1 tablet by mouth 2 times daily (with meals) 900 tablet 3    metoprolol succinate (TOPROL XL) 50 MG extended release tablet Take 1 tablet by mouth daily 90 tablet 3     No current facility-administered medications for this visit. SUBJECTIVE:  Silva Marsh is a very pleasant 77 y.o. female who is Presenting for follow-up. She was admitted to [facility] in November 2018. She was having stroke-like symptoms. I was consult to see her for leukocytosis. In addition, she was having significant anemia after an elective hip surgery. Please refer to my consult from that admission for my full H&P. Since I saw her, she was discharged from [facility].  She is presently in an extended care facility receiving inpatient rehabilitation. She is doing fairly well. She's making some strides. However, she still quite weak and deconditioned. She is accompanied to appointment by her family. Although they agree that she's making progress, she's till very weak and they do not believe she is fit to come home yet. She does not report any recent episodes of bruising or bleeding. She's not had any recent lab draws since her discharge from the hospital.  Otherwise, she has no issues or complaints to bring to my attention. She reports no other changes in her family history or social history. PHYSICAL EXAM:   Vitals:    12/12/18 1621   BP: 130/74   Pulse: 108   Temp: 98.1 °F (36.7 °C)       Physical Exam   Constitutional: She is oriented to person, place, and time. She appears well-developed and well-nourished. No distress. HENT:   Head: Normocephalic and atraumatic. Eyes: Pupils are equal, round, and reactive to light. Neck: Neck supple.

## 2018-12-13 LAB
ABSOLUTE EOS #: 0.2 K/UL (ref 0–0.4)
ABSOLUTE IMMATURE GRANULOCYTE: 0 K/UL (ref 0–0.3)
ABSOLUTE LYMPH #: 2.17 K/UL (ref 1–4.8)
ABSOLUTE MONO #: 1.18 K/UL (ref 0.1–0.8)
BASOPHILS # BLD: 1 % (ref 0–2)
BASOPHILS ABSOLUTE: 0.2 K/UL (ref 0–0.2)
DIFFERENTIAL TYPE: ABNORMAL
EOSINOPHILS RELATIVE PERCENT: 1 % (ref 1–4)
HCT VFR BLD CALC: 31.4 % (ref 36.3–47.1)
HEMOGLOBIN: 10 G/DL (ref 11.9–15.1)
IMMATURE GRANULOCYTES: 0 %
LYMPHOCYTES # BLD: 11 % (ref 24–44)
MCH RBC QN AUTO: 28.2 PG (ref 25.2–33.5)
MCHC RBC AUTO-ENTMCNC: 31.8 G/DL (ref 28.4–34.8)
MCV RBC AUTO: 88.7 FL (ref 82.6–102.9)
MONOCYTES # BLD: 6 % (ref 1–7)
MORPHOLOGY: ABNORMAL
NRBC AUTOMATED: 0 PER 100 WBC
PDW BLD-RTO: 15.4 % (ref 11.8–14.4)
PLATELET # BLD: 307 K/UL (ref 138–453)
PLATELET ESTIMATE: ABNORMAL
PMV BLD AUTO: 10.5 FL (ref 8.1–13.5)
RBC # BLD: 3.54 M/UL (ref 3.95–5.11)
RBC # BLD: ABNORMAL 10*6/UL
SEG NEUTROPHILS: 81 % (ref 36–66)
SEGMENTED NEUTROPHILS ABSOLUTE COUNT: 15.95 K/UL (ref 1.8–7.7)
SURGICAL PATHOLOGY REPORT: NORMAL
WBC # BLD: 19.7 K/UL (ref 3.5–11.3)
WBC # BLD: ABNORMAL 10*3/UL

## 2018-12-14 LAB
ALBUMIN (CALCULATED): 3.2 G/DL (ref 3.2–5.2)
ALBUMIN PERCENT: 51 % (ref 45–65)
ALPHA 1 PERCENT: 5 % (ref 3–6)
ALPHA 2 PERCENT: 14 % (ref 6–13)
ALPHA-1-GLOBULIN: 0.3 G/DL (ref 0.1–0.4)
ALPHA-2-GLOBULIN: 0.9 G/DL (ref 0.5–0.9)
BETA GLOBULIN: 0.7 G/DL (ref 0.5–1.1)
BETA PERCENT: 10 % (ref 11–19)
GAMMA GLOBULIN %: 20 % (ref 9–20)
GAMMA GLOBULIN: 1.3 G/DL (ref 0.5–1.5)
PATHOLOGIST REVIEW: NORMAL
PATHOLOGIST: ABNORMAL
PATHOLOGIST: NORMAL
PROTEIN ELECTROPHORESIS, SERUM: ABNORMAL
SERUM IFX INTERP: NORMAL
TOTAL PROT. SUM,%: 100 % (ref 98–102)
TOTAL PROT. SUM: 6.4 G/DL (ref 6.3–8.2)
TOTAL PROTEIN: 6.3 G/DL (ref 6.4–8.3)

## 2019-01-17 ENCOUNTER — OFFICE VISIT (OUTPATIENT)
Dept: PRIMARY CARE CLINIC | Age: 79
End: 2019-01-17
Payer: MEDICARE

## 2019-01-17 VITALS
WEIGHT: 179.2 LBS | SYSTOLIC BLOOD PRESSURE: 126 MMHG | RESPIRATION RATE: 18 BRPM | DIASTOLIC BLOOD PRESSURE: 78 MMHG | OXYGEN SATURATION: 95 % | BODY MASS INDEX: 31.74 KG/M2 | HEART RATE: 73 BPM

## 2019-01-17 DIAGNOSIS — R73.03 PREDIABETES: ICD-10-CM

## 2019-01-17 DIAGNOSIS — I63.9 CEREBROVASCULAR ACCIDENT (CVA), UNSPECIFIED MECHANISM (HCC): ICD-10-CM

## 2019-01-17 DIAGNOSIS — F41.8 DEPRESSION WITH ANXIETY: ICD-10-CM

## 2019-01-17 DIAGNOSIS — D50.9 IRON DEFICIENCY ANEMIA, UNSPECIFIED IRON DEFICIENCY ANEMIA TYPE: ICD-10-CM

## 2019-01-17 DIAGNOSIS — I10 ESSENTIAL HYPERTENSION: Primary | ICD-10-CM

## 2019-01-17 DIAGNOSIS — M16.12 PRIMARY OSTEOARTHRITIS OF LEFT HIP: ICD-10-CM

## 2019-01-17 DIAGNOSIS — F01.50 VASCULAR DEMENTIA WITHOUT BEHAVIORAL DISTURBANCE (HCC): ICD-10-CM

## 2019-01-17 PROCEDURE — 99215 OFFICE O/P EST HI 40 MIN: CPT | Performed by: INTERNAL MEDICINE

## 2019-01-17 PROCEDURE — 1111F DSCHRG MED/CURRENT MED MERGE: CPT | Performed by: INTERNAL MEDICINE

## 2019-01-17 RX ORDER — ASCORBIC ACID 500 MG
500 TABLET ORAL DAILY
COMMUNITY
End: 2019-01-17

## 2019-01-17 RX ORDER — MEMANTINE HYDROCHLORIDE 5 MG/1
5 TABLET ORAL DAILY
Qty: 30 TABLET | Refills: 5 | Status: SHIPPED | OUTPATIENT
Start: 2019-01-17 | End: 2019-02-19 | Stop reason: SDUPTHER

## 2019-01-17 RX ORDER — FERROUS SULFATE 325(65) MG
325 TABLET ORAL 2 TIMES DAILY
COMMUNITY

## 2019-01-18 ENCOUNTER — TELEPHONE (OUTPATIENT)
Dept: PRIMARY CARE CLINIC | Age: 79
End: 2019-01-18

## 2019-01-18 DIAGNOSIS — I63.9 CEREBROVASCULAR ACCIDENT (CVA), UNSPECIFIED MECHANISM (HCC): Primary | ICD-10-CM

## 2019-01-18 DIAGNOSIS — M16.12 PRIMARY OSTEOARTHRITIS OF LEFT HIP: ICD-10-CM

## 2019-01-18 DIAGNOSIS — N39.45 CONTINUOUS LEAKAGE OF URINE: ICD-10-CM

## 2019-01-22 ENCOUNTER — TELEPHONE (OUTPATIENT)
Dept: PRIMARY CARE CLINIC | Age: 79
End: 2019-01-22

## 2019-01-22 DIAGNOSIS — M10.9 ACUTE GOUT, UNSPECIFIED CAUSE, UNSPECIFIED SITE: Primary | ICD-10-CM

## 2019-01-22 DIAGNOSIS — R60.0 LOCALIZED EDEMA: ICD-10-CM

## 2019-01-22 RX ORDER — PREDNISONE 20 MG/1
TABLET ORAL
Qty: 18 TABLET | Refills: 0 | Status: SHIPPED | OUTPATIENT
Start: 2019-01-22 | End: 2019-02-01

## 2019-01-23 ENCOUNTER — OFFICE VISIT (OUTPATIENT)
Dept: ORTHOPEDIC SURGERY | Age: 79
End: 2019-01-23

## 2019-01-23 DIAGNOSIS — Z96.642 STATUS POST TOTAL REPLACEMENT OF LEFT HIP: Primary | ICD-10-CM

## 2019-01-23 PROCEDURE — 99024 POSTOP FOLLOW-UP VISIT: CPT | Performed by: ORTHOPAEDIC SURGERY

## 2019-02-13 ENCOUNTER — OFFICE VISIT (OUTPATIENT)
Dept: ONCOLOGY | Age: 79
End: 2019-02-13
Payer: MEDICARE

## 2019-02-13 ENCOUNTER — HOSPITAL ENCOUNTER (OUTPATIENT)
Age: 79
Discharge: HOME | End: 2019-02-13
Payer: MEDICARE

## 2019-02-13 VITALS
BODY MASS INDEX: 33.44 KG/M2 | RESPIRATION RATE: 18 BRPM | TEMPERATURE: 97.8 F | SYSTOLIC BLOOD PRESSURE: 150 MMHG | HEART RATE: 72 BPM | DIASTOLIC BLOOD PRESSURE: 70 MMHG | WEIGHT: 188.8 LBS

## 2019-02-13 DIAGNOSIS — D64.9 ANEMIA, UNSPECIFIED TYPE: Primary | ICD-10-CM

## 2019-02-13 DIAGNOSIS — D64.9 ANEMIA, UNSPECIFIED TYPE: ICD-10-CM

## 2019-02-13 DIAGNOSIS — E53.8 VITAMIN B 12 DEFICIENCY: ICD-10-CM

## 2019-02-13 DIAGNOSIS — D72.829 LEUKOCYTOSIS, UNSPECIFIED TYPE: ICD-10-CM

## 2019-02-13 LAB
ABSOLUTE EOS #: 0.2 K/UL (ref 0–0.4)
ABSOLUTE IMMATURE GRANULOCYTE: ABNORMAL K/UL (ref 0–0.3)
ABSOLUTE LYMPH #: 2.24 K/UL (ref 1–4.8)
ABSOLUTE MONO #: 0.88 K/UL (ref 0.1–1.2)
BASOPHILS # BLD: 0 % (ref 0–2)
BASOPHILS ABSOLUTE: 0.04 K/UL (ref 0–0.2)
DIFFERENTIAL TYPE: ABNORMAL
EOSINOPHILS RELATIVE PERCENT: 2 % (ref 1–4)
HCT VFR BLD CALC: 34.7 % (ref 36–46)
HEMOGLOBIN: 10.8 G/DL (ref 12–16)
IMMATURE GRANULOCYTES: ABNORMAL %
LYMPHOCYTES # BLD: 24 % (ref 24–44)
MCH RBC QN AUTO: 27.1 PG (ref 26–34)
MCHC RBC AUTO-ENTMCNC: 31.1 G/DL (ref 31–37)
MCV RBC AUTO: 87 FL (ref 80–100)
MONOCYTES # BLD: 10 % (ref 2–11)
NRBC AUTOMATED: ABNORMAL PER 100 WBC
PDW BLD-RTO: 14.7 % (ref 12.5–15.4)
PLATELET # BLD: 249 K/UL (ref 140–450)
PLATELET ESTIMATE: ABNORMAL
PMV BLD AUTO: 9.9 FL (ref 8–14)
RBC # BLD: 3.99 M/UL (ref 4–5.2)
RBC # BLD: ABNORMAL 10*6/UL
SEG NEUTROPHILS: 64 % (ref 36–66)
SEGMENTED NEUTROPHILS ABSOLUTE COUNT: 5.82 K/UL (ref 1.8–7.7)
WBC # BLD: 9.2 K/UL (ref 3.5–11)
WBC # BLD: ABNORMAL 10*3/UL

## 2019-02-13 PROCEDURE — 85025 COMPLETE CBC W/AUTO DIFF WBC: CPT

## 2019-02-13 PROCEDURE — 36415 COLL VENOUS BLD VENIPUNCTURE: CPT

## 2019-02-13 PROCEDURE — 99214 OFFICE O/P EST MOD 30 MIN: CPT | Performed by: INTERNAL MEDICINE

## 2019-02-19 DIAGNOSIS — I10 ESSENTIAL HYPERTENSION: ICD-10-CM

## 2019-02-19 DIAGNOSIS — F01.50 VASCULAR DEMENTIA WITHOUT BEHAVIORAL DISTURBANCE (HCC): ICD-10-CM

## 2019-02-19 RX ORDER — FUROSEMIDE 40 MG/1
TABLET ORAL
Qty: 90 TABLET | Refills: 1 | Status: SHIPPED | OUTPATIENT
Start: 2019-02-19 | End: 2019-08-31 | Stop reason: SDUPTHER

## 2019-02-19 RX ORDER — BETHANECHOL CHLORIDE 10 MG/1
10 TABLET ORAL 3 TIMES DAILY
Qty: 90 TABLET | Refills: 1 | Status: SHIPPED | OUTPATIENT
Start: 2019-02-19 | End: 2019-05-10 | Stop reason: SDUPTHER

## 2019-02-19 RX ORDER — MEMANTINE HYDROCHLORIDE 5 MG/1
5 TABLET ORAL DAILY
Qty: 30 TABLET | Refills: 5 | Status: SHIPPED | OUTPATIENT
Start: 2019-02-19 | End: 2019-03-13

## 2019-02-19 RX ORDER — METOPROLOL SUCCINATE 50 MG/1
50 TABLET, EXTENDED RELEASE ORAL DAILY
Qty: 90 TABLET | Refills: 3 | Status: SHIPPED | OUTPATIENT
Start: 2019-02-19 | End: 2019-05-01

## 2019-02-20 ENCOUNTER — TELEPHONE (OUTPATIENT)
Dept: PRIMARY CARE CLINIC | Age: 79
End: 2019-02-20

## 2019-03-13 ENCOUNTER — OFFICE VISIT (OUTPATIENT)
Dept: PRIMARY CARE CLINIC | Age: 79
End: 2019-03-13
Payer: MEDICARE

## 2019-03-13 VITALS
HEIGHT: 64 IN | WEIGHT: 190.4 LBS | DIASTOLIC BLOOD PRESSURE: 76 MMHG | OXYGEN SATURATION: 97 % | BODY MASS INDEX: 32.5 KG/M2 | HEART RATE: 74 BPM | RESPIRATION RATE: 16 BRPM | SYSTOLIC BLOOD PRESSURE: 128 MMHG

## 2019-03-13 DIAGNOSIS — E78.2 MIXED HYPERLIPIDEMIA: ICD-10-CM

## 2019-03-13 DIAGNOSIS — F01.50 VASCULAR DEMENTIA WITHOUT BEHAVIORAL DISTURBANCE (HCC): ICD-10-CM

## 2019-03-13 DIAGNOSIS — E55.9 VITAMIN D DEFICIENCY: Primary | ICD-10-CM

## 2019-03-13 DIAGNOSIS — I10 ESSENTIAL HYPERTENSION: ICD-10-CM

## 2019-03-13 PROCEDURE — 99215 OFFICE O/P EST HI 40 MIN: CPT | Performed by: NURSE PRACTITIONER

## 2019-03-13 RX ORDER — LISINOPRIL 20 MG/1
TABLET ORAL
Qty: 90 TABLET | Refills: 3 | Status: SHIPPED | OUTPATIENT
Start: 2019-03-13 | End: 2020-03-23

## 2019-03-13 RX ORDER — PRAVASTATIN SODIUM 40 MG
40 TABLET ORAL DAILY
Qty: 90 TABLET | Refills: 1 | Status: SHIPPED | OUTPATIENT
Start: 2019-03-13 | End: 2019-10-14 | Stop reason: SDUPTHER

## 2019-03-13 RX ORDER — B-COMPLEX WITH VITAMIN C
TABLET ORAL
Qty: 30 TABLET | Refills: 0 | Status: SHIPPED | OUTPATIENT
Start: 2019-03-13 | End: 2019-04-15 | Stop reason: SDUPTHER

## 2019-03-13 RX ORDER — POTASSIUM CHLORIDE 1500 MG/1
20 TABLET, EXTENDED RELEASE ORAL DAILY
Qty: 90 TABLET | Refills: 3 | Status: SHIPPED | OUTPATIENT
Start: 2019-03-13 | End: 2020-05-05

## 2019-03-13 RX ORDER — MEMANTINE HYDROCHLORIDE 10 MG/1
10 TABLET ORAL DAILY
Qty: 30 TABLET | Refills: 0 | Status: SHIPPED | OUTPATIENT
Start: 2019-03-13 | End: 2019-04-15 | Stop reason: SDUPTHER

## 2019-03-13 RX ORDER — CITALOPRAM 20 MG/1
TABLET ORAL
Qty: 90 TABLET | Refills: 1 | Status: SHIPPED | OUTPATIENT
Start: 2019-03-13 | End: 2019-11-21 | Stop reason: SDUPTHER

## 2019-03-13 ASSESSMENT — PATIENT HEALTH QUESTIONNAIRE - PHQ9
SUM OF ALL RESPONSES TO PHQ9 QUESTIONS 1 & 2: 0
SUM OF ALL RESPONSES TO PHQ QUESTIONS 1-9: 0
SUM OF ALL RESPONSES TO PHQ QUESTIONS 1-9: 0
1. LITTLE INTEREST OR PLEASURE IN DOING THINGS: 0
2. FEELING DOWN, DEPRESSED OR HOPELESS: 0

## 2019-03-13 ASSESSMENT — ENCOUNTER SYMPTOMS
ABDOMINAL PAIN: 0
SHORTNESS OF BREATH: 0
BACK PAIN: 0
COUGH: 0

## 2019-03-14 DIAGNOSIS — K58.0 IRRITABLE BOWEL SYNDROME WITH DIARRHEA: ICD-10-CM

## 2019-03-21 ENCOUNTER — TELEPHONE (OUTPATIENT)
Dept: PRIMARY CARE CLINIC | Age: 79
End: 2019-03-21

## 2019-03-22 ENCOUNTER — OFFICE VISIT (OUTPATIENT)
Dept: PRIMARY CARE CLINIC | Age: 79
End: 2019-03-22
Payer: MEDICARE

## 2019-03-22 VITALS
RESPIRATION RATE: 16 BRPM | BODY MASS INDEX: 32.54 KG/M2 | OXYGEN SATURATION: 98 % | WEIGHT: 190.6 LBS | HEIGHT: 64 IN | DIASTOLIC BLOOD PRESSURE: 84 MMHG | SYSTOLIC BLOOD PRESSURE: 128 MMHG | HEART RATE: 70 BPM

## 2019-03-22 DIAGNOSIS — M79.642 PAIN OF LEFT HAND: ICD-10-CM

## 2019-03-22 DIAGNOSIS — E11.9 TYPE 2 DIABETES MELLITUS WITHOUT COMPLICATION, WITHOUT LONG-TERM CURRENT USE OF INSULIN (HCC): Primary | ICD-10-CM

## 2019-03-22 DIAGNOSIS — M25.442 SWELLING OF FINGER JOINT OF LEFT HAND: ICD-10-CM

## 2019-03-22 LAB — HBA1C MFR BLD: 5.6 %

## 2019-03-22 PROCEDURE — 83036 HEMOGLOBIN GLYCOSYLATED A1C: CPT | Performed by: FAMILY MEDICINE

## 2019-03-22 PROCEDURE — 99213 OFFICE O/P EST LOW 20 MIN: CPT | Performed by: FAMILY MEDICINE

## 2019-03-22 RX ORDER — PREDNISONE 20 MG/1
40 TABLET ORAL DAILY
Qty: 10 TABLET | Refills: 0 | Status: SHIPPED | OUTPATIENT
Start: 2019-03-22 | End: 2019-05-10 | Stop reason: SDUPTHER

## 2019-03-22 ASSESSMENT — ENCOUNTER SYMPTOMS
NAUSEA: 0
VOMITING: 0

## 2019-03-27 ENCOUNTER — OFFICE VISIT (OUTPATIENT)
Dept: ORTHOPEDIC SURGERY | Age: 79
End: 2019-03-27
Payer: MEDICARE

## 2019-03-27 DIAGNOSIS — M16.12 PRIMARY OSTEOARTHRITIS OF LEFT HIP: Primary | ICD-10-CM

## 2019-03-27 DIAGNOSIS — Z96.642 STATUS POST TOTAL REPLACEMENT OF LEFT HIP: ICD-10-CM

## 2019-03-27 PROCEDURE — 99213 OFFICE O/P EST LOW 20 MIN: CPT | Performed by: ORTHOPAEDIC SURGERY

## 2019-04-01 ENCOUNTER — TELEPHONE (OUTPATIENT)
Dept: PRIMARY CARE CLINIC | Age: 79
End: 2019-04-01

## 2019-04-03 ENCOUNTER — TELEPHONE (OUTPATIENT)
Dept: PRIMARY CARE CLINIC | Age: 79
End: 2019-04-03

## 2019-04-03 DIAGNOSIS — R53.1 GENERALIZED WEAKNESS: Primary | ICD-10-CM

## 2019-04-15 DIAGNOSIS — F01.50 VASCULAR DEMENTIA WITHOUT BEHAVIORAL DISTURBANCE (HCC): ICD-10-CM

## 2019-04-15 RX ORDER — MEMANTINE HYDROCHLORIDE 10 MG/1
TABLET ORAL
Qty: 30 TABLET | Refills: 0 | Status: SHIPPED | OUTPATIENT
Start: 2019-04-15 | End: 2019-05-10 | Stop reason: SDUPTHER

## 2019-04-15 RX ORDER — B-COMPLEX WITH VITAMIN C
TABLET ORAL
Qty: 30 TABLET | Refills: 0 | Status: SHIPPED | OUTPATIENT
Start: 2019-04-15 | End: 2019-05-29

## 2019-04-15 NOTE — TELEPHONE ENCOUNTER
Hollie Cool MD [address] [place] [address] 22   11/6/2019  2:00 PM Gold Ibarra MD [id number]            Patient Active Problem List:     Depression with anxiety     Essential hypertension     Mixed hyperlipidemia     Vitamin B 12 deficiency     Primary osteoarthritis of left hip     Cerebrovascular accident (CVA) ([facility] [address]     Continuous leakage of urine

## 2019-04-18 ENCOUNTER — TELEPHONE (OUTPATIENT)
Dept: PRIMARY CARE CLINIC | Age: 79
End: 2019-04-18

## 2019-04-18 NOTE — TELEPHONE ENCOUNTER
Just elfego Vasquez from [facility] called, they are seeing patient every other week and she would like to see patient again next to help her set up meds and kiarra her leg swelling-she did not have her stockings on that she is supposed to wear everyday. Gave verbal okay.

## 2019-05-01 ENCOUNTER — OFFICE VISIT (OUTPATIENT)
Dept: PRIMARY CARE CLINIC | Age: 79
End: 2019-05-01
Payer: MEDICARE

## 2019-05-01 VITALS
WEIGHT: 176.6 LBS | OXYGEN SATURATION: 99 % | HEART RATE: 60 BPM | BODY MASS INDEX: 30.31 KG/M2 | SYSTOLIC BLOOD PRESSURE: 138 MMHG | RESPIRATION RATE: 16 BRPM | DIASTOLIC BLOOD PRESSURE: 72 MMHG

## 2019-05-01 DIAGNOSIS — F41.8 DEPRESSION WITH ANXIETY: ICD-10-CM

## 2019-05-01 DIAGNOSIS — E55.9 VITAMIN D DEFICIENCY: ICD-10-CM

## 2019-05-01 DIAGNOSIS — R42 DIZZY SPELLS: Primary | ICD-10-CM

## 2019-05-01 DIAGNOSIS — E78.2 MIXED HYPERLIPIDEMIA: ICD-10-CM

## 2019-05-01 DIAGNOSIS — I10 ESSENTIAL HYPERTENSION: ICD-10-CM

## 2019-05-01 PROCEDURE — 99214 OFFICE O/P EST MOD 30 MIN: CPT | Performed by: INTERNAL MEDICINE

## 2019-05-01 RX ORDER — M-VIT,TX,IRON,MINS/CALC/FOLIC 27MG-0.4MG
1 TABLET ORAL DAILY
COMMUNITY

## 2019-05-01 RX ORDER — MECLIZINE HCL 12.5 MG/1
12.5 TABLET ORAL 3 TIMES DAILY PRN
Qty: 30 TABLET | Refills: 3 | Status: SHIPPED | OUTPATIENT
Start: 2019-05-01 | End: 2019-05-11

## 2019-05-01 ASSESSMENT — ENCOUNTER SYMPTOMS
TROUBLE SWALLOWING: 0
BACK PAIN: 0
ABDOMINAL PAIN: 0
EYE DISCHARGE: 0
NAUSEA: 0
VOMITING: 0
COUGH: 0
EYE REDNESS: 0
SHORTNESS OF BREATH: 0
SORE THROAT: 0

## 2019-05-01 NOTE — PROGRESS NOTES
Visit Information    Have you changed or started any medications since your last visit including any over-the-counter medicines, vitamins, or herbal medicines? no   Are you having any side effects from any of your medications? -  no  Have you stopped taking any of your medications? Is so, why? -  yes - metformin    Have you seen any other physician or provider since your last visit? Yes - Records Obtained  Have you had any other diagnostic tests since your last visit? No  Have you been seen in the emergency room and/or had an admission to a hospital since we last saw you? No  Have you had your routine dental cleaning in the past 6 months? no    Have you activated your [employer] account? If not, what are your barriers?  Yes     Patient Care Team:  KAREEM Donaldson CNP as PCP - General (Nurse Practitioner)  KAREEM Donaldson CNP as PCP - [facility] Attributed Provider    Medical History Review  Past Medical, Family, and Social History reviewed and does contribute to the patient presenting condition    Health Maintenance   Topic Date Due    DTaP/Tdap/Td vaccine (1 - Tdap) 04/16/1958    Shingles Vaccine (2 of 3) 06/05/2015    Potassium monitoring  12/12/2019    Creatinine monitoring  12/12/2019    DEXA (modify frequency per FRAX score)  Completed    Flu vaccine  Completed    Pneumococcal 65+ years Vaccine  Completed

## 2019-05-01 NOTE — PROGRESS NOTES
[facility]  [address] [place] [address] [address]  [address] [facility] [address] [facility] [address]  Dept: [phone]  Dept Fax: [phone]    Elma Marino is a [de-identified] y.o. female who presents today for her medical conditions/complaints as noted below. Elma Marino is c/o of  Chief Complaint   Patient presents with    Other     weak, dizzy , and fatigue x 4 days       HPI:     HPI    She reports feeling dizzy while getting up from sitting to standing almost once a day since last few wks . She denied room spinning around her . She denied fall episodes /LOC   Heart rate  - 60 /min   On metoprolol 50 mg - discontinued   BP - 138 /72 mm Hgb- continued Lisinopril 20 mg od po , lasix 40 mg od po     She was known to have hypertension - takes meds regularly , compliant with salt restricted diet , denied headache , , chest pain , palpitations. Well controlled     Dementia stable on Namenda     Pedal edema stable on lasix 40 mg od po     Urinary retention - on bethanechol 10 mg tid po - asked her to take 10 mg bid po     Anemia - 10.8 gm/dl - on iron supplements   - continued     She walks with help of walker .            Hemoglobin A1C (%)   Date Value   03/22/2019 5.6   05/10/2018 5.6   06/07/2017 6.1 (H)             ( goal A1C is < 7)   No results found for: LABMICR  LDL Cholesterol (mg/dL)   Date Value   05/10/2018 70   06/07/2017 76     LDL Calculated (mg/dL)   Date Value   12/07/2016 76       (goal LDL is <100)   AST (U/L)   Date Value   12/12/2018 12     ALT (U/L)   Date Value   12/12/2018 6     BUN (mg/dL)   Date Value   12/12/2018 14     BP Readings from Last 3 Encounters:   05/01/19 138/72   03/22/19 128/84   03/13/19 128/76          (goal 120/80)    Past Medical History:   Diagnosis Date    Anemia     CHF (congestive heart failure) (HCC)     QUESTIONABLE    Depression     Diabetes mellitus ([facility] [address]     Hiatal hernia     History of blood transfusion     Hyperlipidemia     Hypertension     PONV (postoperative nausea and vomiting)     Rectal urgency     Stress incontinence, female       Past Surgical History:   Procedure Laterality Date    APPENDECTOMY      CATARACT REMOVAL WITH IMPLANT Bilateral     COLONOSCOPY      EYE SURGERY      HYSTERECTOMY      JOINT REPLACEMENT      [place] TOTAL HIP ARTHROPLASTY Left 11/6/2018    HIP TOTAL ARTHROPLASTY MINIMALLY INVASIVE ASI performed by Benjy Valdez MD at [facility] History   Problem Relation Age of Onset    Heart Disease Mother     High Blood Pressure Mother     Cancer Father     Colon Cancer Father        Social History     Tobacco Use    Smoking status: Never Smoker    Smokeless tobacco: Never Used   Substance Use Topics    Alcohol use: No      Current Outpatient Medications   Medication Sig Dispense Refill    Multiple Vitamins-Minerals (THERAPEUTIC MULTIVITAMIN-MINERALS) tablet Take 1 tablet by mouth daily      meclizine (ANTIVERT) 12.5 MG tablet Take 1 tablet by mouth 3 times daily as needed for Dizziness 30 tablet 3    Calcium Carbonate-Vitamin D (OYSTER SHELL CALCIUM/D) 500-200 MG-UNIT TABS TAKE 1 TABLET BY MOUTH ONCE DAILY 30 tablet 0    memantine (NAMENDA) 10 MG tablet TAKE 1 TABLET BY MOUTH ONCE DAILY 30 tablet 0    lisinopril (PRINIVIL;ZESTRIL) 20 MG tablet TAKE ONE TABLET BY MOUTH ONCE DAILY 90 tablet 3    citalopram (CELEXA) 20 MG tablet TAKE 1 TABLET BY MOUTH ONCE DAILY 90 tablet 1    KLOR-CON M20 20 MEQ extended release tablet Take 1 tablet by mouth daily 90 tablet 3    pravastatin (PRAVACHOL) 40 MG tablet Take 1 tablet by mouth daily 90 tablet 1    bethanechol (URECHOLINE) 10 MG tablet Take 1 tablet by mouth 3 times daily (Patient taking differently: Take 10 mg by mouth 2 times daily ) 90 tablet 1    furosemide (LASIX) 40 MG tablet TAKE 1 TABLET BY MOUTH ONCE DAILY (Patient taking differently: 40 mg daily TAKE 1 TABLET BY MOUTH ONCE DAILY) 90 tablet 1    Misc.  Devices MISC Shower transfer bench 1 Device 0  Misc. Devices MISC Adult pull up briefs  Dispense 100 100 Device 0    ferrous sulfate 325 (65 Fe) MG tablet Take 325 mg by mouth 2 times daily       Acetaminophen 325 MG CAPS Take by mouth      docusate sodium (COLACE) 100 MG capsule Take 100 mg by mouth 2 times daily      Blood Pressure KIT 1 kit by Does not apply route daily 1 kit 0    metFORMIN (GLUCOPHAGE) 500 MG tablet Take 1 tablet by mouth 2 times daily (with meals) 900 tablet 3     No current facility-administered medications for this visit. Allergies   Allergen Reactions    Codeine        Health Maintenance   Topic Date Due    DTaP/Tdap/Td vaccine (1 - Tdap) 04/16/1958    Shingles Vaccine (2 of 3) 06/05/2015    Potassium monitoring  12/12/2019    Creatinine monitoring  12/12/2019    DEXA (modify frequency per FRAX score)  Completed    Flu vaccine  Completed    Pneumococcal 65+ years Vaccine  Completed       Subjective:      Review of Systems   Constitutional: Negative for activity change, appetite change, fatigue, fever and unexpected weight change. HENT: Negative for postnasal drip, sore throat and trouble swallowing. Eyes: Negative for discharge and redness. Respiratory: Negative for cough and shortness of breath. Cardiovascular: Negative for chest pain and palpitations. Gastrointestinal: Negative for abdominal pain, nausea and vomiting. Endocrine: Negative for cold intolerance and heat intolerance. Genitourinary: Negative for difficulty urinating and dysuria. Musculoskeletal: Negative for arthralgias, back pain and myalgias. Skin: Negative for rash and wound. Neurological: Positive for dizziness. Negative for light-headedness and headaches. Hematological: Negative for adenopathy. Psychiatric/Behavioral: Negative for behavioral problems. The patient is not nervous/anxious. Objective:     Physical Exam   Constitutional: She is oriented to person, place, and time.  She appears well-developed and well-nourished. No distress. Walks with help of walker    HENT:   Head: Normocephalic and atraumatic. Right Ear: External ear normal.   Left Ear: External ear normal.   Nose: Nose normal.   Mouth/Throat: Oropharynx is clear and moist. No oropharyngeal exudate. Eyes: Conjunctivae are normal. Right eye exhibits no discharge. Left eye exhibits no discharge. No scleral icterus. Neck: Neck supple. Cardiovascular: Normal rate, regular rhythm and normal heart sounds. No murmur heard. Pulmonary/Chest: Effort normal and breath sounds normal. No respiratory distress. She has no wheezes. Abdominal: Soft. Bowel sounds are normal. She exhibits no distension. There is no tenderness. Musculoskeletal: She exhibits edema. Lymphadenopathy:     She has no cervical adenopathy. Neurological: She is alert and oriented to person, place, and time. Skin: Skin is warm and dry. No rash noted. She is not diaphoretic. Psychiatric: Judgment and thought content normal.   Nursing note and vitals reviewed. /72   Pulse 60   Resp 16   Wt 176 lb 9.6 oz (80.1 kg)   SpO2 99%   BMI 30.31 kg/m²     Assessment:      1. Dizzy spells sec to Multiple medical co morbidities and aging process  Poor sympathetic reflex   - meclizine (ANTIVERT) 12.5 MG tablet; Take 1 tablet by mouth 3 times daily as needed for Dizziness  Dispense: 30 tablet; Refill: 3  - [facility]  [place]    2. Essential hypertension   controlled well - continue current meds , advised daily exercise , low salt diet and DASH diet as well . 3. Depression with anxiety  Stable on celexa     4. Mixed hyperlipidemia  Continue Pravachol              Plan:      Return in about 1 month (around 5/29/2019), or if symptoms worsen or fail to improve, for hypertension.     Orders Placed This Encounter   Procedures    [facility] [place]     Referral Priority:   Routine     Referral Type:   Eval and Treat     Referral Reason:   Specialty Services Required     Requested Specialty:   Physical Therapy     Number of Visits Requested:   1     Orders Placed This Encounter   Medications    meclizine (ANTIVERT) 12.5 MG tablet     Sig: Take 1 tablet by mouth 3 times daily as needed for Dizziness     Dispense:  30 tablet     Refill:  3        Patient given educational materials - see patient instructions. Discussed use, benefit, and side effects of prescribed medications. All patient questions answered. Pt voiced understanding. Reviewed healthmaintenance. Instructed to continue current medications, diet and exercise. Patient agreed with treatment plan. Follow up as directed.      Electronically signed by Miky Dunham MD on 5/1/2019 at 5:12 PM

## 2019-05-06 ENCOUNTER — HOSPITAL ENCOUNTER (OUTPATIENT)
Age: 79
Discharge: HOME | End: 2019-05-06
Payer: MEDICARE

## 2019-05-06 DIAGNOSIS — M79.642 PAIN OF LEFT HAND: ICD-10-CM

## 2019-05-06 DIAGNOSIS — D64.9 ANEMIA, UNSPECIFIED TYPE: ICD-10-CM

## 2019-05-06 DIAGNOSIS — D72.829 LEUKOCYTOSIS, UNSPECIFIED TYPE: ICD-10-CM

## 2019-05-06 DIAGNOSIS — E53.8 VITAMIN B 12 DEFICIENCY: ICD-10-CM

## 2019-05-06 DIAGNOSIS — M25.442 SWELLING OF FINGER JOINT OF LEFT HAND: ICD-10-CM

## 2019-05-06 DIAGNOSIS — E55.9 VITAMIN D DEFICIENCY: ICD-10-CM

## 2019-05-06 LAB
ABSOLUTE EOS #: 0.31 K/UL (ref 0–0.44)
ABSOLUTE IMMATURE GRANULOCYTE: <0.03 K/UL (ref 0–0.3)
ABSOLUTE LYMPH #: 2.38 K/UL (ref 1.1–3.7)
ABSOLUTE MONO #: 1.09 K/UL (ref 0.1–1.2)
ALBUMIN SERPL-MCNC: 3.9 G/DL (ref 3.5–5.2)
ALBUMIN/GLOBULIN RATIO: 1.2 (ref 1–2.5)
ALP BLD-CCNC: 100 U/L (ref 35–104)
ALT SERPL-CCNC: 9 U/L (ref 5–33)
ANION GAP SERPL CALCULATED.3IONS-SCNC: 10 MMOL/L (ref 9–17)
AST SERPL-CCNC: 16 U/L
BASOPHILS # BLD: 1 % (ref 0–2)
BASOPHILS ABSOLUTE: 0.1 K/UL (ref 0–0.2)
BILIRUB SERPL-MCNC: 0.55 MG/DL (ref 0.3–1.2)
BUN BLDV-MCNC: 18 MG/DL (ref 8–23)
BUN/CREAT BLD: ABNORMAL (ref 9–20)
CALCIUM SERPL-MCNC: 9.1 MG/DL (ref 8.6–10.4)
CHLORIDE BLD-SCNC: 97 MMOL/L (ref 98–107)
CO2: 28 MMOL/L (ref 20–31)
CREAT SERPL-MCNC: 1.08 MG/DL (ref 0.5–0.9)
DIFFERENTIAL TYPE: ABNORMAL
EOSINOPHILS RELATIVE PERCENT: 3 % (ref 1–4)
FERRITIN: 154 UG/L (ref 13–150)
FREE KAPPA/LAMBDA RATIO: 2.18 (ref 0.26–1.65)
GFR AFRICAN AMERICAN: 59 ML/MIN
GFR NON-AFRICAN AMERICAN: 49 ML/MIN
GFR SERPL CREATININE-BSD FRML MDRD: ABNORMAL ML/MIN/{1.73_M2}
GFR SERPL CREATININE-BSD FRML MDRD: ABNORMAL ML/MIN/{1.73_M2}
GLUCOSE BLD-MCNC: 100 MG/DL (ref 70–99)
HCT VFR BLD CALC: 38 % (ref 36.3–47.1)
HEMOGLOBIN: 12 G/DL (ref 11.9–15.1)
IGA: 90 MG/DL (ref 70–400)
IGG: 1363 MG/DL (ref 700–1600)
IGM: 76 MG/DL (ref 40–230)
IMMATURE GRANULOCYTES: 0 %
IRON SATURATION: 23 % (ref 20–55)
IRON: 55 UG/DL (ref 37–145)
KAPPA FREE LIGHT CHAINS QNT: 4.63 MG/DL (ref 0.37–1.94)
LAMBDA FREE LIGHT CHAINS QNT: 2.12 MG/DL (ref 0.57–2.63)
LYMPHOCYTES # BLD: 22 % (ref 24–43)
MCH RBC QN AUTO: 27.6 PG (ref 25.2–33.5)
MCHC RBC AUTO-ENTMCNC: 31.6 G/DL (ref 28.4–34.8)
MCV RBC AUTO: 87.6 FL (ref 82.6–102.9)
MONOCYTES # BLD: 10 % (ref 3–12)
NRBC AUTOMATED: 0 PER 100 WBC
PDW BLD-RTO: 12.8 % (ref 11.8–14.4)
PLATELET # BLD: 233 K/UL (ref 138–453)
PLATELET ESTIMATE: ABNORMAL
PMV BLD AUTO: 12 FL (ref 8.1–13.5)
POTASSIUM SERPL-SCNC: 4.1 MMOL/L (ref 3.7–5.3)
RBC # BLD: 4.34 M/UL (ref 3.95–5.11)
RBC # BLD: ABNORMAL 10*6/UL
SEG NEUTROPHILS: 64 % (ref 36–65)
SEGMENTED NEUTROPHILS ABSOLUTE COUNT: 7.08 K/UL (ref 1.5–8.1)
SODIUM BLD-SCNC: 135 MMOL/L (ref 135–144)
TOTAL IRON BINDING CAPACITY: 236 UG/DL (ref 250–450)
TOTAL PROTEIN: 7.1 G/DL (ref 6.4–8.3)
UNSATURATED IRON BINDING CAPACITY: 181 UG/DL (ref 112–347)
WBC # BLD: 11 K/UL (ref 3.5–11.3)
WBC # BLD: ABNORMAL 10*3/UL

## 2019-05-06 PROCEDURE — 80053 COMPREHEN METABOLIC PANEL: CPT

## 2019-05-06 PROCEDURE — 84155 ASSAY OF PROTEIN SERUM: CPT

## 2019-05-06 PROCEDURE — 86038 ANTINUCLEAR ANTIBODIES: CPT

## 2019-05-06 PROCEDURE — 82232 ASSAY OF BETA-2 PROTEIN: CPT

## 2019-05-06 PROCEDURE — 83883 ASSAY NEPHELOMETRY NOT SPEC: CPT

## 2019-05-06 PROCEDURE — 84165 PROTEIN E-PHORESIS SERUM: CPT

## 2019-05-06 PROCEDURE — 86431 RHEUMATOID FACTOR QUANT: CPT

## 2019-05-06 PROCEDURE — 82784 ASSAY IGA/IGD/IGG/IGM EACH: CPT

## 2019-05-06 PROCEDURE — 82607 VITAMIN B-12: CPT

## 2019-05-06 PROCEDURE — 86334 IMMUNOFIX E-PHORESIS SERUM: CPT

## 2019-05-06 PROCEDURE — 82306 VITAMIN D 25 HYDROXY: CPT

## 2019-05-06 PROCEDURE — 82746 ASSAY OF FOLIC ACID SERUM: CPT

## 2019-05-06 PROCEDURE — 86200 CCP ANTIBODY: CPT

## 2019-05-06 PROCEDURE — 82728 ASSAY OF FERRITIN: CPT

## 2019-05-06 PROCEDURE — 85651 RBC SED RATE NONAUTOMATED: CPT

## 2019-05-06 PROCEDURE — 36415 COLL VENOUS BLD VENIPUNCTURE: CPT

## 2019-05-06 PROCEDURE — 85025 COMPLETE CBC W/AUTO DIFF WBC: CPT

## 2019-05-06 PROCEDURE — 83550 IRON BINDING TEST: CPT

## 2019-05-06 PROCEDURE — 83540 ASSAY OF IRON: CPT

## 2019-05-07 LAB
ANTI-NUCLEAR ANTIBODY (ANA): NEGATIVE
BETA-2 MICROGLOBULIN: 3.9 MG/L (ref 0.6–2.4)
CCP IGG ANTIBODIES: <1.5 U/ML
FOLATE: >20 NG/ML
RHEUMATOID FACTOR: <10 IU/ML
SEDIMENTATION RATE, ERYTHROCYTE: 29 MM (ref 0–20)
VITAMIN B-12: 395 PG/ML (ref 232–1245)
VITAMIN D 25-HYDROXY: 32.5 NG/ML (ref 30–100)

## 2019-05-08 LAB
ALBUMIN (CALCULATED): 4.1 G/DL (ref 3.2–5.2)
ALBUMIN PERCENT: 60 % (ref 45–65)
ALPHA 1 PERCENT: 3 % (ref 3–6)
ALPHA 2 PERCENT: 12 % (ref 6–13)
ALPHA-1-GLOBULIN: 0.2 G/DL (ref 0.1–0.4)
ALPHA-2-GLOBULIN: 0.8 G/DL (ref 0.5–0.9)
BETA GLOBULIN: 0.7 G/DL (ref 0.5–1.1)
BETA PERCENT: 11 % (ref 11–19)
GAMMA GLOBULIN %: 15 % (ref 9–20)
GAMMA GLOBULIN: 1 G/DL (ref 0.5–1.5)
PATHOLOGIST: NORMAL
PATHOLOGIST: NORMAL
PROTEIN ELECTROPHORESIS, SERUM: NORMAL
SERUM IFX INTERP: NORMAL
TOTAL PROT. SUM,%: 101 % (ref 98–102)
TOTAL PROT. SUM: 6.8 G/DL (ref 6.3–8.2)
TOTAL PROTEIN: 6.8 G/DL (ref 6.4–8.3)

## 2019-05-09 ENCOUNTER — TELEPHONE (OUTPATIENT)
Dept: PRIMARY CARE CLINIC | Age: 79
End: 2019-05-09

## 2019-05-09 DIAGNOSIS — M79.642 PAIN OF LEFT HAND: ICD-10-CM

## 2019-05-09 DIAGNOSIS — M25.442 SWELLING OF FINGER JOINT OF LEFT HAND: ICD-10-CM

## 2019-05-09 NOTE — TELEPHONE ENCOUNTER
Patient's daughter Erum Escobar calling in regards to patient's right pinky finger, states patient has been in recently for this, finger still swollen, tight feeling and red- very painful. Please advise.   Health Maintenance   Topic Date Due    DTaP/Tdap/Td vaccine (1 - Tdap) 04/16/1958    Shingles Vaccine (2 of 3) 06/05/2015    Potassium monitoring  05/06/2020    Creatinine monitoring  05/06/2020    DEXA (modify frequency per FRAX score)  Completed    Flu vaccine  Completed    Pneumococcal 65+ years Vaccine  Completed             (applicable per patient's age: Cancer Screenings, Depression Screening, Fall Risk Screening, Immunizations)    Hemoglobin A1C (%)   Date Value   03/22/2019 5.6   05/10/2018 5.6   06/07/2017 6.1 (H)     LDL Cholesterol (mg/dL)   Date Value   05/10/2018 70     LDL Calculated (mg/dL)   Date Value   12/07/2016 76     AST (U/L)   Date Value   05/06/2019 16     ALT (U/L)   Date Value   05/06/2019 9     BUN (mg/dL)   Date Value   05/06/2019 18      (goal A1C is < 7)   (goal LDL is <100) need 30-50% reduction from baseline     BP Readings from Last 3 Encounters:   05/01/19 138/72   03/22/19 128/84   03/13/19 128/76    (goal /80)      All Future Testing planned in [facility]:  Lab Frequency Next Occurrence       Next Visit Date:  Future Appointments   Date Time Provider [facility]   6/10/2019  1:40 PM KAREEM Mena - CNP [facility]   6/19/2019  1:20 PM Radha Rasmussen MD [address] [place] [address]   11/6/2019  2:00 PM Elicia Obrien MD [facility]            Patient Active Problem List:     Depression with anxiety     Essential hypertension     Mixed hyperlipidemia     Vitamin B 12 deficiency     Primary osteoarthritis of left hip     Cerebrovascular accident (CVA) ([address]     Continuous leakage of urine

## 2019-05-09 NOTE — TELEPHONE ENCOUNTER
Was seen in March by Dr. Joseph Pathak for this-tx with steroid  Had labs completed recently in regards to this that were essential WNL  Uric acid level was not checked  Did steroid help? Seen by dr. Trevino on 5/1/19, no notation made of hand pain    If steroid helped in March I will repeat this. Would also encourage her to be seen by me earlier than 6/2019 if this continues.  thanks

## 2019-05-10 DIAGNOSIS — F01.50 VASCULAR DEMENTIA WITHOUT BEHAVIORAL DISTURBANCE (HCC): ICD-10-CM

## 2019-05-10 RX ORDER — BETHANECHOL CHLORIDE 10 MG/1
10 TABLET ORAL 2 TIMES DAILY
Qty: 60 TABLET | Refills: 1 | Status: SHIPPED | OUTPATIENT
Start: 2019-05-10 | End: 2019-09-30 | Stop reason: ALTCHOICE

## 2019-05-10 RX ORDER — PREDNISONE 20 MG/1
40 TABLET ORAL DAILY
Qty: 10 TABLET | Refills: 0 | Status: SHIPPED | OUTPATIENT
Start: 2019-05-10 | End: 2019-05-15

## 2019-05-10 RX ORDER — MEMANTINE HYDROCHLORIDE 10 MG/1
10 TABLET ORAL DAILY
Qty: 30 TABLET | Refills: 3 | Status: SHIPPED | OUTPATIENT
Start: 2019-05-10 | End: 2019-09-16 | Stop reason: SDUPTHER

## 2019-05-10 NOTE — TELEPHONE ENCOUNTER
Last ov 5-1-19  Health Maintenance   Topic Date Due    DTaP/Tdap/Td vaccine (1 - Tdap) 04/16/1958    Shingles Vaccine (2 of 3) 06/05/2015    Potassium monitoring  05/06/2020    Creatinine monitoring  05/06/2020    DEXA (modify frequency per FRAX score)  Completed    Flu vaccine  Completed    Pneumococcal 65+ years Vaccine  Completed             (applicable per patient's age: Cancer Screenings, Depression Screening, Fall Risk Screening, Immunizations)    Hemoglobin A1C (%)   Date Value   03/22/2019 5.6   05/10/2018 5.6   06/07/2017 6.1 (H)     LDL Cholesterol (mg/dL)   Date Value   05/10/2018 70     LDL Calculated (mg/dL)   Date Value   12/07/2016 76     AST (U/L)   Date Value   05/06/2019 16     ALT (U/L)   Date Value   05/06/2019 9     BUN (mg/dL)   Date Value   05/06/2019 18      (goal A1C is < 7)   (goal LDL is <100) need 30-50% reduction from baseline     BP Readings from Last 3 Encounters:   05/01/19 138/72   03/22/19 128/84   03/13/19 128/76    (goal /80)      All Future Testing planned in [facility]:  Lab Frequency Next Occurrence       Next Visit Date:  Future Appointments   Date Time Provider [facility]   6/10/2019  1:40 PM KAREEM Hdez - CNP [facility]   6/19/2019  1:20 PM Eliceo Ricks MD [address] [place] [address] 22   11/6/2019  2:00 PM Renny Sanders MD [facility]            Patient Active Problem List:     Depression with anxiety     Essential hypertension     Mixed hyperlipidemia     Vitamin B 12 deficiency     Primary osteoarthritis of left hip     Cerebrovascular accident (CVA) ([facility] [address]     Continuous leakage of urine

## 2019-05-10 NOTE — TELEPHONE ENCOUNTER
Patient's daughter called back. States the steroid did help in march, she would like another script sent in. Will call next week if no improvement.

## 2019-05-10 NOTE — TELEPHONE ENCOUNTER
Last ov 5-1-19  Health Maintenance   Topic Date Due    DTaP/Tdap/Td vaccine (1 - Tdap) 04/16/1958    Shingles Vaccine (2 of 3) 06/05/2015    Potassium monitoring  05/06/2020    Creatinine monitoring  05/06/2020    DEXA (modify frequency per FRAX score)  Completed    Flu vaccine  Completed    Pneumococcal 65+ years Vaccine  Completed             (applicable per patient's age: Cancer Screenings, Depression Screening, Fall Risk Screening, Immunizations)    Hemoglobin A1C (%)   Date Value   03/22/2019 5.6   05/10/2018 5.6   06/07/2017 6.1 (H)     LDL Cholesterol (mg/dL)   Date Value   05/10/2018 70     LDL Calculated (mg/dL)   Date Value   12/07/2016 76     AST (U/L)   Date Value   05/06/2019 16     ALT (U/L)   Date Value   05/06/2019 9     BUN (mg/dL)   Date Value   05/06/2019 18      (goal A1C is < 7)   (goal LDL is <100) need 30-50% reduction from baseline     BP Readings from Last 3 Encounters:   05/01/19 138/72   03/22/19 128/84   03/13/19 128/76    (goal /80)      All Future Testing planned in [facility]:  Lab Frequency Next Occurrence       Next Visit Date:  Future Appointments   Date Time Provider [facility]   6/10/2019  1:40 PM KAREEM Lipscomb - CNP [facility]   6/19/2019  1:20 PM Jade Maldonado MD [address] [place] [address] 22   11/6/2019  2:00 PM Jillian Streeter MD [facility]            Patient Active Problem List:     Depression with anxiety     Essential hypertension     Mixed hyperlipidemia     Vitamin B 12 deficiency     Primary osteoarthritis of left hip     Cerebrovascular accident (CVA) ([facility] [address]     Continuous leakage of urine

## 2019-05-29 ENCOUNTER — HOSPITAL ENCOUNTER (OUTPATIENT)
Age: 79
Discharge: HOME | End: 2019-05-31
Payer: MEDICARE

## 2019-05-29 ENCOUNTER — HOSPITAL ENCOUNTER (OUTPATIENT)
Age: 79
Discharge: HOME | End: 2019-05-29
Payer: MEDICARE

## 2019-05-29 ENCOUNTER — HOSPITAL ENCOUNTER (OUTPATIENT)
Dept: GENERAL RADIOLOGY | Age: 79
Discharge: HOME | End: 2019-05-31
Payer: MEDICARE

## 2019-05-29 ENCOUNTER — OFFICE VISIT (OUTPATIENT)
Dept: PRIMARY CARE CLINIC | Age: 79
End: 2019-05-29
Payer: MEDICARE

## 2019-05-29 VITALS
RESPIRATION RATE: 15 BRPM | BODY MASS INDEX: 31.99 KG/M2 | WEIGHT: 187.4 LBS | HEART RATE: 80 BPM | HEIGHT: 64 IN | SYSTOLIC BLOOD PRESSURE: 140 MMHG | OXYGEN SATURATION: 98 % | DIASTOLIC BLOOD PRESSURE: 84 MMHG

## 2019-05-29 DIAGNOSIS — M25.542 ARTHRALGIA OF LEFT HAND: Primary | ICD-10-CM

## 2019-05-29 DIAGNOSIS — M79.642 LEFT HAND PAIN: Primary | ICD-10-CM

## 2019-05-29 DIAGNOSIS — M79.642 LEFT HAND PAIN: ICD-10-CM

## 2019-05-29 LAB — URIC ACID: 7.3 MG/DL (ref 2.4–5.7)

## 2019-05-29 PROCEDURE — 36415 COLL VENOUS BLD VENIPUNCTURE: CPT

## 2019-05-29 PROCEDURE — 73120 X-RAY EXAM OF HAND: CPT

## 2019-05-29 PROCEDURE — 84550 ASSAY OF BLOOD/URIC ACID: CPT

## 2019-05-29 PROCEDURE — 99214 OFFICE O/P EST MOD 30 MIN: CPT | Performed by: NURSE PRACTITIONER

## 2019-05-29 RX ORDER — B-COMPLEX WITH VITAMIN C
1 TABLET ORAL DAILY
Qty: 30 TABLET | Refills: 0 | Status: SHIPPED | OUTPATIENT
Start: 2019-05-29 | End: 2019-06-25 | Stop reason: SDUPTHER

## 2019-05-29 RX ORDER — MECLIZINE HCL 12.5 MG/1
12.5 TABLET ORAL 3 TIMES DAILY PRN
COMMUNITY
End: 2019-09-16 | Stop reason: SDUPTHER

## 2019-05-29 RX ORDER — PREDNISONE 20 MG/1
TABLET ORAL
Qty: 18 TABLET | Refills: 0 | Status: SHIPPED | OUTPATIENT
Start: 2019-05-29 | End: 2019-06-08

## 2019-05-29 ASSESSMENT — ENCOUNTER SYMPTOMS
BACK PAIN: 0
SHORTNESS OF BREATH: 0
COUGH: 0
ABDOMINAL PAIN: 0

## 2019-05-29 NOTE — PROGRESS NOTES
Pressure Mother     Cancer Father     Colon Cancer Father           Social History     Tobacco Use    Smoking status: Never Smoker    Smokeless tobacco: Never Used   Substance Use Topics    Alcohol use: No      Current Outpatient Medications   Medication Sig Dispense Refill    meclizine (ANTIVERT) 12.5 MG tablet Take 12.5 mg by mouth 3 times daily as needed      Calcium Carbonate-Vitamin D (OYSTER SHELL CALCIUM/D) 500-200 MG-UNIT TABS Take 1 tablet by mouth daily 30 tablet 0    predniSONE (DELTASONE) 20 MG tablet 3 tabs x 3 days, then 2 tabs x 3 days, then 1 tab x 3 days 18 tablet 0    memantine (NAMENDA) 10 MG tablet Take 1 tablet by mouth daily 30 tablet 3    Multiple Vitamins-Minerals (THERAPEUTIC MULTIVITAMIN-MINERALS) tablet Take 1 tablet by mouth daily      lisinopril (PRINIVIL;ZESTRIL) 20 MG tablet TAKE ONE TABLET BY MOUTH ONCE DAILY 90 tablet 3    citalopram (CELEXA) 20 MG tablet TAKE 1 TABLET BY MOUTH ONCE DAILY 90 tablet 1    KLOR-CON M20 20 MEQ extended release tablet Take 1 tablet by mouth daily 90 tablet 3    pravastatin (PRAVACHOL) 40 MG tablet Take 1 tablet by mouth daily 90 tablet 1    furosemide (LASIX) 40 MG tablet TAKE 1 TABLET BY MOUTH ONCE DAILY (Patient taking differently: 40 mg daily TAKE 1 TABLET BY MOUTH ONCE DAILY) 90 tablet 1    Misc. Devices MISC Shower transfer bench 1 Device 0    Misc. Devices MISC Adult pull up briefs  Dispense 100 100 Device 0    ferrous sulfate 325 (65 Fe) MG tablet Take 325 mg by mouth 2 times daily       Acetaminophen 325 MG CAPS Take by mouth      docusate sodium (COLACE) 100 MG capsule Take 100 mg by mouth 2 times daily      Blood Pressure KIT 1 kit by Does not apply route daily 1 kit 0    bethanechol (URECHOLINE) 10 MG tablet Take 1 tablet by mouth 2 times daily 60 tablet 1    metFORMIN (GLUCOPHAGE) 500 MG tablet Take 1 tablet by mouth 2 times daily (with meals) 900 tablet 3     No current facility-administered medications for this visit. Allergies   Allergen Reactions    Codeine        Health Maintenance   Topic Date Due    DTaP/Tdap/Td vaccine (1 - Tdap) 04/16/1958    Shingles Vaccine (2 of 3) 06/05/2015    Potassium monitoring  05/06/2020    Creatinine monitoring  05/06/2020    DEXA (modify frequency per FRAX score)  Completed    Flu vaccine  Completed    Pneumococcal 65+ years Vaccine  Completed       Subjective:      Review of Systems   Constitutional: Negative for chills, fatigue and fever. HENT: Negative for congestion. Eyes: Negative for visual disturbance. Respiratory: Negative for cough and shortness of breath. Cardiovascular: Negative for chest pain and palpitations. Gastrointestinal: Negative for abdominal pain. Genitourinary: Negative for dysuria. Musculoskeletal: Positive for arthralgias. Negative for back pain. Neurological: Negative for dizziness and numbness. Psychiatric/Behavioral: Negative for self-injury, sleep disturbance and suicidal ideas. The patient is not nervous/anxious. Objective:     Physical Exam   Constitutional: She is oriented to person, place, and time. She appears well-developed and well-nourished. HENT:   Head: Normocephalic and atraumatic. Eyes: Pupils are equal, round, and reactive to light. Neck: Normal range of motion. Neck supple. Cardiovascular: Normal rate, regular rhythm and normal heart sounds. Pulmonary/Chest: Effort normal and breath sounds normal.   Abdominal: Soft. Bowel sounds are normal. There is no tenderness. Musculoskeletal: Normal range of motion. Arms:  Redness/pain/swelling   Neurological: She is alert and oriented to person, place, and time. Skin: Skin is warm and dry. Psychiatric: She has a normal mood and affect. Her behavior is normal. Judgment and thought content normal.   Nursing note and vitals reviewed. BP (!) 140/84   Pulse 80   Resp 15   Ht 5' 4\" (1.626 m)   Wt 187 lb 6.4 oz (85 kg)   SpO2 98%   Breastfeeding?  No BMI 32.17 kg/m²     Assessment:       Diagnosis Orders   1. Left hand pain  predniSONE (DELTASONE) 20 MG tablet    Uric Acid    XR HAND LEFT (2 VIEWS)             Plan:      Return in about 3 months (around 8/29/2019) for recheck. 1. Left hand pain- Rx given for xray left hand and uric acid level. Rx given prednisone high dose. Follow up in 3 months/as needed. Of the 25 minute duration appointment visit, Nicole JUAREZ spent at least 50% of the face-to-face time in counseling, explanation of diagnosis, planning of further management, and answering all questions. Orders Placed This Encounter   Procedures    XR HAND LEFT (2 VIEWS)     Standing Status:   Future     Standing Expiration Date:   5/29/2020     Order Specific Question:   Reason for exam:     Answer:   pain    Uric Acid     Standing Status:   Future     Standing Expiration Date:   5/29/2020        Orders Placed This Encounter   Medications    Calcium Carbonate-Vitamin D (OYSTER SHELL CALCIUM/D) 500-200 MG-UNIT TABS     Sig: Take 1 tablet by mouth daily     Dispense:  30 tablet     Refill:  0     Please consider 90 day supplies to promote better adherence    predniSONE (DELTASONE) 20 MG tablet     Sig: 3 tabs x 3 days, then 2 tabs x 3 days, then 1 tab x 3 days     Dispense:  18 tablet     Refill:  0       Patient given educational materials - see patient instructions. Discussed use, benefit, and side effects of prescribed medications. All patientquestions answered. Pt voiced understanding. Reviewed health maintenance. Instructedto continue current medications, diet and exercise. Patient agreed with treatmentplan. Follow up as directed.      Electronicallysigned by KAREEM Alex CNP on 5/29/2019 at 2:26 PM

## 2019-05-30 RX ORDER — ALLOPURINOL 100 MG/1
100 TABLET ORAL DAILY
Qty: 30 TABLET | Refills: 5 | Status: SHIPPED | OUTPATIENT
Start: 2019-05-30 | End: 2019-11-25 | Stop reason: SDUPTHER

## 2019-06-04 ENCOUNTER — TELEPHONE (OUTPATIENT)
Dept: PRIMARY CARE CLINIC | Age: 79
End: 2019-06-04

## 2019-06-04 NOTE — TELEPHONE ENCOUNTER
How is her pain/swelling? If resolved allopurinol only  If still present d/c allopurinol and will resend in prednisone rx.  thanks

## 2019-06-04 NOTE — TELEPHONE ENCOUNTER
Patient's daughter Shelley Brennan called, patient was seen 5/29 and she picked up Prednisone rx that day but pharmacy only had #8 pills so when she went to  add'l medication she was given Allopurinol and no Prednisone so started giving her the Allopurinol. She called to question if she is to still take Prednisone since pharmacy messed up rx. Please advise.

## 2019-06-05 NOTE — TELEPHONE ENCOUNTER
Tressa Salgado called back, states still swollen but has gone down and not as much pain now. She will check with her mother and call office back.

## 2019-06-20 ENCOUNTER — TELEPHONE (OUTPATIENT)
Dept: PRIMARY CARE CLINIC | Age: 79
End: 2019-06-20

## 2019-06-20 NOTE — TELEPHONE ENCOUNTER
Please let her know there are no rheumatologist in [place] that we are aware of  Options are the office she has been referred to, [facility] ([address]) or [facility]

## 2019-06-25 ENCOUNTER — HOSPITAL ENCOUNTER (EMERGENCY)
Age: 79
Discharge: HOME | End: 2019-06-25
Attending: EMERGENCY MEDICINE
Payer: MEDICARE

## 2019-06-25 VITALS
HEIGHT: 64 IN | BODY MASS INDEX: 29.88 KG/M2 | OXYGEN SATURATION: 98 % | TEMPERATURE: 97.7 F | RESPIRATION RATE: 16 BRPM | HEART RATE: 77 BPM | SYSTOLIC BLOOD PRESSURE: 153 MMHG | DIASTOLIC BLOOD PRESSURE: 80 MMHG | WEIGHT: 175 LBS

## 2019-06-25 DIAGNOSIS — R21 RASH AND OTHER NONSPECIFIC SKIN ERUPTION: Primary | ICD-10-CM

## 2019-06-25 PROCEDURE — 99282 EMERGENCY DEPT VISIT SF MDM: CPT

## 2019-06-25 PROCEDURE — 6370000000 HC RX 637 (ALT 250 FOR IP): Performed by: PHYSICIAN ASSISTANT

## 2019-06-25 RX ORDER — PREDNISONE 20 MG/1
60 TABLET ORAL ONCE
Status: COMPLETED | OUTPATIENT
Start: 2019-06-25 | End: 2019-06-25

## 2019-06-25 RX ORDER — PREDNISONE 10 MG/1
TABLET ORAL
Qty: 8 TABLET | Refills: 0 | Status: SHIPPED | OUTPATIENT
Start: 2019-06-26 | End: 2019-07-05

## 2019-06-25 RX ORDER — B-COMPLEX WITH VITAMIN C
1 TABLET ORAL DAILY
Qty: 30 TABLET | Refills: 5 | Status: SHIPPED | OUTPATIENT
Start: 2019-06-25 | End: 2021-10-18

## 2019-06-25 RX ADMIN — PREDNISONE 60 MG: 20 TABLET ORAL at 12:27

## 2019-06-25 NOTE — ED PROVIDER NOTES
Gout, Hiatal hernia, History of blood transfusion, Hyperlipidemia, Hypertension, PONV (postoperative nausea and vomiting), Rectal urgency, and Stress incontinence, female. Surgical History:  has a past surgical history that includes Hysterectomy; Appendectomy; eye surgery; Cataract removal with implant (Bilateral); Colonoscopy; joint replacement; and pr total hip arthroplasty (Left, 11/6/2018). Social History:  reports that she has never smoked. She has never used smokeless tobacco. She reports that she does not drink alcohol or use drugs. Family History: None  Psychiatric History: None    Allergies:is allergic to codeine. PHYSICAL EXAM     INITIAL VITALS: BP (!) 153/80   Pulse 77   Temp 97.7 °F (36.5 °C) (Oral)   Resp 16   Ht 5' 4\" (1.626 m)   Wt 79.4 kg (175 lb)   SpO2 98%   BMI 30.04 kg/m²   Constitutional:  Well developed   Eyes:  Pupils equal/round  HENT:  Atraumatic, external ears normal, nose normal.    Respiratory:  Clear to auscultation bilaterally with good air exchange, no W/R/R  Cardiovascular:  RRR with normal S1 and S2  Gastrointestinal/Abdomen:  Soft, NT.      Musculoskeletal:   Normal to inspection  Integument:   Papular rash of bilateral anterior neck and some extension onto the face. There is no vesicular nature to this nor any petechia/purpura. There is no discharge. There is no tenderness to palpation. I observe no itching of this area. Neurologic:  Alert, appropriate mentation/interaction, no focal deficits noted       DIAGNOSTIC RESULTS     EKG: All EKG's are interpreted by the Emergency Department Physician who either signs or Co-signs this chart in the absence of a cardiologist.  Not indicated    RADIOLOGY:   Reviewed the radiologist:  No orders to display     Not indicated      LABS:  Labs Reviewed - No data to display  Not indicated    EMERGENCY DEPARTMENT COURSE:       Nonspecific appearing rash of the neck and face. There is no airway involvement.   Treated with

## 2019-06-25 NOTE — ED NOTES
Patient cleared for discharge per MD. Patient discharge instructions explained, Rx given and explained to patient. Patient Verbalized understanding of all instructions and all patient questions answered to their satisfaction. Patient departs from ED in stable condition.         Rashid Leon RN  06/25/19 0517

## 2019-06-25 NOTE — ED PROVIDER NOTES
[facility] ED  [address] [facility] [address]  Phone: [phone]  Fax: [phone]      Attending Physician Attestation    I performed a history and physical examination of the patient and discussed management with the mid level provider. I reviewed the mid level provider's note and agree with the documented findings and plan of care. Any areas of disagreement are noted on the chart. I was personally present for the key portions of any procedures. I have documented in the chart those procedures where I was not present during the key portions. I have reviewed the emergency nurses triage note. I agree with the chief complaint, past medical history, past surgical history, allergies, medications, social and family history as documented unless otherwise noted below. Documentation of the HPI, Physical Exam and Medical Decision Making performed by mid level providers is based on my personal performance of the HPI, PE and MDM. For Physician Assistant/ Nurse Practitioner cases/documentation I have personally evaluated this patient and have completed at least one if not all key elements of the E/M (history, physical exam, and MDM). Additional findings are as noted. CHIEF COMPLAINT       Chief Complaint   Patient presents with    Rash     itchy rash on face and chest for unknown time \"maybe yesterday\"         HISTORY OF PRESENT ILLNESS    Gisella Rodgers is a [de-identified] y.o. female who presents to the emergency department for evaluation of a rash on her neck. She states she noticed it yesterday. Could be from any number of lotions or beauty products she used on her face. She is not sure. It is itchy. She has no difficulty breathing or swallowing.        PAST MEDICAL HISTORY    has a past medical history of Anemia, CHF (congestive heart failure) ([address] Depression, Diabetes mellitus ([address] Gout, Hiatal hernia, History of blood transfusion, Hyperlipidemia, Hypertension, PONV (postoperative father; Heart Disease in her mother; High Blood Pressure in her mother. SOCIAL HISTORY      reports that she has never smoked. She has never used smokeless tobacco. She reports that she does not drink alcohol or use drugs. PHYSICAL EXAM     INITIAL VITALS:  height is 5' 4\" (1.626 m) and weight is 79.4 kg (175 lb). Her oral temperature is 97.7 °F (36.5 °C). Her blood pressure is 153/80 (abnormal) and her pulse is 77. Her respiration is 16 and oxygen saturation is 98%. Patient has no signs of systemic anaphylaxis but has signs of local dermatitis secondary to contact from something. There are bunch of small wheals. No bullae, no sloughing, no petechia or purpura. She is otherwise resting comfortable and nontoxic      DIAGNOSTIC RESULTS       EMERGENCY DEPARTMENT COURSE:   Vitals:    Vitals:    06/25/19 1159   BP: (!) 153/80   Pulse: 77   Resp: 16   Temp: 97.7 °F (36.5 °C)   TempSrc: Oral   SpO2: 98%   Weight: 79.4 kg (175 lb)   Height: 5' 4\" (1.626 m)     -------------------------  BP: (!) 153/80, Temp: 97.7 °F (36.5 °C), Pulse: 77, Resp: 16      PERTINENT ATTENDING PHYSICIAN COMMENTS:    Patient presents to the emergency department with the complaint described above. Vitals are grossly normal with the exception of hypertension, she is nontoxic. Physical exam reveals no evidence of an allergic reaction. She will be treated appropriately and given follow-up information      (Please note that portions of this note were completed with a voice recognition program.  Efforts were made to edit the dictations but occasionally words are mis-transcribed.)    Gates MD, F.A.C.E.P.   Attending Emergency Medicine Physician        Marcela Hathaway, [address] [place] [address]  06/25/19 124

## 2019-06-25 NOTE — TELEPHONE ENCOUNTER
Last ov 5-29-19  Health Maintenance   Topic Date Due    DTaP/Tdap/Td vaccine (1 - Tdap) 04/16/1958    Annual Wellness Visit (AWV)  04/16/2002    Shingles Vaccine (2 of 3) 06/05/2015    Potassium monitoring  05/06/2020    Creatinine monitoring  05/06/2020    DEXA (modify frequency per FRAX score)  Completed    Flu vaccine  Completed    Pneumococcal 65+ years Vaccine  Completed             (applicable per patient's age: Cancer Screenings, Depression Screening, Fall Risk Screening, Immunizations)    Hemoglobin A1C (%)   Date Value   03/22/2019 5.6   05/10/2018 5.6   06/07/2017 6.1 (H)     LDL Cholesterol (mg/dL)   Date Value   05/10/2018 70     LDL Calculated (mg/dL)   Date Value   12/07/2016 76     AST (U/L)   Date Value   05/06/2019 16     ALT (U/L)   Date Value   05/06/2019 9     BUN (mg/dL)   Date Value   05/06/2019 18      (goal A1C is < 7)   (goal LDL is <100) need 30-50% reduction from baseline     BP Readings from Last 3 Encounters:   06/25/19 (!) 153/80   05/29/19 (!) 140/84   05/01/19 138/72    (goal /80)      All Future Testing planned in [facility]:  Lab Frequency Next Occurrence       Next Visit Date:  Future Appointments   Date Time Provider [facility] Tucker   8/29/2019  1:40 PM KAREEM Lima - CNP [facility]   11/6/2019  2:00 PM Greg Maradiaga MD [facility]            Patient Active Problem List:     Depression with anxiety     Essential hypertension     Mixed hyperlipidemia     Vitamin B 12 deficiency     Primary osteoarthritis of left hip     Cerebrovascular accident (CVA) ([facility] [address]     Continuous leakage of urine

## 2019-07-05 ENCOUNTER — OFFICE VISIT (OUTPATIENT)
Dept: PRIMARY CARE CLINIC | Age: 79
End: 2019-07-05
Payer: MEDICARE

## 2019-07-05 VITALS
DIASTOLIC BLOOD PRESSURE: 84 MMHG | HEIGHT: 64 IN | WEIGHT: 173.8 LBS | SYSTOLIC BLOOD PRESSURE: 132 MMHG | HEART RATE: 85 BPM | OXYGEN SATURATION: 97 % | RESPIRATION RATE: 16 BRPM | BODY MASS INDEX: 29.67 KG/M2

## 2019-07-05 DIAGNOSIS — R21 RASH AND NONSPECIFIC SKIN ERUPTION: Primary | ICD-10-CM

## 2019-07-05 PROCEDURE — 99213 OFFICE O/P EST LOW 20 MIN: CPT | Performed by: FAMILY MEDICINE

## 2019-07-07 ASSESSMENT — ENCOUNTER SYMPTOMS
VOMITING: 0
SHORTNESS OF BREATH: 0
NAUSEA: 0

## 2019-08-19 ENCOUNTER — OFFICE VISIT (OUTPATIENT)
Dept: PODIATRY | Age: 79
End: 2019-08-19
Payer: MEDICARE

## 2019-08-19 VITALS — HEIGHT: 64 IN | BODY MASS INDEX: 29.53 KG/M2 | WEIGHT: 173 LBS

## 2019-08-19 DIAGNOSIS — E11.51 TYPE II DIABETES MELLITUS WITH PERIPHERAL CIRCULATORY DISORDER (HCC): Primary | ICD-10-CM

## 2019-08-19 DIAGNOSIS — M79.672 PAIN IN BOTH FEET: ICD-10-CM

## 2019-08-19 DIAGNOSIS — I73.9 PVD (PERIPHERAL VASCULAR DISEASE) (HCC): ICD-10-CM

## 2019-08-19 DIAGNOSIS — M79.671 PAIN IN BOTH FEET: ICD-10-CM

## 2019-08-19 DIAGNOSIS — L85.3 XEROSIS OF SKIN: ICD-10-CM

## 2019-08-19 DIAGNOSIS — B35.1 DERMATOPHYTOSIS OF NAIL: ICD-10-CM

## 2019-08-19 PROCEDURE — 11721 DEBRIDE NAIL 6 OR MORE: CPT | Performed by: PODIATRIST

## 2019-08-19 PROCEDURE — 99213 OFFICE O/P EST LOW 20 MIN: CPT | Performed by: PODIATRIST

## 2019-08-19 NOTE — PROGRESS NOTES
rash  Cardiovascular: Negative for leg swelling. Gastrointestinal: Negative for constipation, diarrhea, nausea and vomiting. Objective:  General: AAO x 3 in NAD. Derm  Toenail Description  Sites of Onychomycosis Involvement (Check affected area)  [x] [x] [x] [x] [x] [x] [x] [x] [x] [x]  5 4 3 2 1 1 2 3 4 5                          Right                                        Left    Thickness  [x] [x] [x] [x] [x] [x] [x] [x] [x] [x]  5 4 3 2 1 1 2 3 4 5                         Right                                        Left    Dystrophic Changes   [x] [x] [x] [x] [x] [x] [x] [x] [x] [x]  5 4 3 2 1 1 2 3 4 5                         Right                                        Left    Color   [x] [x] [x] [x] [x] [x] [x] [x] [x] [x]  5 4 3 2 1 1 2 3 4 5                          Right                                        Left    Incurvation/Ingrowin   [] [] [] [] [] [] [] [] [] []  5 4 3 2 1 1 2 3 4 5                         Right                                        Left    Inflammation/Pain   [x] [x] [x] [x] [x] [x] [x] [x] [x] [x]  5 4 3 2 1 1 2 3 4 5                         Right                                        Left      Dermatologic Exam:  Skin lesion/ulceration Absent . Skin No rashes or nodules noted. .       Musculoskeletal:     1st MPJ ROM decreased, Bilateral.  Muscle strength 5/5, Bilateral.  Pain present upon palpation of toenails 1-5, Bilateral. decreased medial longitudinal arch, Bilateral.  Ankle ROM decreased,Bilateral.    Dorsally contracted digits present digits 2, Bilateral.     Vascular: DP and PT pulses palpable 1/4, Bilateral.  CFT <5 seconds, Bilateral.  Hair growth absent to the level of the digits, Bilateral.  Edema present, Bilateral.  Varicosities absent, Bilateral. Erythema absent, Bilateral    Neurological: Sensation diminshed to light touch to level of digits, Bilateral.  Protective sensation intact 6/10 sites via 5.07/10g Hull-Ravi Monofilament, Bilateral.

## 2019-09-03 RX ORDER — FUROSEMIDE 40 MG/1
TABLET ORAL
Qty: 90 TABLET | Refills: 1 | Status: SHIPPED | OUTPATIENT
Start: 2019-09-03 | End: 2020-02-10

## 2019-09-16 DIAGNOSIS — F01.50 VASCULAR DEMENTIA WITHOUT BEHAVIORAL DISTURBANCE (HCC): ICD-10-CM

## 2019-09-16 RX ORDER — MEMANTINE HYDROCHLORIDE 10 MG/1
10 TABLET ORAL DAILY
Qty: 30 TABLET | Refills: 5 | Status: SHIPPED | OUTPATIENT
Start: 2019-09-16 | End: 2021-12-01 | Stop reason: SDUPTHER

## 2019-09-16 RX ORDER — MECLIZINE HCL 12.5 MG/1
12.5 TABLET ORAL 3 TIMES DAILY PRN
Qty: 30 TABLET | Refills: 0 | Status: SHIPPED | OUTPATIENT
Start: 2019-09-16 | End: 2019-10-12 | Stop reason: SDUPTHER

## 2019-09-30 ENCOUNTER — OFFICE VISIT (OUTPATIENT)
Dept: PRIMARY CARE CLINIC | Age: 79
End: 2019-09-30
Payer: MEDICARE

## 2019-09-30 VITALS
HEART RATE: 74 BPM | WEIGHT: 183 LBS | DIASTOLIC BLOOD PRESSURE: 72 MMHG | HEIGHT: 64 IN | BODY MASS INDEX: 31.24 KG/M2 | RESPIRATION RATE: 16 BRPM | SYSTOLIC BLOOD PRESSURE: 124 MMHG

## 2019-09-30 DIAGNOSIS — M19.90 ARTHRITIS PAIN: ICD-10-CM

## 2019-09-30 DIAGNOSIS — Z00.01 ENCOUNTER FOR GENERAL ADULT MEDICAL EXAMINATION WITH ABNORMAL FINDINGS: Primary | ICD-10-CM

## 2019-09-30 DIAGNOSIS — F01.50 VASCULAR DEMENTIA WITHOUT BEHAVIORAL DISTURBANCE (HCC): ICD-10-CM

## 2019-09-30 PROCEDURE — G0438 PPPS, INITIAL VISIT: HCPCS | Performed by: NURSE PRACTITIONER

## 2019-09-30 ASSESSMENT — PATIENT HEALTH QUESTIONNAIRE - PHQ9
SUM OF ALL RESPONSES TO PHQ QUESTIONS 1-9: 0
SUM OF ALL RESPONSES TO PHQ QUESTIONS 1-9: 0

## 2019-09-30 ASSESSMENT — ANXIETY QUESTIONNAIRES: GAD7 TOTAL SCORE: 0

## 2019-10-14 DIAGNOSIS — E78.2 MIXED HYPERLIPIDEMIA: ICD-10-CM

## 2019-10-14 RX ORDER — MECLIZINE HCL 12.5 MG/1
TABLET ORAL
Qty: 30 TABLET | Refills: 0 | Status: SHIPPED | OUTPATIENT
Start: 2019-10-14 | End: 2022-01-14 | Stop reason: SDUPTHER

## 2019-10-15 RX ORDER — MECLIZINE HCL 12.5 MG/1
TABLET ORAL
Qty: 30 TABLET | Refills: 0 | Status: ON HOLD | OUTPATIENT
Start: 2019-10-15 | End: 2020-09-09 | Stop reason: HOSPADM

## 2019-10-15 RX ORDER — PRAVASTATIN SODIUM 40 MG
TABLET ORAL
Qty: 90 TABLET | Refills: 1 | Status: SHIPPED | OUTPATIENT
Start: 2019-10-15 | End: 2020-05-05

## 2019-10-23 ENCOUNTER — OFFICE VISIT (OUTPATIENT)
Dept: PODIATRY | Age: 79
End: 2019-10-23
Payer: MEDICARE

## 2019-10-23 VITALS — BODY MASS INDEX: 29.88 KG/M2 | WEIGHT: 175 LBS | HEIGHT: 64 IN

## 2019-10-23 DIAGNOSIS — M19.071 DEGENERATIVE JOINT DISEASE OF BOTH ANKLES AND FEET: ICD-10-CM

## 2019-10-23 DIAGNOSIS — M19.072 DEGENERATIVE JOINT DISEASE OF BOTH ANKLES AND FEET: ICD-10-CM

## 2019-10-23 DIAGNOSIS — E11.51 TYPE II DIABETES MELLITUS WITH PERIPHERAL CIRCULATORY DISORDER (HCC): Primary | ICD-10-CM

## 2019-10-23 DIAGNOSIS — M79.671 PAIN IN BOTH FEET: ICD-10-CM

## 2019-10-23 DIAGNOSIS — M79.672 PAIN IN BOTH FEET: ICD-10-CM

## 2019-10-23 DIAGNOSIS — I73.9 PVD (PERIPHERAL VASCULAR DISEASE) (HCC): ICD-10-CM

## 2019-10-23 DIAGNOSIS — B35.1 DERMATOPHYTOSIS OF NAIL: ICD-10-CM

## 2019-10-23 PROCEDURE — 99213 OFFICE O/P EST LOW 20 MIN: CPT | Performed by: PODIATRIST

## 2019-10-23 PROCEDURE — 11721 DEBRIDE NAIL 6 OR MORE: CPT | Performed by: PODIATRIST

## 2019-11-07 ENCOUNTER — TELEPHONE (OUTPATIENT)
Dept: PRIMARY CARE CLINIC | Age: 79
End: 2019-11-07

## 2019-11-07 DIAGNOSIS — M16.12 PRIMARY OSTEOARTHRITIS OF LEFT HIP: Primary | ICD-10-CM

## 2019-11-18 DIAGNOSIS — E11.51 TYPE II DIABETES MELLITUS WITH PERIPHERAL CIRCULATORY DISORDER (HCC): Primary | ICD-10-CM

## 2019-11-18 PROCEDURE — A5500 DIAB SHOE FOR DENSITY INSERT: HCPCS | Performed by: PODIATRIST

## 2019-11-18 PROCEDURE — A5513 MULTI DEN INSERT CUSTOM MOLD: HCPCS | Performed by: PODIATRIST

## 2019-11-21 RX ORDER — CITALOPRAM 20 MG/1
TABLET ORAL
Qty: 90 TABLET | Refills: 1 | Status: SHIPPED | OUTPATIENT
Start: 2019-11-21 | End: 2020-05-29

## 2019-11-22 ENCOUNTER — OFFICE VISIT (OUTPATIENT)
Dept: NEUROLOGY | Age: 79
End: 2019-11-22
Payer: MEDICARE

## 2019-11-22 VITALS
HEIGHT: 64 IN | BODY MASS INDEX: 31.51 KG/M2 | SYSTOLIC BLOOD PRESSURE: 137 MMHG | DIASTOLIC BLOOD PRESSURE: 84 MMHG | WEIGHT: 184.6 LBS | HEART RATE: 75 BPM

## 2019-11-22 DIAGNOSIS — Z86.73 HISTORY OF STROKE: Primary | ICD-10-CM

## 2019-11-22 DIAGNOSIS — R41.89 COGNITIVE IMPAIRMENT: ICD-10-CM

## 2019-11-22 DIAGNOSIS — E07.9 THYROID DYSFUNCTION: ICD-10-CM

## 2019-11-22 PROCEDURE — 99205 OFFICE O/P NEW HI 60 MIN: CPT | Performed by: PSYCHIATRY & NEUROLOGY

## 2019-11-25 RX ORDER — ALLOPURINOL 100 MG/1
TABLET ORAL
Qty: 30 TABLET | Refills: 5 | Status: SHIPPED | OUTPATIENT
Start: 2019-11-25 | End: 2020-06-11

## 2019-11-27 ENCOUNTER — TELEPHONE (OUTPATIENT)
Dept: NEUROLOGY | Age: 79
End: 2019-11-27

## 2019-12-06 DIAGNOSIS — Z96.642 HISTORY OF TOTAL LEFT HIP REPLACEMENT: Primary | ICD-10-CM

## 2019-12-09 ENCOUNTER — OFFICE VISIT (OUTPATIENT)
Dept: ORTHOPEDIC SURGERY | Age: 79
End: 2019-12-09
Payer: MEDICARE

## 2019-12-09 DIAGNOSIS — Z96.642 HISTORY OF TOTAL LEFT HIP REPLACEMENT: Primary | ICD-10-CM

## 2019-12-09 PROCEDURE — 99213 OFFICE O/P EST LOW 20 MIN: CPT | Performed by: ORTHOPAEDIC SURGERY

## 2020-01-14 ENCOUNTER — TELEPHONE (OUTPATIENT)
Dept: PRIMARY CARE CLINIC | Age: 80
End: 2020-01-14

## 2020-01-30 ENCOUNTER — OFFICE VISIT (OUTPATIENT)
Dept: PRIMARY CARE CLINIC | Age: 80
End: 2020-01-30
Payer: MEDICARE

## 2020-01-30 ENCOUNTER — HOSPITAL ENCOUNTER (OUTPATIENT)
Age: 80
Setting detail: SPECIMEN
Discharge: HOME | End: 2020-01-30
Payer: MEDICARE

## 2020-01-30 VITALS
SYSTOLIC BLOOD PRESSURE: 130 MMHG | DIASTOLIC BLOOD PRESSURE: 82 MMHG | RESPIRATION RATE: 15 BRPM | OXYGEN SATURATION: 97 % | HEIGHT: 64 IN | BODY MASS INDEX: 31.82 KG/M2 | HEART RATE: 72 BPM | WEIGHT: 186.4 LBS

## 2020-01-30 LAB
BILIRUBIN, POC: NORMAL
BLOOD URINE, POC: NORMAL
CLARITY, POC: CLEAR
COLOR, POC: YELLOW
GLUCOSE URINE, POC: NORMAL
KETONES, POC: NORMAL
LEUKOCYTE EST, POC: NORMAL
NITRITE, POC: NORMAL
PH, POC: 6
PROTEIN, POC: NORMAL
SPECIFIC GRAVITY, POC: 1.01
UROBILINOGEN, POC: 0.2

## 2020-01-30 PROCEDURE — 99213 OFFICE O/P EST LOW 20 MIN: CPT | Performed by: NURSE PRACTITIONER

## 2020-01-30 PROCEDURE — 81003 URINALYSIS AUTO W/O SCOPE: CPT | Performed by: NURSE PRACTITIONER

## 2020-01-30 RX ORDER — DOCUSATE SODIUM 100 MG/1
100 CAPSULE, LIQUID FILLED ORAL 2 TIMES DAILY PRN
Qty: 60 CAPSULE | Refills: 3 | Status: SHIPPED | OUTPATIENT
Start: 2020-01-30

## 2020-01-30 ASSESSMENT — ENCOUNTER SYMPTOMS
CONSTIPATION: 1
ABDOMINAL PAIN: 0
BACK PAIN: 0
COUGH: 0
SHORTNESS OF BREATH: 0

## 2020-01-30 NOTE — PROGRESS NOTES
Carbonate-Vitamin D (OYSTER SHELL CALCIUM/D) 500-200 MG-UNIT TABS Take 1 tablet by mouth daily 30 tablet 5    Multiple Vitamins-Minerals (THERAPEUTIC MULTIVITAMIN-MINERALS) tablet Take 1 tablet by mouth daily      lisinopril (PRINIVIL;ZESTRIL) 20 MG tablet TAKE ONE TABLET BY MOUTH ONCE DAILY 90 tablet 3    KLOR-CON M20 20 MEQ extended release tablet Take 1 tablet by mouth daily 90 tablet 3    Misc. Devices MISC Shower transfer bench 1 Device 0    Misc. Devices MISC Adult pull up briefs  Dispense 100 100 Device 0    ferrous sulfate 325 (65 Fe) MG tablet Take 325 mg by mouth 2 times daily       Acetaminophen 325 MG CAPS Take 1 capsule by mouth daily as needed       Blood Pressure KIT 1 kit by Does not apply route daily 1 kit 0     No current facility-administered medications for this visit. Allergies   Allergen Reactions    Codeine        Health Maintenance   Topic Date Due    DTaP/Tdap/Td vaccine (1 - Tdap) 04[birth year]16/1950    Shingles Vaccine (2 of 3) 08/07/2015    Lipid screen  05/10/2019    Potassium monitoring  05/06/2020    Creatinine monitoring  05/06/2020    Annual Wellness Visit (AWV)  09/29/2020    DEXA (modify frequency per FRAX score)  Completed    Flu vaccine  Completed    Pneumococcal 65+ years Vaccine  Completed       Subjective:      Review of Systems   Constitutional: Negative for chills, fatigue and fever. HENT: Negative for congestion. Respiratory: Negative for cough and shortness of breath. Cardiovascular: Negative for chest pain and palpitations. Gastrointestinal: Positive for constipation. Negative for abdominal pain. Genitourinary: Positive for dysuria. Negative for difficulty urinating, flank pain, frequency and hematuria. Musculoskeletal: Positive for gait problem (uses walking stick). Negative for back pain. Neurological: Positive for light-headedness (ocassional - baseline for her). Negative for dizziness, numbness and headaches.    Psychiatric/Behavioral: Negative for self-injury, sleep disturbance and suicidal ideas. The patient is not nervous/anxious. Objective:     Physical Exam  Vitals signs and nursing note reviewed. Constitutional:       Appearance: She is well-developed. HENT:      Head: Normocephalic and atraumatic. Eyes:      Pupils: Pupils are equal, round, and reactive to light. Neck:      Musculoskeletal: Normal range of motion and neck supple. Cardiovascular:      Rate and Rhythm: Normal rate and regular rhythm. Heart sounds: Normal heart sounds. Pulmonary:      Effort: Pulmonary effort is normal.      Breath sounds: Normal breath sounds. Abdominal:      General: Bowel sounds are normal.      Palpations: Abdomen is soft. Tenderness: There is no abdominal tenderness. Musculoskeletal: Normal range of motion. Skin:     General: Skin is warm and dry. Neurological:      Mental Status: She is alert and oriented to person, place, and time. Psychiatric:         Behavior: Behavior normal.         Thought Content: Thought content normal.         Judgment: Judgment normal.       /82   Pulse 72   Resp 15   Ht 5' 4\" (1.626 m)   Wt 186 lb 6.4 oz (84.6 kg)   SpO2 97%   Breastfeeding No   BMI 32.00 kg/m²     Assessment:       Diagnosis Orders   1. Dysuria  POCT Urinalysis No Micro (Auto)    Urine Culture             Plan:      Return in about 3 months (around 4/30/2020), or if symptoms worsen or fail to improve, for recheck. 1. Dysuria- UA positive for leuk. Will send for culture and tx pending culture results. Follow up as needed/or in 3 months for recheck. Orders Placed This Encounter   Procedures    Urine Culture     Standing Status:   Future     Standing Expiration Date:   1/30/2021     Order Specific Question:   Specify (ex-cath, midstream, cysto, etc)?      Answer:   midstream    POCT Urinalysis No Micro (Auto)        Orders Placed This Encounter   Medications    docusate sodium (COLACE) 100 MG capsule Sig: Take 1 capsule by mouth 2 times daily as needed for Constipation     Dispense:  60 capsule     Refill:  3       Patient given educational materials - see patient instructions. Discussed use, benefit, and side effects of prescribed medications. All patientquestions answered. Pt voiced understanding. Reviewed health maintenance. Instructedto continue current medications, diet and exercise. Patient agreed with treatmentplan. Follow up as directed.      Electronicallysigned by KAREEM Jenkins CNP on 1/30/2020 at 1:57 PM

## 2020-02-01 LAB
CULTURE: ABNORMAL
Lab: ABNORMAL
SPECIMEN DESCRIPTION: ABNORMAL

## 2020-02-01 RX ORDER — NITROFURANTOIN 25; 75 MG/1; MG/1
100 CAPSULE ORAL 2 TIMES DAILY
Qty: 14 CAPSULE | Refills: 0 | Status: SHIPPED | OUTPATIENT
Start: 2020-02-01 | End: 2020-02-08

## 2020-02-09 NOTE — TELEPHONE ENCOUNTER
Health Maintenance   Topic Date Due    DTaP/Tdap/Td vaccine (1 - Tdap) 04/16/1950    Hepatitis B vaccine (1 of 3 - Risk 3-dose series) 04/16/1958    Shingles Vaccine (2 of 3) 08/07/2015    Lipid screen  05/10/2019    Potassium monitoring  05/06/2020    Creatinine monitoring  05/06/2020    Annual Wellness Visit (AWV)  09/30/2020    DEXA (modify frequency per FRAX score)  Completed    Flu vaccine  Completed    Pneumococcal 65+ years Vaccine  Completed    Hepatitis A vaccine  Aged Out    Hib vaccine  Aged Out    Meningococcal (ACWY) vaccine  Aged Out             (applicable per patient's age: Cancer Screenings, Depression Screening, Fall Risk Screening, Immunizations)    Hemoglobin A1C (%)   Date Value   03/22/2019 5.6   05/10/2018 5.6   06/07/2017 6.1 (H)     LDL Cholesterol (mg/dL)   Date Value   05/10/2018 70     LDL Calculated (mg/dL)   Date Value   12/07/2016 76     AST (U/L)   Date Value   05/06/2019 16     ALT (U/L)   Date Value   05/06/2019 9     BUN (mg/dL)   Date Value   05/06/2019 18      (goal A1C is < 7)   (goal LDL is <100) need 30-50% reduction from baseline     BP Readings from Last 3 Encounters:   01/30/20 130/82   11/22/19 137/84   09/30/19 124/72    (goal /80)      All Future Testing planned in [facility]:  Lab Frequency Next Occurrence   Vitamin B12 & Folate Once 11/22/2019   Comprehensive Metabolic Panel Once 99/22/8331   CBC Once 11/22/2019   TSH With Reflex Ft4 Once 11/22/2019       Next Visit Date:  Future Appointments   Date Time Provider Kennedy Tucker   2/28/2020  1:20 PM Dee Peter MD [facility]   4/30/2020 10:00 AM KAREEM Stanley - CNP [facility]        Last Visit: 1/30/2020    Patient Active Problem List:     Depression with anxiety     Essential hypertension     Mixed hyperlipidemia     Vitamin B 12 deficiency     Primary osteoarthritis of left hip     Cerebrovascular accident (CVA) ([address]     Continuous leakage of urine

## 2020-02-10 RX ORDER — FUROSEMIDE 40 MG/1
TABLET ORAL
Qty: 90 TABLET | Refills: 0 | Status: SHIPPED | OUTPATIENT
Start: 2020-02-10 | End: 2020-06-24

## 2020-02-12 ENCOUNTER — OFFICE VISIT (OUTPATIENT)
Dept: PRIMARY CARE CLINIC | Age: 80
End: 2020-02-12
Payer: MEDICARE

## 2020-02-12 VITALS
BODY MASS INDEX: 29.43 KG/M2 | HEIGHT: 64 IN | OXYGEN SATURATION: 98 % | HEART RATE: 79 BPM | SYSTOLIC BLOOD PRESSURE: 128 MMHG | RESPIRATION RATE: 16 BRPM | WEIGHT: 172.4 LBS | DIASTOLIC BLOOD PRESSURE: 86 MMHG

## 2020-02-12 PROCEDURE — 99213 OFFICE O/P EST LOW 20 MIN: CPT | Performed by: NURSE PRACTITIONER

## 2020-02-12 RX ORDER — CEPHALEXIN 500 MG/1
500 CAPSULE ORAL 2 TIMES DAILY
Qty: 14 CAPSULE | Refills: 0 | Status: SHIPPED | OUTPATIENT
Start: 2020-02-12 | End: 2020-02-17 | Stop reason: ALTCHOICE

## 2020-02-12 ASSESSMENT — ENCOUNTER SYMPTOMS
DIARRHEA: 0
COLOR CHANGE: 0
CHEST TIGHTNESS: 0
NAUSEA: 0
RHINORRHEA: 0
SHORTNESS OF BREATH: 0
SORE THROAT: 0
ABDOMINAL PAIN: 0
VOMITING: 0

## 2020-02-12 NOTE — PROGRESS NOTES
[facility] PRIMARY CARE  [facility] [address]  [address] [address]  Dept: [phone]  Dept Fax: [phone]    Penelope Gaona is a [de-identified] y.o. female who presentstoday for her medical conditions/complaints as noted below. Penelope Gaona is c/o of  Chief Complaint   Patient presents with    Swelling     happened 1 week ago, got cut in bathroom at PCP office.     Blister     red rt arm     Rash     near lt eyebrow         HPI:     Here with complaint of wound to right forearm where she hit her arm 1 week ago while here in office at appt  Reports that she was in the bathroom and hit her arm where her watch was located which caused a skin tear  Since then, wound has gotten worse, she has been washing with mild soap and water and applying triple antibiotic ointment with no improvement  She has also noted that she has an area above her left eye that is red and swollen and started shortly after she got wound on her arm, denies any injury to left forehead  No fever or chills, denies pain      Hemoglobin A1C (%)   Date Value   03/22/2019 5.6   05/10/2018 5.6   06/07/2017 6.1 (H)             ( goal A1C is < 7)   No results found for: LABMICR  LDL Cholesterol (mg/dL)   Date Value   05/10/2018 70   06/07/2017 76     LDL Calculated (mg/dL)   Date Value   12/07/2016 76       (goal LDL is <100)   AST (U/L)   Date Value   05/06/2019 16     ALT (U/L)   Date Value   05/06/2019 9     BUN (mg/dL)   Date Value   05/06/2019 18     BP Readings from Last 3 Encounters:   02/12/20 128/86   01/30/20 130/82   11/22/19 137/84          ([id number])    Past Medical History:   Diagnosis Date    Anemia     Cataract     Cerebral artery occlusion with cerebral infarction ([address]     TIA    CHF (congestive heart failure) ([address]     QUESTIONABLE    Depression     Diabetes mellitus ([address]     Eczema     Gout     Hearing loss     Hiatal hernia     History of blood transfusion     Hyperlipidemia     (KEFLEX) 500 MG capsule; Take 1 capsule by mouth 2 times daily for 7 days  Dispense: 14 capsule; Refill: 0      Return if symptoms worsen or fail to improve. Patient given educational materials - see patient instructions. Discussed use, benefit, and side effects of prescribed medications. All patient questions answered. Pt voiced understanding. Reviewed health maintenance. Instructed to continue current medications, diet and exercise. Patient agreed with treatment plan. Follow up as directed.        Electronicallysigned by KAREEM Denis Cha, CNP on 2/12/2020 at 9:04 PM

## 2020-02-15 ENCOUNTER — HOSPITAL ENCOUNTER (EMERGENCY)
Age: 80
Discharge: HOME | End: 2020-02-15
Attending: EMERGENCY MEDICINE
Payer: MEDICARE

## 2020-02-15 VITALS
OXYGEN SATURATION: 99 % | SYSTOLIC BLOOD PRESSURE: 162 MMHG | RESPIRATION RATE: 16 BRPM | WEIGHT: 176 LBS | HEIGHT: 64 IN | TEMPERATURE: 98.1 F | DIASTOLIC BLOOD PRESSURE: 80 MMHG | HEART RATE: 80 BPM | BODY MASS INDEX: 30.05 KG/M2

## 2020-02-15 PROCEDURE — 99282 EMERGENCY DEPT VISIT SF MDM: CPT

## 2020-02-15 PROCEDURE — 96372 THER/PROPH/DIAG INJ SC/IM: CPT

## 2020-02-15 PROCEDURE — 6360000002 HC RX W HCPCS: Performed by: PHYSICIAN ASSISTANT

## 2020-02-15 RX ORDER — PREDNISONE 50 MG/1
50 TABLET ORAL DAILY
Qty: 4 TABLET | Refills: 0 | Status: SHIPPED | OUTPATIENT
Start: 2020-02-16 | End: 2020-02-20

## 2020-02-15 RX ORDER — METHYLPREDNISOLONE SODIUM SUCCINATE 125 MG/2ML
125 INJECTION, POWDER, LYOPHILIZED, FOR SOLUTION INTRAMUSCULAR; INTRAVENOUS ONCE
Status: COMPLETED | OUTPATIENT
Start: 2020-02-15 | End: 2020-02-15

## 2020-02-15 RX ORDER — DIPHENHYDRAMINE HYDROCHLORIDE 50 MG/ML
25 INJECTION INTRAMUSCULAR; INTRAVENOUS ONCE
Status: COMPLETED | OUTPATIENT
Start: 2020-02-15 | End: 2020-02-15

## 2020-02-15 RX ORDER — MUPIROCIN CALCIUM 20 MG/G
CREAM TOPICAL 3 TIMES DAILY
Qty: 15 G | Refills: 0 | Status: SHIPPED | OUTPATIENT
Start: 2020-02-15 | End: 2021-10-18

## 2020-02-15 RX ADMIN — METHYLPREDNISOLONE SODIUM SUCCINATE 125 MG: 125 INJECTION, POWDER, FOR SOLUTION INTRAMUSCULAR; INTRAVENOUS at 16:06

## 2020-02-15 RX ADMIN — DIPHENHYDRAMINE HYDROCHLORIDE 25 MG: 50 INJECTION, SOLUTION INTRAMUSCULAR; INTRAVENOUS at 16:06

## 2020-02-15 NOTE — ED PROVIDER NOTES
[address] [facility] ED  [address] [facility] [address] [facility] [address]  Phone: [phone]  Fax: [phone]      Attending Physician Attestation    I performed a history and physical examination of the patient and discussed management with the mid level provider. I reviewed the mid level provider's note and agree with the documented findings and plan of care. Any areas of disagreement are noted on the chart. I was personally present for the key portions of any procedures. I have documented in the chart those procedures where I was not present during the key portions. I have reviewed the emergency nurses triage note. I agree with the chief complaint, past medical history, past surgical history, allergies, medications, social and family history as documented unless otherwise noted below. Documentation of the HPI, Physical Exam and Medical Decision Making performed by mid level providers is based on my personal performance of the HPI, PE and MDM. For Physician Assistant/ Nurse Practitioner cases/documentation I have personally evaluated this patient and have completed at least one if not all key elements of the E/M (history, physical exam, and MDM). Additional findings are as noted. CHIEF COMPLAINT       Chief Complaint   Patient presents with    Facial Swelling     red swollen and itchy eyes, started this thursday, started on Keflex the 13th. pt has wound on right wrist and saw her NP on tuesday for the possible infection. DIAGNOSTIC RESULTS     LABS:  Labs Reviewed - No data to display    All other labs were within normal range or not returned as of this dictation.     RADIOLOGY:  No orders to display         EMERGENCY DEPARTMENT COURSE:   Vitals:    Vitals:    02/15/20 1501   BP: (!) 162/80   Pulse: 80   Resp: 16   Temp: 98.1 °F (36.7 °C)   TempSrc: Oral   SpO2: 99%   Weight: 79.8 kg (176 lb)   Height: 5' 4\" (1.626 m)     -------------------------  BP: (!) 162/80, Temp: 98.1 °F (36.7 °C), Pulse: 80, Resp: 16             PERTINENT ATTENDING PHYSICIAN COMMENTS:    Patient presents to the emergency department for the evaluation of possible allergic reaction. Patient was started on Keflex a few days ago for possible infection on her right hand, she is now having swelling around her eyes. She has some itching as well little bit of drainage. Patient has no difficulty breathing or swallowing, no rash anywhere else.   She was given steroids and Benadryl in the emergency department, instructed to stop taking the Keflex, will prescribe some Bactroban cream instead of the oral antibiotic for the skin condition she was having and talked about PCP follow-up      (Please note that portions of this note were completed with a voice recognition program.  Efforts were made to edit the dictations but occasionally words are mis-transcribed.)    Yenni Padron DO  Attending Emergency Medicine Physician        Yenni Padron DO  02/15/20 1491

## 2020-02-15 NOTE — ED NOTES
Swelling and redness to face improved, hives cleared behind bilateral ears. Neck hives fading. Pt reports less itching.      Elvira Caballero RN  02/15/20 1286

## 2020-02-15 NOTE — ED PROVIDER NOTES
wrist mild and doesn't appear grossly infected or requiring oral abx. Anticipate topical bactroban and oral steroid/antihistamine treatment for edema and itching. Will discuss with attending. Solumedrol and Benadryl given here. [id number]  Attending discharged this patient after discussing all labwork/imaging results that were finalized. Treatment plan and recommended follow-up discussed with them as well. Prednisone and Bactroban rxs provided. CONSULTS:  None    PROCEDURES:  None    Patient instructed to return to the emergency room if symptoms worsen, return, or any other concern right away which is agreed. FINAL IMPRESSION      1. Allergic reaction, initial encounter    2. Facial edema          DISPOSITION/PLAN   DISPOSITION Decision To Discharge    PATIENT REFERRED TO:  Katerina Daniels, KAREEM - LB  [address]  [address] [address]  [phone]    Schedule an appointment as soon as possible for a visit in 3 days  for re-evaluation of your symptoms    [facility] ED  [address] [id number] [phone]  Go to   for worsening of your symptoms, wheezing/shortness of breath or throat swelling      DISCHARGE MEDICATIONS:  New Prescriptions    MUPIROCIN (BACTROBAN) 2 % CREAM    Apply topically 3 times daily Apply topically 3 times daily.     PREDNISONE (DELTASONE) 50 MG TABLET    Take 1 tablet by mouth daily for 4 days       (Please note that portions of this note were completed with a voice recognition program.  Efforts were made to edit the dictations but occasionally words are mis-transcribed.)    ARMANDO Reinoso PA-C  02/15/20 9511

## 2020-02-17 ENCOUNTER — OFFICE VISIT (OUTPATIENT)
Dept: PRIMARY CARE CLINIC | Age: 80
End: 2020-02-17
Payer: MEDICARE

## 2020-02-17 VITALS
RESPIRATION RATE: 16 BRPM | HEIGHT: 64 IN | BODY MASS INDEX: 29.71 KG/M2 | HEART RATE: 73 BPM | DIASTOLIC BLOOD PRESSURE: 84 MMHG | SYSTOLIC BLOOD PRESSURE: 144 MMHG | OXYGEN SATURATION: 97 % | WEIGHT: 174 LBS

## 2020-02-17 PROCEDURE — 96372 THER/PROPH/DIAG INJ SC/IM: CPT | Performed by: NURSE PRACTITIONER

## 2020-02-17 PROCEDURE — 99213 OFFICE O/P EST LOW 20 MIN: CPT | Performed by: NURSE PRACTITIONER

## 2020-02-17 RX ORDER — DIPHENHYDRAMINE HYDROCHLORIDE 50 MG/ML
25 INJECTION INTRAMUSCULAR; INTRAVENOUS ONCE
Status: COMPLETED | OUTPATIENT
Start: 2020-02-17 | End: 2020-02-17

## 2020-02-17 RX ADMIN — DIPHENHYDRAMINE HYDROCHLORIDE 25 MG: 50 INJECTION INTRAMUSCULAR; INTRAVENOUS at 14:11

## 2020-02-17 ASSESSMENT — ENCOUNTER SYMPTOMS
ROS SKIN COMMENTS: SEE HPI
COLOR CHANGE: 1
NAUSEA: 0
RHINORRHEA: 0
VOMITING: 0
CHEST TIGHTNESS: 0
ABDOMINAL PAIN: 0
SHORTNESS OF BREATH: 0
SORE THROAT: 0
DIARRHEA: 0

## 2020-02-17 NOTE — PROGRESS NOTES
Genitourinary: Negative for difficulty urinating. Musculoskeletal: Negative for arthralgias and myalgias. Skin: Positive for color change and rash. See HPI   Neurological: Negative for weakness and headaches. Psychiatric/Behavioral: Negative for behavioral problems. The patient is not nervous/anxious. Objective:   BP (!) 144/84   Pulse 73   Resp 16   Ht 5' 4\" (1.626 m)   Wt 174 lb (78.9 kg)   SpO2 97%   BMI 29.87 kg/m²   Physical Exam  Vitals signs reviewed. Constitutional:       General: She is not in acute distress. Appearance: Normal appearance. HENT:      Head: Normocephalic. Eyes:      Extraocular Movements: Extraocular movements intact. Right eye: No nystagmus. Left eye: No nystagmus. Conjunctiva/sclera: Conjunctivae normal.      Pupils: Pupils are equal, round, and reactive to light. Comments: Periorbital edema and redness   Neck:      Musculoskeletal: Normal range of motion. Cardiovascular:      Rate and Rhythm: Normal rate and regular rhythm. Heart sounds: Normal heart sounds. Pulmonary:      Effort: Pulmonary effort is normal.      Breath sounds: Normal breath sounds. Musculoskeletal: Normal range of motion. Right lower leg: No edema. Left lower leg: No edema. Skin:     General: Skin is warm and dry. Capillary Refill: Capillary refill takes less than 2 seconds. Findings: Erythema present. Comments: Redness and edema around eyes, redness to bilateral cheeks and forehead, scattered redness to chest and bilateral upper extremities   Neurological:      Mental Status: She is alert and oriented to person, place, and time. Psychiatric:         Mood and Affect: Mood normal.         Behavior: Behavior normal.           :       Diagnosis Orders   1. Allergic reaction to drug, sequela  diphenhydrAMINE (BENADRYL) injection 25 mg   2.  Skin infection, bacterial  mupirocin (BACTROBAN) 2 % ointment             :          1. Allergic reaction to drug, sequela  Benadryl IM given in office, continue prednisone and add PO benadryl at home for a few doses to see if that helps. Also discussed use of cold compress to face to help decrease edema. Discussed seeking treatment at ED if develops difficulty breathing or swallowing.    - diphenhydrAMINE (BENADRYL) injection 25 mg    2. Skin infection, bacterial  Healing, bactroban ointment ordered, continue to keep clean and dry, topical antibiotic TID until healed. Continue to monitor preston-wound area for worsening redness or swelling.    - mupirocin (BACTROBAN) 2 % ointment; Apply 3 times daily. Dispense: 1 Tube; Refill: 0    Return if symptoms worsen or fail to improve. Patient given educational materials - see patient instructions. Discussed use, benefit, and side effects of prescribed medications. All patient questions answered. Pt voiced understanding. Reviewed health maintenance. Instructed to continue current medications, diet and exercise. Patient agreed with treatment plan. Follow up as directed.        Electronicallysigned by Garlon Dandy, APRN - CNP on 2/17/2020 at 4:22 PM

## 2020-03-23 RX ORDER — LISINOPRIL 20 MG/1
TABLET ORAL
Qty: 90 TABLET | Refills: 0 | Status: SHIPPED | OUTPATIENT
Start: 2020-03-23 | End: 2020-06-11

## 2020-05-05 RX ORDER — PRAVASTATIN SODIUM 40 MG
TABLET ORAL
Qty: 90 TABLET | Refills: 0 | Status: SHIPPED | OUTPATIENT
Start: 2020-05-05 | End: 2020-07-30

## 2020-05-05 RX ORDER — POTASSIUM CHLORIDE 1500 MG/1
TABLET, EXTENDED RELEASE ORAL
Qty: 90 TABLET | Refills: 0 | Status: SHIPPED | OUTPATIENT
Start: 2020-05-05 | End: 2020-07-30

## 2020-05-05 NOTE — TELEPHONE ENCOUNTER
Last OV 2/17/2020  Next OV 6/5/2020    Health Maintenance   Topic Date Due    DTaP/Tdap/Td vaccine (1 - Tdap) 04/16/1958    Shingles Vaccine (2 of 3) 08/07/2015    Lipid screen  05/10/2019    Potassium monitoring  05/06/2020    Creatinine monitoring  05/06/2020    Annual Wellness Visit (AWV)  09/30/2020    DEXA (modify frequency per FRAX score)  Completed    Flu vaccine  Completed    Pneumococcal 65+ years Vaccine  Completed    Hepatitis A vaccine  Aged Out    Hib vaccine  Aged Out    Meningococcal (ACWY) vaccine  Aged Out             (applicable per patient's age: Cancer Screenings, Depression Screening, Fall Risk Screening, Immunizations)    Hemoglobin A1C (%)   Date Value   03/22/2019 5.6   05/10/2018 5.6   06/07/2017 6.1 (H)     LDL Cholesterol (mg/dL)   Date Value   05/10/2018 70     LDL Calculated (mg/dL)   Date Value   12/07/2016 76     AST (U/L)   Date Value   05/06/2019 16     ALT (U/L)   Date Value   05/06/2019 9     BUN (mg/dL)   Date Value   05/06/2019 18      (goal A1C is < 7)   (goal LDL is <100) need 30-50% reduction from baseline     BP Readings from Last 3 Encounters:   02/17/20 (!) 144/84   02/15/20 (!) 162/80   02/12/20 128/86    (goal /80)      All Future Testing planned in [facility]:  Lab Frequency Next Occurrence   Vitamin B12 & Folate Once 11/22/2019   Comprehensive Metabolic Panel Once 61/95/8869   CBC Once 11/22/2019   TSH With Reflex Ft4 Once 11/22/2019       Next Visit Date:  Future Appointments   Date Time Provider Kennedy Tucker   6/5/2020 12:40 PM Megan Nolen MD [address]            Patient Active Problem List:     Depression with anxiety     Essential hypertension     Mixed hyperlipidemia     Vitamin B 12 deficiency     Primary osteoarthritis of left hip     Cerebrovascular accident (CVA) ([address]     Continuous leakage of urine

## 2020-05-08 ENCOUNTER — TELEPHONE (OUTPATIENT)
Dept: NEUROLOGY | Age: 80
End: 2020-05-08

## 2020-05-08 NOTE — TELEPHONE ENCOUNTER
These records have been records from [facility]. [facility]. They only have a CT Brain which they will push through to PACS.   Lan Nevaehlydia has a follow up for June 5, 2020

## 2020-05-29 RX ORDER — CITALOPRAM 20 MG/1
TABLET ORAL
Qty: 90 TABLET | Refills: 0 | Status: SHIPPED | OUTPATIENT
Start: 2020-05-29 | End: 2020-06-11

## 2020-06-02 ENCOUNTER — HOSPITAL ENCOUNTER (OUTPATIENT)
Age: 80
Discharge: HOME | End: 2020-06-02
Payer: MEDICARE

## 2020-06-02 LAB
HCT VFR BLD CALC: 35.8 % (ref 36.3–47.1)
HEMOGLOBIN: 11.6 G/DL (ref 11.9–15.1)
MCH RBC QN AUTO: 29.2 PG (ref 25.2–33.5)
MCHC RBC AUTO-ENTMCNC: 32.4 G/DL (ref 28.4–34.8)
MCV RBC AUTO: 90.2 FL (ref 82.6–102.9)
NRBC AUTOMATED: 0 PER 100 WBC
PDW BLD-RTO: 13.1 % (ref 11.8–14.4)
PLATELET # BLD: 226 K/UL (ref 138–453)
PMV BLD AUTO: 11.6 FL (ref 8.1–13.5)
RBC # BLD: 3.97 M/UL (ref 3.95–5.11)
WBC # BLD: 8 K/UL (ref 3.5–11.3)

## 2020-06-02 PROCEDURE — 84443 ASSAY THYROID STIM HORMONE: CPT

## 2020-06-02 PROCEDURE — 36415 COLL VENOUS BLD VENIPUNCTURE: CPT

## 2020-06-02 PROCEDURE — 82746 ASSAY OF FOLIC ACID SERUM: CPT

## 2020-06-02 PROCEDURE — 80053 COMPREHEN METABOLIC PANEL: CPT

## 2020-06-02 PROCEDURE — 82607 VITAMIN B-12: CPT

## 2020-06-02 PROCEDURE — 85027 COMPLETE CBC AUTOMATED: CPT

## 2020-06-03 LAB
ALBUMIN SERPL-MCNC: 4 G/DL (ref 3.5–5.2)
ALBUMIN/GLOBULIN RATIO: 1.4 (ref 1–2.5)
ALP BLD-CCNC: 105 U/L (ref 35–104)
ALT SERPL-CCNC: 11 U/L (ref 5–33)
ANION GAP SERPL CALCULATED.3IONS-SCNC: 17 MMOL/L (ref 9–17)
AST SERPL-CCNC: 17 U/L
BILIRUB SERPL-MCNC: 0.4 MG/DL (ref 0.3–1.2)
BUN BLDV-MCNC: 17 MG/DL (ref 8–23)
BUN/CREAT BLD: ABNORMAL (ref 9–20)
CALCIUM SERPL-MCNC: 9.4 MG/DL (ref 8.6–10.4)
CHLORIDE BLD-SCNC: 103 MMOL/L (ref 98–107)
CO2: 23 MMOL/L (ref 20–31)
CREAT SERPL-MCNC: 1.03 MG/DL (ref 0.5–0.9)
FOLATE: >20 NG/ML
GFR AFRICAN AMERICAN: >60 ML/MIN
GFR NON-AFRICAN AMERICAN: 51 ML/MIN
GFR SERPL CREATININE-BSD FRML MDRD: ABNORMAL ML/MIN/{1.73_M2}
GFR SERPL CREATININE-BSD FRML MDRD: ABNORMAL ML/MIN/{1.73_M2}
GLUCOSE BLD-MCNC: 97 MG/DL (ref 70–99)
POTASSIUM SERPL-SCNC: 4.4 MMOL/L (ref 3.7–5.3)
SODIUM BLD-SCNC: 143 MMOL/L (ref 135–144)
TOTAL PROTEIN: 6.9 G/DL (ref 6.4–8.3)
TSH SERPL DL<=0.05 MIU/L-ACNC: 1.35 MIU/L (ref 0.3–5)
VITAMIN B-12: 433 PG/ML (ref 232–1245)

## 2020-06-05 ENCOUNTER — OFFICE VISIT (OUTPATIENT)
Dept: NEUROLOGY | Age: 80
End: 2020-06-05
Payer: MEDICARE

## 2020-06-05 VITALS
SYSTOLIC BLOOD PRESSURE: 127 MMHG | DIASTOLIC BLOOD PRESSURE: 72 MMHG | BODY MASS INDEX: 30.73 KG/M2 | HEART RATE: 71 BPM | WEIGHT: 180 LBS | HEIGHT: 64 IN | TEMPERATURE: 97.2 F

## 2020-06-05 PROCEDURE — 99215 OFFICE O/P EST HI 40 MIN: CPT | Performed by: PSYCHIATRY & NEUROLOGY

## 2020-06-05 NOTE — PROGRESS NOTES
MOUTH ONCE DAILY 30 tablet 5    meclizine (ANTIVERT) 12.5 MG tablet TAKE 1 TABLET BY MOUTH THREE TIMES DAILY AS NEEDED FOR DIZZINESS 30 tablet 0    meclizine (ANTIVERT) 12.5 MG tablet TAKE 1 TABLET BY MOUTH THREE TIMES DAILY AS NEEDED FOR DIZZINESS 30 tablet 0     No current facility-administered medications for this visit. LABS & TESTS:      Lab Results   Component Value Date    WBC 8.0 06/02/2020    HGB 11.6 (L) 06/02/2020    HCT 35.8 (L) 06/02/2020    MCV 90.2 06/02/2020     06/02/2020       REVIEW OF SYSTEMS:      CONSTITUTIONAL Weight change: absent, Appetite change: present, Fatigue: absent    HEENT Ears: diminished, Visual disturbance: absent    RESPIRATORY Shortness of breath: present, Cough: absent    CARDIOVASCULAR Chest pain: absent, Leg swelling :present    GI Constipation: absent, Diarrhea: absent, Swallowing change: absent     Urinary frequency: absent, Urinary urgency: absent, Urinary incontinence: absent    MUSCULOSKELETAL Neck pain: absent, Back pain: absent, Stiffness: absent, Muscle pain: absent, Joint pain: present Restless legs: absent    DERMATOLOGIC Hair loss: absent, Skin changes: absent    NEUROLOGIC Memory loss: present, Confusion: present, Seizures: absent Trouble walking or imbalance: present, Dizziness: present, Weakness: present, Numbness: absent Tremor: absent, Spasm: absent, Speech difficulty: absent, Headache: absent, Light sensitivity: absent    PSYCHIATRIC Anxiety: present, Hallucination: absent, Mood disorder: absent    HEMATOLOGIC Abnormal bleeding: absent, Anemia: present, Clotting disorder: absent, Lymph gland changes: absent     VITALS  BP (!) 152/81 (Site: Left Lower Arm, Position: Sitting)   Pulse 74   Temp 97.2 °F (36.2 °C)   Ht 5' 4\" (1.626 m)   Wt 180 lb (81.6 kg)   BMI 30.90 kg/m²      PHYSICAL EXAMINATIONS:     General appearance: cooperative  Skin: no rash or skin lesions.   HEENT: normocephalic  Optic Fundi: deferred  Neck: supple, no cervcical daily  - need more social activity  - if worsens, consider psychology/psychiarty       >50% of 40 minute face to face time spent counseling patient, multiple issues discussed, all questions answered.      RTC in 10-12 months      Jemal Prather MD, MS

## 2020-06-11 RX ORDER — LISINOPRIL 20 MG/1
TABLET ORAL
Qty: 90 TABLET | Refills: 0 | Status: SHIPPED | OUTPATIENT
Start: 2020-06-11 | End: 2020-08-12 | Stop reason: SDUPTHER

## 2020-06-11 RX ORDER — CITALOPRAM 20 MG/1
TABLET ORAL
Qty: 90 TABLET | Refills: 0 | Status: SHIPPED | OUTPATIENT
Start: 2020-06-11 | End: 2022-02-13 | Stop reason: SDUPTHER

## 2020-06-11 RX ORDER — ALLOPURINOL 100 MG/1
TABLET ORAL
Qty: 90 TABLET | Refills: 0 | Status: SHIPPED | OUTPATIENT
Start: 2020-06-11 | End: 2020-06-21

## 2020-06-11 NOTE — TELEPHONE ENCOUNTER
LOV 2/17/2020    No future appt scheduled    Health Maintenance   Topic Date Due    DTaP/Tdap/Td vaccine (1 - Tdap) 04/16/1958    Shingles Vaccine (2 of 3) 08/07/2015    Lipid screen  05/10/2019    Annual Wellness Visit (AWV)  09/30/2020    Potassium monitoring  06/02/2021    Creatinine monitoring  06/02/2021    DEXA (modify frequency per FRAX score)  Completed    Flu vaccine  Completed    Pneumococcal 65+ years Vaccine  Completed    Hepatitis A vaccine  Aged Out    Hib vaccine  Aged Out    Meningococcal (ACWY) vaccine  Aged Out             (applicable per patient's age: Cancer Screenings, Depression Screening, Fall Risk Screening, Immunizations)    Hemoglobin A1C (%)   Date Value   03/22/2019 5.6   05/10/2018 5.6   06/07/2017 6.1 (H)     LDL Cholesterol (mg/dL)   Date Value   05/10/2018 70     LDL Calculated (mg/dL)   Date Value   12/07/2016 76     AST (U/L)   Date Value   06/02/2020 17     ALT (U/L)   Date Value   06/02/2020 11     BUN (mg/dL)   Date Value   06/02/2020 17      (goal A1C is < 7)   (goal LDL is <100) need 30-50% reduction from baseline     BP Readings from Last 3 Encounters:   06/05/20 127/72   02/17/20 (!) 144/84   02/15/20 (!) 162/80    (goal /80)      All Future Testing planned in [facility]:  Lab Frequency Next Occurrence       Next Visit Date:  No future appointments.          Patient Active Problem List:     Depression with anxiety     Essential hypertension     Mixed hyperlipidemia     Vitamin B 12 deficiency     Primary osteoarthritis of left hip     Cerebrovascular accident (CVA) ([address]     Continuous leakage of urine

## 2020-06-24 RX ORDER — FUROSEMIDE 40 MG/1
TABLET ORAL
Qty: 90 TABLET | Refills: 0 | Status: SHIPPED | OUTPATIENT
Start: 2020-06-24 | End: 2021-10-18 | Stop reason: SDUPTHER

## 2020-06-24 NOTE — TELEPHONE ENCOUNTER
Health Maintenance   Topic Date Due    DTaP/Tdap/Td vaccine (1 - Tdap) 04/16/1958    Shingles Vaccine (2 of 3) 08/07/2015    Lipid screen  05/10/2019    Annual Wellness Visit (AWV)  09/30/2020    Potassium monitoring  06/02/2021    Creatinine monitoring  06/02/2021    DEXA (modify frequency per FRAX score)  Completed    Flu vaccine  Completed    Pneumococcal 65+ years Vaccine  Completed    Hepatitis A vaccine  Aged Out    Hib vaccine  Aged Out    Meningococcal (ACWY) vaccine  Aged Out             (applicable per patient's age: Cancer Screenings, Depression Screening, Fall Risk Screening, Immunizations)    Hemoglobin A1C (%)   Date Value   03/22/2019 5.6   05/10/2018 5.6   06/07/2017 6.1 (H)     LDL Cholesterol (mg/dL)   Date Value   05/10/2018 70     LDL Calculated (mg/dL)   Date Value   12/07/2016 76     AST (U/L)   Date Value   06/02/2020 17     ALT (U/L)   Date Value   06/02/2020 11     BUN (mg/dL)   Date Value   06/02/2020 17      (goal A1C is < 7)   (goal LDL is <100) need 30-50% reduction from baseline     BP Readings from Last 3 Encounters:   06/05/20 127/72   02/17/20 (!) 144/84   02/15/20 (!) 162/80    (goal /80)      All Future Testing planned in [facility]:  Lab Frequency Next Occurrence       Next Visit Date:  No future appointments.      Last Visit: 2/17/2020    Patient Active Problem List:     Depression with anxiety     Essential hypertension     Mixed hyperlipidemia     Vitamin B 12 deficiency     Primary osteoarthritis of left hip     Cerebrovascular accident (CVA) ([address]     Continuous leakage of urine

## 2020-07-13 ENCOUNTER — OFFICE VISIT (OUTPATIENT)
Dept: PODIATRY | Age: 80
End: 2020-07-13
Payer: MEDICARE

## 2020-07-13 VITALS — TEMPERATURE: 97.3 F | BODY MASS INDEX: 30.73 KG/M2 | WEIGHT: 180 LBS | HEIGHT: 64 IN

## 2020-07-13 PROCEDURE — 99213 OFFICE O/P EST LOW 20 MIN: CPT | Performed by: PODIATRIST

## 2020-07-13 PROCEDURE — 11721 DEBRIDE NAIL 6 OR MORE: CPT | Performed by: PODIATRIST

## 2020-07-13 NOTE — PROGRESS NOTES
[address] [facility] [address] [id number] [address] [place] [address]  Dept: [phone]    DIABETIC PROGRESS NOTE  Date of patient's visit: 7/13/2020  Patient's Name:  Gail Sarabia YOB: 1940            Patient Care Team:  KAREEM Corona CNP as PCP - General (Nurse Practitioner)  KAREEM Corona CNP as PCP - [facility] Empaneled Provider          Chief Complaint   Patient presents with    Diabetes    Nail Problem    Peripheral Neuropathy       Subjective:   Gail Sarabia comes to clinic for Diabetes; Nail Problem; and Peripheral Neuropathy    she is a diabetic and states that she has more swelling to her feet then usual.  Pt currently has complaint of thickened, elongated nails that they cannot manage by themselves. Pt's primary care physician is KAREEM Velez CNP last seen January 30 2020   Pt's last blood sugar was 138 . Pt has a NEW complaint of swelling to the right and left leg that is getting worse. Pt relates to being on the feet more and sleeping in a chair         Lab Results   Component Value Date    LABA1C 5.6 03/22/2019      Complains of numbness in the feet bilat.   Past Medical History:   Diagnosis Date    Anemia     Cataract     Cerebral artery occlusion with cerebral infarction ([address]     TIA    CHF (congestive heart failure) (HCC)     QUESTIONABLE    Depression     Diabetes mellitus ([address]     Eczema     Gout     Hearing loss     Hiatal hernia     History of blood transfusion     Hyperlipidemia     Hypertension     PONV (postoperative nausea and vomiting)     Rectal urgency     Stress incontinence, female        Allergies   Allergen Reactions    Codeine     Keflex [Cephalexin] Itching, Swelling and Rash     Current Outpatient Medications on File Prior to Visit   Medication Sig Dispense Refill    furosemide (LASIX) 40 MG tablet Take 1 tablet by mouth once daily 90 tablet 0    allopurinol (ZYLOPRIM) 100 MG tablet Take 1 tablet by mouth once daily 90 tablet 3    citalopram (CELEXA) 20 MG tablet Take 1 tablet by mouth once daily 90 tablet 0    lisinopril (PRINIVIL;ZESTRIL) 20 MG tablet Take 1 tablet by mouth once daily 90 tablet 0    KLOR-CON M20 20 MEQ extended release tablet Take 1 tablet by mouth once daily 90 tablet 0    pravastatin (PRAVACHOL) 40 MG tablet Take 1 tablet by mouth once daily 90 tablet 0    mupirocin (BACTROBAN) 2 % cream Apply topically 3 times daily Apply topically 3 times daily. 15 g 0    docusate sodium (COLACE) 100 MG capsule Take 1 capsule by mouth 2 times daily as needed for Constipation 60 capsule 3    Handicap Placard MISC by Does not apply route Expires 11/2024 1 each 0    meclizine (ANTIVERT) 12.5 MG tablet TAKE 1 TABLET BY MOUTH THREE TIMES DAILY AS NEEDED FOR DIZZINESS 30 tablet 0    meclizine (ANTIVERT) 12.5 MG tablet TAKE 1 TABLET BY MOUTH THREE TIMES DAILY AS NEEDED FOR DIZZINESS 30 tablet 0    memantine (NAMENDA) 10 MG tablet Take 1 tablet by mouth daily 30 tablet 5    Calcium Carbonate-Vitamin D (OYSTER SHELL CALCIUM/D) 500-200 MG-UNIT TABS Take 1 tablet by mouth daily 30 tablet 5    Multiple Vitamins-Minerals (THERAPEUTIC MULTIVITAMIN-MINERALS) tablet Take 1 tablet by mouth daily      Misc. Devices MISC Shower transfer bench 1 Device 0    Misc. Devices MISC Adult pull up briefs  Dispense 100 100 Device 0    ferrous sulfate 325 (65 Fe) MG tablet Take 325 mg by mouth 2 times daily       Acetaminophen 325 MG CAPS Take 1 capsule by mouth daily as needed       Blood Pressure KIT 1 kit by Does not apply route daily 1 kit 0     No current facility-administered medications on file prior to visit.         Review of Systems    Review of Systems:   History obtained from chart review and the patient  General ROS: negative for - chills, fatigue, fever, night sweats or weight gain  Constitutional: Negative for chills, diaphoresis, fatigue, fever and unexpected weight change. Musculoskeletal: Positive for arthralgias, gait problem and joint swelling. Neurological ROS: negative for - behavioral changes, confusion, headaches or seizures. Negative for weakness and numbness. Dermatological ROS: negative for - mole changes, rash  Cardiovascular: Negative for leg swelling. Gastrointestinal: Negative for constipation, diarrhea, nausea and vomiting. Objective:  Dermatologic Exam:  Skin lesion/ulceration Absent . Skin No rashes or nodules noted. .   Skin is thin, with flaky sloughing skin as well as decreased hair growth to the lower leg  Small red hemosiderin deposits seen dorsal foot   Musculoskeletal:     1st MPJ ROM decreased, Bilateral.  Muscle strength 5/5, Bilateral.  Pain present upon palpation of toenails 1-5, Bilateral. decreased medial longitudinal arch, Bilateral.  Ankle ROM decreased,Bilateral.    Dorsally contracted digits present digits 2, Bilateral.     Vascular:    Pitting 2+ edema noted to the right and left foot to the level of the midleg. DP pulses 1/4 bilateral.  PT pulses 0/4 bilateral.   CFT <5 seconds, Bilateral.  Hair growth absent to the level of the digits, Bilateral.  Edema present, Bilateral.  Varicosities absent, Bilateral. Erythema absent, Bilateral    Neurological: Sensation diminshed to light touch to level of digits, Bilateral.  Protective sensation intact 6/10 sites via 5.07/10g Olean-Ravi Monofilament, Bilateral.  negative Tinel's, Bilateral.  negative Valleix sign, Bilateral.      Integument: Warm, dry, supple, Bilateral.  Open lesion absent, Bilateral.  Interdigital maceration absent to web spaces 4, Bilateral.  Nails 1-5 left and 1-5 right thickened > 3.0 mm, dystrophic and crumbly, discolored with subungual debris. Fissures absent, Bilateral.   General: AAO x 3 in NAD.     Derm  Toenail Description  Sites of Onychomycosis Involvement (Check affected area)  [x] [x] [x] [x] [x] [x] [x] [x] [x] [x]  5 4 3 2 1 1 2 3 4 5 Right                                        Left    Thickness  [x] [x] [x] [x] [x] [x] [x] [x] [x] [x]  5 4 3 2 1 1 2 3 4 5                         Right                                        Left    Dystrophic Changes   [x] [x] [x] [x] [x] [x] [x] [x] [x] [x]  5 4 3 2 1 1 2 3 4 5                         Right                                        Left    Color   [x] [x] [x] [x] [x] [x] [x] [x] [x] [x]  5 4 3 2 1 1 2 3 4 5                          Right                                        Left    Incurvation/Ingrowin   [] [] [] [] [] [] [] [] [] []  5 4 3 2 1 1 2 3 4 5                         Right                                        Left    Inflammation/Pain   [x] [x] [x] [x] [x] [x] [x] [x] [x] [x]  5 4 3 2 1 1 2 3 4 5                         Right                                        Left        Visual inspection:  Deformity: hammertoe deformity pierre feet  amputation: absent  Skin lesions: absent  Edema: right- 2+ pitting edema, left- 2+ pitting edema    Sensory exam:  Monofilament sensation: abnormal - 6/10 via SW 5.07/10g monofilament to the plantar foot bilateral feet    Pulses: abnormal - 1/4 dorsalis pedis pulse and 1/4 Posterior tibial pulse,   Pinprick: Impaired  Proprioception: Impaired  Vibration (128 Hz): Impaired       DM with PVD       [x]Yes    []No      Assessment:  79 y.o. female with:   Diagnosis Orders   1. Type II diabetes mellitus with peripheral circulatory disorder (HCC)  HM DIABETES FOOT EXAM    38553 - MT DEBRIDEMENT OF NAILS, 6 OR MORE   2. Dermatophytosis of nail  HM DIABETES FOOT EXAM    73722 - MT DEBRIDEMENT OF NAILS, 6 OR MORE   3. PVD (peripheral vascular disease) (HCC)  HM DIABETES FOOT EXAM    63317 - MT DEBRIDEMENT OF NAILS, 6 OR MORE   4. Pain in both feet  HM DIABETES FOOT EXAM    76481 - MT DEBRIDEMENT OF NAILS, 6 OR MORE   5.  Edema of lower extremity  HM DIABETES FOOT EXAM             Q7   []Yes    []No                Q8   [x]Yes    []No

## 2020-07-30 RX ORDER — POTASSIUM CHLORIDE 1500 MG/1
TABLET, EXTENDED RELEASE ORAL
Qty: 90 TABLET | Refills: 0 | Status: SHIPPED | OUTPATIENT
Start: 2020-07-30 | End: 2020-08-19

## 2020-07-30 RX ORDER — PRAVASTATIN SODIUM 40 MG
TABLET ORAL
Qty: 90 TABLET | Refills: 0 | Status: SHIPPED | OUTPATIENT
Start: 2020-07-30 | End: 2020-08-12 | Stop reason: SDUPTHER

## 2020-07-30 NOTE — TELEPHONE ENCOUNTER
Last OV 2/17/2020    Health Maintenance   Topic Date Due    DTaP/Tdap/Td vaccine (1 - Tdap) 04/16/1958    Shingles Vaccine (2 of 3) 08/07/2015    Lipid screen  05/10/2019    Flu vaccine (1) 09/01/2020    Annual Wellness Visit (AWV)  09/30/2020    Potassium monitoring  06/02/2021    Creatinine monitoring  06/02/2021    DEXA (modify frequency per FRAX score)  Completed    Pneumococcal 65+ years Vaccine  Completed    Hepatitis A vaccine  Aged Out    Hib vaccine  Aged Out    Meningococcal (ACWY) vaccine  Aged Out             (applicable per patient's age: Cancer Screenings, Depression Screening, Fall Risk Screening, Immunizations)    Hemoglobin A1C (%)   Date Value   03/22/2019 5.6   05/10/2018 5.6   06/07/2017 6.1 (H)     LDL Cholesterol (mg/dL)   Date Value   05/10/2018 70     LDL Calculated (mg/dL)   Date Value   12/07/2016 76     AST (U/L)   Date Value   06/02/2020 17     ALT (U/L)   Date Value   06/02/2020 11     BUN (mg/dL)   Date Value   06/02/2020 17      (goal A1C is < 7)   (goal LDL is <100) need 30-50% reduction from baseline     BP Readings from Last 3 Encounters:   06/05/20 127/72   02/17/20 (!) 144/84   02/15/20 (!) 162/80    (goal /80)      All Future Testing planned in [facility]:  Lab Frequency Next Occurrence       Next Visit Date:  Future Appointments   Date Time Provider Kennedy Tucker   9/14/2020  3:00 PM Isaiah Hanley  [facility]            Patient Active Problem List:     Depression with anxiety     Essential hypertension     Mixed hyperlipidemia     Vitamin B 12 deficiency     Primary osteoarthritis of left hip     Cerebrovascular accident (CVA) ([address]     Continuous leakage of urine

## 2020-08-12 RX ORDER — PRAVASTATIN SODIUM 40 MG
40 TABLET ORAL DAILY
Qty: 90 TABLET | Refills: 0 | Status: SHIPPED | OUTPATIENT
Start: 2020-08-12 | End: 2022-01-06 | Stop reason: SDUPTHER

## 2020-08-12 RX ORDER — LISINOPRIL 20 MG/1
20 TABLET ORAL DAILY
Qty: 90 TABLET | Refills: 0 | Status: SHIPPED | OUTPATIENT
Start: 2020-08-12 | End: 2022-03-06 | Stop reason: SDUPTHER

## 2020-08-12 NOTE — TELEPHONE ENCOUNTER
Anthony Alvarado, APRN - CNP, patient will need rxs when refills due in Oct/Nov.    LOV: 2/17/20  Next: to schedule    Thank you,  Dasha Witt, PharmD, [facility]   Direct: [phone]  Department, toll free: [phone], option 7    =======================================================  CLINICAL PHARMACY: ADHERENCE REVIEW  Identified care gap per [employer]; fills at [facility]: ACE/ARB adherence    Last Office Visit: 2/17/20    Patient also appears to be taking: statin     ASSESSMENT  ACE/ARB ADHERENCE  PDC: 87% in 2019; 1st fill YTD per [employer] report as of 7/5/20    Per [address]   Lisinopril 20mg daily last filled on 7/9/20 for a 90 day supply; 0 refills remaining. (prev fill: #90 on 3/23/20)    BP Readings from Last 3 Encounters:   06/05/20 127/72   02/17/20 (!) 144/84   02/15/20 (!) 162/80     Estimated Creatinine Clearance: 44 mL/min (A) (based on SCr of 1.03 mg/dL (H)). STATIN ADHERENCE  PDC: 81% in 2019; 95% YTD    Per [address]   Pravastatin 40mg daily last filled earlier this month for a 90 day supply; 0 refills remaining. (prev fills: #90 1/28/20 & 5/5/20)    Lab Results   Component Value Date    CHOL 158 05/10/2018    TRIG 154 (H) 05/10/2018    HDL 57 05/10/2018    LDLCHOLESTEROL 70 05/10/2018    LDLCALC 76 12/07/2016     ALT   Date Value Ref Range Status   06/02/2020 11 5 - 33 U/L Final     AST   Date Value Ref Range Status   06/02/2020 17 <32 U/L Final     The ASCVD Risk score (Julieth Velazquez et al., 2013) failed to calculate for the following reasons: The 2013 ASCVD risk score is only valid for ages 36 to 78    The patient has a prior MI or stroke diagnosis     PLAN  Reached patient to review. States she's been taking the lisinopril every day, unsure why there would have been a gap in refill. Denies missed doses or ADRs. Will pend refill requests, as will need pravastatin and lisinopril rxs when next refills due in Oct/Nov. Patient to schedule appt.

## 2020-08-13 NOTE — TELEPHONE ENCOUNTER
Noted rxs reordered, thank you    =======================================================   For Pharmacy Admin Tracking Only    PHSO: Yes  Total # of Interventions Recommended: 2  - New Order #: 0 New Medication Order Reason(s):  Adherence  - Refills Provided #: 2  - Maintenance Safety Lab Monitoring #: 1  - New Therapy Lab Monitoring #: 1  Recommended intervention potential cost savings: 1  Total Interventions Accepted: 2  Time Spent (min): 15

## 2020-08-19 RX ORDER — POTASSIUM CHLORIDE 1500 MG/1
TABLET, EXTENDED RELEASE ORAL
Qty: 90 TABLET | Refills: 0 | Status: SHIPPED | OUTPATIENT
Start: 2020-08-19 | End: 2022-01-06 | Stop reason: SDUPTHER

## 2020-08-19 NOTE — TELEPHONE ENCOUNTER
Last OV 02/12/2020      Health Maintenance   Topic Date Due    DTaP/Tdap/Td vaccine (1 - Tdap) 04/16/1958    Shingles Vaccine (2 of 3) 08/07/2015    Lipid screen  05/10/2019    Flu vaccine (1) 09/01/2020    Annual Wellness Visit (AWV)  09/30/2020    Potassium monitoring  06/02/2021    Creatinine monitoring  06/02/2021    DEXA (modify frequency per FRAX score)  Completed    Pneumococcal 65+ years Vaccine  Completed    Hepatitis A vaccine  Aged Out    Hib vaccine  Aged Out    Meningococcal (ACWY) vaccine  Aged Out             (applicable per patient's age: Cancer Screenings, Depression Screening, Fall Risk Screening, Immunizations)    Hemoglobin A1C (%)   Date Value   03/22/2019 5.6   05/10/2018 5.6   06/07/2017 6.1 (H)     LDL Cholesterol (mg/dL)   Date Value   05/10/2018 70     LDL Calculated (mg/dL)   Date Value   12/07/2016 76     AST (U/L)   Date Value   06/02/2020 17     ALT (U/L)   Date Value   06/02/2020 11     BUN (mg/dL)   Date Value   06/02/2020 17      (goal A1C is < 7)   (goal LDL is <100) need 30-50% reduction from baseline     BP Readings from Last 3 Encounters:   06/05/20 127/72   02/17/20 (!) 144/84   02/15/20 (!) 162/80    (goal /80)      All Future Testing planned in [facility]:  Lab Frequency Next Occurrence       Next Visit Date:  Future Appointments   Date Time Provider Kennedy Tucker   9/14/2020  3:00 PM Wily Winn  [facility]            Patient Active Problem List:     Depression with anxiety     Essential hypertension     Mixed hyperlipidemia     Vitamin B 12 deficiency     Primary osteoarthritis of left hip     Cerebrovascular accident (CVA) ([address]     Continuous leakage of urine

## 2020-09-03 ENCOUNTER — APPOINTMENT (OUTPATIENT)
Dept: CT IMAGING | Age: 80
DRG: 326 | End: 2020-09-03
Payer: MEDICARE

## 2020-09-03 ENCOUNTER — ANESTHESIA EVENT (OUTPATIENT)
Dept: OPERATING ROOM | Age: 80
DRG: 326 | End: 2020-09-03
Payer: MEDICARE

## 2020-09-03 ENCOUNTER — APPOINTMENT (OUTPATIENT)
Dept: GENERAL RADIOLOGY | Age: 80
DRG: 326 | End: 2020-09-03
Payer: MEDICARE

## 2020-09-03 ENCOUNTER — ANESTHESIA (OUTPATIENT)
Dept: OPERATING ROOM | Age: 80
DRG: 326 | End: 2020-09-03
Payer: MEDICARE

## 2020-09-03 ENCOUNTER — HOSPITAL ENCOUNTER (INPATIENT)
Age: 80
LOS: 9 days | Discharge: NURSING, REHAB OR LONG-TERM CARE | DRG: 326 | End: 2020-09-12
Attending: EMERGENCY MEDICINE | Admitting: INTERNAL MEDICINE
Payer: MEDICARE

## 2020-09-03 VITALS — DIASTOLIC BLOOD PRESSURE: 63 MMHG | SYSTOLIC BLOOD PRESSURE: 112 MMHG | OXYGEN SATURATION: 91 % | TEMPERATURE: 96.4 F

## 2020-09-03 PROBLEM — E53.8 VITAMIN B 12 DEFICIENCY: Status: RESOLVED | Noted: 2017-04-10 | Resolved: 2020-09-03

## 2020-09-03 PROBLEM — K31.1 GASTRIC OUTLET OBSTRUCTION: Status: ACTIVE | Noted: 2020-09-03

## 2020-09-03 PROBLEM — K56.2 VOLVULUS (HCC): Status: ACTIVE | Noted: 2020-09-03

## 2020-09-03 LAB
ABO/RH: NORMAL
ABSOLUTE EOS #: 0.1 K/UL (ref 0–0.4)
ABSOLUTE EOS #: <0.03 K/UL (ref 0–0.44)
ABSOLUTE IMMATURE GRANULOCYTE: 0.12 K/UL (ref 0–0.3)
ABSOLUTE IMMATURE GRANULOCYTE: ABNORMAL K/UL (ref 0–0.3)
ABSOLUTE LYMPH #: 1 K/UL (ref 1.1–3.7)
ABSOLUTE LYMPH #: 2.3 K/UL (ref 1–4.8)
ABSOLUTE MONO #: 0.73 K/UL (ref 0.1–1.2)
ABSOLUTE MONO #: 0.8 K/UL (ref 0.1–1.2)
ABSOLUTE RETIC #: 0.04 M/UL (ref 0.03–0.08)
ALBUMIN SERPL-MCNC: 3.6 G/DL (ref 3.5–5.2)
ALBUMIN SERPL-MCNC: 4.1 G/DL (ref 3.5–5.2)
ALBUMIN/GLOBULIN RATIO: 1.1 (ref 1–2.5)
ALBUMIN/GLOBULIN RATIO: 1.4 (ref 1–2.5)
ALP BLD-CCNC: 102 U/L (ref 35–104)
ALP BLD-CCNC: 121 U/L (ref 35–104)
ALT SERPL-CCNC: 12 U/L (ref 5–33)
ALT SERPL-CCNC: 13 U/L (ref 5–33)
ANION GAP SERPL CALCULATED.3IONS-SCNC: 10 MMOL/L (ref 9–17)
ANION GAP SERPL CALCULATED.3IONS-SCNC: 14 MMOL/L (ref 9–17)
ANION GAP SERPL CALCULATED.3IONS-SCNC: 17 MMOL/L (ref 9–17)
ANTIBODY SCREEN: NEGATIVE
ARM BAND NUMBER: NORMAL
AST SERPL-CCNC: 17 U/L
AST SERPL-CCNC: 21 U/L
BASOPHILS # BLD: 0 % (ref 0–2)
BASOPHILS # BLD: 0 % (ref 0–2)
BASOPHILS ABSOLUTE: 0 K/UL (ref 0–0.2)
BASOPHILS ABSOLUTE: 0.05 K/UL (ref 0–0.2)
BILIRUB SERPL-MCNC: 0.31 MG/DL (ref 0.3–1.2)
BILIRUB SERPL-MCNC: 0.32 MG/DL (ref 0.3–1.2)
BILIRUBIN URINE: NEGATIVE
BLOOD BANK SPECIMEN: NORMAL
BNP INTERPRETATION: ABNORMAL
BUN BLDV-MCNC: 18 MG/DL (ref 8–23)
BUN BLDV-MCNC: 23 MG/DL (ref 8–23)
BUN BLDV-MCNC: 26 MG/DL (ref 8–23)
BUN/CREAT BLD: ABNORMAL (ref 9–20)
CALCIUM IONIZED: 1.13 MMOL/L (ref 1.13–1.33)
CALCIUM SERPL-MCNC: 8.2 MG/DL (ref 8.6–10.4)
CALCIUM SERPL-MCNC: 8.7 MG/DL (ref 8.6–10.4)
CALCIUM SERPL-MCNC: 9.4 MG/DL (ref 8.6–10.4)
CHLORIDE BLD-SCNC: 102 MMOL/L (ref 98–107)
CHLORIDE BLD-SCNC: 106 MMOL/L (ref 98–107)
CHLORIDE BLD-SCNC: 107 MMOL/L (ref 98–107)
CO2: 25 MMOL/L (ref 20–31)
CO2: 26 MMOL/L (ref 20–31)
CO2: 26 MMOL/L (ref 20–31)
COLOR: YELLOW
COMMENT UA: ABNORMAL
CREAT SERPL-MCNC: 1.26 MG/DL (ref 0.5–0.9)
CREAT SERPL-MCNC: 1.35 MG/DL (ref 0.5–0.9)
CREAT SERPL-MCNC: 1.55 MG/DL (ref 0.5–0.9)
CREATININE URINE: 46.5 MG/DL (ref 28–217)
DIFFERENTIAL TYPE: ABNORMAL
DIFFERENTIAL TYPE: ABNORMAL
EOSINOPHILS RELATIVE PERCENT: 0 % (ref 1–4)
EOSINOPHILS RELATIVE PERCENT: 0 % (ref 1–4)
EXPIRATION DATE: NORMAL
FERRITIN: 329 UG/L (ref 13–150)
FOLATE: 18 NG/ML
GFR AFRICAN AMERICAN: 39 ML/MIN
GFR AFRICAN AMERICAN: 46 ML/MIN
GFR AFRICAN AMERICAN: 49 ML/MIN
GFR NON-AFRICAN AMERICAN: 32 ML/MIN
GFR NON-AFRICAN AMERICAN: 38 ML/MIN
GFR NON-AFRICAN AMERICAN: 41 ML/MIN
GFR SERPL CREATININE-BSD FRML MDRD: ABNORMAL ML/MIN/{1.73_M2}
GLUCOSE BLD-MCNC: 149 MG/DL (ref 70–99)
GLUCOSE BLD-MCNC: 166 MG/DL (ref 65–105)
GLUCOSE BLD-MCNC: 186 MG/DL (ref 70–99)
GLUCOSE BLD-MCNC: 225 MG/DL (ref 70–99)
GLUCOSE BLD-MCNC: 96 MG/DL (ref 65–105)
GLUCOSE URINE: ABNORMAL
HCT VFR BLD CALC: 34 % (ref 36–46)
HCT VFR BLD CALC: 35.7 % (ref 36.3–47.1)
HEMOGLOBIN: 11.3 G/DL (ref 11.9–15.1)
HEMOGLOBIN: 11.4 G/DL (ref 12–16)
IMMATURE GRANULOCYTES: 1 %
IMMATURE GRANULOCYTES: ABNORMAL %
IMMATURE RETIC FRACT: 5.6 % (ref 2.7–18.3)
INR BLD: 0.9
IRON SATURATION: 18 % (ref 20–55)
IRON: 39 UG/DL (ref 37–145)
KETONES, URINE: NEGATIVE
LACTIC ACID, WHOLE BLOOD: 2.7 MMOL/L (ref 0.7–2.1)
LACTIC ACID, WHOLE BLOOD: 5.2 MMOL/L (ref 0.7–2.1)
LACTIC ACID: 2.9 MMOL/L (ref 0.5–2.2)
LACTIC ACID: 3.9 MMOL/L (ref 0.5–2.2)
LACTIC ACID: ABNORMAL MMOL/L
LEUKOCYTE ESTERASE, URINE: NEGATIVE
LIPASE: 52 U/L (ref 13–60)
LYMPHOCYTES # BLD: 13 % (ref 24–44)
LYMPHOCYTES # BLD: 5 % (ref 24–43)
MAGNESIUM: 1.8 MG/DL (ref 1.6–2.6)
MAGNESIUM: 2 MG/DL (ref 1.6–2.6)
MCH RBC QN AUTO: 28.9 PG (ref 26–34)
MCH RBC QN AUTO: 29 PG (ref 25.2–33.5)
MCHC RBC AUTO-ENTMCNC: 31.7 G/DL (ref 28.4–34.8)
MCHC RBC AUTO-ENTMCNC: 33.5 G/DL (ref 31–37)
MCV RBC AUTO: 86.1 FL (ref 80–100)
MCV RBC AUTO: 91.5 FL (ref 82.6–102.9)
MONOCYTES # BLD: 4 % (ref 3–12)
MONOCYTES # BLD: 5 % (ref 2–11)
NITRITE, URINE: NEGATIVE
NRBC AUTOMATED: 0 PER 100 WBC
NRBC AUTOMATED: ABNORMAL PER 100 WBC
PARTIAL THROMBOPLASTIN TIME: 22.2 SEC (ref 21.3–31.3)
PDW BLD-RTO: 13.3 % (ref 11.8–14.4)
PDW BLD-RTO: 13.7 % (ref 12.5–15.4)
PH UA: 7 (ref 5–8)
PHOSPHORUS: 3.7 MG/DL (ref 2.6–4.5)
PLATELET # BLD: 239 K/UL (ref 138–453)
PLATELET # BLD: 260 K/UL (ref 140–450)
PLATELET ESTIMATE: ABNORMAL
PLATELET ESTIMATE: ABNORMAL
PMV BLD AUTO: 12.1 FL (ref 8.1–13.5)
PMV BLD AUTO: 9.3 FL (ref 6–12)
POTASSIUM SERPL-SCNC: 3.3 MMOL/L (ref 3.7–5.3)
POTASSIUM SERPL-SCNC: 3.5 MMOL/L (ref 3.7–5.3)
POTASSIUM SERPL-SCNC: 4.1 MMOL/L (ref 3.7–5.3)
PRO-BNP: 404 PG/ML
PROTEIN UA: NEGATIVE
PROTHROMBIN TIME: 9.9 SEC (ref 9.4–12.6)
RBC # BLD: 3.9 M/UL (ref 3.95–5.11)
RBC # BLD: 3.95 M/UL (ref 4–5.2)
RBC # BLD: ABNORMAL 10*6/UL
RBC # BLD: ABNORMAL 10*6/UL
RETIC %: 1 % (ref 0.5–1.9)
RETIC HEMOGLOBIN: 33.9 PG (ref 28.2–35.7)
SARS-COV-2, PCR: NORMAL
SARS-COV-2, RAPID: NOT DETECTED
SARS-COV-2: NORMAL
SEG NEUTROPHILS: 82 % (ref 36–66)
SEG NEUTROPHILS: 91 % (ref 36–65)
SEGMENTED NEUTROPHILS ABSOLUTE COUNT: 14.5 K/UL (ref 1.8–7.7)
SEGMENTED NEUTROPHILS ABSOLUTE COUNT: 18.9 K/UL (ref 1.5–8.1)
SODIUM BLD-SCNC: 142 MMOL/L (ref 135–144)
SODIUM BLD-SCNC: 144 MMOL/L (ref 135–144)
SODIUM BLD-SCNC: 147 MMOL/L (ref 135–144)
SODIUM,UR: 122 MMOL/L
SOURCE: NORMAL
SPECIFIC GRAVITY UA: 1.01 (ref 1–1.03)
TOTAL IRON BINDING CAPACITY: 221 UG/DL (ref 250–450)
TOTAL PROTEIN, URINE: 10 MG/DL
TOTAL PROTEIN: 6.8 G/DL (ref 6.4–8.3)
TOTAL PROTEIN: 7.1 G/DL (ref 6.4–8.3)
TRANSFERRIN: 174 MG/DL (ref 200–360)
TROPONIN INTERP: ABNORMAL
TROPONIN INTERP: ABNORMAL
TROPONIN T: ABNORMAL NG/ML
TROPONIN T: ABNORMAL NG/ML
TROPONIN, HIGH SENSITIVITY: 30 NG/L (ref 0–14)
TROPONIN, HIGH SENSITIVITY: 33 NG/L (ref 0–14)
TURBIDITY: CLEAR
UNSATURATED IRON BINDING CAPACITY: 182 UG/DL (ref 112–347)
URINE HGB: NEGATIVE
URINE TOTAL PROTEIN CREATININE RATIO: 0.22 (ref 0–0.2)
UROBILINOGEN, URINE: NORMAL
VITAMIN B-12: 560 PG/ML (ref 232–1245)
WBC # BLD: 17.7 K/UL (ref 3.5–11)
WBC # BLD: 20.8 K/UL (ref 3.5–11.3)
WBC # BLD: ABNORMAL 10*3/UL
WBC # BLD: ABNORMAL 10*3/UL

## 2020-09-03 PROCEDURE — 2580000003 HC RX 258: Performed by: NURSE ANESTHETIST, CERTIFIED REGISTERED

## 2020-09-03 PROCEDURE — 2709999900 HC NON-CHARGEABLE SUPPLY: Performed by: INTERNAL MEDICINE

## 2020-09-03 PROCEDURE — 3700000000 HC ANESTHESIA ATTENDED CARE: Performed by: INTERNAL MEDICINE

## 2020-09-03 PROCEDURE — 83550 IRON BINDING TEST: CPT

## 2020-09-03 PROCEDURE — 85025 COMPLETE CBC W/AUTO DIFF WBC: CPT

## 2020-09-03 PROCEDURE — 6360000004 HC RX CONTRAST MEDICATION: Performed by: STUDENT IN AN ORGANIZED HEALTH CARE EDUCATION/TRAINING PROGRAM

## 2020-09-03 PROCEDURE — 87040 BLOOD CULTURE FOR BACTERIA: CPT

## 2020-09-03 PROCEDURE — 86901 BLOOD TYPING SEROLOGIC RH(D): CPT

## 2020-09-03 PROCEDURE — 0DJ08ZZ INSPECTION OF UPPER INTESTINAL TRACT, VIA NATURAL OR ARTIFICIAL OPENING ENDOSCOPIC: ICD-10-PCS | Performed by: INTERNAL MEDICINE

## 2020-09-03 PROCEDURE — C1781 MESH (IMPLANTABLE): HCPCS | Performed by: INTERNAL MEDICINE

## 2020-09-03 PROCEDURE — 84100 ASSAY OF PHOSPHORUS: CPT

## 2020-09-03 PROCEDURE — 99291 CRITICAL CARE FIRST HOUR: CPT | Performed by: INTERNAL MEDICINE

## 2020-09-03 PROCEDURE — 83880 ASSAY OF NATRIURETIC PEPTIDE: CPT

## 2020-09-03 PROCEDURE — 96375 TX/PRO/DX INJ NEW DRUG ADDON: CPT

## 2020-09-03 PROCEDURE — 74176 CT ABD & PELVIS W/O CONTRAST: CPT

## 2020-09-03 PROCEDURE — 82746 ASSAY OF FOLIC ACID SERUM: CPT

## 2020-09-03 PROCEDURE — 2580000003 HC RX 258: Performed by: EMERGENCY MEDICINE

## 2020-09-03 PROCEDURE — 84300 ASSAY OF URINE SODIUM: CPT

## 2020-09-03 PROCEDURE — 43235 EGD DIAGNOSTIC BRUSH WASH: CPT | Performed by: INTERNAL MEDICINE

## 2020-09-03 PROCEDURE — 2580000003 HC RX 258: Performed by: STUDENT IN AN ORGANIZED HEALTH CARE EDUCATION/TRAINING PROGRAM

## 2020-09-03 PROCEDURE — 96374 THER/PROPH/DIAG INJ IV PUSH: CPT

## 2020-09-03 PROCEDURE — 99285 EMERGENCY DEPT VISIT HI MDM: CPT

## 2020-09-03 PROCEDURE — 6360000002 HC RX W HCPCS: Performed by: EMERGENCY MEDICINE

## 2020-09-03 PROCEDURE — 99222 1ST HOSP IP/OBS MODERATE 55: CPT | Performed by: INTERNAL MEDICINE

## 2020-09-03 PROCEDURE — 83036 HEMOGLOBIN GLYCOSYLATED A1C: CPT

## 2020-09-03 PROCEDURE — 0BUT0JZ SUPPLEMENT DIAPHRAGM WITH SYNTHETIC SUBSTITUTE, OPEN APPROACH: ICD-10-PCS | Performed by: SURGERY

## 2020-09-03 PROCEDURE — U0002 COVID-19 LAB TEST NON-CDC: HCPCS

## 2020-09-03 PROCEDURE — 85610 PROTHROMBIN TIME: CPT

## 2020-09-03 PROCEDURE — 86900 BLOOD TYPING SEROLOGIC ABO: CPT

## 2020-09-03 PROCEDURE — 85730 THROMBOPLASTIN TIME PARTIAL: CPT

## 2020-09-03 PROCEDURE — 36415 COLL VENOUS BLD VENIPUNCTURE: CPT

## 2020-09-03 PROCEDURE — 85045 AUTOMATED RETICULOCYTE COUNT: CPT

## 2020-09-03 PROCEDURE — 83735 ASSAY OF MAGNESIUM: CPT

## 2020-09-03 PROCEDURE — 2000000000 HC ICU R&B

## 2020-09-03 PROCEDURE — 2720000010 HC SURG SUPPLY STERILE: Performed by: INTERNAL MEDICINE

## 2020-09-03 PROCEDURE — 7100000001 HC PACU RECOVERY - ADDTL 15 MIN: Performed by: INTERNAL MEDICINE

## 2020-09-03 PROCEDURE — 80048 BASIC METABOLIC PNL TOTAL CA: CPT

## 2020-09-03 PROCEDURE — 3600000005 HC SURGERY LEVEL 5 BASE: Performed by: INTERNAL MEDICINE

## 2020-09-03 PROCEDURE — 82330 ASSAY OF CALCIUM: CPT

## 2020-09-03 PROCEDURE — 93005 ELECTROCARDIOGRAM TRACING: CPT | Performed by: EMERGENCY MEDICINE

## 2020-09-03 PROCEDURE — 2500000003 HC RX 250 WO HCPCS: Performed by: NURSE ANESTHETIST, CERTIFIED REGISTERED

## 2020-09-03 PROCEDURE — 86850 RBC ANTIBODY SCREEN: CPT

## 2020-09-03 PROCEDURE — 7100000000 HC PACU RECOVERY - FIRST 15 MIN: Performed by: INTERNAL MEDICINE

## 2020-09-03 PROCEDURE — 83540 ASSAY OF IRON: CPT

## 2020-09-03 PROCEDURE — 83605 ASSAY OF LACTIC ACID: CPT

## 2020-09-03 PROCEDURE — 81003 URINALYSIS AUTO W/O SCOPE: CPT

## 2020-09-03 PROCEDURE — 84484 ASSAY OF TROPONIN QUANT: CPT

## 2020-09-03 PROCEDURE — 82570 ASSAY OF URINE CREATININE: CPT

## 2020-09-03 PROCEDURE — 0DS60ZZ REPOSITION STOMACH, OPEN APPROACH: ICD-10-PCS | Performed by: SURGERY

## 2020-09-03 PROCEDURE — 84156 ASSAY OF PROTEIN URINE: CPT

## 2020-09-03 PROCEDURE — 72125 CT NECK SPINE W/O DYE: CPT

## 2020-09-03 PROCEDURE — 2580000003 HC RX 258: Performed by: INTERNAL MEDICINE

## 2020-09-03 PROCEDURE — 71250 CT THORAX DX C-: CPT

## 2020-09-03 PROCEDURE — 6360000002 HC RX W HCPCS: Performed by: NURSE ANESTHETIST, CERTIFIED REGISTERED

## 2020-09-03 PROCEDURE — 6360000002 HC RX W HCPCS: Performed by: STUDENT IN AN ORGANIZED HEALTH CARE EDUCATION/TRAINING PROGRAM

## 2020-09-03 PROCEDURE — 82728 ASSAY OF FERRITIN: CPT

## 2020-09-03 PROCEDURE — 83690 ASSAY OF LIPASE: CPT

## 2020-09-03 PROCEDURE — 82607 VITAMIN B-12: CPT

## 2020-09-03 PROCEDURE — P9041 ALBUMIN (HUMAN),5%, 50ML: HCPCS | Performed by: NURSE ANESTHETIST, CERTIFIED REGISTERED

## 2020-09-03 PROCEDURE — 70450 CT HEAD/BRAIN W/O DYE: CPT

## 2020-09-03 PROCEDURE — 82947 ASSAY GLUCOSE BLOOD QUANT: CPT

## 2020-09-03 PROCEDURE — 87086 URINE CULTURE/COLONY COUNT: CPT

## 2020-09-03 PROCEDURE — 84466 ASSAY OF TRANSFERRIN: CPT

## 2020-09-03 PROCEDURE — 3700000001 HC ADD 15 MINUTES (ANESTHESIA): Performed by: INTERNAL MEDICINE

## 2020-09-03 PROCEDURE — 87641 MR-STAPH DNA AMP PROBE: CPT

## 2020-09-03 PROCEDURE — 71045 X-RAY EXAM CHEST 1 VIEW: CPT

## 2020-09-03 PROCEDURE — 80053 COMPREHEN METABOLIC PANEL: CPT

## 2020-09-03 PROCEDURE — 3600000015 HC SURGERY LEVEL 5 ADDTL 15MIN: Performed by: INTERNAL MEDICINE

## 2020-09-03 PROCEDURE — 6360000002 HC RX W HCPCS: Performed by: ANESTHESIOLOGY

## 2020-09-03 DEVICE — MESH HERN W4XL6IN MFIL RESRB RECT W/ HYDRGEL BARR SCFLD: Type: IMPLANTABLE DEVICE | Site: ABDOMEN | Status: FUNCTIONAL

## 2020-09-03 RX ORDER — POTASSIUM CHLORIDE 20 MEQ/1
40 TABLET, EXTENDED RELEASE ORAL PRN
Status: DISCONTINUED | OUTPATIENT
Start: 2020-09-03 | End: 2020-09-10

## 2020-09-03 RX ORDER — SODIUM CHLORIDE 0.9 % (FLUSH) 0.9 %
10 SYRINGE (ML) INJECTION EVERY 12 HOURS SCHEDULED
Status: DISCONTINUED | OUTPATIENT
Start: 2020-09-03 | End: 2020-09-03

## 2020-09-03 RX ORDER — ONDANSETRON 2 MG/ML
INJECTION INTRAMUSCULAR; INTRAVENOUS PRN
Status: DISCONTINUED | OUTPATIENT
Start: 2020-09-03 | End: 2020-09-03 | Stop reason: SDUPTHER

## 2020-09-03 RX ORDER — SODIUM CHLORIDE 9 MG/ML
INJECTION, SOLUTION INTRAVENOUS CONTINUOUS
Status: DISCONTINUED | OUTPATIENT
Start: 2020-09-03 | End: 2020-09-03

## 2020-09-03 RX ORDER — PROMETHAZINE HYDROCHLORIDE 25 MG/1
12.5 TABLET ORAL EVERY 6 HOURS PRN
Status: DISCONTINUED | OUTPATIENT
Start: 2020-09-03 | End: 2020-09-03

## 2020-09-03 RX ORDER — ACETAMINOPHEN 160 MG/5ML
1000 SOLUTION ORAL EVERY 8 HOURS
Status: DISCONTINUED | OUTPATIENT
Start: 2020-09-03 | End: 2020-09-06

## 2020-09-03 RX ORDER — LABETALOL HYDROCHLORIDE 5 MG/ML
10 INJECTION, SOLUTION INTRAVENOUS EVERY 4 HOURS PRN
Status: DISCONTINUED | OUTPATIENT
Start: 2020-09-03 | End: 2020-09-10

## 2020-09-03 RX ORDER — ALBUMIN, HUMAN INJ 5% 5 %
SOLUTION INTRAVENOUS PRN
Status: DISCONTINUED | OUTPATIENT
Start: 2020-09-03 | End: 2020-09-03 | Stop reason: SDUPTHER

## 2020-09-03 RX ORDER — PROPOFOL 10 MG/ML
INJECTION, EMULSION INTRAVENOUS PRN
Status: DISCONTINUED | OUTPATIENT
Start: 2020-09-03 | End: 2020-09-03 | Stop reason: SDUPTHER

## 2020-09-03 RX ORDER — EPHEDRINE SULFATE/0.9% NACL/PF 50 MG/5 ML
SYRINGE (ML) INTRAVENOUS PRN
Status: DISCONTINUED | OUTPATIENT
Start: 2020-09-03 | End: 2020-09-03 | Stop reason: SDUPTHER

## 2020-09-03 RX ORDER — SODIUM CHLORIDE, SODIUM LACTATE, POTASSIUM CHLORIDE, CALCIUM CHLORIDE 600; 310; 30; 20 MG/100ML; MG/100ML; MG/100ML; MG/100ML
INJECTION, SOLUTION INTRAVENOUS CONTINUOUS PRN
Status: DISCONTINUED | OUTPATIENT
Start: 2020-09-03 | End: 2020-09-03 | Stop reason: SDUPTHER

## 2020-09-03 RX ORDER — MAGNESIUM SULFATE 1 G/100ML
1 INJECTION INTRAVENOUS PRN
Status: DISCONTINUED | OUTPATIENT
Start: 2020-09-03 | End: 2020-09-12 | Stop reason: HOSPADM

## 2020-09-03 RX ORDER — MAGNESIUM HYDROXIDE 1200 MG/15ML
LIQUID ORAL CONTINUOUS PRN
Status: COMPLETED | OUTPATIENT
Start: 2020-09-03 | End: 2020-09-03

## 2020-09-03 RX ORDER — DEXTROSE MONOHYDRATE 25 G/50ML
12.5 INJECTION, SOLUTION INTRAVENOUS PRN
Status: DISCONTINUED | OUTPATIENT
Start: 2020-09-03 | End: 2020-09-12 | Stop reason: HOSPADM

## 2020-09-03 RX ORDER — OXYCODONE HYDROCHLORIDE 5 MG/1
5 TABLET ORAL EVERY 4 HOURS PRN
Status: DISCONTINUED | OUTPATIENT
Start: 2020-09-03 | End: 2020-09-07

## 2020-09-03 RX ORDER — LIDOCAINE 4 G/G
2 PATCH TOPICAL DAILY
Status: DISCONTINUED | OUTPATIENT
Start: 2020-09-04 | End: 2020-09-12 | Stop reason: HOSPADM

## 2020-09-03 RX ORDER — ACETAMINOPHEN 325 MG/1
650 TABLET ORAL EVERY 6 HOURS PRN
Status: DISCONTINUED | OUTPATIENT
Start: 2020-09-03 | End: 2020-09-03

## 2020-09-03 RX ORDER — SODIUM CHLORIDE, SODIUM LACTATE, POTASSIUM CHLORIDE, AND CALCIUM CHLORIDE .6; .31; .03; .02 G/100ML; G/100ML; G/100ML; G/100ML
1000 INJECTION, SOLUTION INTRAVENOUS ONCE
Status: COMPLETED | OUTPATIENT
Start: 2020-09-03 | End: 2020-09-03

## 2020-09-03 RX ORDER — SUCCINYLCHOLINE CHLORIDE 20 MG/ML
INJECTION INTRAMUSCULAR; INTRAVENOUS PRN
Status: DISCONTINUED | OUTPATIENT
Start: 2020-09-03 | End: 2020-09-03 | Stop reason: SDUPTHER

## 2020-09-03 RX ORDER — POTASSIUM CHLORIDE 7.45 MG/ML
10 INJECTION INTRAVENOUS PRN
Status: DISCONTINUED | OUTPATIENT
Start: 2020-09-03 | End: 2020-09-10

## 2020-09-03 RX ORDER — SODIUM CHLORIDE 9 MG/ML
INJECTION, SOLUTION INTRAVENOUS CONTINUOUS PRN
Status: DISCONTINUED | OUTPATIENT
Start: 2020-09-03 | End: 2020-09-03 | Stop reason: SDUPTHER

## 2020-09-03 RX ORDER — ACETAMINOPHEN 650 MG/1
650 SUPPOSITORY RECTAL EVERY 6 HOURS PRN
Status: DISCONTINUED | OUTPATIENT
Start: 2020-09-03 | End: 2020-09-03

## 2020-09-03 RX ORDER — SODIUM CHLORIDE 0.9 % (FLUSH) 0.9 %
10 SYRINGE (ML) INJECTION PRN
Status: DISCONTINUED | OUTPATIENT
Start: 2020-09-03 | End: 2020-09-03

## 2020-09-03 RX ORDER — FENTANYL CITRATE 50 UG/ML
50 INJECTION, SOLUTION INTRAMUSCULAR; INTRAVENOUS EVERY 5 MIN PRN
Status: COMPLETED | OUTPATIENT
Start: 2020-09-03 | End: 2020-09-03

## 2020-09-03 RX ORDER — FENTANYL CITRATE 50 UG/ML
25 INJECTION, SOLUTION INTRAMUSCULAR; INTRAVENOUS EVERY 5 MIN PRN
Status: DISCONTINUED | OUTPATIENT
Start: 2020-09-03 | End: 2020-09-03 | Stop reason: HOSPADM

## 2020-09-03 RX ORDER — POLYETHYLENE GLYCOL 3350 17 G/17G
17 POWDER, FOR SOLUTION ORAL DAILY PRN
Status: DISCONTINUED | OUTPATIENT
Start: 2020-09-03 | End: 2020-09-07

## 2020-09-03 RX ORDER — FENTANYL CITRATE 50 UG/ML
50 INJECTION, SOLUTION INTRAMUSCULAR; INTRAVENOUS EVERY 5 MIN PRN
Status: DISCONTINUED | OUTPATIENT
Start: 2020-09-03 | End: 2020-09-03 | Stop reason: HOSPADM

## 2020-09-03 RX ORDER — DEXTROSE, SODIUM CHLORIDE, AND POTASSIUM CHLORIDE 5; .45; .15 G/100ML; G/100ML; G/100ML
INJECTION INTRAVENOUS CONTINUOUS
Status: DISCONTINUED | OUTPATIENT
Start: 2020-09-03 | End: 2020-09-03

## 2020-09-03 RX ORDER — 0.9 % SODIUM CHLORIDE 0.9 %
80 INTRAVENOUS SOLUTION INTRAVENOUS ONCE
Status: DISCONTINUED | OUTPATIENT
Start: 2020-09-03 | End: 2020-09-03

## 2020-09-03 RX ORDER — 0.9 % SODIUM CHLORIDE 0.9 %
1000 INTRAVENOUS SOLUTION INTRAVENOUS ONCE
Status: COMPLETED | OUTPATIENT
Start: 2020-09-03 | End: 2020-09-03

## 2020-09-03 RX ORDER — NICOTINE 21 MG/24HR
1 PATCH, TRANSDERMAL 24 HOURS TRANSDERMAL DAILY PRN
Status: DISCONTINUED | OUTPATIENT
Start: 2020-09-03 | End: 2020-09-03

## 2020-09-03 RX ORDER — GLYCOPYRROLATE 1 MG/5 ML
SYRINGE (ML) INTRAVENOUS PRN
Status: DISCONTINUED | OUTPATIENT
Start: 2020-09-03 | End: 2020-09-03 | Stop reason: SDUPTHER

## 2020-09-03 RX ORDER — NEOSTIGMINE METHYLSULFATE 5 MG/5 ML
SYRINGE (ML) INTRAVENOUS PRN
Status: DISCONTINUED | OUTPATIENT
Start: 2020-09-03 | End: 2020-09-03 | Stop reason: SDUPTHER

## 2020-09-03 RX ORDER — PROMETHAZINE HYDROCHLORIDE 25 MG/ML
12.5 INJECTION, SOLUTION INTRAMUSCULAR; INTRAVENOUS ONCE
Status: COMPLETED | OUTPATIENT
Start: 2020-09-03 | End: 2020-09-03

## 2020-09-03 RX ORDER — FENTANYL CITRATE 50 UG/ML
INJECTION, SOLUTION INTRAMUSCULAR; INTRAVENOUS PRN
Status: DISCONTINUED | OUTPATIENT
Start: 2020-09-03 | End: 2020-09-03 | Stop reason: SDUPTHER

## 2020-09-03 RX ORDER — ROCURONIUM BROMIDE 10 MG/ML
INJECTION, SOLUTION INTRAVENOUS PRN
Status: DISCONTINUED | OUTPATIENT
Start: 2020-09-03 | End: 2020-09-03 | Stop reason: SDUPTHER

## 2020-09-03 RX ORDER — SODIUM CHLORIDE 0.9 % (FLUSH) 0.9 %
10 SYRINGE (ML) INJECTION EVERY 12 HOURS SCHEDULED
Status: DISCONTINUED | OUTPATIENT
Start: 2020-09-03 | End: 2020-09-12 | Stop reason: HOSPADM

## 2020-09-03 RX ORDER — HEPARIN SODIUM 5000 [USP'U]/ML
5000 INJECTION, SOLUTION INTRAVENOUS; SUBCUTANEOUS EVERY 8 HOURS SCHEDULED
Status: DISCONTINUED | OUTPATIENT
Start: 2020-09-03 | End: 2020-09-12 | Stop reason: HOSPADM

## 2020-09-03 RX ORDER — NICOTINE POLACRILEX 4 MG
15 LOZENGE BUCCAL PRN
Status: DISCONTINUED | OUTPATIENT
Start: 2020-09-03 | End: 2020-09-12 | Stop reason: HOSPADM

## 2020-09-03 RX ORDER — SODIUM CHLORIDE 0.9 % (FLUSH) 0.9 %
10 SYRINGE (ML) INJECTION PRN
Status: DISCONTINUED | OUTPATIENT
Start: 2020-09-03 | End: 2020-09-12 | Stop reason: HOSPADM

## 2020-09-03 RX ORDER — DEXTROSE MONOHYDRATE 50 MG/ML
100 INJECTION, SOLUTION INTRAVENOUS PRN
Status: DISCONTINUED | OUTPATIENT
Start: 2020-09-03 | End: 2020-09-12 | Stop reason: HOSPADM

## 2020-09-03 RX ORDER — ONDANSETRON 2 MG/ML
4 INJECTION INTRAMUSCULAR; INTRAVENOUS EVERY 6 HOURS PRN
Status: DISCONTINUED | OUTPATIENT
Start: 2020-09-03 | End: 2020-09-10

## 2020-09-03 RX ORDER — SODIUM CHLORIDE, SODIUM LACTATE, POTASSIUM CHLORIDE, CALCIUM CHLORIDE 600; 310; 30; 20 MG/100ML; MG/100ML; MG/100ML; MG/100ML
INJECTION, SOLUTION INTRAVENOUS CONTINUOUS
Status: DISCONTINUED | OUTPATIENT
Start: 2020-09-03 | End: 2020-09-05

## 2020-09-03 RX ORDER — METHOCARBAMOL 750 MG/1
750 TABLET, FILM COATED ORAL EVERY 6 HOURS
Status: DISCONTINUED | OUTPATIENT
Start: 2020-09-03 | End: 2020-09-09

## 2020-09-03 RX ORDER — ONDANSETRON 2 MG/ML
4 INJECTION INTRAMUSCULAR; INTRAVENOUS ONCE
Status: COMPLETED | OUTPATIENT
Start: 2020-09-03 | End: 2020-09-03

## 2020-09-03 RX ADMIN — FENTANYL CITRATE 25 MCG: 50 INJECTION INTRAMUSCULAR; INTRAVENOUS at 21:47

## 2020-09-03 RX ADMIN — SODIUM CHLORIDE, POTASSIUM CHLORIDE, SODIUM LACTATE AND CALCIUM CHLORIDE 1000 ML: 600; 310; 30; 20 INJECTION, SOLUTION INTRAVENOUS at 13:07

## 2020-09-03 RX ADMIN — FENTANYL CITRATE 50 MCG: 50 INJECTION INTRAMUSCULAR; INTRAVENOUS at 20:21

## 2020-09-03 RX ADMIN — Medication 10 MG: at 20:32

## 2020-09-03 RX ADMIN — FENTANYL CITRATE 50 MCG: 50 INJECTION, SOLUTION INTRAMUSCULAR; INTRAVENOUS at 22:05

## 2020-09-03 RX ADMIN — PHENYLEPHRINE HYDROCHLORIDE 200 MCG: 10 INJECTION INTRAVENOUS at 20:01

## 2020-09-03 RX ADMIN — IOHEXOL 50 ML: 240 INJECTION, SOLUTION INTRATHECAL; INTRAVASCULAR; INTRAVENOUS; ORAL at 12:01

## 2020-09-03 RX ADMIN — FENTANYL CITRATE 50 MCG: 50 INJECTION INTRAMUSCULAR; INTRAVENOUS at 20:16

## 2020-09-03 RX ADMIN — SODIUM CHLORIDE, POTASSIUM CHLORIDE, SODIUM LACTATE AND CALCIUM CHLORIDE: 600; 310; 30; 20 INJECTION, SOLUTION INTRAVENOUS at 19:59

## 2020-09-03 RX ADMIN — PROPOFOL 150 MG: 10 INJECTION, EMULSION INTRAVENOUS at 18:57

## 2020-09-03 RX ADMIN — PROMETHAZINE HYDROCHLORIDE 12.5 MG: 25 INJECTION INTRAMUSCULAR; INTRAVENOUS at 04:36

## 2020-09-03 RX ADMIN — Medication 10 MG: at 20:08

## 2020-09-03 RX ADMIN — ROCURONIUM BROMIDE 30 MG: 10 INJECTION INTRAVENOUS at 20:16

## 2020-09-03 RX ADMIN — SODIUM CHLORIDE 1000 ML: 9 INJECTION, SOLUTION INTRAVENOUS at 04:13

## 2020-09-03 RX ADMIN — PHENYLEPHRINE HYDROCHLORIDE 200 MCG: 10 INJECTION INTRAVENOUS at 20:47

## 2020-09-03 RX ADMIN — PHENYLEPHRINE HYDROCHLORIDE 200 MCG: 10 INJECTION INTRAVENOUS at 19:04

## 2020-09-03 RX ADMIN — SODIUM CHLORIDE: 0.9 INJECTION, SOLUTION INTRAVENOUS at 18:50

## 2020-09-03 RX ADMIN — PHENYLEPHRINE HYDROCHLORIDE 200 MCG: 10 INJECTION INTRAVENOUS at 20:37

## 2020-09-03 RX ADMIN — PHENYLEPHRINE HYDROCHLORIDE 100 MCG: 10 INJECTION INTRAVENOUS at 19:26

## 2020-09-03 RX ADMIN — FENTANYL CITRATE 50 MCG: 50 INJECTION, SOLUTION INTRAMUSCULAR; INTRAVENOUS at 22:10

## 2020-09-03 RX ADMIN — SODIUM CHLORIDE, POTASSIUM CHLORIDE, SODIUM LACTATE AND CALCIUM CHLORIDE 1000 ML: 600; 310; 30; 20 INJECTION, SOLUTION INTRAVENOUS at 12:41

## 2020-09-03 RX ADMIN — FENTANYL CITRATE 50 MCG: 50 INJECTION, SOLUTION INTRAMUSCULAR; INTRAVENOUS at 22:15

## 2020-09-03 RX ADMIN — Medication 4 MG: at 21:36

## 2020-09-03 RX ADMIN — FENTANYL CITRATE 50 MCG: 50 INJECTION, SOLUTION INTRAMUSCULAR; INTRAVENOUS at 22:00

## 2020-09-03 RX ADMIN — PHENYLEPHRINE HYDROCHLORIDE 100 MCG: 10 INJECTION INTRAVENOUS at 19:14

## 2020-09-03 RX ADMIN — ALBUMIN (HUMAN) 25 G: 12.5 INJECTION, SOLUTION INTRAVENOUS at 20:43

## 2020-09-03 RX ADMIN — SODIUM CHLORIDE: 9 INJECTION, SOLUTION INTRAVENOUS at 15:38

## 2020-09-03 RX ADMIN — ROCURONIUM BROMIDE 50 MG: 10 INJECTION INTRAVENOUS at 19:35

## 2020-09-03 RX ADMIN — FENTANYL CITRATE 50 MCG: 50 INJECTION INTRAMUSCULAR; INTRAVENOUS at 18:57

## 2020-09-03 RX ADMIN — PHENYLEPHRINE HYDROCHLORIDE 100 MCG: 10 INJECTION INTRAVENOUS at 19:01

## 2020-09-03 RX ADMIN — HYDROMORPHONE HYDROCHLORIDE 0.5 MG: 1 INJECTION, SOLUTION INTRAMUSCULAR; INTRAVENOUS; SUBCUTANEOUS at 22:48

## 2020-09-03 RX ADMIN — Medication 0.6 MG: at 21:36

## 2020-09-03 RX ADMIN — SUCCINYLCHOLINE CHLORIDE 100 MG: 20 INJECTION, SOLUTION INTRAMUSCULAR; INTRAVENOUS at 18:57

## 2020-09-03 RX ADMIN — PHENYLEPHRINE HYDROCHLORIDE 100 MCG: 10 INJECTION INTRAVENOUS at 19:55

## 2020-09-03 RX ADMIN — ONDANSETRON 4 MG: 2 INJECTION, SOLUTION INTRAMUSCULAR; INTRAVENOUS at 21:33

## 2020-09-03 RX ADMIN — ONDANSETRON 4 MG: 2 INJECTION, SOLUTION INTRAMUSCULAR; INTRAVENOUS at 04:13

## 2020-09-03 RX ADMIN — FENTANYL CITRATE 25 MCG: 50 INJECTION INTRAMUSCULAR; INTRAVENOUS at 21:40

## 2020-09-03 RX ADMIN — ONDANSETRON 4 MG: 2 INJECTION INTRAMUSCULAR; INTRAVENOUS at 23:00

## 2020-09-03 RX ADMIN — PIPERACILLIN AND TAZOBACTAM 3.38 G: 3; .375 INJECTION, POWDER, FOR SOLUTION INTRAVENOUS at 17:26

## 2020-09-03 RX ADMIN — PHENYLEPHRINE HYDROCHLORIDE 200 MCG: 10 INJECTION INTRAVENOUS at 19:37

## 2020-09-03 RX ADMIN — PHENYLEPHRINE HYDROCHLORIDE 100 MCG: 10 INJECTION INTRAVENOUS at 19:32

## 2020-09-03 ASSESSMENT — PULMONARY FUNCTION TESTS
PIF_VALUE: 17
PIF_VALUE: 15
PIF_VALUE: 17
PIF_VALUE: 16
PIF_VALUE: 16
PIF_VALUE: 15
PIF_VALUE: 1
PIF_VALUE: 16
PIF_VALUE: 2
PIF_VALUE: 17
PIF_VALUE: 15
PIF_VALUE: 16
PIF_VALUE: 0
PIF_VALUE: 16
PIF_VALUE: 16
PIF_VALUE: 15
PIF_VALUE: 13
PIF_VALUE: 16
PIF_VALUE: 15
PIF_VALUE: 10
PIF_VALUE: 15
PIF_VALUE: 16
PIF_VALUE: 16
PIF_VALUE: 15
PIF_VALUE: 0
PIF_VALUE: 0
PIF_VALUE: 15
PIF_VALUE: 2
PIF_VALUE: 1
PIF_VALUE: 15
PIF_VALUE: 16
PIF_VALUE: 2
PIF_VALUE: 17
PIF_VALUE: 15
PIF_VALUE: 16
PIF_VALUE: 15
PIF_VALUE: 13
PIF_VALUE: 2
PIF_VALUE: 16
PIF_VALUE: 0
PIF_VALUE: 4
PIF_VALUE: 16
PIF_VALUE: 18
PIF_VALUE: 16
PIF_VALUE: 1
PIF_VALUE: 13
PIF_VALUE: 16
PIF_VALUE: 15
PIF_VALUE: 1
PIF_VALUE: 16
PIF_VALUE: 16
PIF_VALUE: 15
PIF_VALUE: 15
PIF_VALUE: 2
PIF_VALUE: 17
PIF_VALUE: 14
PIF_VALUE: 13
PIF_VALUE: 11
PIF_VALUE: 15
PIF_VALUE: 16
PIF_VALUE: 15
PIF_VALUE: 16
PIF_VALUE: 11
PIF_VALUE: 17
PIF_VALUE: 15
PIF_VALUE: 16
PIF_VALUE: 15
PIF_VALUE: 16
PIF_VALUE: 16
PIF_VALUE: 13
PIF_VALUE: 10
PIF_VALUE: 15
PIF_VALUE: 15
PIF_VALUE: 16
PIF_VALUE: 15
PIF_VALUE: 14
PIF_VALUE: 2
PIF_VALUE: 16
PIF_VALUE: 22
PIF_VALUE: 17
PIF_VALUE: 15
PIF_VALUE: 2
PIF_VALUE: 0
PIF_VALUE: 16
PIF_VALUE: 1
PIF_VALUE: 15
PIF_VALUE: 14
PIF_VALUE: 0
PIF_VALUE: 19
PIF_VALUE: 15
PIF_VALUE: 16
PIF_VALUE: 0
PIF_VALUE: 15
PIF_VALUE: 16
PIF_VALUE: 15
PIF_VALUE: 15
PIF_VALUE: 16
PIF_VALUE: 4
PIF_VALUE: 15
PIF_VALUE: 16
PIF_VALUE: 16
PIF_VALUE: 15
PIF_VALUE: 14
PIF_VALUE: 14
PIF_VALUE: 16
PIF_VALUE: 16
PIF_VALUE: 17
PIF_VALUE: 16
PIF_VALUE: 2
PIF_VALUE: 16
PIF_VALUE: 16
PIF_VALUE: 19
PIF_VALUE: 15
PIF_VALUE: 15
PIF_VALUE: 17
PIF_VALUE: 14
PIF_VALUE: 15
PIF_VALUE: 17
PIF_VALUE: 16
PIF_VALUE: 16
PIF_VALUE: 17
PIF_VALUE: 15
PIF_VALUE: 15
PIF_VALUE: 3
PIF_VALUE: 1
PIF_VALUE: 17
PIF_VALUE: 13
PIF_VALUE: 0
PIF_VALUE: 1
PIF_VALUE: 15
PIF_VALUE: 16
PIF_VALUE: 2
PIF_VALUE: 16
PIF_VALUE: 14
PIF_VALUE: 16
PIF_VALUE: 5
PIF_VALUE: 16
PIF_VALUE: 17
PIF_VALUE: 16
PIF_VALUE: 15
PIF_VALUE: 16
PIF_VALUE: 15
PIF_VALUE: 15
PIF_VALUE: 16
PIF_VALUE: 17
PIF_VALUE: 16
PIF_VALUE: 15
PIF_VALUE: 14
PIF_VALUE: 14
PIF_VALUE: 18
PIF_VALUE: 0
PIF_VALUE: 18
PIF_VALUE: 15
PIF_VALUE: 16
PIF_VALUE: 15
PIF_VALUE: 4
PIF_VALUE: 18
PIF_VALUE: 15
PIF_VALUE: 16
PIF_VALUE: 16
PIF_VALUE: 17
PIF_VALUE: 16
PIF_VALUE: 15
PIF_VALUE: 16
PIF_VALUE: 15
PIF_VALUE: 16
PIF_VALUE: 15
PIF_VALUE: 17
PIF_VALUE: 16
PIF_VALUE: 16
PIF_VALUE: 17
PIF_VALUE: 14
PIF_VALUE: 17
PIF_VALUE: 15
PIF_VALUE: 14
PIF_VALUE: 17
PIF_VALUE: 14
PIF_VALUE: 15

## 2020-09-03 ASSESSMENT — PAIN DESCRIPTION - PAIN TYPE: TYPE: SURGICAL PAIN

## 2020-09-03 ASSESSMENT — PAIN SCALES - GENERAL
PAINLEVEL_OUTOF10: 10
PAINLEVEL_OUTOF10: 0
PAINLEVEL_OUTOF10: 10

## 2020-09-03 ASSESSMENT — ENCOUNTER SYMPTOMS
COUGH: 0
BACK PAIN: 0
SORE THROAT: 0
NAUSEA: 1
SHORTNESS OF BREATH: 0
VOMITING: 1
EYE PAIN: 0
ABDOMINAL PAIN: 1
RHINORRHEA: 0
SHORTNESS OF BREATH: 0
DIARRHEA: 0

## 2020-09-03 NOTE — CARE COORDINATION
Case Management Initial Discharge Plan  Watson Cerda,             Met with:patient to discuss discharge plans. Information verified: address, contacts, phone number, , insurance Yes    Emergency Contact/Next of Kin name & number: Scar Barkley (daughter) [phone]    PCP: Amaris Morris, KAREEM - CNP  Date of last visit: July    Insurance Provider: Evi Krishnan    Discharge Planning    Living Arrangements:  Alone   Support Systems:  Children, Family Members    Home has 1 stories  1 stairs to climb to get into front door,     Patient able to perform ADL's:Independent    Current Services (outpatient & in home) none  DME equipment: 2 canes, wheelchair, rollator, shower chair, elevated toilet seat. DME provider:     Receiving oral anticoagulation therapy? No    If indicated:   Physician managing anticoagulation treatment:   Where does patient obtain lab work for ATC treatment? Yes  Potential Assistance Needed:       Patient agreeable to home care: discussed   Freedom of choice provided:  No  Prior SNF/Rehab Placement and Facility: [facility]  Agreeable to SNF/Rehab: discussed. [place] of choice provided: no     Evaluation: no    Expected Discharge date:  20    Patient expects to be discharged to:  transition needs to be determined. Follow Up Appointment: Best Day/ Time:      Transportation provider: family  Transportation arrangements needed for discharge: TBD    Readmission Risk              Risk of Unplanned Readmission:        10             Does patient have a readmission risk score greater than 14?: No  If yes, follow-up appointment must be made within 7 days of discharge. Goals of Care: decreased pain      Discharge Plan: transition needs to be determined. Patient states that she could use some help at home,   Faxed face sheet to [facility] on Aging/Passport, to evaluate for assistance at home once the patient is discharged.   Payor source is [facility] [employer].         Electronically signed by Leatha Pizarro RN on 9/3/20 at 11:57 AM EDT

## 2020-09-03 NOTE — ED NOTES
Pt put on monitor. Transfer from [facility]. Arrived with NG tube. Squad states that pt received 1L fluid, 4mg Zofran, and 12.5 Phenagren. NAD noted. Pt respirations are even and unlabored, pt is oriented X 4, speaking in complete sentences, bed is in the lowest position, call light is within reach. Will continue to monitor.        Jacob Martinez RN  09/03/20 [id number]

## 2020-09-03 NOTE — PROGRESS NOTES
RN tried to call patient's daughter, Briana Wells, with number provided in chart. There was no answer and the no voicemail could not be left d/t voicemail not set up.

## 2020-09-03 NOTE — CONSULTS
General Surgery:  Consult Note        PATIENT NAME: Felisha Ma   YOB: 1940    ADMISSION DATE: 9/3/2020  3:53 AM     Admitting Provider:     Roselia Lozano Physician: Dr. Ronaldo Espana DATE: 9/3/2020    Chief Complaint: Emesis, abnormal CAT scan showing large hiatal hernia  Consult Regarding: Vomiting for 2 days possible gastric outlet obstruction    HISTORY OF PRESENT ILLNESS:  The patient is a 79 y.o. female  who presents with past medical history hypertension, CHF, dementia, gout, and hiatal hernia. Who complains of nausea, vomiting for the past 2 days and is seen in the emergency department at [facility]. Patient was transferred from [facility] emergency department and after CT scan of abdomen and pelvis showed large hiatal hernia volvulus versus gastric outlet obstruction. Patient reports vomiting 2-3 times a day since Monday. Vomit was clear no blood. patient last ate solid food on Monday. Patient also reports constipation and not passing gas since Monday or Tuesday. Patient has past history of hiatal hernia. Past surgical history appendectomy, hysterectomy. Patient also reports during hip surgery she suffered a stroke. Patient also reports recent fall denies hitting her head. Patient patient reports that she lost her balance when attempting to grab her walker. Patient is seen and evaluated in [facility] emergency department    In ED patient had portable chest x-ray shows large hiatal hernia involving entire stomach and part of the transverse colon. CT scan of cervical spine shows no spine abnormalities but esophageal dilation. CT scan without contrast abdomen pelvis shows stomach contents full, volvulus/outlet obstruction not excluded.         Past Medical History:        Diagnosis Date    Anemia     Cataract     Cerebral artery occlusion with cerebral infarction ([address]     TIA    CHF (congestive heart failure) ([address]     QUESTIONABLE    Depression     Diabetes mellitus ([facility]     Eczema     Gout     Hearing loss     Hiatal hernia     History of blood transfusion     Hyperlipidemia     Hypertension     PONV (postoperative nausea and vomiting)     Rectal urgency     Stress incontinence, female        Past Surgical History:        Procedure Laterality Date    APPENDECTOMY      CATARACT REMOVAL WITH IMPLANT Bilateral     COLONOSCOPY      EYE SURGERY      HYSTERECTOMY      JOINT REPLACEMENT      FL TOTAL HIP ARTHROPLASTY Left 11/6/2018    HIP TOTAL ARTHROPLASTY MINIMALLY INVASIVE ASI performed by Estephanie Wakefield MD at [address]       Medications Prior to Admission: See epic list  Not in a hospital admission.     Allergies:  Codeine and Keflex [cephalexin]    Social History:   Social History     Socioeconomic History    Marital status: Single     Spouse name: Not on file    Number of children: Not on file    Years of education: Not on file    Highest education level: Not on file   Occupational History    Not on file   Social Needs    Financial resource strain: Not on file    Food insecurity     Worry: Not on file     Inability: Not on file    Transportation needs     Medical: Not on file     Non-medical: Not on file   Tobacco Use    Smoking status: Never Smoker    Smokeless tobacco: Never Used   Substance and Sexual Activity    Alcohol use: No    Drug use: No    Sexual activity: Not on file   Lifestyle    Physical activity     Days per week: Not on file     Minutes per session: Not on file    Stress: Not on file   Relationships    Social connections     Talks on phone: Not on file     Gets together: Not on file     Attends Shinto service: Not on file     Active member of club or organization: Not on file     Attends meetings of clubs or organizations: Not on file     Relationship status: Not on file    Intimate partner violence     Fear of current or ex partner: Not on file     Emotionally abused: Not on file Physically abused: Not on file     Forced sexual activity: Not on file   Other Topics Concern    Not on file   Social History Narrative    Not on file       Family History:       Problem Relation Age of Onset    Heart Disease Mother     High Blood Pressure Mother     Cancer Father     Colon Cancer Father        REVIEW OF SYSTEMS:    CONSTITUTIONAL: Denies recent weight loss, fatigue, fevers, chills. Has had frequent falls and poor oral intake  HEENT: Denies rhinorrhea, dysphagia, odynphagia. CARDIOVASCULAR: Denies history of MI, recent chest pain. Has history of CVA after left total hip replacement  RESPIRATORY: Denies recent history of shortness of breath or history of PE. GASTROINTESTINAL: Vomiting, constipated as per HPI  GENITOURINARY: Denies increased frequency or dysuria. HEMATOLOGIC/LYMPHATIC: Denies history of anemia or DVTs. ENDOCRINE: Denies history of thyroid problems or diabetes. NEURO: History of CVA (11/2018). Review of systems negative unless listed above. PHYSICAL EXAM:    VITALS:  BP (!) 163/81   Pulse 101   Temp 97.5 °F (36.4 °C) (Oral)   Resp 17   Ht 5' 4\" (1.626 m)   Wt 176 lb (79.8 kg)   SpO2 98%   BMI 30.21 kg/m²   INTAKE/OUTPUT:     Intake/Output Summary (Last 24 hours) at 9/3/2020 1034  Last data filed at 9/3/2020 0715  Gross per 24 hour   Intake --   Output 1000 ml   Net -1000 ml       CONSTITUTIONAL:  awake, alert, not distressed and mildly obese  HEENT: Normocephalic/atraumatic, without obvious abnormality. NG tube in place, brownish content  NECK:  Supple, symmetrical, trachea midline   CARDIOVASCULAR: Regular rate and rhythm without murmurs. LUNGS: Clear to auscultation bilaterally without evidence of wheezing or tachypnea. ABDOMEN: Soft, nontender, nondistended, normal bowel sounds present all 4 quadrants, no TTP. MUSCULOSKELETAL: Muscle strength intact in all extremities bilaterally. NEUROLOGIC: CN II- XII intact.  Gross motor intact without focal weakness. SKIN: No cyanosis, rashes, or edema noted. Orientation:   oriented to person, place, and time    CBC with Differential:    Lab Results   Component Value Date    WBC 20.8 09/03/2020    RBC 3.90 09/03/2020    HGB 11.3 09/03/2020    HCT 35.7 09/03/2020     09/03/2020    MCV 91.5 09/03/2020    MCH 29.0 09/03/2020    MCHC 31.7 09/03/2020    RDW 13.3 09/03/2020    LYMPHOPCT 5 09/03/2020    MONOPCT 4 09/03/2020    BASOPCT 0 09/03/2020    MONOSABS 0.73 09/03/2020    LYMPHSABS 1.00 09/03/2020    EOSABS <0.03 09/03/2020    BASOSABS 0.05 09/03/2020    DIFFTYPE NOT REPORTED 09/03/2020     CMP:    Lab Results   Component Value Date     09/03/2020    K 4.1 09/03/2020     09/03/2020    CO2 26 09/03/2020    BUN 23 09/03/2020    CREATININE 1.35 09/03/2020    GFRAA 46 09/03/2020    LABGLOM 38 09/03/2020    GLUCOSE 149 09/03/2020    PROT 6.8 09/03/2020    LABALBU 3.6 09/03/2020    CALCIUM 8.7 09/03/2020    BILITOT 0.32 09/03/2020    ALKPHOS 102 09/03/2020    AST 21 09/03/2020    ALT 13 09/03/2020     Hepatic Function Panel:    Lab Results   Component Value Date    ALKPHOS 102 09/03/2020    ALT 13 09/03/2020    AST 21 09/03/2020    PROT 6.8 09/03/2020    BILITOT 0.32 09/03/2020    LABALBU 3.6 09/03/2020       Pertinent Radiology:   Ct Abdomen Pelvis Wo Contrast Additional Contrast? None    Result Date: 9/3/2020  EXAMINATION: CT OF THE ABDOMEN AND PELVIS WITHOUT CONTRAST 9/3/2020 4:03 am TECHNIQUE: CT of the abdomen and pelvis was performed without the administration of intravenous contrast. Multiplanar reformatted images are provided for review. Dose modulation, iterative reconstruction, and/or weight based adjustment of the mA/kV was utilized to reduce the radiation dose to as low as reasonably achievable. COMPARISON: None.  HISTORY: ORDERING SYSTEM PROVIDED HISTORY: n/v pain TECHNOLOGIST PROVIDED HISTORY: IV Only Contrast n/v pain Reason for Exam: vomiting Acuity: Acute Type of Exam: Initial FINDINGS: Lower Chest:  Visualized portion of the lower chest demonstrates no acute abnormality. Intrathoracic stomach full of contents. No gastric wall thickening. Organs: The visualized portions of the liver, gallbladder, spleen, pancreas and adrenal glands appear unremarkable within the constraints of a noncontrast exam.  No biliary ductal dilatation. The kidneys appear normal in size without evidence of a contour distorting mass. No renal stone or hydronephrosis. No perinephric stranding. GI/Bowel: No evidence bowel wall thickening or small or large bowel obstruction. Pelvis: The urinary bladder appears unremarkable. The pelvic organs demonstrate no acute abnormality. Peritoneum/Retroperitoneum: The abdominal aorta is normal in caliber. No gross lymphadenopathy, fluid collection, free air Bones/Soft Tissues: No acute findings. Total left hip replacement. Extensive osteoarthrosis of the right hip. Intrathoracic stomach full of contents. Volvulus/outlet obstruction not excluded. Further evaluation can be performed with the administration of oral contrast in repeat study if clinically indicated. Ct Head Wo Contrast    Result Date: 9/3/2020  EXAMINATION: CT OF THE HEAD WITHOUT CONTRAST  9/3/2020 4:01 am TECHNIQUE: CT of the head was performed without the administration of intravenous contrast. Dose modulation, iterative reconstruction, and/or weight based adjustment of the mA/kV was utilized to reduce the radiation dose to as low as reasonably achievable. COMPARISON: None. HISTORY: ORDERING SYSTEM PROVIDED HISTORY: fall TECHNOLOGIST PROVIDED HISTORY: fall Reason for Exam: fall Acuity: Acute Type of Exam: Initial FINDINGS: Many of the images are degraded by motion artifact. Within this limit, BRAIN/VENTRICLES: There is no acute intracranial hemorrhage, mass effect or midline shift. No abnormal extra-axial fluid collection. Old infarct left parietal lobe.   There is prominence of the ventricles and sulci due to global parenchymal volume loss. Insert CT micro the gray-white differentiation is otherwise maintained without evidence of an acute infarct. There is no evidence of hydrocephalus. ORBITS: The visualized portion of the orbits demonstrate no acute abnormality. SINUSES: The visualized paranasal sinuses and mastoid air cells demonstrate no acute abnormality. SOFT TISSUES/SKULL:  No acute abnormality of the visualized skull or soft tissues. No acute intracranial abnormality. Ct Cervical Spine Wo Contrast    Result Date: 9/3/2020  EXAMINATION: CT OF THE CERVICAL SPINE WITHOUT CONTRAST 9/3/2020 4:01 am TECHNIQUE: CT of the cervical spine was performed without the administration of intravenous contrast. Multiplanar reformatted images are provided for review. Dose modulation, iterative reconstruction, and/or weight based adjustment of the mA/kV was utilized to reduce the radiation dose to as low as reasonably achievable. COMPARISON: None. HISTORY: ORDERING SYSTEM PROVIDED HISTORY: fall TECHNOLOGIST PROVIDED HISTORY: fall Reason for Exam: fall Acuity: Acute Type of Exam: Initial FINDINGS: BONES/ALIGNMENT: There is no acute fracture or traumatic malalignment. DEGENERATIVE CHANGES: Multilevel facet arthrosis and degenerative disc space narrowing. SOFT TISSUES: There is no prevertebral soft tissue swelling. Esophageal dilatation. No acute abnormality of the cervical spine. Xr Chest Portable    Result Date: 9/3/2020  EXAMINATION: ONE XRAY VIEW OF THE CHEST 9/3/2020 4:00 am COMPARISON: 01/13/2018 HISTORY: ORDERING SYSTEM PROVIDED HISTORY: vomiting TECHNOLOGIST PROVIDED HISTORY: vomiting Reason for Exam: vomiting Acuity: Acute FINDINGS: The cardiac silhouette appears within normal limits for size given portable technique. No convincing evidence of a focal consolidation. No pleural effusion or pneumothorax seen. No acute cardiopulmonary abnormality. Large hiatal hernia.          ASSESSMENT:  C/Radha Gallegos [id number] Problems    Diagnosis Date Noted    Gastric outlet obstruction [K31.1] 09/03/2020       1. Possible volvulus/obstruction  2. Type IV paraesophageal hiatal hernia   3. Constipation    Plan:    1. Repeat CT scan with contrast abdomen and chest which showed Type IV paraesophageal hiatal hernia  containing the distal half of the transverse colon  2. Diet NPO  3.  NG tube decompression  4. Patient given fluid bolus due to severe hypovolemia  5. Consult GI to rule out gastric mucosal ischemia via EGD  6. Medical and supportive care primary team    Electronically signed by Brittany Andino MD  on 9/3/2020 at 10:34 AM     I attest that I was present with the resident during the patient's evaluation and agree with the description of findings and plan as outlined above. Assessing patient for preoperative risk and optimization with NG tube decompression IV fluids and repeat cardiac echo. We will repeat serum lactate level as well.   Miki Espinoza MD

## 2020-09-03 NOTE — OP NOTE
Operative Note      Patient: Endy Wylie  YOB: 1940  MRN: [id number]    Date of Procedure: 9/3/2020    Pre-Op Diagnosis: gastric volvulus    Post-Op Diagnosis: Same  Organoaxial gastric volvulus with intrathoracic stomach  Duodenal ischemia    Procedure(s):  EGD ESOPHAGOGASTRODUODENOSCOPY    Surgeon(s):  Paul Anderson MD    Assistant:   * No surgical staff found *    Anesthesia: General    Estimated Blood Loss (mL): None    Complications: None    Specimens:   * No specimens in log *    Implants:  * No implants in log *      Drains:   NG/OG/NJ/NE Tube Nasogastric 16 fr Left nostril (Active)   Surrounding Skin Dry; Intact 09/03/20 1500   Securement device Yes 09/03/20 1500   Status Suction-low continuous 09/03/20 1500   Placement Verified by X-Ray (Initial);by External Catheter Length;by Respiratory Status 09/03/20 1500   NG/OG/NJ/NE External Measurement (cm) 55 cm 09/03/20 1500   Drainage Appearance Brown 09/03/20 1500       Detailed Description of Procedure:   Informed consent was obtained from the patient's daughter after explanation of the procedure including indications, description of the procedure,  benefits and possible risks and complications of the procedure, and alternatives. Questions were answered. The patient's history was reviewed and a directed physical examination was performed prior to the procedure. Patient was monitored throughout the procedure with pulse oximetry and periodic assessment of vital signs. Patient was sedated as noted above. With the patient in the left lateral decubitus position, the Olympus videoendoscope was placed in the patient's mouth and under direct visualization passed into the esophagus. Visualization of the esophagus, stomach, and duodenum was performed during both introduction and withdrawal of the endoscope and retroflexed view of the proximal stomach was obtained. The scope was passed to the 2nd portion of the duodenum.      The patient tolerated the procedure well. Findings:   Tortuous esophagus  Transthoracic stomach with organoaxial volvulus causing partial gastric outlet obstruction. Dusky appearing mucosa at the pylorus and the first and second part of the duodenum suspicious for bowel ischemia. After successfully decompressing the stomach, multiple attempts for derotation were performed but were unsuccessful. Intra-procedure consultation was made to general surgery due to suspicion for bowel ischemia and early surgical intervention. Recommendations  Patient to be maintained under general anesthesia.   Discussed the above findings with the patient's daughter and surgery team.  Further management per General surgery      Electronically signed by Sonido Martinez MD on 9/3/2020 at 7:44 PM

## 2020-09-03 NOTE — ED PROVIDER NOTES
[facility]  [address]  Phone: [address]          Pt Name: Gail Sarabia  MRN: [id number]  [place] 1940  Date of evaluation: 9/3/2020      CHIEF COMPLAINT       Chief Complaint   Patient presents with    Emesis     Pt c/o vomiting for 2 days. HISTORY OF PRESENT ILLNESS       Gail Sarabia is a 79 y.o. female who presents with nausea, vomiting and some abdominal discomfort that began this evening. Number of days ago the patient also reports falling. Does have some bruising to her legs. Also reports increased edema to her legs that is somewhat abnormal for her. Denies any pain to the legs. Does have some abdominal discomfort. Does not remember if she hit her head. Denies neck or back pain. Patient is actively vomiting in the room. Denies other symptoms or concerns. REVIEW OF SYSTEMS       Review of Systems   Constitutional: Negative for chills, fatigue and fever. HENT: Negative for rhinorrhea and sore throat. Eyes: Negative for pain. Respiratory: Negative for cough and shortness of breath. Cardiovascular: Positive for leg swelling. Negative for chest pain and palpitations. Gastrointestinal: Positive for abdominal pain, nausea and vomiting. Negative for diarrhea. Genitourinary: Negative for difficulty urinating. Musculoskeletal: Negative for back pain and neck pain. Skin: Negative for rash. Neurological: Negative for weakness and headaches. PAST MEDICAL HISTORY    has a past medical history of Anemia, Cataract, Cerebral artery occlusion with cerebral infarction ([address] CHF (congestive heart failure) ([address] Depression, Diabetes mellitus ([address] Eczema, Gout, Hearing loss, Hiatal hernia, History of blood transfusion, Hyperlipidemia, Hypertension, PONV (postoperative nausea and vomiting), Rectal urgency, and Stress incontinence, female.     SURGICAL HISTORY      has a past surgical history that tablet, Refills: 5    Comments: Please consider 90 day supplies to promote better adherence  Associated Diagnoses: Vascular dementia without behavioral disturbance (HCC)      Calcium Carbonate-Vitamin D (OYSTER SHELL CALCIUM/D) 500-200 MG-UNIT TABS Take 1 tablet by mouth daily  Qty: 30 tablet, Refills: 5    Comments: Please consider 90 day supplies to promote better adherence      Multiple Vitamins-Minerals (THERAPEUTIC MULTIVITAMIN-MINERALS) tablet Take 1 tablet by mouth daily    Associated Diagnoses: Vitamin D deficiency      !! Misc. Devices MISC Shower transfer bench  Qty: 1 Device, Refills: 0    Associated Diagnoses: Cerebrovascular accident (CVA), unspecified mechanism ([facility] Primary osteoarthritis of left hip; Continuous leakage of urine      !! Misc. Devices MISC Adult pull up briefs  Dispense 100  Qty: 100 Device, Refills: 0    Associated Diagnoses: Cerebrovascular accident (CVA), unspecified mechanism ([facility] [address] [facility] Primary osteoarthritis of left hip      ferrous sulfate 325 (65 Fe) MG tablet Take 325 mg by mouth 2 times daily       Acetaminophen 325 MG CAPS Take 1 capsule by mouth daily as needed       Blood Pressure KIT 1 kit by Does not apply route daily  Qty: 1 kit, Refills: 0       !! - Potential duplicate medications found. Please discuss with provider. ALLERGIES     is allergic to codeine and keflex [cephalexin]. FAMILY HISTORY     She indicated that her mother is . She indicated that her father is . family history includes Cancer in her father; Terrilee Pa in her father; Heart Disease in her mother; High Blood Pressure in her mother. SOCIAL HISTORY      reports that she has never smoked. She has never used smokeless tobacco. She reports that she does not drink alcohol or use drugs. PHYSICAL EXAM     INITIAL VITALS:  height is 5' 4\" (1.626 m) and weight is 81.1 kg (178 lb 12.7 oz). Her oral temperature is 97.5 °F (36.4 °C).  Her blood pressure is 138/56 (abnormal) and exam not performed based on chemical results unless requested in original order. Urinalysis Comments          Urinalysis Comments       Utilizing a urinalysis as the only screening method to exclude a potential uropathogen can be unreliable in many patient populations. Rapid screening tests are less sensitive than culture and if UTI is a clinical possibility, culture should be considered despite a negative urinalysis.    CBC Auto Differential   Result Value Ref Range    WBC 17.7 (H) 3.5 - 11.0 k/uL    RBC 3.95 (L) 4.0 - 5.2 m/uL    Hemoglobin 11.4 (L) 12.0 - 16.0 g/dL    Hematocrit 34.0 (L) 36 - 46 %    MCV 86.1 80 - 100 fL    MCH 28.9 26 - 34 pg    MCHC 33.5 31 - 37 g/dL    RDW 13.7 12.5 - 15.4 %    Platelets 517 266 - 091 k/uL    MPV 9.3 6.0 - 12.0 fL    NRBC Automated NOT REPORTED per 100 WBC    Differential Type NOT REPORTED     Seg Neutrophils 82 (H) 36 - 66 %    Lymphocytes 13 (L) 24 - 44 %    Monocytes 5 2 - 11 %    Eosinophils % 0 (L) 1 - 4 %    Basophils 0 0 - 2 %    Immature Granulocytes NOT REPORTED 0 %    Segs Absolute 14.50 (H) 1.8 - 7.7 k/uL    Absolute Lymph # 2.30 1.0 - 4.8 k/uL    Absolute Mono # 0.80 0.1 - 1.2 k/uL    Absolute Eos # 0.10 0.0 - 0.4 k/uL    Basophils Absolute 0.00 0.0 - 0.2 k/uL    Absolute Immature Granulocyte NOT REPORTED 0.00 - 0.30 k/uL    WBC Morphology NOT REPORTED     RBC Morphology NOT REPORTED     Platelet Estimate NOT REPORTED    Comprehensive Metabolic Panel   Result Value Ref Range    Glucose 225 (H) 70 - 99 mg/dL    BUN 26 (H) 8 - 23 mg/dL    CREATININE 1.55 (H) 0.50 - 0.90 mg/dL    Bun/Cre Ratio NOT REPORTED 9 - 20    Calcium 9.4 8.6 - 10.4 mg/dL    Sodium 144 135 - 144 mmol/L    Potassium 3.5 (L) 3.7 - 5.3 mmol/L    Chloride 102 98 - 107 mmol/L    CO2 25 20 - 31 mmol/L    Anion Gap 17 9 - 17 mmol/L    Alkaline Phosphatase 121 (H) 35 - 104 U/L    ALT 12 5 - 33 U/L    AST 17 <32 U/L    Total Bilirubin 0.31 0.3 - 1.2 mg/dL    Total Protein 7.1 6.4 - 8.3 g/dL    Alb 4.1 Value Ref Range    Glucose 186 (H) 70 - 99 mg/dL    BUN 18 8 - 23 mg/dL    CREATININE 1.26 (H) 0.50 - 0.90 mg/dL    Bun/Cre Ratio NOT REPORTED 9 - 20    Calcium 8.2 (L) 8.6 - 10.4 mg/dL    Sodium 142 135 - 144 mmol/L    Potassium 3.3 (L) 3.7 - 5.3 mmol/L    Chloride 106 98 - 107 mmol/L    CO2 26 20 - 31 mmol/L    Anion Gap 10 9 - 17 mmol/L    GFR Non-African American 41 (L) >60 mL/min    GFR  49 (L) >60 mL/min    GFR Comment          GFR Staging NOT REPORTED    CBC WITH AUTO DIFFERENTIAL   Result Value Ref Range    WBC 26.7 (H) 3.5 - 11.3 k/uL    RBC 3.89 (L) 3.95 - 5.11 m/uL    Hemoglobin 11.0 (L) 11.9 - 15.1 g/dL    Hematocrit 36.9 36.3 - 47.1 %    MCV 94.9 82.6 - 102.9 fL    MCH 28.3 25.2 - 33.5 pg    MCHC 29.8 28.4 - 34.8 g/dL    RDW 13.5 11.8 - 14.4 %    Platelets 099 400 - 326 k/uL    MPV 11.3 8.1 - 13.5 fL    NRBC Automated 0.0 0.0 per 100 WBC    Differential Type NOT REPORTED     WBC Morphology NOT REPORTED     RBC Morphology NOT REPORTED     Platelet Estimate NOT REPORTED     Immature Granulocytes 0 0 %    Seg Neutrophils 91 (H) 36 - 66 %    Lymphocytes 5 (L) 24 - 44 %    Monocytes 4 1 - 7 %    Eosinophils % 0 (L) 1 - 4 %    Basophils 0 0 - 2 %    Absolute Immature Granulocyte 0.00 0.00 - 0.30 k/uL    Segs Absolute 24.29 (H) 1.8 - 7.7 k/uL    Absolute Lymph # 1.34 1.0 - 4.8 k/uL    Absolute Mono # 1.07 (H) 0.1 - 0.8 k/uL    Absolute Eos # 0.00 0.0 - 0.4 k/uL    Basophils Absolute 0.00 0.0 - 0.2 k/uL    Morphology Normal    LACTIC ACID, WHOLE BLOOD   Result Value Ref Range    Lactic Acid, Whole Blood 2.7 (H) 0.7 - 2.1 mmol/L   MAGNESIUM   Result Value Ref Range    Magnesium 1.8 1.6 - 2.6 mg/dL   PHOSPHORUS   Result Value Ref Range    Phosphorus 3.7 2.6 - 4.5 mg/dL   CALCIUM, IONIZED   Result Value Ref Range    Calcium, Ion 1.13 1.13 - 1.33 mmol/L   POC Glucose Fingerstick   Result Value Ref Range    POC Glucose 96 65 - 105 mg/dL   POC Glucose Fingerstick   Result Value Ref Range    POC Glucose 166 (H) 65 - 105 mg/dL   EKG 12 Lead   Result Value Ref Range    Ventricular Rate 85 BPM    Atrial Rate 85 BPM    P-R Interval 264 ms    QRS Duration 84 ms    Q-T Interval 416 ms    QTc Calculation (Bazett) 495 ms    P Axis 85 degrees    R Axis -12 degrees    T Axis 177 degrees   TYPE AND SCREEN   Result Value Ref Range    Expiration Date 09/06/2020,2359     Arm Band Number BE [id number]     ABO/Rh O NEGATIVE     Antibody Screen NEGATIVE    BLOOD BANK SPECIMEN   Result Value Ref Range    Blood Bank Specimen NOT REPORTED        EMERGENCY DEPARTMENT COURSE:     The patient was given the following medications:  Orders Placed This Encounter   Medications    ondansetron (ZOFRAN) injection 4 mg    0.9 % sodium chloride bolus    DISCONTD: ioversol (OPTIRAY) 74 % injection 75 mL    DISCONTD: sodium chloride flush 0.9 % injection 10 mL    DISCONTD: 0.9 % sodium chloride bolus    promethazine (PHENERGAN) injection 12.5 mg    DISCONTD: dextrose 5 % and 0.45 % NaCl with KCl 20 mEq infusion    sodium chloride flush 0.9 % injection 10 mL    sodium chloride flush 0.9 % injection 10 mL    OR Linked Order Group     potassium chloride (KLOR-CON M) extended release tablet 40 mEq     potassium bicarb-citric acid (EFFER-K) effervescent tablet 40 mEq     potassium chloride 10 mEq/100 mL IVPB (Peripheral Line)    magnesium sulfate 1 g in dextrose 5% 100 mL IVPB    DISCONTD: acetaminophen (TYLENOL) tablet 650 mg    DISCONTD: acetaminophen (TYLENOL) suppository 650 mg    polyethylene glycol (GLYCOLAX) packet 17 g    DISCONTD: promethazine (PHENERGAN) tablet 12.5 mg    ondansetron (ZOFRAN) injection 4 mg    famotidine (PEPCID) injection 20 mg    DISCONTD: nicotine (NICODERM CQ) 21 MG/24HR 1 patch     As needed if a smoker and requested by patient.     DISCONTD: enoxaparin (LOVENOX) injection 30 mg    iohexol (OMNIPAQUE 240) injection 50 mL    lactated ringers bolus    lactated ringers bolus    DISCONTD: 0.9 % sodium chloride infusion    DISCONTD: sodium chloride flush 0.9 % injection 10 mL    DISCONTD: sodium chloride flush 0.9 % injection 10 mL    heparin (porcine) injection 5,000 Units    DISCONTD: insulin lispro (HUMALOG) injection vial 0-18 Units    DISCONTD: insulin lispro (HUMALOG) injection vial 0-9 Units    labetalol (NORMODYNE;TRANDATE) injection 10 mg    insulin lispro (HUMALOG) injection vial 0-12 Units    insulin lispro (HUMALOG) injection vial 0-6 Units    glucose (GLUTOSE) 40 % oral gel 15 g    dextrose 50 % IV solution    glucagon (rDNA) injection 1 mg    dextrose 5 % solution    piperacillin-tazobactam (ZOSYN) 3.375 g in dextrose 5 % 50 mL IVPB (mini-bag)    sodium chloride 0.9 % irrigation    DISCONTD: fentaNYL (SUBLIMAZE) injection 25 mcg    fentaNYL (SUBLIMAZE) injection 50 mcg    DISCONTD: fentaNYL (SUBLIMAZE) injection 50 mcg    lactated ringers infusion    acetaminophen (TYLENOL) 160 MG/5ML solution 1,000 mg    lidocaine 4 % external patch 2 patch    oxyCODONE (ROXICODONE) immediate release tablet 5 mg    HYDROmorphone (DILAUDID) injection 0.5 mg    methocarbamol (ROBAXIN) tablet 750 mg    HYDROmorphone (DILAUDID) 1 MG/ML injection     [facility]: cabinet override        Vitals:    Vitals:    09/03/20 2215 09/03/20 2226 09/03/20 2300 09/03/20 2329   BP: 133/74 (!) 152/70 (!) 155/69 (!) 138/56   Pulse: 77 91 78 75   Resp: 25 19 19 18   Temp:  97.3 °F (36.3 °C) 97.5 °F (36.4 °C)    TempSrc:  Temporal Oral    SpO2: 100% 96% 94% 99%   Weight:       Height:         -------------------------  BP: (!) 138/56, Temp: 97.5 °F (36.4 °C), Pulse: 75, Resp: 18      Re-evaluation Notes    ED Course as of Sep 04 0008   Thu Sep 03, 2020   1003 Discussed with Dr. Nikki Damon, general surgery resident, he is requesting CT chest with p.o. contrast to assess for extent of obstruction. He is additionally requesting admission to the ICU. He Will evaluate patient's when able.     [RB]   3946 Lactic acid WBC uptrending, repeating labs. Awaiting response from critical care team.    [RB]   1020 Discussed with [facility], they are uncertain if this patient is appropriate for their service. They will call back. [RB]   [phone] with critical care they have accepted patient. [RB]      ED Course User Index  [RB] DO TODD Vee    Spoke with Dr. Edin Agosto on the phone regarding the CT scan results. He feels this may be more complex than what we can handle here. We will transfer to [facility]. I discussed with the patient and family and they are comfortable with the plan. Questionable volvulus versus high gastric outlet obstruction. We will place an NG tube as the patient is still symptomatic. I discussed with Dr. Ilene Ponce at [facility] emergency department who accepted transfer of the patient. Lactic acid is 2.9. Rechecking that as well. Did receive IV fluids. Will keep n.p.o. Awaiting EMS transport. CONSULTS:    Surgery    CRITICAL CARE:     None    PROCEDURES:    None    FINAL IMPRESSION      1. Gastric outlet obstruction    2.  Nausea and vomiting, intractability of vomiting not specified, unspecified vomiting type          DISPOSITION/PLAN   DISPOSITION Admitted 09/03/2020 10:44:00 AM      Condition on Disposition    Improved    PATIENT REFERRED TO:  Parish Gutierrez, APRN - CNP  [address] [place] [address] [address]  [phone]            DISCHARGE MEDICATIONS:  Current Discharge Medication List          (Please note that portions of this note were completed with a voice recognition program.  Efforts were made to edit the dictations but occasionally words are mis-transcribed.)    Elizabeth Almonte DO  Attending Emergency Physician       Elizabeth Almonte,   09/03/20 [address] [place] [address],   09/04/20 9225

## 2020-09-03 NOTE — ED PROVIDER NOTES
[facility]  Emergency Department Encounter  Emergency Medicine Resident         This patient was evaluated in the Emergency Department for symptoms described in the history of present illness. He/she was evaluated in the context of the global COVID-19 pandemic, which necessitated consideration that the patient might be at risk for infection with the SARS-CoV-2 virus that causes COVID-19. Institutional protocols and algorithms that pertain to the evaluation of patients at risk for COVID-19 are in a state of rapid change based on information released by regulatory bodies including the CDC and federal and state organizations. These policies and algorithms were followed during the patient's care in the ED. Pt Name: Jessica Alvarez  MRN: [id number]  [place] 1940  Date of evaluation: 9/3/20  PCP:  KAREEM Bowen [id number]       Chief Complaint   Patient presents with    Emesis     Pt c/o vomiting for 2 days. HISTORY OF PRESENT ILLNESS  (Location/Symptom, Timing/Onset, Context/Setting, Quality, Duration, Modifying Factors, Severity.)    Jessica Alvarez is a 79 y.o. female who presents with nausea vomiting abdominal pain x2 days. Patient is transfer from [facility] emergency department where she was found to have volvulus versus obstruction on CT abdomen pelvis. Additionally she had complained of fall without loss of consciousness a CT head and CT cervical are performed that were grossly negative for acute fracture or bleed.   Patient's pain is 5 out of 10 in severity nonradiating Diffuse abdominal.        PAST MEDICAL / SURGICAL / SOCIAL / FAMILY HISTORY    has a past medical history of Anemia, Cataract, Cerebral artery occlusion with cerebral infarction ([address] CHF (congestive heart failure) ([address] Depression, Diabetes mellitus ([address] Eczema, Gout, Hearing loss, Hiatal hernia, History of blood transfusion, Hyperlipidemia, Hypertension, PONV (postoperative nausea and 20 MEQ extended release tablet Take 1 tablet by mouth once daily 8/19/20   Raymundo Tom, APRN - CNP   lisinopril (PRINIVIL;ZESTRIL) 20 MG tablet Take 1 tablet by mouth daily 8/12/20   Raymundo Tom, KAREEM - CNP   pravastatin (PRAVACHOL) 40 MG tablet Take 1 tablet by mouth daily 8/12/20   Raymundo Negro, KAREEM - CNP   furosemide (LASIX) 40 MG tablet Take 1 tablet by mouth once daily 6/24/20   Raymundo Addison, KAREEM - CNP   allopurinol (ZYLOPRIM) 100 MG tablet Take 1 tablet by mouth once daily 6/21/20   KAREEM Fraser CNP   citalopram (CELEXA) 20 MG tablet Take 1 tablet by mouth once daily 6/11/20   Raymundo Negro, KAREEM - CNP   mupirocin (BACTROBAN) 2 % cream Apply topically 3 times daily Apply topically 3 times daily. 2/15/20   Byron Plascencia PA-C   docusate sodium (COLACE) 100 MG capsule Take 1 capsule by mouth 2 times daily as needed for Constipation 1/30/20   Raymundo Addison, APRN - CNP   Handicap Placard MISC by Does not apply route Expires 11/2024 11/7/19   Raymundo TomKAREEM - CNP   meclizine (ANTIVERT) 12.5 MG tablet TAKE 1 TABLET BY MOUTH THREE TIMES DAILY AS NEEDED FOR DIZZINESS 10/15/19   Raymundo TomKAREEM - CNP   meclizine (ANTIVERT) 12.5 MG tablet TAKE 1 TABLET BY MOUTH THREE TIMES DAILY AS NEEDED FOR DIZZINESS 10/14/19   Raymundo NegroKAREEM - CNP   memantine (NAMENDA) 10 MG tablet Take 1 tablet by mouth daily 9/16/19   Raymundo Addison, APRN - CNP   Calcium Carbonate-Vitamin D (OYSTER SHELL CALCIUM/D) 500-200 MG-UNIT TABS Take 1 tablet by mouth daily 6/25/19   Raymundo Negro, APRN - CNP   Multiple Vitamins-Minerals (THERAPEUTIC MULTIVITAMIN-MINERALS) tablet Take 1 tablet by mouth daily    Historical Provider, MD   Misc. Devices MISC Shower transfer bench 1/18/19   KAREEM Fraser CNP   Misc.  Devices MISC Adult pull up briefs  Dispense 100 1/18/19   Tyree Mckeon, APRN - CNP   ferrous sulfate 325 (65 Fe) MG tablet Take 325 mg by mouth 2 times daily     Historical Provider, MD   Acetaminophen 325 MG CAPS Take 1 capsule by mouth daily as needed     Historical Provider, MD   Blood Pressure KIT 1 kit by Does not apply route daily 9/12/18   Leisa Lynch, APRN - CNP       REVIEW OF SYSTEMS    (2-9 systems for level 4, 10 or more for level 5)      Gen: No Fever, No chills  EYES: No blurry visiion, no double vision  HENT: No sore throat, No runny nose. No cough  CV: No CP , No palpitation  RESP: No SOB, No respiratory distress  GI: + N/V, + Abdm pain  : No dysuria  SKIN: No rash  MSK: No back pain, no joint pain  NEURO: No HA, no weakness  PSYCH: No SI/HI        PHYSICAL EXAM   (up to 7 for level 4, 8 or more for level 5)     INITIAL VITALS:     BP (!) 163/81   Pulse 101   Temp 97.5 °F (36.4 °C) (Oral)   Resp 17   Ht 5' 4\" (1.626 m)   Wt 176 lb (79.8 kg)   SpO2 98%   BMI 30.21 kg/m²     Physical Exam  GENERAL: upon initial examination, patient is frail appearing, nontoxic, and not in acute respiratory distress she has a NG tube placed to intermittent suction.   HENT: normocephalic  EYES: no occular discharge, no scleral icterus  NECK: no JVD, no tracheal deviation  CV: Normal S1 S2, no MRG  PULM / CHEST: CTA Bilaterally all fields, no WRR  ABDOMEN: Soft, nondistended, diffusely tender to palpation but non-peritoneal., No peritoneal signs  MSK: no gross deformity, no edema, no TTP  SKIN: no rash, no erythema, cap refill < 2 sec  PSYCH / BEHAVIORAL: mood/affect normal, behavior normal, no flight of ideas    DIFFERENTIAL  DIAGNOSIS/ MDM   PLAN (LABS / IMAGING / EKG):  Orders Placed This Encounter   Procedures    XR CHEST PORTABLE    CT HEAD WO CONTRAST    CT CERVICAL SPINE WO CONTRAST    CT ABDOMEN PELVIS WO CONTRAST Additional Contrast? None    CT CHEST W CONTRAST    Brain Natriuretic Peptide    Urinalysis Reflex to Culture    CBC Auto Differential    Comprehensive Metabolic Panel    Lactic Acid    Protime-INR    APTT    Troponin  Magnesium    Lipase    COVID-19    Lactic Acid    CBC WITH AUTO DIFFERENTIAL    Comprehensive Metabolic Panel    Lactic Acid, Plasma    Telemetry monitoring    Continuous Pulse Oximetry    Inpatient consult to General Surgery    Inpatient consult to Critical Care    EKG 12 Lead    Insert peripheral IV    PATIENT STATUS (FROM ED OR OR/PROCEDURAL) Inpatient    PATIENT STATUS (FROM ED OR OR/PROCEDURAL) Inpatient       MEDICATIONS ORDERED:  Orders Placed This Encounter   Medications    ondansetron (ZOFRAN) injection 4 mg    0.9 % sodium chloride bolus    ioversol (OPTIRAY) 74 % injection 75 mL    sodium chloride flush 0.9 % injection 10 mL    0.9 % sodium chloride bolus    promethazine (PHENERGAN) injection 12.5 mg         MDM:    Tammy Samuel is a 79 y.o. female who presents as a transfer from [facility] emergency department for surgical evaluation due to concern for volvulus versus obstruction. Patient had NG tube placed at that time and has improved her symptoms. Patient initial lactate of 3.9, troponin of 30, creatinine 1.55 potassium 3.5, WBC 17.7. She received milligrams Zofran and a liter of normal saline anxiety. Will consult general surgery for further evaluation continue symptomatic control. EMERGENCY DEPARTMENT COURSE:  ED Course as of Sep 03 1044   Thu Sep 03, 2020   1003 Discussed with Dr. Pastor Thorpe, general surgery resident, he is requesting CT chest with p.o. contrast to assess for extent of obstruction. He is additionally requesting admission to the ICU. He Will evaluate patient's when able. [RB]   1006 Lactic acid WBC uptrending, repeating labs. Awaiting response from critical care team.    [RB]   1020 Discussed with MICU, they are uncertain if this patient is appropriate for their service. They will call back. [RB]   [phone] with critical care they have accepted patient.     [RB]      ED Course User Index  [RB] Will Cornelius, DO         DIAGNOSTIC RESULTS / Notable for the following components:    Glucose 149 (*)     CREATININE 1.35 (*)     Sodium 147 (*)     GFR Non- 38 (*)     GFR  46 (*)     All other components within normal limits   LACTIC ACID, PLASMA - Abnormal; Notable for the following components:    Lactic Acid, Whole Blood 5.2 (*)     All other components within normal limits   PROTIME-INR   APTT   MAGNESIUM   LIPASE   COVID-19       RADIOLOGY:  Ct Abdomen Pelvis Wo Contrast Additional Contrast? None    Result Date: 9/3/2020  EXAMINATION: CT OF THE ABDOMEN AND PELVIS WITHOUT CONTRAST 9/3/2020 4:03 am TECHNIQUE: CT of the abdomen and pelvis was performed without the administration of intravenous contrast. Multiplanar reformatted images are provided for review. Dose modulation, iterative reconstruction, and/or weight based adjustment of the mA/kV was utilized to reduce the radiation dose to as low as reasonably achievable. COMPARISON: None. HISTORY: ORDERING SYSTEM PROVIDED HISTORY: n/v pain TECHNOLOGIST PROVIDED HISTORY: IV Only Contrast n/v pain Reason for Exam: vomiting Acuity: Acute Type of Exam: Initial FINDINGS: Lower Chest:  Visualized portion of the lower chest demonstrates no acute abnormality. Intrathoracic stomach full of contents. No gastric wall thickening. Organs: The visualized portions of the liver, gallbladder, spleen, pancreas and adrenal glands appear unremarkable within the constraints of a noncontrast exam.  No biliary ductal dilatation. The kidneys appear normal in size without evidence of a contour distorting mass. No renal stone or hydronephrosis. No perinephric stranding. GI/Bowel: No evidence bowel wall thickening or small or large bowel obstruction. Pelvis: The urinary bladder appears unremarkable. The pelvic organs demonstrate no acute abnormality. Peritoneum/Retroperitoneum: The abdominal aorta is normal in caliber.   No gross lymphadenopathy, fluid collection, free air Bones/Soft Tissues: No weight based adjustment of the mA/kV was utilized to reduce the radiation dose to as low as reasonably achievable. COMPARISON: None. HISTORY: ORDERING SYSTEM PROVIDED HISTORY: fall TECHNOLOGIST PROVIDED HISTORY: fall Reason for Exam: fall Acuity: Acute Type of Exam: Initial FINDINGS: BONES/ALIGNMENT: There is no acute fracture or traumatic malalignment. DEGENERATIVE CHANGES: Multilevel facet arthrosis and degenerative disc space narrowing. SOFT TISSUES: There is no prevertebral soft tissue swelling. Esophageal dilatation. No acute abnormality of the cervical spine. Xr Chest Portable    Result Date: 9/3/2020  EXAMINATION: ONE XRAY VIEW OF THE CHEST 9/3/2020 4:00 am COMPARISON: 01/13/2018 HISTORY: ORDERING SYSTEM PROVIDED HISTORY: vomiting TECHNOLOGIST PROVIDED HISTORY: vomiting Reason for Exam: vomiting Acuity: Acute FINDINGS: The cardiac silhouette appears within normal limits for size given portable technique. No convincing evidence of a focal consolidation. No pleural effusion or pneumothorax seen. No acute cardiopulmonary abnormality. Large hiatal hernia. CONSULTS:  IP CONSULT TO GENERAL SURGERY  IP CONSULT TO CRITICAL CARE  IP CONSULT TO GENERAL SURGERY    CRITICAL CARE:  Please see attending note    FINAL IMPRESSION     1. Gastric outlet obstruction    2. Nausea and vomiting, intractability of vomiting not specified, unspecified vomiting type          DISPOSITION / [id number]  Admitted 09/03/2020 10:44:00 AM      PATIENT REFERRED TO:  KAREEM Mancuso - CNP  [address] [place] [address]  [address]  [facility] [address] [facility]  [phone]            DISCHARGE MEDICATIONS:  New Prescriptions    No medications on file       Dr. Macarena Huizar.  [facility]  Emergency Medicine Resident Physician, PGY-3    (Please note that portions of this note were completed with a voice recognition program.  Efforts were made to edit the dictations but occasionally words are mis-transcribed.)          Macarena Huizar [place], DO  Resident  09/03/20 1040

## 2020-09-03 NOTE — CONSULTS
[facility]    GASTROENTEROLOGY CONSULT    Patient:   Cee Johnson   :    1940   Facility:   [facility]   Date:    9/3/2020  Admission Dx:  Gastric outlet obstruction [K31.1]  Volvulus ([facility] [address] [K56.2]  Requesting physician: Lan Meyers MD  Reason for consult: Type IV paraesophageal hernia. Concern for gastric mucosal ischemia   CC : Nausea and vomiting    SUBJECTIVE     HISTORY OF PRESENT ILLNESS  This is a 79 y.o.   female who was admitted 9/3/2020 with Gastric outlet obstruction [K31.1]  Volvulus ([address] [K56.2]. We have been asked to see the patient in consultation by Lan Meyers MD for type IV paraesophageal hernia. Concern for gastric mucosal ischemia. 79-year-old female with a history of hiatal hernia, DM, and anemia presented to [facility] with reports of 2-day history of nausea and vomiting. CT A/P showed intrathoracic stomach full of contents. Concerning for volvulus/outlet obstruction. Patient transferred to [facility] for further care. Patient was seen by general surgery. CT chest completed showing type IV paraesophageal hernia which also contains distal half of the transverse colon. General surgery recommend GI consult. Patient reports she has been experiencing intermittent nausea and vomiting for the past couple years but symptoms were worse over the past few days. Patient denies any hematemesis. Patient reports she has a history of intermittent constipation and has been experience occasional diarrhea. She denies any melena or hematochezia. Patient reports a history of anemia and has underwent a colonoscopy as well as describes a VCE by Dr Alexis Child with [facility]. She does not recall prior EGD. Patient reports mild right upper quadrant discomfort with palpation. NG tube in place and found to be on high intermittent suction.   NG with scant amount of dry, blood-tinged content in tubing. Lactic acid: 2.9--> 3.9---> 5.2        OBJECTIVE:     PAST MEDICAL/SURGICAL HISTORY  Past Medical History:   Diagnosis Date    Anemia     Cataract     Cerebral artery occlusion with cerebral infarction (HCC)     TIA    CHF (congestive heart failure) (HCC)     QUESTIONABLE    Depression     Diabetes mellitus (HCC)     Eczema     Gout     Hearing loss     Hiatal hernia     History of blood transfusion     Hyperlipidemia     Hypertension     PONV (postoperative nausea and vomiting)     Rectal urgency     Stress incontinence, female      Past Surgical History:   Procedure Laterality Date    APPENDECTOMY      CATARACT REMOVAL WITH IMPLANT Bilateral     COLONOSCOPY      EYE SURGERY      HYSTERECTOMY      JOINT REPLACEMENT      LA TOTAL HIP ARTHROPLASTY Left 11/6/2018    HIP TOTAL ARTHROPLASTY MINIMALLY INVASIVE ASI performed by Elena Gunn MD at [address]:  Allergies   Allergen Reactions    Codeine     Keflex [Cephalexin] Itching, Swelling and Rash       HOME MEDICATIONS:  Prior to Admission medications    Medication Sig Start Date End Date Taking? Authorizing Provider   KLOR-CON M20 20 MEQ extended release tablet Take 1 tablet by mouth once daily 8/19/20   Michelle Coppersmith, APRN - CNP   lisinopril (PRINIVIL;ZESTRIL) 20 MG tablet Take 1 tablet by mouth daily 8/12/20   Michelle Coppersmith, APRN - CNP   pravastatin (PRAVACHOL) 40 MG tablet Take 1 tablet by mouth daily 8/12/20   Michelle Coppersmith, APRN - CNP   furosemide (LASIX) 40 MG tablet Take 1 tablet by mouth once daily 6/24/20   Michelle Coppersmith, APRN - CNP   allopurinol (ZYLOPRIM) 100 MG tablet Take 1 tablet by mouth once daily 6/21/20   Rosario Elsie, APRN - CNP   citalopram (CELEXA) 20 MG tablet Take 1 tablet by mouth once daily 6/11/20   Michelle Coppersmith, APRN - CNP   mupirocin (BACTROBAN) 2 % cream Apply topically 3 times daily Apply topically 3 times daily.  2/15/20 chloride flush, potassium chloride **OR** potassium alternative oral replacement **OR** potassium chloride, magnesium sulfate, acetaminophen **OR** acetaminophen, polyethylene glycol, promethazine **OR** ondansetron, labetalol, glucose, dextrose, glucagon (rDNA), dextrose    SOCIAL HISTORY:     Tobacco:   reports that she has never smoked. She has never used smokeless tobacco.  Alcohol:   reports no history of alcohol use. Illicit drugs:  reports no history of drug use. FAMILY HISTORY:     Family History   Problem Relation Age of Onset    Heart Disease Mother     High Blood Pressure Mother     Cancer Father     Colon Cancer Father        REVIEW OF SYSTEMS:    Constitutional: No fever, no chills, no lethargy, no weakness. HEENT:  No headache, otalgia, itchy eyes, nasal discharge or sore throat. Cardiac:  No chest pain, dyspnea, orthopnea or PND. Chest:   No cough, phlegm or wheezing. Abdomen:  + RUQ abdominal pain, + nausea and vomiting. Neuro:  No focal weakness, abnormal movements or seizure like activity. Skin:   No rashes, no itching. :   No hematuria, no pyuria, no dysuria, no flank pain. Extremities:  No swelling or joint pains. ROS was otherwise negative except as mentioned in the [address] PHYSICAL EXAM:    BP (!) 153/71   Pulse 96   Temp 99 °F (37.2 °C)   Resp 16   Ht 5' 4\" (1.626 m)   Wt 176 lb (79.8 kg)   SpO2 (!) 83%   BMI 30.21 kg/m²     GENERAL:   Well developed, Well nourished, No apparent distress  HEAD:   Normocephalic, Atraumatic  EENT:   EOMI, Sclera not icteric, Oropharynx moist   NECK:   Supple, Trachea midline  LUNGS:  CTA Bilaterally  HEART:  RRR, No murmur  ABDOMEN:   Soft, + UQ tender, Nondistended, BS +, N/G tube to SX  EXT:   No clubbing. No cyanosis. 1+ Bilat LE edema. SKIN:   No rashes. No jaundice. No stigmata of liver disease.     MUSC/SKEL:   Adequate muscle bulk for patient's age, No significant synovitis, No deformities  NEURO:  A&O x Three, CN II- XII grossly intact      LABS AND IMAGING:     CBC  Recent Labs     09/03/20  0402 09/03/20  1010   WBC 17.7* 20.8*   HGB 11.4* 11.3*   HCT 34.0* 35.7*   MCV 86.1 91.5   MCH 28.9 29.0   MCHC 33.5 31.7    239       IMMATURE PLTs  No results found for: PLTFLUORE    BMP  Recent Labs     09/03/20  0402 09/03/20  1010    147*   K 3.5* 4.1    107   CO2 25 26   BUN 26* 23   CREATININE 1.55* 1.35*   GLUCOSE 225* 149*   CALCIUM 9.4 8.7       LFTS  Recent Labs     09/03/20  0402 09/03/20  1010   ALKPHOS 121* 102   ALT 12 13   AST 17 21   PROT 7.1 6.8   BILITOT 0.31 0.32   LABALBU 4.1 3.6       AMYLASE/LIPASE/AMMONIA  Recent Labs     09/03/20  0402   LIPASE 52       PT/INR  Recent Labs     09/03/20  0402   PROTIME 9.9   INR 0.9           ANEMIA STUDIES  No results for input(s): IRON, LABIRON, TIBC, UIBC, FERRITIN, [id number], FOLATE, OCCULTBLD in the last 72 hours. IMAGING  Ct Abdomen Pelvis Wo Contrast Additional Contrast? None    Result Date: 9/3/2020  EXAMINATION: CT OF THE ABDOMEN AND PELVIS WITHOUT CONTRAST 9/3/2020 4:03 am TECHNIQUE: CT of the abdomen and pelvis was performed without the administration of intravenous contrast. Multiplanar reformatted images are provided for review. Dose modulation, iterative reconstruction, and/or weight based adjustment of the mA/kV was utilized to reduce the radiation dose to as low as reasonably achievable. COMPARISON: None. HISTORY: ORDERING SYSTEM PROVIDED HISTORY: n/v pain TECHNOLOGIST PROVIDED HISTORY: IV Only Contrast n/v pain Reason for Exam: vomiting Acuity: Acute Type of Exam: Initial FINDINGS: Lower Chest:  Visualized portion of the lower chest demonstrates no acute abnormality. Intrathoracic stomach full of contents. No gastric wall thickening. Organs: The visualized portions of the liver, gallbladder, spleen, pancreas and adrenal glands appear unremarkable within the constraints of a noncontrast exam.  No biliary ductal dilatation.   The kidneys appear normal in size without evidence of a contour distorting mass. No renal stone or hydronephrosis. No perinephric stranding. GI/Bowel: No evidence bowel wall thickening or small or large bowel obstruction. Pelvis: The urinary bladder appears unremarkable. The pelvic organs demonstrate no acute abnormality. Peritoneum/Retroperitoneum: The abdominal aorta is normal in caliber. No gross lymphadenopathy, fluid collection, free air Bones/Soft Tissues: No acute findings. Total left hip replacement. Extensive osteoarthrosis of the right hip. Intrathoracic stomach full of contents. Volvulus/outlet obstruction not excluded. Further evaluation can be performed with the administration of oral contrast in repeat study if clinically indicated. Ct Head Wo Contrast    Result Date: 9/3/2020  EXAMINATION: CT OF THE HEAD WITHOUT CONTRAST  9/3/2020 4:01 am TECHNIQUE: CT of the head was performed without the administration of intravenous contrast. Dose modulation, iterative reconstruction, and/or weight based adjustment of the mA/kV was utilized to reduce the radiation dose to as low as reasonably achievable. COMPARISON: None. HISTORY: ORDERING SYSTEM PROVIDED HISTORY: fall TECHNOLOGIST PROVIDED HISTORY: fall Reason for Exam: fall Acuity: Acute Type of Exam: Initial FINDINGS: Many of the images are degraded by motion artifact. Within this limit, BRAIN/VENTRICLES: There is no acute intracranial hemorrhage, mass effect or midline shift. No abnormal extra-axial fluid collection. Old infarct left parietal lobe. There is prominence of the ventricles and sulci due to global parenchymal volume loss. Insert CT micro the gray-white differentiation is otherwise maintained without evidence of an acute infarct. There is no evidence of hydrocephalus. ORBITS: The visualized portion of the orbits demonstrate no acute abnormality. SINUSES: The visualized paranasal sinuses and mastoid air cells demonstrate no acute abnormality.  SOFT TISSUES/SKULL:  No acute abnormality of the visualized skull or soft tissues. No acute intracranial abnormality. Ct Chest Wo Contrast    Result Date: 9/3/2020  EXAMINATION: CT OF THE CHEST WITHOUT CONTRAST 9/3/2020 11:35 am TECHNIQUE: CT of the chest was performed without the administration of intravenous contrast. Multiplanar reformatted images are provided for review. Dose modulation, iterative reconstruction, and/or weight based adjustment of the mA/kV was utilized to reduce the radiation dose to as low as reasonably achievable. COMPARISON: CT abdomen pelvis 09/03/2020 04:44 HISTORY: ORDERING SYSTEM PROVIDED HISTORY: ASSESS EXTENT OF OBSTRUCTION PER GS REC TECHNOLOGIST PROVIDED HISTORY: PO CONTRAST ONLY ASSESS EXTENT OF OBSTRUCTION PER GS REC FINDINGS: Mediastinum: Cardiac size normal.  Normal caliber aorta. Trace pericardial fluid. Coronary artery calcifications. No significant mediastinal, hilar or axillary lymphadenopathy. Thyroid gland grossly normal. There is a type IV paraesophageal hiatal hernia with organoaxial position of the stomach. Entire stomach in chest.  The hiatal hernia also contains the distal half of the transverse colon which is not obstructed. NG tube in place terminating in the gastric fundus. Contrast administered via the NG tube collects in the gastric fundus. No appreciable gastric wall thickening or pneumatosis. Lungs/pleura: Bibasilar atelectasis/scarring around the hiatal hernia in the lower lobes. Trace left pleural effusion. No suspicious nodules or masses. Upper Abdomen: No acute process. Soft Tissues/Bones: No acute abnormality of the bones. The superficial soft tissues show no significant abnormalities. 1. A type IV paraesophageal hiatal hernia which also contains the distal half of the transverse colon. NG tube in place. Some contrast has been administered which collects in the gastric fundus. The rest of the stomach is fluid-filled.   Mild gastric stomach full of contents. Concerning for volvulus/outlet obstruction. CT chest completed showing type IV paraesophageal hernia which also contains distal half of the transverse colon. Lactate trending upward   Old records, labs and imaging reviewed. PLAN   1. Plan for urgent EGD  for decompression of paraesophageal hernia. 2. Continue N/G to sx  3. Serial abdominal examination   4. Trend Lactate   5. Further plan of care to follow EGD    This plan was formulated in collaboration with Dr. Nasim Marquez  Thank you for allowing us to participate in the care of your patient. Electronically signed by: Taylor WHALEY on 9/3/2020 at 4:51 PM     Please note that this note was generated using a voice recognition dictation software. Although every effort was made to ensure the accuracy of this automated transcription, some errors in transcription may have occurred. Attending Physician Attestation  Patient seen and examined with Ms. Farnaz Null CNP. I have reviewed the above note and confirmed the key elements of the medical history and physical exam. I have discussed the findings, established the care plan and recommendations with Ms. Malorie Sunshine and primary team.    CT results and images reviewed  Patient has intrathoracic stomach with gastric volvulus and part of the transverse colon  Lactic acid is elevated and trending up  Patient is altered mental status, tachycardic and with severe tenderness  Suspect possible ischemia    PLAN  Will proceed with emergent EGD  Keep NPO  Discussed case with patient's daughter Brigitte Dalal and explained to her the above findings and that we will proceed with EGD for decompression and detorsion of gastric volvulus. Explained to her that this is a high risk procedure and there is fair chance that this may not be successful. In such a case, the patient may need surgical intervention.   Daughter understands and agrees with the recommendation and has given consent to proceed with the EGD.      Misbah Fraire MD  Gastroenterology

## 2020-09-03 NOTE — ED NOTES
Pt respirations are even and unlabored, pt is oriented X 4, speaking in complete sentences, bed is in the lowest position, call light is within reach. Will continue to monitor.        Zonia Banerjee RN  09/03/20 [id number]

## 2020-09-03 NOTE — PROGRESS NOTES
[facility] Gastric outlet obstruction [K31.1]  Volvulus ([address] [K56.2] . Vitals:    09/03/20 1700   BP: (!) 160/77   Pulse: 98   Resp: 16   Temp:    SpO2:    . Patients home meds are   Prior to Admission medications    Medication Sig Start Date End Date Taking? Authorizing Provider   KLOR-CON M20 20 MEQ extended release tablet Take 1 tablet by mouth once daily 8/19/20   KAREEM Kirkland - CNP   lisinopril (PRINIVIL;ZESTRIL) 20 MG tablet Take 1 tablet by mouth daily 8/12/20   KAREEM Kirkland - CNP   pravastatin (PRAVACHOL) 40 MG tablet Take 1 tablet by mouth daily 8/12/20   KAREEM Kirkland - CNP   furosemide (LASIX) 40 MG tablet Take 1 tablet by mouth once daily 6/24/20   KAREEM Kirkland - CNP   allopurinol (ZYLOPRIM) 100 MG tablet Take 1 tablet by mouth once daily 6/21/20   KAREEM Augilar CNP   citalopram (CELEXA) 20 MG tablet Take 1 tablet by mouth once daily 6/11/20   Jillian Holley APRN - CNP   mupirocin (BACTROBAN) 2 % cream Apply topically 3 times daily Apply topically 3 times daily.  2/15/20   Byron Plascencia PA-C   docusate sodium (COLACE) 100 MG capsule Take 1 capsule by mouth 2 times daily as needed for Constipation 1/30/20   KAREEM Kirkland - CNP   Handicap Placard MISC by Does not apply route Expires 11/2024 11/7/19   Jillian Holley APRN - CNP   meclizine (ANTIVERT) 12.5 MG tablet TAKE 1 TABLET BY MOUTH THREE TIMES DAILY AS NEEDED FOR DIZZINESS 10/15/19   Jillian Holley APRN - CNP   meclizine (ANTIVERT) 12.5 MG tablet TAKE 1 TABLET BY MOUTH THREE TIMES DAILY AS NEEDED FOR DIZZINESS 10/14/19   Jillian Holley APRN - CNP   memantine (NAMENDA) 10 MG tablet Take 1 tablet by mouth daily 9/16/19   MadlyKAREEM Rashid CNP   Calcium Carbonate-Vitamin D (OYSTER SHELL CALCIUM/D) 500-200 MG-UNIT TABS Take 1 tablet by mouth daily 6/25/19   KAREEM Kirkland CNP   Multiple Vitamins-Minerals (THERAPEUTIC MULTIVITAMIN-MINERALS) tablet Take 1 tablet by mouth daily    Historical Provider, MD   Misc. Devices MISC Shower transfer bench 1/18/19   Mary Kaba APRN - CNP   Misc. Devices MISC Adult pull up briefs  Dispense 100 1/18/19   Ruth Aranda, APRN - CNP   ferrous sulfate 325 (65 Fe) MG tablet Take 325 mg by mouth 2 times daily     Historical Provider, MD   Acetaminophen 325 MG CAPS Take 1 capsule by mouth daily as needed     Historical Provider, MD   Blood Pressure KIT 1 kit by Does not apply route daily 9/12/18   Navarro Figueroa APRN - CNP   .   Recent Surgical History: Lower Abdominal = 2     Assessment     Peak Flow (asthma only)    Predicted: 0  Personal Best: 0  PEF 0  % Predicted 0  Peak Flow : not applicable = 0    CDT3/SYE    FEV1 Predicted 0      FEV1 0    FEV1 % Predicted 0  FVC 0  IS volume 0  IBW 0    RR 17   Breath Sounds: clear bilaterally      · Bronchodilator assessment at level  0  · Hyperinflation assessment at level   · Secretion Management assessment at level    ·   · [x]    Bronchodilator Assessment  BRONCHODILATOR ASSESSMENT SCORE  Score 0 1 2 3 4 5   Breath Sounds   [x]  Patient Baseline []  No Wheeze good aeration []  Faint, scattered wheezing, good aeration []  Expiratory Wheezing and or moderately diminished []  Insp/Exp wheeze and/or very diminished []  Insp/Exp and/ or marked distress   Respiratory Rate   [x]  Patient Baseline []  Less than 20 []  Less than 20 []  20-25 []  Greater than 25 []  Greater than 25   Peak flow % of Pred or PB [x]  NA   []  Greater than 90%  []  81-90% []  71-80% []  Less than or equal to 70%  or unable to perform []  Unable due to Respiratory Distress   Dyspnea re [x]  Patient Baseline []  No SOB []  No SOB []  SOB on exertion []  SOB min activity []  At rest/acute   e FEV% Predicted       [x]  NA []  Above 69%  []  Unable []  Above 60-69%  []  Unable []  Above 50-59%  []  Unable []  Above 35-49%  []  Unable []  Less than 35%  []  Unable                 []  Hyperinflation Assessment  Score 1 2 3   CXR and Breath Sounds   []  Clear []  No atelectasis  Basilar aeration []  Atelectasis or absent basilar breath sounds   Incentive Spirometry Volume  (Per IBW)   []  Greater than or equal to 15ml/Kg []  less than 15ml/Kg []  less than 15ml/Kg   Surgery within last 2 weeks []  None or general   []  Abdominal or thoracic surgery  []  Abdominal or thoracic   Chronic Pulmonary Historyre []  No []  Yes []  Yes     []  Secretion Management Assessment  Score 1 2 3   Bilateral Breath Sounds   []  Occasional Rhonchi []  Scattered Rhonchi []  Course Rhonchi and/or poor aeration   Sputum    []  Small amount of thin secretions []  Moderate amount of viscous secretions []  Copius, Viscious Yellow/ Secretions   CXR as reported by physician []  clear  []  Unavailable []  Infiltrates and/or consolidation  []  Unavailable []  Mucus Plugging and or lobar consolidation  []  Unavailable   Cough []  Strong, productive cough []  Weak productive cough []  No cough or weak non-productive cough   GER MARX  6:09 PM                            FEMALE                                  MALE                            FEV1 Predicted Normal Values                        FEV1 Predicted Normal Values          Age                                     Height in Feet and Inches       Age                                     Height in Feet and Inches       4' 11\" 5' 1\" 5' 3\" 5' 5\" 5' 7\" 5' 9\" 5' 11\" 6' 1\"  4' 11\" 5' 1\" 5' 3\" 5' 5\" 5' 7\" 5' 9\" 5' 11\" 6' 1\"   42 - 45 2.49 2.66 2.84 3.03 3.22 3.42 3.62 3.83 42 - 45 2.82 3.03 3.26 3.49 3.72 3.96 4.22 4.47   46 - 49 2.40 2.57 2.76 2.94 3.14 3.33 3.54 3.75 46 - 49 2.70 2.92 3.14 3.37 3.61 3.85 4.10 4.36   50 - 53 2.31 2.48 2.66 2.85 3.04 3.24 3.45 3.66 50 - 53 2.58 2.80 3.02 3.25 3.49 3.73 3.98 4.24   54 - 57 2.21 2.38 2.57 2.75 2.95 3.14 3.35 3.56 54 - 57 2.46 2.67 2.89 3.12 3.36 3.60 3.85 4.11   58 - 61 2.10 2.28 2.46 2.65 2.84

## 2020-09-03 NOTE — H&P
Critical Care - History and Physical Examination    Patient's name:  Jacki Doherty  Medical Record Number: [id number]  Patient's account/billing number: [de-identified]  Patient's YOB: 1940  Age: 79 y.o. Date of Admission: 9/3/2020  3:53 AM  Date of History and Physical Examination: 9/3/2020      Primary Care Physician: KAREEM Grimm CNP  Attending Physician: Dr Nohelia Velez  Code Status: Prior    Chief complaint:   Chief Complaint   Patient presents with    Emesis     Pt c/o vomiting for 2 days. HISTORY OF PRESENT ILLNESS:      History was obtained from the patient. Jacki Doherty is a 79 y.o. with PMH of essential hypertension, heart failure,HPL dementia, gout and haital hernia, appendectomy and hysterectomy and stroke in the past    Presented to ER for nausea vomiting of 2 days duration. Patient initially presented to [facility] ER where CT of her abdomen pelvis was done which showed volvulus versus gastric outlet obstruction. Patient reported that patient has been having these symptoms since a while but was tolerating p.o. but since Monday her symptoms got worse. She was reported to be constipated and not passing gas since Monday. Also mentioned that she had a recent mechanical fall. In [facility] ER chest x-ray showed large hiatal hernia. CT scan of cervical spine showed esopahgeal dilatation. CT scan without contrast of abdomen pelvis showed intrathoracic stomach full of contents concerning for volvulus/gastric outlet obstruction. Repeat CT scan chest WO contrast showed Type IV paraesophageal hiatal hernia  containing the distal half of the transverse colon. No evidence of gastric pneumatosis and trace left pleural effusion. Upon admission, pt was A&Ox3 hypertensive SBP 160s,  tachycardic HR-101 and a/ febrile.    Pertinent labs on admission showed sodium 147, creatinine 1.35, lactic acid 5.2, blood glucose 149, troponin 30, WBC 20.8, hemoglobin topically 3 times daily Apply topically 3 times daily. 2/15/20   Byron Plascencia PA-C   docusate sodium (COLACE) 100 MG capsule Take 1 capsule by mouth 2 times daily as needed for Constipation 1/30/20   KAREEM Sebastian CNP   Handicap Placard MISC by Does not apply route Expires 11/2024 11/7/19   Orelia Barrier, KAREEM - CNP   meclizine (ANTIVERT) 12.5 MG tablet TAKE 1 TABLET BY MOUTH THREE TIMES DAILY AS NEEDED FOR DIZZINESS 10/15/19   Orelia Barrier, KAREEM - CNP   meclizine (ANTIVERT) 12.5 MG tablet TAKE 1 TABLET BY MOUTH THREE TIMES DAILY AS NEEDED FOR DIZZINESS 10/14/19   Francescolia Barrier, APRN - CNP   memantine (NAMENDA) 10 MG tablet Take 1 tablet by mouth daily 9/16/19   Francescolia Barrier, APRN - CNP   Calcium Carbonate-Vitamin D (OYSTER SHELL CALCIUM/D) 500-200 MG-UNIT TABS Take 1 tablet by mouth daily 6/25/19   Francescolia Barrier, APRN - CNP   Multiple Vitamins-Minerals (THERAPEUTIC MULTIVITAMIN-MINERALS) tablet Take 1 tablet by mouth daily    Historical Provider, MD   Misc. Devices MISC Shower transfer bench 1/18/19   KAREEM Otoole CNP   Misc. Devices MISC Adult pull up briefs  Dispense 100 1/18/19   Ruth KAREEM Obregon CNP   ferrous sulfate 325 (65 Fe) MG tablet Take 325 mg by mouth 2 times daily     Historical Provider, MD   Acetaminophen 325 MG CAPS Take 1 capsule by mouth daily as needed     Historical Provider, MD   Blood Pressure KIT 1 kit by Does not apply route daily 9/12/18   Francescolia Barrier, APRN - CNP       SOCIAL HISTORY:       TOBACCO:   reports that she has never smoked. She has never used smokeless tobacco.  ETOH:   reports no history of alcohol use. DRUGS:  reports no history of drug use.      FAMILY HISTORY:          Problem Relation Age of Onset    Heart Disease Mother     High Blood Pressure Mother     Cancer Father     Colon Cancer Father        REVIEW OF SYSTEMS (ROS):     Review of Systems -   General ROS: looks dehydrated  Allergy and Immunology ROS: negative  Hematological and Lymphatic ROS: negative  Endocrine ROS: negative  Respiratory ROS: no cough, shortness of breath, or wheezing  Cardiovascular ROS: no chest pain or dyspnea on exertion  Gastrointestinal ROS: Nausea vomiting constipation  Genito-Urinary ROS: negative  Musculoskeletal ROS: negative  Neurological ROS: negative  Dermatological ROS: negative    OBJECTIVE:     VITAL SIGNS:  BP (!) 163/81   Pulse 101   Temp 97.5 °F (36.4 °C) (Oral)   Resp 17   Ht 5' 4\" (1.626 m)   Wt 176 lb (79.8 kg)   SpO2 98%   BMI 30.21 kg/m²   Tmax over 24 hours:  Temp (24hrs), Av.5 °F (36.4 °C), Min:97.5 °F (36.4 °C), Max:97.5 °F (36.4 °C)      Patient Vitals for the past 8 hrs:   BP Pulse Resp SpO2   20 0730 (!) 163/81 101 17 98 %   20 0700 137/63 92 17 95 %   20 0630 (!) 153/73 91 21 94 %         Intake/Output Summary (Last 24 hours) at 9/3/2020 1400  Last data filed at 9/3/2020 0715  Gross per 24 hour   Intake --   Output 1000 ml   Net -1000 ml     Date 20 0000 - 20 2359   Shift 0625-4273 9211-4937 1690-0937 24 Hour Total   INTAKE   Shift Total(mL/kg)       OUTPUT   Other(mL/kg) 1000(12.5)   1000(12.5)   Shift Total(mL/kg) 1000(12.5)   1000(12.5)   Weight (kg) 79.8 79.8 79.8 79.8     Wt Readings from Last 3 Encounters:   20 176 lb (79.8 kg)   20 180 lb (81.6 kg)   20 180 lb (81.6 kg)     Body mass index is 30.21 kg/m². PHYSICAL EXAM:  Constitutional: Appears uncomfortable, dehydrated. EENT:  midline trachea.   Neck: Supple, symmetrical, trachea midline  Respiratory: clear to auscultation, no wheezes or rales   Cardiovascular: Sinus tachycardia  normal S1, S2, no murmur noted  Abdomen: soft, nontender, nondistended, no masses or organomegaly  Extremities:  1+pedal edema    MEDICATIONS:  Scheduled Meds:   sodium chloride  80 mL Intravenous Once    lactated ringers bolus  1,000 mL Intravenous Once     Continuous Infusions:    PRN Meds:   sodium chloride flush, 10 mL, PRN          ABGs:   No results found for: PHART, PH, GIU0HIP, PCO2, PO2ART, PO2, ASG9QOR, HCO3, BEART, BE, THGBART, THB, GTD2UMF, G4SXZTRJ, O2SAT, FIO2    DATA:  Complete Blood Count:   Recent Labs     09/03/20  0402 09/03/20  1010   WBC 17.7* 20.8*   RBC 3.95* 3.90*   HGB 11.4* 11.3*   HCT 34.0* 35.7*   MCV 86.1 91.5   MCH 28.9 29.0   MCHC 33.5 31.7   RDW 13.7 13.3    239   MPV 9.3 12.1        Last 3 Blood Glucose:   Recent Labs     09/03/20  0402 09/03/20  1010   GLUCOSE 225* 149*        PT/INR:    Lab Results   Component Value Date    PROTIME 9.9 09/03/2020    INR 0.9 09/03/2020     PTT:    Lab Results   Component Value Date    APTT 22.2 09/03/2020       Comprehensive Metabolic Profile:   Recent Labs     09/03/20  0402 09/03/20  1010    147*   K 3.5* 4.1    107   CO2 25 26   BUN 26* 23   CREATININE 1.55* 1.35*   GLUCOSE 225* 149*   CALCIUM 9.4 8.7   PROT 7.1 6.8   LABALBU 4.1 3.6   BILITOT 0.31 0.32   ALKPHOS 121* 102   AST 17 21   ALT 12 13      Magnesium:   Lab Results   Component Value Date    MG 2.0 09/03/2020     Phosphorus: No results found for: PHOS  Ionized Calcium: No results found for: CAION     Urinalysis:   Lab Results   Component Value Date    NITRU NEGATIVE 09/03/2020    COLORU YELLOW 09/03/2020    PHUR 7.0 09/03/2020    WBCUA 0 TO 2 11/07/2018    RBCUA 0 TO 2 11/07/2018    MUCUS NOT REPORTED 11/07/2018    TRICHOMONAS NOT REPORTED 11/07/2018    YEAST NOT REPORTED 11/07/2018    BACTERIA FEW 11/07/2018    CLARITYU clear 01/30/2020    SPECGRAV 1.015 09/03/2020    LEUKOCYTESUR NEGATIVE 09/03/2020    UROBILINOGEN Normal 09/03/2020    BILIRUBINUR NEGATIVE 09/03/2020    BILIRUBINUR - 01/30/2020    BLOODU - 01/30/2020    GLUCOSEU TRACE 09/03/2020    KETUA NEGATIVE 09/03/2020    AMORPHOUS NOT REPORTED 11/07/2018       HgBA1c:    Lab Results   Component Value Date    LABA1C 5.6 03/22/2019     TSH:    Lab Results   Component Value Date    TSH 1.35 06/02/2020 Lactic Acid:   Lab Results   Component Value Date    LACTA NOT REPORTED 09/03/2020    LACTA 3.9 09/03/2020    LACTA 2.9 09/03/2020      Troponin: No results for input(s): TROPONINI in the last 72 hours. Electrocardiogram:   On admission showed sinus rhythm with first-degree AV block and premature ventricular complexes. Last Echocardiogram findings:   (See actual reports for details)  No previous echo on file. Radiological imaging  Ct Abdomen Pelvis Wo Contrast Additional Contrast? Non 9/3/2020  Intrathoracic stomach full of contents. Volvulus/outlet obstruction not excluded. Further evaluation can be performed with the administration of oral contrast in repeat study if clinically indicated. Ct Head Wo Contrast9/3/2020  No acute intracranial abnormality. Ct Chest Wo Contrast9/3/2020   A type IV paraesophageal hiatal hernia which also contains the distal half of the transverse colon. NG tube in place. Some contrast has been administered which collects in the gastric fundus. The rest of the stomach is fluid-filled. Mild gastric distension noted which could be due to administered contrast.  Difficult to determine if there is some degree of obstruction on the basis of this study. No evidence for gastric pneumatosis. 2. Some bibasilar lower lobe atelectasis/scarring around the hiatal hernia noted along with trace left pleural effusion. Ct Cervical Spine Wo Contrast    Result Date: 9/3/2020  EXAMINATION: CT OF THE CERVICAL SPINE WITHOUT CONTRAST 9/3/2020 4:01 am TECHNIQUE: CT of the cervical spine was performed without the administration of intravenous contrast. Multiplanar reformatted images are provided for review. Dose modulation, iterative reconstruction, and/or weight based adjustment of the mA/kV was utilized to reduce the radiation dose to as low as reasonably achievable. COMPARISON: None.  HISTORY: ORDERING SYSTEM PROVIDED HISTORY: fall TECHNOLOGIST PROVIDED HISTORY: fall Reason for Exam: fall Acuity: Acute Type of Exam: Initial FINDINGS: BONES/ALIGNMENT: There is no acute fracture or traumatic malalignment. DEGENERATIVE CHANGES: Multilevel facet arthrosis and degenerative disc space narrowing. SOFT TISSUES: There is no prevertebral soft tissue swelling. Esophageal dilatation. No acute abnormality of the cervical spine. Xr Chest Portable 9/3/2020. No acute cardiopulmonary abnormality. Large hiatal hernia. ASSESSMENT:     Active Problems:    Gastric outlet obstruction    Volvulus (HCC)  Resolved Problems:    * No resolved hospital problems. *      PLAN:     -Gastric outlet obstruction /volvulus   Type IV paraesophageal hiatal hernia repeat CT chest WO contrast  Evaluated by general surgery. Diet N.p.o. for now. NG to low intermittent suction. GI consulted to rule out gastric mucosal ischemia. Ordered ECHO. Class IV RCRI  Risk -15%    -DOMINIQUE-likely prerenal and nonoliguric type secondary to hypovolemia from dehydration. Follow-up urine studies. Baseline creatinine around 1  Daily BMP. Intake output.    -Rqedgiqsxmskf-hfemlo-xu HbA1c. medium dose correction scale. POCT glucose checks every 6 hours    -Hypertension-current systolic blood pressure 908. Start on IV labetalol 10 every 6 hours as needed. On lisinopril 20, Lasix 40 at home. Confirm medication with pharmacy and resume when tolerate p.o.    -Leukocytosis-WBC 20.8. Afebrile. Follow-up urine and blood culture. Continued on IV hydration. Empirical coverage with I.v  Zosyn.    -Lactic acidosis-3.4<5.2. Continued on IV hydration. IV Zosyn for empirical coverage. Follow-up urine and blood cultures. -Hyperlipidemia-resume home medicines when tolerates p.o.  -CVA in the past-stable currently. -Gout- on allopurinol at home.  Resume when pt tolerates p/o and renal function better     Feeding Diet: NPO  Fluids-IV fluids at 100  Family-contacted in ER  Analgesic-I.v morphine as PRN  Sedation

## 2020-09-03 NOTE — ED NOTES
[facility] Janna Guo NP messaged via perfect serve regarding admission.      Quincy Phan RN  09/03/20 0426

## 2020-09-03 NOTE — ED NOTES
Gown changed and repositioned in bed. NAD noted. Pt respirations are even and unlabored, pt is oriented X 4, speaking in complete sentences, bed is in the lowest position, call light is within reach. Will continue to monitor.        Christina Easton RN  09/03/20 3028

## 2020-09-03 NOTE — ED NOTES
78 yo F,   N/v, abdominal pain, wbc 17k, lactate 2.9, trop 33,   Ct > gastric outlet obstruction // volvulus,   vss     Love Buenrostro RN  09/03/20 1969

## 2020-09-03 NOTE — ANESTHESIA PRE PROCEDURE
Department of Anesthesiology  Preprocedure Note       Name:  Gail Sarabia   Age:  79 y.o.  :  1940                                          MRN:  [id number]         Date:  9/3/2020      Surgeon: Kierra Kirkland):  Jose Jackson MD    Procedure: Procedure(s):  EGD ESOPHAGOGASTRODUODENOSCOPY    Medications prior to admission:   Prior to Admission medications    Medication Sig Start Date End Date Taking? Authorizing Provider   KLOR-CON M20 20 MEQ extended release tablet Take 1 tablet by mouth once daily 20   KAREEM Corona CNP   lisinopril (PRINIVIL;ZESTRIL) 20 MG tablet Take 1 tablet by mouth daily 20   KAREEM Corona - CNP   pravastatin (PRAVACHOL) 40 MG tablet Take 1 tablet by mouth daily 20   KAREEM Corona - CNP   furosemide (LASIX) 40 MG tablet Take 1 tablet by mouth once daily 20   KAREEM Corona CNP   allopurinol (ZYLOPRIM) 100 MG tablet Take 1 tablet by mouth once daily 20   KAREEM Mijares CNP   citalopram (CELEXA) 20 MG tablet Take 1 tablet by mouth once daily 20   KAREEM Corona CNP   mupirocin (BACTROBAN) 2 % cream Apply topically 3 times daily Apply topically 3 times daily.  2/15/20   Byron Plascencia PA-C   docusate sodium (COLACE) 100 MG capsule Take 1 capsule by mouth 2 times daily as needed for Constipation 20   KAREEM Corona CNP   Handicap Placard MISC by Does not apply route Expires 19   KAREEM Corona CNP   meclizine (ANTIVERT) 12.5 MG tablet TAKE 1 TABLET BY MOUTH THREE TIMES DAILY AS NEEDED FOR DIZZINESS 10/15/19   KAREEM Corona - CNP   meclizine (ANTIVERT) 12.5 MG tablet TAKE 1 TABLET BY MOUTH THREE TIMES DAILY AS NEEDED FOR DIZZINESS 10/14/19   KAREEM Corona - CNP   memantine (NAMENDA) 10 MG tablet Take 1 tablet by mouth daily 19   Martene Polka, APRN - CNP   Calcium Carbonate-Vitamin D (OYSTER SHELL CALCIUM/D) 500-200 MG-UNIT TABS Take 1 tablet by mouth daily 6/25/19   KAREEM Morales CNP   Multiple Vitamins-Minerals (THERAPEUTIC MULTIVITAMIN-MINERALS) tablet Take 1 tablet by mouth daily    Historical Provider, MD   Misc. Devices MISC Shower transfer bench 1/18/19   KAREEM Philippe CNP   Misc.  Devices MISC Adult pull up briefs  Dispense 100 1/18/19   KAREEM Cody CNP   ferrous sulfate 325 (65 Fe) MG tablet Take 325 mg by mouth 2 times daily     Historical Provider, MD   Acetaminophen 325 MG CAPS Take 1 capsule by mouth daily as needed     Historical Provider, MD   Blood Pressure KIT 1 kit by Does not apply route daily 9/12/18   KAREEM Morales CNP       Current medications:    Current Facility-Administered Medications   Medication Dose Route Frequency Provider Last Rate Last Dose    0.9 % sodium chloride bolus  80 mL Intravenous Once Mick Kraus DO        sodium chloride flush 0.9 % injection 10 mL  10 mL Intravenous 2 times per day KAREEM Wills CNP        sodium chloride flush 0.9 % injection 10 mL  10 mL Intravenous PRN KAREEM Wills CNP        potassium chloride (KLOR-CON M) extended release tablet 40 mEq  40 mEq Oral PRN KAREEM Wills CNP        Or    potassium bicarb-citric acid (EFFER-K) effervescent tablet 40 mEq  40 mEq Oral PRN KAREEM Wills CNP        Or    potassium chloride 10 mEq/100 mL IVPB (Peripheral Line)  10 mEq Intravenous PRN KAREEM Wills CNP        magnesium sulfate 1 g in dextrose 5% 100 mL IVPB  1 g Intravenous PRN KAREEM Wills CNP        acetaminophen (TYLENOL) tablet 650 mg  650 mg Oral Q6H PRN KAREEM Wills CNP        Or    acetaminophen (TYLENOL) suppository 650 mg  650 mg Rectal Q6H PRN KAREEM Wills CNP        polyethylene glycol (GLYCOLAX) packet 17 g  17 g Oral Daily PRN KAREEM Wills CNP        promethazine (PHENERGAN) tablet 12.5 mg  12.5 mg Oral Q6H PRN KAREEM Wills - CNP        Or    ondansetron (ZOFRAN) injection 4 mg  4 mg Intravenous Q6H PRN Lachelle Howard APRN - CNP        famotidine (PEPCID) injection 20 mg  20 mg Intravenous Daily KAREEM iWlls - CNP        0.9 % sodium chloride infusion   Intravenous Continuous Yoly Sinha  mL/hr at 09/03/20 1538      heparin (porcine) injection 5,000 Units  5,000 Units Subcutaneous 3 times per day Yoly Denton MD        labetalol (NORMODYNE;TRANDATE) injection 10 mg  10 mg Intravenous Q4H PRN Yoly Denton MD        insulin lispro (HUMALOG) injection vial 0-12 Units  0-12 Units Subcutaneous TID WC Yoly Denton MD        insulin lispro (HUMALOG) injection vial 0-6 Units  0-6 Units Subcutaneous Nightly Yoly Denton MD        glucose (GLUTOSE) 40 % oral gel 15 g  15 g Oral PRN Yoly Denton MD        dextrose 50 % IV solution  12.5 g Intravenous PRN Yoly Denton MD        glucagon (rDNA) injection 1 mg  1 mg Intramuscular PRN Yoly Denton MD        dextrose 5 % solution  100 mL/hr Intravenous PRN Yoly Denton MD        piperacillin-tazobactam (ZOSYN) 3.375 g in dextrose 5 % 50 mL IVPB (mini-bag)  3.375 g Intravenous Q8H Yoly Sinha MD 12.5 mL/hr at 09/03/20 1726 3.375 g at 09/03/20 1726       Allergies:     Allergies   Allergen Reactions    Codeine     Keflex [Cephalexin] Itching, Swelling and Rash       Problem List:    Patient Active Problem List   Diagnosis Code    Depression with anxiety F41.8    Essential hypertension I10    Mixed hyperlipidemia E78.2    Primary osteoarthritis of left hip M16.12    Cerebrovascular accident (CVA) ([address] I63.9    Continuous leakage of urine N39.45    Gastric outlet obstruction K31.1    Volvulus ([address] K56.2       Past Medical History:        Diagnosis Date    Anemia     Cataract     Cerebral artery occlusion with cerebral infarction ([facility] [address]     TIA    CHF 09/03/2020    BUN 23 09/03/2020    CREATININE 1.35 09/03/2020    GFRAA 46 09/03/2020    LABGLOM 38 09/03/2020    GLUCOSE 149 09/03/2020    PROT 6.8 09/03/2020    CALCIUM 8.7 09/03/2020    BILITOT 0.32 09/03/2020    ALKPHOS 102 09/03/2020    AST 21 09/03/2020    ALT 13 09/03/2020       POC Tests:   Recent Labs     09/03/20  1643   POCGLU 96       Coags:   Lab Results   Component Value Date    PROTIME 9.9 09/03/2020    INR 0.9 09/03/2020    APTT 22.2 09/03/2020       HCG (If Applicable): No results found for: PREGTESTUR, PREGSERUM, HCG, HCGQUANT     ABGs: No results found for: PHART, PO2ART, DDZ6RRY, DJR9HGK, BEART, G6QYCMBS     Type & Screen (If Applicable):  No results found for: LABABO, LABRH    Drug/Infectious Status (If Applicable):  No results found for: HIV, HEPCAB    COVID-19 Screening (If Applicable):   Lab Results   Component Value Date    COVID19 Not Detected 09/03/2020         Anesthesia Evaluation  Patient summary reviewed and Nursing notes reviewed   history of anesthetic complications: PONV. Airway: Mallampati: II  TM distance: >3 FB   Neck ROM: full  Mouth opening: > = 3 FB Dental: normal exam         Pulmonary:normal exam  breath sounds clear to auscultation      (-) COPD, asthma, shortness of breath, recent URI and sleep apnea                           Cardiovascular:  Exercise tolerance: good (>4 METS),   (+) hypertension:, CHF:, hyperlipidemia    (-) past MI, CAD, CABG/stent,  angina, orthopnea and  MASSEY      Rhythm: regular  Rate: normal                    Neuro/Psych:   (+) CVA:, TIA, psychiatric history:depression/anxiety    (-) seizures            ROS comment: Dementia GI/Hepatic/Renal:   (+) hiatal hernia, renal disease: CRI,      (-) GERD      ROS comment: Gastric volvulus. Endo/Other:    (+) Diabetes, . Abdominal:           Vascular: negative vascular ROS. TTE 4/13/2016  Low normal left ventricular systolic function is noted.   Estimated left

## 2020-09-03 NOTE — ED NOTES
15f Ng tube placed to left nare, on second attempt, immediate return of gastric contents, pt tolerated well, sats maintained in the mid 90s.       Fiorella James RN  09/03/20 9059

## 2020-09-03 NOTE — ED PROVIDER NOTES
[facility]     Emergency Department     Faculty Attestation    I performed a history and physical examination of the patient and discussed management with the resident. I reviewed the residents note and agree with the documented findings and plan of care. Any areas of disagreement are noted on the chart. I was personally present for the key portions of any procedures. I have documented in the chart those procedures where I was not present during the key portions. I have reviewed the emergency nurses triage note. I agree with the chief complaint, past medical history, past surgical history, allergies, medications, social and family history as documented unless otherwise noted below. For Physician Assistant/ Nurse Practitioner cases/documentation I have personally evaluated this patient and have completed at least one if not all key elements of the E/M (history, physical exam, and MDM). Additional findings are as noted. I have personally seen and evaluated the patient. I find the patient's history and physical exam are consistent with the NP/PA documentation. I agree with the care provided, treatment rendered, disposition and follow-up plan. 18-year-old female transferred from [facility] for gastric outlet obstruction versus volvulus. Patient complaining of sore throat on the NG tube, continued abdominal discomfort. No vomiting since NG tube was placed. Exam:  General: Laying on the bed, awake, alert and in no acute distress  CV: normal rate and regular rhythm  Lungs: Breathing comfortably on room air with no tachypnea, hypoxia, or increased work of breathing  Abdomen: Generally tender to palpation    Plan:  Surgery consulted upon patient arrival  CT chest ordered with oral contrast per surgery recommendation to determine extent of obstruction  Surgery requested patient be admitted to ICU, critical care team accepted patient for admission.         Grzegorz Haddad MD Attending Emergency  Physician              Vickie Unger MD  09/03/20 1996

## 2020-09-04 LAB
ABSOLUTE EOS #: 0 K/UL (ref 0–0.4)
ABSOLUTE EOS #: 0 K/UL (ref 0–0.44)
ABSOLUTE IMMATURE GRANULOCYTE: 0 K/UL (ref 0–0.3)
ABSOLUTE IMMATURE GRANULOCYTE: 0.21 K/UL (ref 0–0.3)
ABSOLUTE LYMPH #: 0.64 K/UL (ref 1.1–3.7)
ABSOLUTE LYMPH #: 1.34 K/UL (ref 1–4.8)
ABSOLUTE MONO #: 1.07 K/UL (ref 0.1–0.8)
ABSOLUTE MONO #: 1.7 K/UL (ref 0.1–1.2)
ANION GAP SERPL CALCULATED.3IONS-SCNC: 11 MMOL/L (ref 9–17)
BASOPHILS # BLD: 0 % (ref 0–2)
BASOPHILS # BLD: 0 % (ref 0–2)
BASOPHILS ABSOLUTE: 0 K/UL (ref 0–0.2)
BASOPHILS ABSOLUTE: 0 K/UL (ref 0–0.2)
BUN BLDV-MCNC: 19 MG/DL (ref 8–23)
BUN/CREAT BLD: ABNORMAL (ref 9–20)
CALCIUM SERPL-MCNC: 8 MG/DL (ref 8.6–10.4)
CHLORIDE BLD-SCNC: 107 MMOL/L (ref 98–107)
CO2: 27 MMOL/L (ref 20–31)
CREAT SERPL-MCNC: 1.36 MG/DL (ref 0.5–0.9)
CULTURE: NO GROWTH
DIFFERENTIAL TYPE: ABNORMAL
DIFFERENTIAL TYPE: ABNORMAL
DIRECT EXAM: NORMAL
EKG ATRIAL RATE: 85 BPM
EKG P AXIS: 85 DEGREES
EKG P-R INTERVAL: 264 MS
EKG Q-T INTERVAL: 416 MS
EKG QRS DURATION: 84 MS
EKG QTC CALCULATION (BAZETT): 495 MS
EKG R AXIS: -12 DEGREES
EKG T AXIS: 177 DEGREES
EKG VENTRICULAR RATE: 85 BPM
EOSINOPHILS RELATIVE PERCENT: 0 % (ref 1–4)
EOSINOPHILS RELATIVE PERCENT: 0 % (ref 1–4)
ESTIMATED AVERAGE GLUCOSE: 114 MG/DL
GFR AFRICAN AMERICAN: 45 ML/MIN
GFR NON-AFRICAN AMERICAN: 37 ML/MIN
GFR SERPL CREATININE-BSD FRML MDRD: ABNORMAL ML/MIN/{1.73_M2}
GFR SERPL CREATININE-BSD FRML MDRD: ABNORMAL ML/MIN/{1.73_M2}
GLUCOSE BLD-MCNC: 109 MG/DL (ref 65–105)
GLUCOSE BLD-MCNC: 112 MG/DL (ref 65–105)
GLUCOSE BLD-MCNC: 139 MG/DL (ref 65–105)
GLUCOSE BLD-MCNC: 144 MG/DL (ref 65–105)
GLUCOSE BLD-MCNC: 159 MG/DL (ref 70–99)
HBA1C MFR BLD: 5.6 % (ref 4–6)
HCT VFR BLD CALC: 32.5 % (ref 36.3–47.1)
HCT VFR BLD CALC: 36.9 % (ref 36.3–47.1)
HEMOGLOBIN: 10.2 G/DL (ref 11.9–15.1)
HEMOGLOBIN: 11 G/DL (ref 11.9–15.1)
IMMATURE GRANULOCYTES: 0 %
IMMATURE GRANULOCYTES: 1 %
LACTIC ACID, WHOLE BLOOD: 2.6 MMOL/L (ref 0.7–2.1)
LACTIC ACID, WHOLE BLOOD: 3.4 MMOL/L (ref 0.7–2.1)
LYMPHOCYTES # BLD: 3 % (ref 24–43)
LYMPHOCYTES # BLD: 5 % (ref 24–44)
Lab: NORMAL
Lab: NORMAL
MCH RBC QN AUTO: 28.3 PG (ref 25.2–33.5)
MCH RBC QN AUTO: 28.6 PG (ref 25.2–33.5)
MCHC RBC AUTO-ENTMCNC: 29.8 G/DL (ref 28.4–34.8)
MCHC RBC AUTO-ENTMCNC: 31.4 G/DL (ref 28.4–34.8)
MCV RBC AUTO: 91 FL (ref 82.6–102.9)
MCV RBC AUTO: 94.9 FL (ref 82.6–102.9)
MONOCYTES # BLD: 4 % (ref 1–7)
MONOCYTES # BLD: 8 % (ref 3–12)
MORPHOLOGY: NORMAL
MORPHOLOGY: NORMAL
NRBC AUTOMATED: 0 PER 100 WBC
NRBC AUTOMATED: 0 PER 100 WBC
PDW BLD-RTO: 13.4 % (ref 11.8–14.4)
PDW BLD-RTO: 13.5 % (ref 11.8–14.4)
PLATELET # BLD: 214 K/UL (ref 138–453)
PLATELET # BLD: 256 K/UL (ref 138–453)
PLATELET ESTIMATE: ABNORMAL
PLATELET ESTIMATE: ABNORMAL
PMV BLD AUTO: 10.9 FL (ref 8.1–13.5)
PMV BLD AUTO: 11.3 FL (ref 8.1–13.5)
POTASSIUM SERPL-SCNC: 3.9 MMOL/L (ref 3.7–5.3)
RBC # BLD: 3.57 M/UL (ref 3.95–5.11)
RBC # BLD: 3.89 M/UL (ref 3.95–5.11)
RBC # BLD: ABNORMAL 10*6/UL
RBC # BLD: ABNORMAL 10*6/UL
SEG NEUTROPHILS: 88 % (ref 36–65)
SEG NEUTROPHILS: 91 % (ref 36–66)
SEGMENTED NEUTROPHILS ABSOLUTE COUNT: 18.75 K/UL (ref 1.5–8.1)
SEGMENTED NEUTROPHILS ABSOLUTE COUNT: 24.29 K/UL (ref 1.8–7.7)
SODIUM BLD-SCNC: 145 MMOL/L (ref 135–144)
SPECIMEN DESCRIPTION: NORMAL
SPECIMEN DESCRIPTION: NORMAL
SURGICAL PATHOLOGY REPORT: NORMAL
WBC # BLD: 21.3 K/UL (ref 3.5–11.3)
WBC # BLD: 26.7 K/UL (ref 3.5–11.3)
WBC # BLD: ABNORMAL 10*3/UL
WBC # BLD: ABNORMAL 10*3/UL

## 2020-09-04 PROCEDURE — 6370000000 HC RX 637 (ALT 250 FOR IP): Performed by: STUDENT IN AN ORGANIZED HEALTH CARE EDUCATION/TRAINING PROGRAM

## 2020-09-04 PROCEDURE — 97166 OT EVAL MOD COMPLEX 45 MIN: CPT

## 2020-09-04 PROCEDURE — 2500000003 HC RX 250 WO HCPCS: Performed by: STUDENT IN AN ORGANIZED HEALTH CARE EDUCATION/TRAINING PROGRAM

## 2020-09-04 PROCEDURE — 6360000002 HC RX W HCPCS: Performed by: STUDENT IN AN ORGANIZED HEALTH CARE EDUCATION/TRAINING PROGRAM

## 2020-09-04 PROCEDURE — 2580000003 HC RX 258: Performed by: STUDENT IN AN ORGANIZED HEALTH CARE EDUCATION/TRAINING PROGRAM

## 2020-09-04 PROCEDURE — 97535 SELF CARE MNGMENT TRAINING: CPT

## 2020-09-04 PROCEDURE — 85025 COMPLETE CBC W/AUTO DIFF WBC: CPT

## 2020-09-04 PROCEDURE — 97530 THERAPEUTIC ACTIVITIES: CPT

## 2020-09-04 PROCEDURE — 80048 BASIC METABOLIC PNL TOTAL CA: CPT

## 2020-09-04 PROCEDURE — 97162 PT EVAL MOD COMPLEX 30 MIN: CPT

## 2020-09-04 PROCEDURE — 99291 CRITICAL CARE FIRST HOUR: CPT | Performed by: INTERNAL MEDICINE

## 2020-09-04 PROCEDURE — 2060000000 HC ICU INTERMEDIATE R&B

## 2020-09-04 PROCEDURE — 36415 COLL VENOUS BLD VENIPUNCTURE: CPT

## 2020-09-04 PROCEDURE — 83605 ASSAY OF LACTIC ACID: CPT

## 2020-09-04 PROCEDURE — 82947 ASSAY GLUCOSE BLOOD QUANT: CPT

## 2020-09-04 RX ORDER — METOCLOPRAMIDE HYDROCHLORIDE 5 MG/ML
10 INJECTION INTRAMUSCULAR; INTRAVENOUS EVERY 6 HOURS
Status: DISCONTINUED | OUTPATIENT
Start: 2020-09-04 | End: 2020-09-09

## 2020-09-04 RX ADMIN — PIPERACILLIN AND TAZOBACTAM 3.38 G: 3; .375 INJECTION, POWDER, FOR SOLUTION INTRAVENOUS at 00:32

## 2020-09-04 RX ADMIN — PIPERACILLIN AND TAZOBACTAM 3.38 G: 3; .375 INJECTION, POWDER, FOR SOLUTION INTRAVENOUS at 23:49

## 2020-09-04 RX ADMIN — HEPARIN SODIUM 5000 UNITS: 5000 INJECTION INTRAVENOUS; SUBCUTANEOUS at 15:20

## 2020-09-04 RX ADMIN — METHOCARBAMOL TABLETS 750 MG: 750 TABLET, COATED ORAL at 16:22

## 2020-09-04 RX ADMIN — FAMOTIDINE 20 MG: 10 INJECTION INTRAVENOUS at 08:11

## 2020-09-04 RX ADMIN — ACETAMINOPHEN 1000 MG: 650 SOLUTION ORAL at 23:49

## 2020-09-04 RX ADMIN — METOCLOPRAMIDE 10 MG: 5 INJECTION, SOLUTION INTRAMUSCULAR; INTRAVENOUS at 23:49

## 2020-09-04 RX ADMIN — OXYCODONE HYDROCHLORIDE 5 MG: 5 TABLET ORAL at 16:29

## 2020-09-04 RX ADMIN — HEPARIN SODIUM 5000 UNITS: 5000 INJECTION INTRAVENOUS; SUBCUTANEOUS at 05:54

## 2020-09-04 RX ADMIN — OXYCODONE HYDROCHLORIDE 5 MG: 5 TABLET ORAL at 08:34

## 2020-09-04 RX ADMIN — PIPERACILLIN AND TAZOBACTAM 3.38 G: 3; .375 INJECTION, POWDER, FOR SOLUTION INTRAVENOUS at 16:21

## 2020-09-04 RX ADMIN — METOCLOPRAMIDE 10 MG: 5 INJECTION, SOLUTION INTRAMUSCULAR; INTRAVENOUS at 06:35

## 2020-09-04 RX ADMIN — ACETAMINOPHEN 1000 MG: 650 SOLUTION ORAL at 06:02

## 2020-09-04 RX ADMIN — METHOCARBAMOL TABLETS 750 MG: 750 TABLET, COATED ORAL at 23:49

## 2020-09-04 RX ADMIN — SODIUM CHLORIDE, POTASSIUM CHLORIDE, SODIUM LACTATE AND CALCIUM CHLORIDE: 600; 310; 30; 20 INJECTION, SOLUTION INTRAVENOUS at 00:00

## 2020-09-04 RX ADMIN — PIPERACILLIN AND TAZOBACTAM 3.38 G: 3; .375 INJECTION, POWDER, FOR SOLUTION INTRAVENOUS at 08:11

## 2020-09-04 RX ADMIN — METOCLOPRAMIDE 10 MG: 5 INJECTION, SOLUTION INTRAMUSCULAR; INTRAVENOUS at 18:24

## 2020-09-04 RX ADMIN — METHOCARBAMOL TABLETS 750 MG: 750 TABLET, COATED ORAL at 01:23

## 2020-09-04 RX ADMIN — OXYCODONE HYDROCHLORIDE 5 MG: 5 TABLET ORAL at 12:28

## 2020-09-04 RX ADMIN — ACETAMINOPHEN 1000 MG: 650 SOLUTION ORAL at 00:32

## 2020-09-04 RX ADMIN — METHOCARBAMOL TABLETS 750 MG: 750 TABLET, COATED ORAL at 11:08

## 2020-09-04 RX ADMIN — ACETAMINOPHEN 1000 MG: 650 SOLUTION ORAL at 15:20

## 2020-09-04 RX ADMIN — OXYCODONE HYDROCHLORIDE 5 MG: 5 TABLET ORAL at 00:32

## 2020-09-04 RX ADMIN — INSULIN LISPRO 1 UNITS: 100 INJECTION, SOLUTION INTRAVENOUS; SUBCUTANEOUS at 00:33

## 2020-09-04 RX ADMIN — SODIUM CHLORIDE, PRESERVATIVE FREE 10 ML: 5 INJECTION INTRAVENOUS at 08:11

## 2020-09-04 RX ADMIN — SODIUM CHLORIDE, PRESERVATIVE FREE 10 ML: 5 INJECTION INTRAVENOUS at 20:58

## 2020-09-04 RX ADMIN — HEPARIN SODIUM 5000 UNITS: 5000 INJECTION INTRAVENOUS; SUBCUTANEOUS at 20:57

## 2020-09-04 RX ADMIN — POTASSIUM BICARBONATE 40 MEQ: 782 TABLET, EFFERVESCENT ORAL at 01:23

## 2020-09-04 RX ADMIN — METOCLOPRAMIDE 10 MG: 5 INJECTION, SOLUTION INTRAMUSCULAR; INTRAVENOUS at 12:25

## 2020-09-04 RX ADMIN — SODIUM CHLORIDE, POTASSIUM CHLORIDE, SODIUM LACTATE AND CALCIUM CHLORIDE: 600; 310; 30; 20 INJECTION, SOLUTION INTRAVENOUS at 21:11

## 2020-09-04 RX ADMIN — OXYCODONE HYDROCHLORIDE 5 MG: 5 TABLET ORAL at 21:08

## 2020-09-04 RX ADMIN — METHOCARBAMOL TABLETS 750 MG: 750 TABLET, COATED ORAL at 05:54

## 2020-09-04 RX ADMIN — INSULIN LISPRO 2 UNITS: 100 INJECTION, SOLUTION INTRAVENOUS; SUBCUTANEOUS at 08:17

## 2020-09-04 RX ADMIN — HYDROMORPHONE HYDROCHLORIDE 0.5 MG: 1 INJECTION, SOLUTION INTRAMUSCULAR; INTRAVENOUS; SUBCUTANEOUS at 02:35

## 2020-09-04 ASSESSMENT — PAIN SCALES - GENERAL
PAINLEVEL_OUTOF10: 3
PAINLEVEL_OUTOF10: 7
PAINLEVEL_OUTOF10: 10
PAINLEVEL_OUTOF10: 7
PAINLEVEL_OUTOF10: 7
PAINLEVEL_OUTOF10: 10
PAINLEVEL_OUTOF10: 6

## 2020-09-04 ASSESSMENT — PAIN DESCRIPTION - ORIENTATION: ORIENTATION: LEFT

## 2020-09-04 ASSESSMENT — PAIN DESCRIPTION - PAIN TYPE
TYPE: SURGICAL PAIN
TYPE: ACUTE PAIN
TYPE: SURGICAL PAIN
TYPE: SURGICAL PAIN

## 2020-09-04 ASSESSMENT — PAIN DESCRIPTION - DESCRIPTORS: DESCRIPTORS: DISCOMFORT

## 2020-09-04 ASSESSMENT — PAIN DESCRIPTION - LOCATION
LOCATION: ABDOMEN
LOCATION: THROAT;ABDOMEN

## 2020-09-04 NOTE — PROGRESS NOTES
Post Op Note    SUBJECTIVE  Pt s/p Ex lap, paraesophageal hernia repair with mesh bridge, double gastropexy, Kocherization of the duodenum. At exam patient with some nausea, producing good urine, pain controlled. OBJECTIVE  VITALS:  BP (!) 138/56   Pulse 75   Temp 97.5 °F (36.4 °C) (Oral)   Resp 18   Ht 5' 4\" (1.626 m)   Wt 178 lb 12.7 oz (81.1 kg)   SpO2 99%   BMI 30.69 kg/m²         GENERAL:  awake and alert. mild distress due to nausea  CARDIOVASCULAR:  regular rate and rhythm   LUNGS:  Unlabored breathing  ABDOMEN:   Abdomen soft, non-tender, non-distended  INCISION: Incision clean/dry/intact, dressings in place. ASSESSMENT  1. POD# 1 s/p Ex lap, paraesophageal hernia repair with mesh bridge, double gastropexy, Kocherization of the duodenum    PLAN  1. Pain: MMT  2. Zofran PRN for nausea  3.  Adequate UOP

## 2020-09-04 NOTE — PROGRESS NOTES
Comprehensive Nutrition Assessment    Type and Reason for Visit:  Initial, Positive Nutrition Screen    Nutrition Recommendations/Plan: Will monitor for initiation of diet vs need for nutrition support. Nutrition Assessment:  Pt admitted due to gastric volvulus, s/p 9/3 double gastropexy, paraesophageal hernia repair. Pt referred to RD due to poor appetite and N/V x 2 days prior to admit. Pt is now in ICU, extubated and sitting up in chair. She is allowed ice chips. Review of wt history shows no significant wt loss. Malnutrition Assessment:  Malnutrition Status: At risk for malnutrition (Comment)    Context:  Acute Illness     Findings of the 6 clinical characteristics of malnutrition:  Energy Intake:  (poor po x 2 days prior to admit)  Weight Loss:  No significant weight loss     Body Fat Loss:  No significant body fat loss     Muscle Mass Loss:  No significant muscle mass loss    Fluid Accumulation:  1 - Mild Extremities   Strength:  Not Performed    Estimated Daily Nutrient Needs:  Energy (kcal):  1500 kcal; Weight Used for Energy Requirements:  Current     Protein (g):  1.2g/kg= 65 g protein or more; Weight Used for Protein Requirements:  Ideal          Nutrition Related Findings:  soft abd, hypo BS, Glu 109-166      Wounds:  Surgical Wound       Current Nutrition Therapies:    Diet NPO Effective Now Exceptions are: Sips with Meds, Ice Chips, Popsicles    Anthropometric Measures:  · Height: 5' 4\" (162.6 cm)  · Current Body Weight: 181 lb (82.1 kg)   · Ideal Body Weight: 120 lbs; BMI: 31.1  · BMI Categories: Obese Class 1 (BMI 30.0-34. 9)       Nutrition Diagnosis:   · Inadequate oral intake related to altered GI function as evidenced by NPO or clear liquid status due to medical condition    Nutrition Interventions:   Food and/or Nutrient Delivery:  Start Oral Diet  Nutrition Education/Counseling:  Education not indicated   Coordination of Nutrition Care:  No recommendation at this time    Goals:  initiation of nutrition within 72 hours       Nutrition Monitoring and Evaluation:   Food/Nutrient Intake Outcomes:  Diet Advancement/Tolerance  Physical Signs/Symptoms Outcomes:  Biochemical Data, Skin, GI Status     Discharge Planning:     Too soon to determine     Electronically signed by Annalise Pérez RD, LD on 9/4/20 at 1:38 PM EDT    Contact: [phone]

## 2020-09-04 NOTE — PROGRESS NOTES
Yesterday patient underwent EGD which showed organoaxial gastric volvulus  with intrathoracic stomach Duodenal ischemia     Also underwent  Ex lap, paraesophageal hernia repair with mesh bridge, double gastropexy, Kocherization of the duodenum. Overnight postoperatively patient was stable. Diet n.p.o. for now advance as tolerated  Pain control and antiemetic per gen surgery. Continued on IV fluids and IV Zosyn.     Carlos Hsieh MD      Department of Internal Medicine  [facility]         9/4/2020, 7:38 AM

## 2020-09-04 NOTE — OP NOTE
Operative Note      Patient: Lenard Barrera  YOB: 1940  MRN: [id number]    Date of Procedure: 9/3/2020    Pre-Op Diagnosis:   1. 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 Gastric volvulus  3. Duodenal ischemia secondary to the above    Post-Op Diagnosis: Same     Procedure:  1. Exploratory laparotomy  2. Paraesophageal hernia repair with mesh bridge with reduction of incarcerated stomach and colon from hiatal hernia  3. Double gastropexy with gastrostomy tubes x2  4. Kocherization of the duodenum    Surgeon: Shahid Velasquez MD    Assistant:   Maria Elena Barragan DO, PGY-5  Skip Reddy DO, PGY-5    Anesthesia: General    Estimated Blood Loss (mL): 87WU    Complications: None    Specimens: none    Implants:  Implant Name Type Inv. Item Serial No.  Lot No. LRB No. Used Action   MESH PHASIX ST FULLY RESORBABLE RECT [id number] Mesh MESH PHASIX ST FULLY RESORBABLE RECT [id number]   [employer] [facility] [id number]  1 Implanted         Drains: Two 20 Fr abraham catheters inserted for gastrostomy tubes / double gastropexy     Findings: Wound class I. Entire stomach and transverse colon/omentum within the mediastinum all reduced. Large hiatal defect. Bridged with Phasix ST resorbable mesh. Gastropexy x2. Indications: This an 70-year-old female presented as a transfer from [facility] with reports of 2 days of nausea and vomitings. CT of the abdomen pelvis demonstrated a large paraesophageal hernia containing intrathoracic stomach as well as transverse colon. Initially the patient was stable and not peritoneal on exam but due to concerns for possible volvulus we felt it would be reasonable for GI consultation and upper endoscopy so the patient was transferred to [facility].  The patient was admitted to the ICU and then subsequently taken to the endoscopy suite per GI for upper endoscopy urgently.   We were called to the operating room after Dr. Blanka Win discovered that this was pass it in the retroesophageal window and provide good coverage of the defect, and we approximated it on all sides using multiple interrupted 0 Ethibond sutures attaching it to the diaphragm without tension. We made sure that our mesh was not constricting the esophagus and that the coated side was rolled back and the only portion of the mesh that would be in contact with the stomach or esophagus. We then placed multiple interrupted sutures on both sides of the mesh to the diaphragm to prevent migration of the mesh postoperatively. Once this was performed we then turned our attention to the spleen as we had a small less than 1 cm abrasion which we controlled with electrocautery and a small piece of Surgicel. We had excellent hemostasis and then at this point prior to performing a gastropexy we turned our attention to the duodenum as this did appear ischemic during upper endoscopy. We mobilized the upper right aspect of the ascending colon and hepatic flexure in order to assess the duodenum. This was carefully dissected back with blunt dissection and the duodenum was identified. At this point we then performed a gentle kocherization of the duodenum in order to ensure that there was no ischemia which there was none. The duodenum was healthy appearing so we then turned our attention to the stomach for gastropexy. We selected 2 points on the body of the stomach and proximal antrum for insertion of a gastrostomy tubes to be used for gastropexy in the postoperative period. A pursestring suture was made at each of the sites using 2-0 Vicryl sutures. Electrocautery was used to make a gastrotomy and we then tunneled a 21 Wolof Noe catheter through the abdominal wall and placed through the gastrotomy site. These tubes were inserted into both gastrostomy sites and one was tunneled through the right upper quadrant and one was tunneled out of the left upper quadrant.   Each of the Noe catheter balloons were

## 2020-09-04 NOTE — PROGRESS NOTES
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  Additional Comments: Pt reports daughter and sister are able to provide support upon discharge PRN  Cognition   Cognition  Overall Cognitive Status: WFL    Objective  Joint Mobility  Spine: WFL  ROM RLE: WFL  ROM LLE: WFL  ROM RUE: Shoulder flexion to 60 degrees; Elbow/ wrist/ hand WFL  ROM LUE: WFL  Strength RLE  Strength RLE: Exception  Comment: Grossly 4/5 at hip, knee and ankle  Strength LLE  Strength LLE: Exception  Comment: Grossly 4/5 at hip, knee and ankle  Strength RUE  Strength RUE: WFL  Strength LUE  Strength LUE: WFL     Sensation  Overall Sensation Status: WNL(pt denies numbness/tingling)  Bed mobility  Rolling to Right: Moderate assistance  Supine to Sit: Moderate assistance  Sit to Supine: (did not assess- pt retired in bedside chair upon writer's exit)  Scooting: Moderate assistance(Scooting EOB)  Comment: HOB elevated. Pt required assistance for trunk progression while seated EOB. Transfers  Sit to Stand: Minimal Assistance;2 Person Assistance  Stand to sit: Minimal Assistance;2 Person Assistance  Ambulation  Ambulation?: Yes  Ambulation 1  Surface: level tile  Device: Rolling Walker  Assistance: Minimal assistance;2 Person assistance  Quality of Gait: Decreased step length bilaterally; shuffling of gait  Gait Deviations: Slow Toshia  Distance: 5ft  Comments: Pt demonstrates unsteadiness noted throughout mobility with increase pain noted throughout. Stairs/Curb  Stairs?: No     Balance  Posture: Fair  Sitting - Static: Good;-  Sitting - Dynamic: Fair;+  Standing - Static: Fair;+  Standing - Dynamic: Fair;-  Comments: Pt required Miranda to correct posterior trunk lean while seated EOB;  Standing balance assessed w/ RW        Plan   Plan  Times per week: 5-6x/week  Current Treatment Recommendations: Strengthening, Transfer Training, Endurance Training, Patient/Caregiver Education & Training, ROM, Balance Training, Gait Training, Home Exercise Program, Functional Mobility Training, Stair training, Safety Education & Training  Safety Devices  Type of devices: Left in chair, Call light within reach, Chair alarm in place, Gait belt, Nurse notified, Patient at risk for falls  Restraints  Initially in place: No  AM-PAC Score     AM-PAC Inpatient Mobility without Stair Climbing Raw Score : 10 (09/04/20 1118)  AM-PAC Inpatient without Stair Climbing T-Scale Score : 34.07 (09/04/20 1118)  Mobility Inpatient CMS 0-100% Score: 71.66 (09/04/20 1118)  Mobility Inpatient without Stair CMS G-Code Modifier : CL (09/04/20 1118)       Goals  Short term goals  Time Frame for Short term goals: 14  Short term goal 1: Pt to perform bed mobility CGA  Short term goal 2: Pt to demonstrate functional transfers CGA  Short term goal 3: Ambulate 100ft w/ RW CGA  Short term goal 4: Tolerate 30 minutes of therapy to demo increased endurnace  Patient Goals   Patient goals : Did not state       Therapy Time   Individual Concurrent Group Co-treatment   Time In 0851         Time Out 0926         Minutes 35         Timed Code Treatment Minutes: 23 Minutes       Naomy Long, PT

## 2020-09-04 NOTE — PROGRESS NOTES
Critical Care Team - Daily Progress Note      Date and time: 2020 2:01 PM  Patient's name:  Roland Jaramillo  Medical Record Number: [id number]  Patient's account/billing number: [de-identified]  Patient's YOB: 1940  Age: 79 y.o. Date of Admission: 9/3/2020  3:53 AM  Length of stay during current admission: 1      Primary Care Physician: KAREEM Mccain - CNP  ICU Attending Physician: Dr. Debbie Love Status: Full Code    Reason for ICU admission:   Chief Complaint   Patient presents with    Emesis     Pt c/o vomiting for 2 days. SUBJECTIVE:      Yesterday patient underwent EGD which showed tortuous esophagus  Transthoracic stomach with organoaxial volvulus causing partial gastric outlet obstruction suspicious for bowel ischemia. After successfully decompressing the stomach, multiple attempts for derotation were performed but were unsuccessful. Intra-procedure consultation was made to general surgery due to suspicion for bowel ischemia and early surgical intervention. She underwent Ex lap, paraesophageal hernia repair with mesh bridge, double gastropexy, Kocherization of the duodenum.      Postop no acute events overnight. Current evaluation: This morning pt was lying comfortably on bed. C/o nausea and 4/10 abd pain. No active vomiting. Urine output 1 L in 24 hours    Pertinent labs showed sodium trending down 145, creatinine at 1.26, lactate 3.4, WBC 21.3, hemoglobin 10.2    Diet N.p.o. for now advance as tolerated. NG out. Pain control per general surgery. Patient stable to be moved out of ICU.                                                                                                               OBJECTIVE:     VITAL SIGNS:  /62   Pulse 92   Temp 98.1 °F (36.7 °C) (Oral)   Resp 14   Ht 5' 4\" (1.626 m)   Wt 181 lb 7 oz (82.3 kg)   SpO2 91%   BMI 31.14 kg/m²   Tmax over 24 hours:  Temp (24hrs), Av °F (36.1 °C), Min:96.4 °F (35.8 °C), Max:99 °F (37.2 °C)      Patient Vitals for the past 8 hrs:   BP Temp Temp src Pulse Resp SpO2   09/04/20 1200 133/62 98.1 °F (36.7 °C) Oral 92 14 91 %   09/04/20 1128 123/61 -- -- 81 16 95 %   09/04/20 1006 137/69 -- -- 90 20 96 %   09/04/20 0920 (!) 148/71 -- -- 96 19 96 %   09/04/20 0829 (!) 130/56 97.9 °F (36.6 °C) Oral 79 17 96 %   09/04/20 0729 (!) 131/54 -- -- 74 17 98 %         Intake/Output Summary (Last 24 hours) at 9/4/2020 1401  Last data filed at 9/4/2020 1300  Gross per 24 hour   Intake 2306 ml   Output 1500 ml   Net 806 ml     Date 09/04/20 0000 - 09/04/20 2359   Shift 1130-6688 0927-9523 2531-3576 24 Hour Total   INTAKE   P.O.(mL/kg/hr)  74  74   I.V.(mL/kg) 226(2.7) 706(8.6)  932(11.3)   Shift Total(mL/kg) 226(2.7) 780(9.5)  6793(46.9)   OUTPUT   Urine(mL/kg/hr) 200(0.3) 130  330   Drains(mL/kg) 300(3.6)   300(3.6)   Shift Total(mL/kg) 500(6.1) 130(1.6)  630(7.7)   Weight (kg) 82.3 82.3 82.3 82.3     Wt Readings from Last 3 Encounters:   09/04/20 181 lb 7 oz (82.3 kg)   07/13/20 180 lb (81.6 kg)   06/05/20 180 lb (81.6 kg)     Body mass index is 31.14 kg/m². PHYSICAL EXAM:  Constitutional: Appears well, no distress  EENT: PERRLA, EOMI intact. Neck: Supple, symmetrical, trachea midline  Respiratory: clear to auscultation, no wheezes or rales  Cardiovascular: regular rate and rhythm, normal S1, S2, no murmur noted   Abdomen: soft, nontender, nondistended, no organomegaly  NEUROLOGIC Awake, alert, oriented to name, place and time. Motor and  sensory function grossly intact.   Extremities:peripheral pulses normal, no pedal edema  SKIN: normal coloration and turgor    Any additional physical findings:      MEDICATIONS:  Scheduled Meds:   metoclopramide  10 mg Intravenous Q6H    sodium chloride flush  10 mL Intravenous 2 times per day    famotidine (PEPCID) injection  20 mg Intravenous Daily    heparin (porcine)  5,000 Units Subcutaneous 3 times per day    insulin lispro  0-12 Units Subcutaneous TID WC    insulin lispro  0-6 Units Subcutaneous Nightly    piperacillin-tazobactam  3.375 g Intravenous Q8H    acetaminophen  1,000 mg Oral Q8H    lidocaine  2 patch Transdermal Daily    methocarbamol  750 mg Oral Q6H     Continuous Infusions:   dextrose      lactated ringers 100 mL/hr at 09/04/20 0000     PRN Meds:   sodium chloride flush, 10 mL, PRN  potassium chloride, 40 mEq, PRN    Or  potassium alternative oral replacement, 40 mEq, PRN    Or  potassium chloride, 10 mEq, PRN  magnesium sulfate, 1 g, PRN  polyethylene glycol, 17 g, Daily PRN  ondansetron, 4 mg, Q6H PRN  labetalol, 10 mg, Q4H PRN  glucose, 15 g, PRN  dextrose, 12.5 g, PRN  glucagon (rDNA), 1 mg, PRN  dextrose, 100 mL/hr, PRN  oxyCODONE, 5 mg, Q4H PRN  HYDROmorphone, 0.5 mg, Q3H PRN        SUPPORT DEVICES: [] Ventilator [] BIPAP  [] Nasal Cannula [x] Room Air    DATA:  Complete Blood Count:   Recent Labs     09/03/20  1010 09/03/20 2317 09/04/20  0356   WBC 20.8* 26.7* 21.3*   HGB 11.3* 11.0* 10.2*   MCV 91.5 94.9 91.0    256 214   RBC 3.90* 3.89* 3.57*   HCT 35.7* 36.9 32.5*   MCH 29.0 28.3 28.6   MCHC 31.7 29.8 31.4   RDW 13.3 13.5 13.4   MPV 12.1 11.3 10.9        PT/INR:    Lab Results   Component Value Date    PROTIME 9.9 09/03/2020    INR 0.9 09/03/2020     PTT:    Lab Results   Component Value Date    APTT 22.2 09/03/2020       Basal Metabolic Profile:   Recent Labs     09/03/20  1010 09/03/20 2317 09/04/20  0356   * 142 145*   K 4.1 3.3* 3.9   BUN 23 18 19   CREATININE 1.35* 1.26* 1.36*    106 107   CO2 26 26 27      Magnesium:   Lab Results   Component Value Date    MG 1.8 09/03/2020    MG 2.0 09/03/2020     Phosphorus:   Lab Results   Component Value Date    PHOS 3.7 09/03/2020     S. Calcium:  Recent Labs     09/04/20  0356   CALCIUM 8.0*     S. Ionized Calcium:No results for input(s): IONCA in the last 72 hours.       Urinalysis:   Lab Results   Component Value Date    NITRU NEGATIVE 09/03/2020    COLORU YELLOW 09/03/2020    PHUR 7.0 09/03/2020    WBCUA 0 TO 2 11/07/2018    RBCUA 0 TO 2 11/07/2018    MUCUS NOT REPORTED 11/07/2018    TRICHOMONAS NOT REPORTED 11/07/2018    YEAST NOT REPORTED 11/07/2018    BACTERIA FEW 11/07/2018    CLARITYU clear 01/30/2020    SPECGRAV 1.015 09/03/2020    LEUKOCYTESUR NEGATIVE 09/03/2020    UROBILINOGEN Normal 09/03/2020    BILIRUBINUR NEGATIVE 09/03/2020    BILIRUBINUR - 01/30/2020    BLOODU - 01/30/2020    GLUCOSEU TRACE 09/03/2020    KETUA NEGATIVE 09/03/2020    AMORPHOUS NOT REPORTED 11/07/2018       CARDIAC ENZYMES: No results for input(s): CKMB, CKMBINDEX, TROPONINI in the last 72 hours. Invalid input(s): CKTOTAL;3  BNP: No results for input(s): BNP in the last 72 hours. LFTS  Recent Labs     09/03/20  0402 09/03/20  1010   ALKPHOS 121* 102   ALT 12 13   AST 17 21   BILITOT 0.31 0.32   LABALBU 4.1 3.6       AMYLASE/LIPASE/AMMONIA  Recent Labs     09/03/20  0402   LIPASE 52       Last 3 Blood Glucose:   Recent Labs     09/03/20  0402 09/03/20  1010 09/03/20  2317 09/04/20  0356   GLUCOSE 225* 149* 186* 159*      HgBA1c:    Lab Results   Component Value Date    LABA1C 5.6 09/03/2020         TSH:    Lab Results   Component Value Date    TSH 1.35 06/02/2020     ANEMIA STUDIES  Recent Labs     09/03/20  1010 09/03/20  1631   LABIRON 18*  --    TIBC 221*  --    FERRITIN 329*  --    [id number]  --  560   FOLATE  --  18.0           Cultures during this admission:     Blood cultures:                 [] None drawn      [x] Negative             []  Positive (Details:  )  Urine Culture:                   [] None drawn      [x] Negative             []  Positive    Radiological imaging  Ct Abdomen Pelvis Wo Contrast Additional Contrast? Non 9/3/2020  Intrathoracic stomach full of contents. Volvulus/outlet obstruction not excluded.   Further evaluation can be performed with the administration of oral contrast in repeat study if clinically indicated.      Ct Head Wo Contrast9/3/2020  No acute intracranial abnormality.      Ct Chest Wo Contrast9/3/2020   A type IV paraesophageal hiatal hernia which also contains the distal half of the transverse colon. NG tube in place. Some contrast has been administered which collects in the gastric fundus. The rest of the stomach is fluid-filled. Mild gastric distension noted which could be due to administered contrast.  Difficult to determine if there is some degree of obstruction on the basis of this study. No evidence for gastric pneumatosis. 2. Some bibasilar lower lobe atelectasis/scarring around the hiatal hernia noted along with trace left pleural effusion.      Ct Cervical Spine Wo Contrast     Result Date: 9/3/2020  EXAMINATION: CT OF THE CERVICAL SPINE WITHOUT CONTRAST 9/3/2020 4:01 am TECHNIQUE: CT of the cervical spine was performed without the administration of intravenous contrast. Multiplanar reformatted images are provided for review. Dose modulation, iterative reconstruction, and/or weight based adjustment of the mA/kV was utilized to reduce the radiation dose to as low as reasonably achievable. COMPARISON: None. HISTORY: ORDERING SYSTEM PROVIDED HISTORY: fall TECHNOLOGIST PROVIDED HISTORY: fall Reason for Exam: fall Acuity: Acute Type of Exam: Initial FINDINGS: BONES/ALIGNMENT: There is no acute fracture or traumatic malalignment. DEGENERATIVE CHANGES: Multilevel facet arthrosis and degenerative disc space narrowing. SOFT TISSUES: There is no prevertebral soft tissue swelling. Esophageal dilatation.      No acute abnormality of the cervical spine.      Xr Chest Portable 9/3/2020. No acute cardiopulmonary abnormality. Large hiatal hernia.           ASSESSMENT:     Active Problems:    Gastric outlet obstruction    Volvulus (HCC)  Resolved Problems:    * No resolved hospital problems.  *      PLAN:       Gastric volvulus causing partial gastric outlet obstruction and bowel ischemia  Type IV paraesophageal hiatal hernia   S/p  Ex lap, paraesophageal hernia repair with mesh bridge, double gastropexy, Kocherization of the duodenum. POD-1 postoperatively stable. Nausea present, no bowel movement yet  Urine output 1 L in 24 hours. Hb-10.2. Diet N.p.o. for now advance as tolerated. NG out. Pain control per general surgery lidocaine patch, robaxin,  tylneol 1g every 8 fentanyl 50- every 5 PRN. Dilaudid 0.5 every 3 PRN and Roxicodone 5 every 4 PRN. IV Zofran. -DOMINIQUE- prerenal and nonoliguric type secondary to hypovolemia from dehydration-resolving, Baseline creatinine around 1. Urine output around 1 L in 24 hours. Daily BMP. Intake output.     -Hypernatremia-resolving. sodium 145 this morning. Continue with IV hydration.    -Iquxlfammgxuq-hqjgjr-eb HbA1c. Glucose control adequate.  medium dose correction scale. POCT glucose checks every 6 hours    -Leukocytosis-WBC 21.3. Afebrile. Urine culture showed no growth till date  Continued on IV hydration. Empirical coverage with I.v  Zosyn.     -Lactic acidosis-3.4<5.2<3.2. Continued on IV hydration. IV Zosyn for empirical coverage. Blood and urine culture showed no growth till date    -Hypertension-current systolic blood pressure 437. Start on IV labetalol 10 every 6 hours as needed. On lisinopril 20, Lasix 40 at home. Confirm medication with pharmacy and resume when tolerate p.o.    -Hyperlipidemia-resume home medicines when tolerates p.o.  -CVA in the past-stable currently. -Gout- on allopurinol at home. Resume when pt tolerates p/o and renal function better     Feeding Diet: NPO-ok for Popsicles, ice chips  Fluids-LR at 100  Family-updated  Analgesic- lidocaine patch, robaxin,  tylneol 1g every 8 fentanyl 50- every 5 PRN.  Dilaudid 0.5 every 3 PRN and Roxicodone 5 every 4 PRN  Sedation None  Xgfzlob-kzjlzqbwvhe-g/c heparin  Mobility fair   Heads up 30 Degrees  Ulcer prophylaxis N/A  Glycemic control med dose correction insulin  Spontaneous breathing trial N/A  Bowel regimen/urine output-u/o -1l/24hrs. No Bm yet. Indwelling catheter/lines -peripheral IVs  De-escalation None                 Lakesha Sims M.D.              Department of Internal Medicine/ Critical care  [facility] ([address] [place] [address])             9/4/2020, 2:01 PM

## 2020-09-04 NOTE — FLOWSHEET NOTE
09/03/20 2015   Encounter Summary   Services provided to: Patient and family together   Support System Children;Family members   Place of Religious None   Contact Jew No   Continue Visiting   (9/3/20)   Complexity of Encounter Moderate   Length of Encounter 30 minutes   Spiritual Assessment Completed Yes   Routine   Type Pre-procedure   Assessment Approachable; Anxious   Intervention Active listening;Explored feelings, thoughts, concerns;Nurtured hope;Prayer;Sustaining presence/ Ministry of presence; Discussed belief system/Quaker practices/andrei   Outcome Acceptance;Comfort;Expressed gratitude

## 2020-09-04 NOTE — PROGRESS NOTES
General Surgery:  Daily Progress Note          POD # 1 s/p ex lap, paraesophageal hernia repair with mesh bridge, double gastropexy, kocherization of the duodenum          PATIENT NAME: Eryn Jaime     TODAY'S DATE: 9/4/2020, 3:53 AM  CC: Emesis    SUBJECTIVE:     Pt seen and examined at bedside. 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 Due to the concern for bowel ischemia patient was taken urgently to the OR and underwent a paraesophageal hernia repair, double gastropexy. This morning patient was nauseous with mild to moderate abdominal pain status post surgery. Nausea has since resolved. Has urinated since returning to floor. Afebrile, Tmax 99.0. OBJECTIVE:   VITALS:  BP (!) 124/59   Pulse 77   Temp 97.5 °F (36.4 °C) (Oral)   Resp 16   Ht 5' 4\" (1.626 m)   Wt 178 lb 12.7 oz (81.1 kg)   SpO2 91%   BMI 30.69 kg/m²      INTAKE/OUTPUT:      Intake/Output Summary (Last 24 hours) at 9/4/2020 0353  Last data filed at 9/3/2020 2300  Gross per 24 hour   Intake 1300 ml   Output 1870 ml   Net -570 ml       PHYSICAL EXAM:  CONSTITUTIONAL:  awake, alert,  some distress due to nausea, pain  HEENT: Normocephalic/atraumatic, without obvious abnormality. NG tube in place, brown fluid in canister  NECK:  Supple, symmetrical, trachea midline   CARDIOVASCULAR: Regular rate, well-perfused  LUNGS: Unlabored breathing  ABDOMEN: Soft, diffuse TTP, mild distention,  surgical incision covered with clean/dry dressing. 2 JIMENA drains in place in LUQ and RUQ  MUSCULOSKELETAL: Muscle strength intact in all extremities bilaterally. NEUROLOGIC: CN II- XII intact. Gross motor intact without focal weakness. SKIN: No cyanosis, rashes, or edema noted.   Orientation:   oriented to person, place, and time    IV Access: AV   Noe:  yes      Data:  CBC with Differential:    Lab Results   Component Value Date    WBC 21.3 09/04/2020    RBC 3.57 distal half of the transverse colon. NG tube in place. Some contrast has been administered which collects in the gastric fundus. The rest of the stomach is fluid-filled. Mild gastric distension noted which could be due to administered contrast.  Difficult to determine if there is some degree of obstruction on the basis of this study. No evidence for gastric pneumatosis. 2. Some bibasilar lower lobe atelectasis/scarring around the hiatal hernia noted along with trace left pleural effusion. Ct Cervical Spine Wo Contrast    Result Date: 9/3/2020  No acute abnormality of the cervical spine. Xr Chest Portable    Result Date: 9/3/2020  No acute cardiopulmonary abnormality. Large hiatal hernia. ASSESSMENT:  Active Hospital Problems    Diagnosis Date Noted    Gastric outlet obstruction [K31.1] 09/03/2020    Volvulus ([address] [K56.2] 09/03/2020       79 y.o. 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 Diet: NPO, will advance as tolerated  2. Analgesia:  MMT, per primary  3. Nausea: ondansetron  4. Remove dressing POD#2 (9/5)   5. GI ppx: Pepcid  6.  Lactic acidosis: 3.4 from 2.7 from 5.2  -Continue resuscitation IVF  -Abx: Zosyn        Electronically signed by Heaven Parikh DO  on 9/4/2020 at 3:53 AM

## 2020-09-04 NOTE — PLAN OF CARE
Problem: Falls - Risk of:  Goal: Will remain free from falls  Description: Will remain free from falls  9/4/2020 1011 by Krissy Greene RN  Outcome: Ongoing  9/4/2020 0341 by Rick Urena RN  Outcome: Ongoing  Goal: Absence of physical injury  Description: Absence of physical injury  9/4/2020 1011 by Krissy Greene RN  Outcome: Ongoing  9/4/2020 0341 by Rick Urena RN  Outcome: Ongoing     Problem: Skin Integrity:  Goal: Will show no infection signs and symptoms  Description: Will show no infection signs and symptoms  9/4/2020 1011 by Krissy Greene RN  Outcome: Ongoing  9/4/2020 0341 by Rick Urena RN  Outcome: Ongoing  Goal: Absence of new skin breakdown  Description: Absence of new skin breakdown  9/4/2020 1011 by Krissy Greene RN  Outcome: Ongoing  9/4/2020 0341 by Rick Urena RN  Outcome: Ongoing     Problem: Pain:  Goal: Pain level will decrease  Description: Pain level will decrease  9/4/2020 1011 by Krissy Greene RN  Outcome: Ongoing  9/4/2020 0341 by Rick Urena RN  Outcome: Ongoing  Goal: Control of acute pain  Description: Control of acute pain  9/4/2020 1011 by Krissy Greene RN  Outcome: Ongoing  9/4/2020 0341 by Rick Urena RN  Outcome: Ongoing  Goal: Control of chronic pain  Description: Control of chronic pain  9/4/2020 0341 by Rick Urena RN  Outcome: Ongoing

## 2020-09-04 NOTE — PROGRESS NOTES
Critical care team - Resident sign-out to medicine service      Date and time: 9/6/2020 10:48 AM  Patient's name:  Radha Nieves Record Number: [id number]  Patient's account/billing number: [de-identified]  Patient's YOB: 1940  Age: 79 y.o. Date of Admission: 9/3/2020  3:53 AM  Length of stay during current admission: 3    Primary Care Physician: KAREEM Morse CNP    Code Status: Full Code    Mode of physician to physician communication:        [x] Via telephone   [] In person     Date and time of sign-out: 9/6/2020 10:48 AM    Accepting Internal Medicine resident: Dr. Joey Ca    Accepting Medicine team: IM Team 1    Accepting team's attending: Dr. Joey Ca    Patient's current ICU Bed:  104    Patient's assigned bed on floor:  2017        [x] Med-Surg Monitored [] Step-down       [] Psychiatry ICU       [] Psych floor     Reason for ICU admission:   Volvulus/gastric outlet obstruction      ICU course summary:   Kalyan Michel is a 79 y.o. with PMH of essential hypertension, heart failure,HPL dementia, gout and haital hernia, appendectomy and hysterectomy and stroke in the past     Presented to ER for nausea vomiting of 2 days duration. Patient initially presented to [facility] ER where CT of her abdomen pelvis was done which showed volvulus versus gastric outlet obstruction. She was reported to be constipated and not passing gas since Monday.      In [facility] ER chest x-ray showed large hiatal hernia. CT scan of cervical spine showed esopahgeal dilatation. CT scan without contrast of abdomen pelvis showed intrathoracic stomach full of contents concerning for volvulus/gastric outlet obstruction. Repeat CT scan chest WO contrast showed Type IV paraesophageal hiatal hernia  containing the distal half of the transverse colon. GI was consulted per general surgery. 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 After successfully decompressing the stomach, multiple attempts for derotation were performed but were unsuccessful. Intra-procedure consultation was made to general surgery due to suspicion for bowel ischemia and early surgical intervention. She underwent Ex lap, paraesophageal hernia repair with mesh bridge, double gastropexy, Kocherization of the duodenum.     Postop no acute events overnight (9/4)  Pt was lying comfortably on bed. Complaining of mild nausea no active vomiting. Minimal belly pain. No bowel movement. Diet N.p.o. ,okay for popsicles and ice chips. NPO until resuming bowel function. NG out. Adequate Pain control management with robaxin and PRN meds. Patient stable to be moved out of ICU. Patient had few PVCs and occasional non-sustained V-Tach on telemetry. One dose of Mag sulfate given. Monitored for 24 hours. No recurring episodes. Stable to transfer out of ICU.                                                                        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 After successfully decompressing the stomach, multiple attempts for derotation were performed but were unsuccessful. Intra-procedure consultation was made to general surgery due to suspicion for bowel ischemia and early surgical intervention.   She underwent Ex lap, paraesophageal hernia repair with mesh bridge, double gastropexy, Kocherization of the duodenum.       Current Vitals:     BP 87/60   Pulse 94   Temp 98.4 °F (36.9 °C) (Oral)   Resp 20   Ht 5' 4\" (1.626 m)   Wt 181 lb 7 oz (82.3 kg)   SpO2 97%   BMI 31.14 kg/m²       Cultures:     Blood cultures:                 [] None drawn      [x] Negative             []  Positive (Details:  )  Urine Culture:                   [] None drawn      [x] Negative             []  Positive (Details:  )  Sputum Culture:               [x] None drawn       [] Negative             []  Positive (Details:  )   Endotracheal aspirate:     [x] None drawn       [] Negative             []  Positive (Details:  )       Consults:     1. 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      POD-3 postoperatively stable. Nausea present, no bowel movement yet  To receive milk and molasses enema today. On abraham catheter. Good urine output. Hb-9.3. Diet N.p.o.,okay for popsicles and ice chips. NPO until resuming bowel function. NG out. Adequate Pain control management with robaxin and PRN meds. -DOMINIQUE- prerenal and nonoliguric type secondary to hypovolemia from dehydration-resolving, Baseline creatinine around 1. Urine output around 1 L in 24 hours. Daily BMP.  Intake output.     -Hypernatremia-resolved. Sodium 140. Continue with IV hydration.     -Hyperglycemia- HbA1c 5.6. Glucose control adequate.  medium dose correction scale.  POCT glucose checks every 6 hours     -Leukocytosis- improving. WBC 16.4.  Afebrile. Urine culture showed no growth till date  Continued on IV hydration.  Empirical coverage with I.v  Zosyn (start date 9/3).      -Lactic acidosis-Improved.  Continued on IV hydration.  IV Zosyn for empirical coverage.    Blood and urine culture showed no growth till date     -Hypertension-well controlled. On  IV labetalol 10 every 6 hours as needed.  On lisinopril 20, Lasix 40 at home.  Confirm medication with pharmacy and resume when tolerate p.o.     -Hyperlipidemia-resume home medicines when tolerates p.o.  -CVA in the past-stable currently. -Gout- on allopurinol at home. Resume when pt tolerates p/o and renal function better        Encourage patient to do incentive spirometry. Above mentioned assessment and plan was discussed by me with the admitting medicine resident. The medicine team assigned to the patient by medicine admitting resident will be following up the patient from now onwards on the floor. Monty Hopkins M.D.   Internal Medicine PGY1  9/6/2020, 10:48 AM

## 2020-09-04 NOTE — PROGRESS NOTES
Occupational Therapy   Occupational Therapy Initial Assessment  Date: 2020   Patient Name: Jose Waldron  MRN: [id number]     : 1940    Date of Service: 2020    Discharge Recommendations:  Patient would benefit from continued therapy after discharge  OT Equipment Recommendations  Equipment Needed: Yes  Mobility Devices: ADL Assistive Devices  ADL Assistive Devices: Shower Chair with back    Assessment   Performance deficits / Impairments: Decreased functional mobility ; Decreased safe awareness;Decreased ADL status; Decreased endurance;Decreased high-level IADLs;Decreased strength;Decreased ROM  Assessment: Pt required Min-Mod A for bed mobility and functional transers/mobility this date. Required total assist to don socks. Pt limited by surgical pain. Pt is expected to require OT services during acute hospitilization to maximize safety and increase independence in functional mobility and ADL tasks. Pt lives alone and is currently unsafet to return to prior living arrangement without 24/7 assistance for all aspects of ADL/functional mobility tasks. Prognosis: Good  Decision Making: Medium Complexity  OT Education: OT Role;Plan of Care;Energy Conservation;Transfer Training  REQUIRES OT FOLLOW UP: Yes  Activity Tolerance  Activity Tolerance: Patient limited by pain  Safety Devices  Safety Devices in place: Yes  Type of devices: Gait belt;Patient at risk for falls; Left in chair;Call light within reach; Chair alarm in place;Nurse notified  Restraints  Initially in place: No           Patient Diagnosis(es): The primary encounter diagnosis was Gastric outlet obstruction. A diagnosis of Nausea and vomiting, intractability of vomiting not specified, unspecified vomiting type was also pertinent to this visit.      has a past medical history of Anemia, Cataract, Cerebral artery occlusion with cerebral infarction ([address] CHF (congestive heart failure) ([facility] [address] Depression, Diabetes mellitus ([facility] [address] Eczema, Gout, Hearing loss, Hiatal hernia, History of blood transfusion, Hyperlipidemia, Hypertension, PONV (postoperative nausea and vomiting), Rectal urgency, and Stress incontinence, female. has a past surgical history that includes Hysterectomy; Appendectomy; eye surgery; Cataract removal with implant (Bilateral); Colonoscopy; joint replacement; pr total hip arthroplasty (Left, 11/6/2018); and Abdomen surgery (09/03/2020). Restrictions  Restrictions/Precautions  Restrictions/Precautions: Fall Risk, Up as Tolerated  Required Braces or Orthoses?: No  Position Activity Restriction  Other position/activity restrictions: G-tube to gravity    Subjective   General  Chart Reviewed: Orders, Progress Notes, History and Physical, Imaging, Labs, Operative Notes  Patient assessed for rehabilitation services?: Yes  Family / Caregiver Present: No  General Comment  Comments: Rn ok'd for therapy visit this date. Pt agreeable to session, pleasent/cooperative throughout.   Patient Currently in Pain: Yes  Pain Assessment  Pain Assessment: 0-10  Pain Level: 6  Pain Type: Acute pain  Pain Location: Abdomen  Pain Orientation: Left  Pain Descriptors: Discomfort  Non-Pharmaceutical Pain Intervention(s): Ambulation/Increased Activity;Repositioned  Response to Pain Intervention: Patient Satisfied  Multiple Pain Sites: No  Vital Signs  Pulse: 90  Resp: 20  BP: 137/69  MAP (mmHg): 81  Patient Currently in Pain: Yes  Oxygen Therapy  SpO2: 96 %     Social/Functional History  Social/Functional History  Lives With: Alone  Type of Home: Apartment  Home Layout: One level  Home Access: Ramped entrance  Entrance Stairs - Number of Steps: 1  Bathroom Shower/Tub: Tub/Shower unit  Bathroom Toilet: Standard  Bathroom Equipment: Grab bars in shower, Grab bars around toilet  Home Equipment: Cane, [employer], Rolling walker  ADL Assistance: Independent  Homemaking Assistance: Independent(Pt reports difficulty with cooking)  Homemaking Responsibilities: Yes  Ambulation Assistance: Independent(Independently ambulates with RW at baseline)  Transfer Assistance: Independent  Active : No  Mode of Transportation: Family  Occupation: Retired  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 Min A upon first sitting up, progressed to CGA.)  Standing Balance: Minimal assistance  Standing Balance  Time: ~4 minutes  Activity: Standing EOB, functional mobility to chair. Comment: Use of RW    Functional Mobility  Functional - Mobility Device: Rolling Walker  Activity: (bed to recliner)  Assist Level: Minimal assistance(Min-CGA during functional mobility.)  Functional Mobility Comments: Increased time and effort required with slow gait. Assistance for line management. Pt encouraged to use deep breathing techniques. ADL  Feeding: Modified independent   Grooming: Minimal assistance; Increased time to complete;Setup  UE Bathing: Moderate assistance; Increased time to complete  LE Bathing: Maximum assistance; Increased time to complete  UE Dressing: Minimal assistance; Increased time to complete  LE Dressing: Increased time to complete;Dependent/Total(Don socks lying supine in bed)  Toileting: Maximum assistance; Increased time to complete  Additional Comments: Pt required total assist to don socks lying supine in bed. Pt limited secondary to pain post functional  mobility/trasnfers. ADL tasks not assessed. At this time pt expected to require significant assistance for functional mobility/ADL tasks.     Tone RUE  RUE Tone: Normotonic  Tone LUE  LUE Tone: Normotonic    Coordination  Movements Are Fluid And Coordinated: Yes     Bed mobility  Rolling to Right: Moderate assistance  Sit to Supine: Moderate assistance(Mod A for BLE progression and UE support)  Scooting: Moderate assistance(Scooting EOB)  Comment: Increased time/effort required for all aspects of bed mobility this date. HOB raised. VCs provided for proper hand placement throughout. Transfers  Sit to stand: Moderate assistance  Transfer Comments: Use of RW. Increased time/effort required. Pt unable to complete transfer with proper hand placement and used 2 hands on the RW to assist.     Cognition  Overall Cognitive Status: WFL        Sensation  Overall Sensation Status: WNL          Pt reports R rotator cuff tear that has not been treated. Pt with significantly limited AROM, PROM, and strength in shoulder of RUE. Assessment limited d/t pt with increased pain throughout.  P    LUE AROM (degrees)  LUE AROM : Exceptions  L Shoulder Flexion 0-180: 0-120  Left Hand AROM (degrees)  Left Hand AROM: WFL  RUE PROM (degrees)  RUE PROM: Exceptions  R Shoulder Flex  0-180: 0-90  R Shoulder ABduction 0-180: 0-90  RUE AROM (degrees)  RUE AROM : Exceptions  R Shoulder Flexion 0-180: 0-45  R Shoulder ABduction 0-180: 0-80  Right Hand AROM (degrees)  Right Hand AROM: WFL  LUE Strength  Gross LUE Strength: [facility]  L Hand General: 4-/5  LUE Strength Comment: Grossly 4-/5  RUE Strength  Gross RUE Strength: Exceptions to [facility]  R Shoulder Flex: 3-/5  R Shoulder ABduction: 3-/5  R Elbow Flex: 3-/5  R Hand General: 3-/5           Plan   Plan  Times per week: 4-5x/week  Current Treatment Recommendations: Safety Education & Training, Patient/Caregiver Education & Training, Self-Care / ADL, Functional Mobility Training, Endurance Training, ROM, Strengthening, Equipment Evaluation, Education, & procurement      AM-PAC Score        AM-PAC Inpatient Daily Activity Raw Score: 14 (09/04/20 1106)  AM-PAC Inpatient ADL T-Scale Score : 33.39 (09/04/20 1106)  ADL Inpatient CMS 0-100% Score: 59.67 (09/04/20 1106)  ADL Inpatient CMS G-Code Modifier : CK (09/04/20 1106)    Goals  Short term goals  Time Frame for Short term goals: By discharge:  Short term goal 1: Pt will complete functional transfers/functional mobility with Mod I, use of AD/AE PRN. Short term goal 2: Pt will complete ADLs with Mod I, use of AD/AE PRN.   Short term goal 3: Pt will demo ~15 minutes of standing tolerance during functional tasks to increase participation in ADL/IADL tasks  Short term goal 4: Pt will demo good safety awareness throughout all ADL/functional mobility tasks  Short term goal 5: Pt will demo energy conservation techniques throughout all ADL/functional mobility tasks       Therapy Time   Individual Concurrent Group Co-treatment   Time In 0853         Time Out 0929         Minutes 36         Timed Code Treatment Minutes: 1010 [facility],

## 2020-09-04 NOTE — PLAN OF CARE
Nutrition Problem #1: Inadequate oral intake  Intervention: Food and/or Nutrient Delivery: Start Oral Diet  Nutritional Goals: initiation of nutrition within 72 hours

## 2020-09-04 NOTE — FLOWSHEET NOTE
A- Post surgery visit. Patient remembered me from yesterday evening when I prayed with her before she was taken to surgery. She indicates that the surgery went well though she is in much pain today. She is hopeful of a complete recovery and hopes to go home soon. She lives alone in her own apartment but has good support from her family/children. I- I spoke to the patient and provided emotional and spiritual support. I asked how she was feeling and listened closely to her feelings. I asked if a prayer would be helpful and she agreed. O- Patient thanked me for checking on her today. She was grateful that I cared enough to see how she was doing after her surgery. She also thanked me for my prayers, last evening and today. 09/04/20 7512   Encounter Summary   Services provided to: Patient   Support System Children;Family members   Place of Mu-ism None   Contact Jehovah's witness No   Continue Visiting   (9/4/20)   Complexity of Encounter Low   Length of Encounter 15 minutes   Spiritual Assessment Completed Yes   Routine   Type Post-procedure   Assessment Calm; Approachable;Coping   Intervention Active listening;Explored feelings, thoughts, concerns;Nurtured hope;Prayer;Sustaining presence/ Ministry of presence   Outcome Acceptance;Comfort;Expressed gratitude

## 2020-09-05 LAB
ABSOLUTE EOS #: 0 K/UL (ref 0–0.4)
ABSOLUTE IMMATURE GRANULOCYTE: 0 K/UL (ref 0–0.3)
ABSOLUTE LYMPH #: 2.29 K/UL (ref 1–4.8)
ABSOLUTE MONO #: 1.15 K/UL (ref 0.1–0.8)
ANION GAP SERPL CALCULATED.3IONS-SCNC: 9 MMOL/L (ref 9–17)
BASOPHILS # BLD: 0 % (ref 0–2)
BASOPHILS ABSOLUTE: 0 K/UL (ref 0–0.2)
BUN BLDV-MCNC: 20 MG/DL (ref 8–23)
BUN/CREAT BLD: ABNORMAL (ref 9–20)
CALCIUM SERPL-MCNC: 8.4 MG/DL (ref 8.6–10.4)
CHLORIDE BLD-SCNC: 105 MMOL/L (ref 98–107)
CO2: 25 MMOL/L (ref 20–31)
CREAT SERPL-MCNC: 1.45 MG/DL (ref 0.5–0.9)
DIFFERENTIAL TYPE: ABNORMAL
EOSINOPHILS RELATIVE PERCENT: 0 % (ref 1–4)
GFR AFRICAN AMERICAN: 42 ML/MIN
GFR NON-AFRICAN AMERICAN: 35 ML/MIN
GFR SERPL CREATININE-BSD FRML MDRD: ABNORMAL ML/MIN/{1.73_M2}
GFR SERPL CREATININE-BSD FRML MDRD: ABNORMAL ML/MIN/{1.73_M2}
GLUCOSE BLD-MCNC: 118 MG/DL (ref 65–105)
GLUCOSE BLD-MCNC: 123 MG/DL (ref 70–99)
GLUCOSE BLD-MCNC: 124 MG/DL (ref 65–105)
GLUCOSE BLD-MCNC: 128 MG/DL (ref 65–105)
GLUCOSE BLD-MCNC: 138 MG/DL (ref 65–105)
HCT VFR BLD CALC: 32.7 % (ref 36.3–47.1)
HEMOGLOBIN: 10.1 G/DL (ref 11.9–15.1)
IMMATURE GRANULOCYTES: 0 %
LYMPHOCYTES # BLD: 12 % (ref 24–44)
MAGNESIUM: 1.7 MG/DL (ref 1.6–2.6)
MCH RBC QN AUTO: 29.1 PG (ref 25.2–33.5)
MCHC RBC AUTO-ENTMCNC: 30.9 G/DL (ref 28.4–34.8)
MCV RBC AUTO: 94.2 FL (ref 82.6–102.9)
MONOCYTES # BLD: 6 % (ref 1–7)
MORPHOLOGY: NORMAL
NRBC AUTOMATED: 0 PER 100 WBC
PATHOLOGIST REVIEW: NORMAL
PDW BLD-RTO: 13.8 % (ref 11.8–14.4)
PLATELET # BLD: 207 K/UL (ref 138–453)
PLATELET ESTIMATE: ABNORMAL
PMV BLD AUTO: 11.7 FL (ref 8.1–13.5)
POTASSIUM SERPL-SCNC: 4 MMOL/L (ref 3.7–5.3)
RBC # BLD: 3.47 M/UL (ref 3.95–5.11)
RBC # BLD: ABNORMAL 10*6/UL
SEG NEUTROPHILS: 82 % (ref 36–66)
SEGMENTED NEUTROPHILS ABSOLUTE COUNT: 15.66 K/UL (ref 1.8–7.7)
SODIUM BLD-SCNC: 139 MMOL/L (ref 135–144)
WBC # BLD: 19.1 K/UL (ref 3.5–11.3)
WBC # BLD: ABNORMAL 10*3/UL

## 2020-09-05 PROCEDURE — 80048 BASIC METABOLIC PNL TOTAL CA: CPT

## 2020-09-05 PROCEDURE — 6360000002 HC RX W HCPCS: Performed by: STUDENT IN AN ORGANIZED HEALTH CARE EDUCATION/TRAINING PROGRAM

## 2020-09-05 PROCEDURE — 51701 INSERT BLADDER CATHETER: CPT

## 2020-09-05 PROCEDURE — 99291 CRITICAL CARE FIRST HOUR: CPT | Performed by: INTERNAL MEDICINE

## 2020-09-05 PROCEDURE — 85025 COMPLETE CBC W/AUTO DIFF WBC: CPT

## 2020-09-05 PROCEDURE — 2580000003 HC RX 258: Performed by: STUDENT IN AN ORGANIZED HEALTH CARE EDUCATION/TRAINING PROGRAM

## 2020-09-05 PROCEDURE — 51798 US URINE CAPACITY MEASURE: CPT

## 2020-09-05 PROCEDURE — 2500000003 HC RX 250 WO HCPCS: Performed by: STUDENT IN AN ORGANIZED HEALTH CARE EDUCATION/TRAINING PROGRAM

## 2020-09-05 PROCEDURE — 83735 ASSAY OF MAGNESIUM: CPT

## 2020-09-05 PROCEDURE — 36415 COLL VENOUS BLD VENIPUNCTURE: CPT

## 2020-09-05 PROCEDURE — 2000000000 HC ICU R&B

## 2020-09-05 PROCEDURE — 6370000000 HC RX 637 (ALT 250 FOR IP): Performed by: STUDENT IN AN ORGANIZED HEALTH CARE EDUCATION/TRAINING PROGRAM

## 2020-09-05 PROCEDURE — 6360000002 HC RX W HCPCS: Performed by: INTERNAL MEDICINE

## 2020-09-05 PROCEDURE — 82947 ASSAY GLUCOSE BLOOD QUANT: CPT

## 2020-09-05 RX ORDER — DEXTROSE, SODIUM CHLORIDE, AND POTASSIUM CHLORIDE 5; .45; .15 G/100ML; G/100ML; G/100ML
INJECTION INTRAVENOUS CONTINUOUS
Status: DISCONTINUED | OUTPATIENT
Start: 2020-09-05 | End: 2020-09-07

## 2020-09-05 RX ORDER — MAGNESIUM SULFATE IN WATER 40 MG/ML
2 INJECTION, SOLUTION INTRAVENOUS ONCE
Status: DISCONTINUED | OUTPATIENT
Start: 2020-09-05 | End: 2020-09-05 | Stop reason: SDUPTHER

## 2020-09-05 RX ORDER — MAGNESIUM SULFATE IN WATER 40 MG/ML
2 INJECTION, SOLUTION INTRAVENOUS ONCE
Status: COMPLETED | OUTPATIENT
Start: 2020-09-05 | End: 2020-09-05

## 2020-09-05 RX ORDER — BISACODYL 10 MG
10 SUPPOSITORY, RECTAL RECTAL ONCE
Status: COMPLETED | OUTPATIENT
Start: 2020-09-05 | End: 2020-09-05

## 2020-09-05 RX ADMIN — METOCLOPRAMIDE 10 MG: 5 INJECTION, SOLUTION INTRAMUSCULAR; INTRAVENOUS at 17:43

## 2020-09-05 RX ADMIN — ACETAMINOPHEN 1000 MG: 650 SOLUTION ORAL at 14:50

## 2020-09-05 RX ADMIN — POTASSIUM CHLORIDE, DEXTROSE MONOHYDRATE AND SODIUM CHLORIDE: 150; 5; 450 INJECTION, SOLUTION INTRAVENOUS at 15:19

## 2020-09-05 RX ADMIN — METOCLOPRAMIDE 10 MG: 5 INJECTION, SOLUTION INTRAMUSCULAR; INTRAVENOUS at 23:57

## 2020-09-05 RX ADMIN — HEPARIN SODIUM 5000 UNITS: 5000 INJECTION INTRAVENOUS; SUBCUTANEOUS at 14:50

## 2020-09-05 RX ADMIN — METOCLOPRAMIDE 10 MG: 5 INJECTION, SOLUTION INTRAMUSCULAR; INTRAVENOUS at 06:30

## 2020-09-05 RX ADMIN — SODIUM CHLORIDE, PRESERVATIVE FREE 10 ML: 5 INJECTION INTRAVENOUS at 20:55

## 2020-09-05 RX ADMIN — PIPERACILLIN AND TAZOBACTAM 3.38 G: 3; .375 INJECTION, POWDER, FOR SOLUTION INTRAVENOUS at 08:48

## 2020-09-05 RX ADMIN — METHOCARBAMOL TABLETS 750 MG: 750 TABLET, COATED ORAL at 10:40

## 2020-09-05 RX ADMIN — METHOCARBAMOL TABLETS 750 MG: 750 TABLET, COATED ORAL at 16:22

## 2020-09-05 RX ADMIN — ACETAMINOPHEN 1000 MG: 650 SOLUTION ORAL at 08:38

## 2020-09-05 RX ADMIN — BISACODYL 10 MG: 10 SUPPOSITORY RECTAL at 12:22

## 2020-09-05 RX ADMIN — FAMOTIDINE 20 MG: 10 INJECTION INTRAVENOUS at 08:50

## 2020-09-05 RX ADMIN — HEPARIN SODIUM 5000 UNITS: 5000 INJECTION INTRAVENOUS; SUBCUTANEOUS at 05:05

## 2020-09-05 RX ADMIN — PIPERACILLIN AND TAZOBACTAM 3.38 G: 3; .375 INJECTION, POWDER, FOR SOLUTION INTRAVENOUS at 23:56

## 2020-09-05 RX ADMIN — MAGNESIUM SULFATE HEPTAHYDRATE 2 G: 40 INJECTION, SOLUTION INTRAVENOUS at 12:18

## 2020-09-05 RX ADMIN — HEPARIN SODIUM 5000 UNITS: 5000 INJECTION INTRAVENOUS; SUBCUTANEOUS at 20:55

## 2020-09-05 RX ADMIN — METHOCARBAMOL TABLETS 750 MG: 750 TABLET, COATED ORAL at 23:57

## 2020-09-05 RX ADMIN — METHOCARBAMOL TABLETS 750 MG: 750 TABLET, COATED ORAL at 05:05

## 2020-09-05 RX ADMIN — SODIUM CHLORIDE, PRESERVATIVE FREE 10 ML: 5 INJECTION INTRAVENOUS at 08:44

## 2020-09-05 RX ADMIN — ACETAMINOPHEN 1000 MG: 650 SOLUTION ORAL at 23:56

## 2020-09-05 RX ADMIN — SODIUM CHLORIDE, POTASSIUM CHLORIDE, SODIUM LACTATE AND CALCIUM CHLORIDE: 600; 310; 30; 20 INJECTION, SOLUTION INTRAVENOUS at 05:05

## 2020-09-05 RX ADMIN — POTASSIUM CHLORIDE, DEXTROSE MONOHYDRATE AND SODIUM CHLORIDE: 150; 5; 450 INJECTION, SOLUTION INTRAVENOUS at 23:56

## 2020-09-05 RX ADMIN — PIPERACILLIN AND TAZOBACTAM 3.38 G: 3; .375 INJECTION, POWDER, FOR SOLUTION INTRAVENOUS at 16:10

## 2020-09-05 RX ADMIN — POTASSIUM CHLORIDE, DEXTROSE MONOHYDRATE AND SODIUM CHLORIDE: 150; 5; 450 INJECTION, SOLUTION INTRAVENOUS at 06:36

## 2020-09-05 RX ADMIN — METOCLOPRAMIDE 10 MG: 5 INJECTION, SOLUTION INTRAMUSCULAR; INTRAVENOUS at 12:22

## 2020-09-05 ASSESSMENT — PAIN SCALES - GENERAL
PAINLEVEL_OUTOF10: 2
PAINLEVEL_OUTOF10: 0
PAINLEVEL_OUTOF10: 0
PAINLEVEL_OUTOF10: 5
PAINLEVEL_OUTOF10: 6
PAINLEVEL_OUTOF10: 0

## 2020-09-05 ASSESSMENT — PAIN DESCRIPTION - DESCRIPTORS: DESCRIPTORS: DISCOMFORT

## 2020-09-05 ASSESSMENT — PAIN DESCRIPTION - PAIN TYPE
TYPE: ACUTE PAIN
TYPE: ACUTE PAIN

## 2020-09-05 ASSESSMENT — PAIN DESCRIPTION - LOCATION
LOCATION: ABDOMEN
LOCATION: ABDOMEN

## 2020-09-05 ASSESSMENT — PAIN DESCRIPTION - ORIENTATION
ORIENTATION: LEFT
ORIENTATION: LEFT

## 2020-09-05 NOTE — CARE COORDINATION
TRANSITIONAL CARE PLANNING/ 2 [facility] Day: 2    Reason for Admission: Gastric outlet obstruction [K31.1]  Volvulus ([address] [K56.2]     Treatment Plan of Care: npo with Gt to gravity await GI activity s/p exp lap paraesophageal hernia repair    Tests/Procedures still needed:     Barriers to Discharge: medical improvement Gi activity, restart and tolerate diet    Readmission Risk              Risk of Unplanned Readmission:        17            Patient goals/Treatment Preferences/Transitional Plan:     Referrals Made: Angelique    Follow Up needed:

## 2020-09-05 NOTE — PROGRESS NOTES
General Surgery:  Daily Progress Note          POD # 2 s/p ex lap, paraesophageal hernia repair with mesh bridge, double gastropexy, kocherization of the duodenum          PATIENT NAME: Jose Waldron     TODAY'S DATE: 9/5/2020, 5:15 AM  CC: Emesis    SUBJECTIVE:     Pt seen and examined at bedside. No overnight events. Pt reports she has not had nausea since yesterday, denies vomiting, reports right sided abdominal pain 6/10. Adequate UOP. Not passing gas, no BM. Afebrile, Tmax 99.5    OBJECTIVE:   VITALS:  BP (!) 158/77   Pulse 110   Temp 98.2 °F (36.8 °C) (Oral)   Resp 19   Ht 5' 4\" (1.626 m)   Wt 181 lb 7 oz (82.3 kg)   SpO2 94%   BMI 31.14 kg/m²      INTAKE/OUTPUT:      Intake/Output Summary (Last 24 hours) at 9/5/2020 0515  Last data filed at 9/5/2020 0400  Gross per 24 hour   Intake 3520 ml   Output 834 ml   Net 2686 ml       PHYSICAL EXAM:  CONSTITUTIONAL:  awake, alert,  some distress due to nausea, pain  HEENT: Normocephalic/atraumatic, without obvious abnormality. NG tube in place, brown fluid in canister  NECK:  Supple, symmetrical, trachea midline   CARDIOVASCULAR: Regular rate, well-perfused  LUNGS: Unlabored breathing  ABDOMEN: Soft, attp, nd,  incision c/d/i w/ staples. Gastrostomy tubes X2 intact. MUSCULOSKELETAL: Muscle strength intact in all extremities bilaterally. NEUROLOGIC: CN II- XII intact. Gross motor intact without focal weakness. SKIN: No cyanosis, rashes, or edema noted.   Orientation:   oriented to person, place, and time    IV Access: AV   Noe:  yes      Data:  CBC with Differential:    Lab Results   Component Value Date    WBC 19.1 09/05/2020    RBC 3.47 09/05/2020    HGB 10.1 09/05/2020    HCT 32.7 09/05/2020     09/05/2020    MCV 94.2 09/05/2020    MCH 29.1 09/05/2020    MCHC 30.9 09/05/2020    RDW 13.8 09/05/2020    LYMPHOPCT PENDING 09/05/2020    MONOPCT PENDING 09/05/2020    BASOPCT PENDING 09/05/2020    MONOSABS PENDING 09/05/2020    LYMPHSABS PENDING 09/05/2020    EOSABS PENDING 09/05/2020    BASOSABS PENDING 09/05/2020    DIFFTYPE NOT REPORTED 09/05/2020     CMP:    Lab Results   Component Value Date     09/04/2020    K 3.9 09/04/2020     09/04/2020    CO2 27 09/04/2020    BUN 19 09/04/2020    CREATININE 1.36 09/04/2020    GFRAA 45 09/04/2020    LABGLOM 37 09/04/2020    GLUCOSE 159 09/04/2020    PROT 6.8 09/03/2020    LABALBU 3.6 09/03/2020    CALCIUM 8.0 09/04/2020    BILITOT 0.32 09/03/2020    ALKPHOS 102 09/03/2020    AST 21 09/03/2020    ALT 13 09/03/2020     BMP:    Lab Results   Component Value Date     09/04/2020    K 3.9 09/04/2020     09/04/2020    CO2 27 09/04/2020    BUN 19 09/04/2020    LABALBU 3.6 09/03/2020    CREATININE 1.36 09/04/2020    CALCIUM 8.0 09/04/2020    GFRAA 45 09/04/2020    LABGLOM 37 09/04/2020    GLUCOSE 159 09/04/2020     LDH:    Lab Results   Component Value Date     12/12/2018     PT/INR:    Lab Results   Component Value Date    PROTIME 9.9 09/03/2020    INR 0.9 09/03/2020     Warfarin PT/INR:  No components found for: PTPATWAR, PTINRWAR  PTT:    Lab Results   Component Value Date    APTT 22.2 09/03/2020   [APTT}    Radiology Review:    Ct Abdomen Pelvis Wo Contrast Additional Contrast? None    Result Date: 9/3/2020  Intrathoracic stomach full of contents. Volvulus/outlet obstruction not excluded. Further evaluation can be performed with the administration of oral contrast in repeat study if clinically indicated. Ct Head Wo Contrast    Result Date: 9/3/2020  No acute intracranial abnormality. Ct Chest Wo Contrast    Result Date: 9/3/2020  1. A type IV paraesophageal hiatal hernia which also contains the distal half of the transverse colon. NG tube in place. Some contrast has been administered which collects in the gastric fundus. The rest of the stomach is fluid-filled.   Mild gastric distension noted which could be due to administered contrast.  Difficult to determine if there is some degree of obstruction on the basis of this study. No evidence for gastric pneumatosis. 2. Some bibasilar lower lobe atelectasis/scarring around the hiatal hernia noted along with trace left pleural effusion. Ct Cervical Spine Wo Contrast    Result Date: 9/3/2020  No acute abnormality of the cervical spine. Xr Chest Portable    Result Date: 9/3/2020  No acute cardiopulmonary abnormality. Large hiatal hernia. ASSESSMENT:  Active Hospital Problems    Diagnosis Date Noted    Gastric outlet obstruction [K31.1] 09/03/2020    Volvulus ([address] [K56.2] 09/03/2020       79 y.o. 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 Diet: NPO until return of bowel function  2. Analgesia:  MMT, per primary  3. Nausea: ondansetron  4. Dressing removed  5. GI ppx: Pepcid  6. Abx: Zosyn  7. Continue medical management per primary team      Electronically signed by Yani Rabago DO  on 9/5/2020 at 5:15 AM     Pt seen and examined with Dr. David Abbott changes made accordingly. Pt recovering well. Up oob to chair yesterday. No nausea. No flatus/bm. Abd: soft, nd, attp. Incision c/d/i w/ staples. X2 gastrostomy tubes in place. Continue NPO, ok for sips with meds and ice chips  IVF changed to D5 1/2NS +20meq KCl. Need to monitor uop as cr slightly elevated this morning. Cont abx day 2/4: zosyn.    Ok for txf out of ICU  Encourage ambulation, IS use  Medical and supportive care per primary     [facility]

## 2020-09-05 NOTE — PLAN OF CARE
Problem: Falls - Risk of:  Goal: Will remain free from falls  Description: Will remain free from falls  Outcome: Met This Shift  Goal: Absence of physical injury  Description: Absence of physical injury  Outcome: Met This Shift     Problem: Skin Integrity:  Goal: Will show no infection signs and symptoms  Description: Will show no infection signs and symptoms  Outcome: Ongoing  Goal: Absence of new skin breakdown  Description: Absence of new skin breakdown  Outcome: Ongoing     Problem: Pain:  Goal: Pain level will decrease  Description: Pain level will decrease  Outcome: Ongoing  Goal: Control of acute pain  Description: Control of acute pain  Outcome: Ongoing  Goal: Control of chronic pain  Description: Control of chronic pain  Outcome: Ongoing     Problem: Nutrition  Goal: Optimal nutrition therapy  Description: Nutrition Problem #1: Inadequate oral intake  Intervention: Food and/or Nutrient Delivery: Start Oral Diet  Nutritional Goals: initiation of nutrition within 72 hours     Outcome: Ongoing

## 2020-09-05 NOTE — PROGRESS NOTES
Critical Care Team - Daily Progress Note      Date and time: 2020 3:17 PM  Patient's name:  Mack Diaz Record Number: [id number]  Patient's account/billing number: [de-identified]  Patient's YOB: 1940  Age: 79 y.o. Date of Admission: 9/3/2020  3:53 AM  Length of stay during current admission: 2      Primary Care Physician: Dale Holt, KAREEM - CNP  ICU Attending Physician: Dr. Rock Wakefield Status: Full Code    Reason for ICU admission: Gastric volvulus  Chief Complaint   Patient presents with    Emesis     Pt c/o vomiting for 2 days. SUBJECTIVE:          POD 2, no acute events overnight. Dressing removed. Current evaluation: This morning pt was lying comfortably on bed. Denies nausea/vomiting and 4/10 abd pain. Urine output 509ml in 24 hours    Pertinent labs showed sodium trending down 139, improving creatinine at 1.45, improving lactate 2.6, WBC trending down 21.3, hemoglobin stable 10.1    Diet N.p.o. until return of bowel function. For pain management, robaxin,  tylneol 1g every 8 fentanyl 50- every 5 PRN. Dilaudid 0.5 every 3 PRN and Roxicodone 5 every 4 PRN. IV Zofran. Patient had PACs on telemetry, and a few non sustained PVCs, magnesium sulfate 2g IV PRN ordered. Will monitor today and if stable, transfer the patient to med-surg unit tomorrow.                                                                                                                OBJECTIVE:     VITAL SIGNS:  BP (!) 159/73   Pulse 102   Temp 98.1 °F (36.7 °C) (Oral)   Resp 19   Ht 5' 4\" (1.626 m)   Wt 181 lb 7 oz (82.3 kg)   SpO2 94%   BMI 31.14 kg/m²   Tmax over 24 hours:  Temp (24hrs), Av.5 °F (36.9 °C), Min:97.9 °F (36.6 °C), Max:99.5 °F (37.5 °C)      Patient Vitals for the past 8 hrs:   BP Temp Temp src Pulse Resp SpO2   20 1500 -- -- -- 102 19 94 %   20 1400 -- -- -- 99 19 95 %   20 1300 -- -- -- 97 20 93 %   20 1217 (!) 159/73 98.1 °F (36.7 °C) Oral 99 20 94 %   09/05/20 1200 -- -- -- 85 17 94 %   09/05/20 1000 -- -- -- 87 17 96 %   09/05/20 0900 -- -- -- 103 19 96 %   09/05/20 0836 -- 98.6 °F (37 °C) Oral 119 21 92 %   09/05/20 0815 (!) 144/62 -- -- 97 19 94 %   09/05/20 0800 -- -- -- 106 20 94 %         Intake/Output Summary (Last 24 hours) at 9/5/2020 1517  Last data filed at 9/5/2020 1459  Gross per 24 hour   Intake 3525.84 ml   Output 1189 ml   Net 2336.84 ml     Date 09/05/20 0000 - 09/05/20 2359   Shift 9393-0131 0621-9007 1317-3730 24 Hour Total   INTAKE   I.V.(mL/kg) 946(11.5) 1045. 8(12.7)  8225.0(12.7)   IV Piggyback(mL/kg)  100(1.2)  100(1.2)   Shift Total(mL/kg) 946(11.5) 1145. 8(13.9)  9283.3(29.1)   OUTPUT   Urine(mL/kg/hr) 155(0.2) 175  330   Drains(mL/kg) 200(2.4) 175(2.1)  375(4.6)   Shift Total(mL/kg) 355(4.3) 350(4.3)  705(8.6)   Weight (kg) 82.3 82.3 82.3 82.3     Wt Readings from Last 3 Encounters:   09/04/20 181 lb 7 oz (82.3 kg)   07/13/20 180 lb (81.6 kg)   06/05/20 180 lb (81.6 kg)     Body mass index is 31.14 kg/m². PHYSICAL EXAM:  Constitutional: Appears well, no distress  EENT: PERRLA, EOMI intact. Neck: Supple, symmetrical, trachea midline  Respiratory: clear to auscultation, no wheezes or rales  Cardiovascular: regular rate and rhythm, normal S1, S2, no murmur noted   Abdomen: Dressing removed,  soft, nontender, nondistended, no organomegaly  NEUROLOGIC Awake, alert, oriented to name, place and time. Motor and  sensory function grossly intact.   Extremities:peripheral pulses normal, no pedal edema  SKIN: normal coloration and turgor    Any additional physical findings:      MEDICATIONS:  Scheduled Meds:   metoclopramide  10 mg Intravenous Q6H    sodium chloride flush  10 mL Intravenous 2 times per day    famotidine (PEPCID) injection  20 mg Intravenous Daily    heparin (porcine)  5,000 Units Subcutaneous 3 times per day    insulin lispro  0-12 Units Subcutaneous TID     insulin lispro  0-6 Units Subcutaneous Nightly    piperacillin-tazobactam  3.375 g Intravenous Q8H    acetaminophen  1,000 mg Oral Q8H    lidocaine  2 patch Transdermal Daily    methocarbamol  750 mg Oral Q6H     Continuous Infusions:   dextrose 5% and 0.45% NaCl with KCl 20 mEq 125 mL/hr at 09/05/20 0636    dextrose      lactated ringers Stopped (09/05/20 0635)     PRN Meds:   sodium chloride flush, 10 mL, PRN  potassium chloride, 40 mEq, PRN    Or  potassium alternative oral replacement, 40 mEq, PRN    Or  potassium chloride, 10 mEq, PRN  magnesium sulfate, 1 g, PRN  polyethylene glycol, 17 g, Daily PRN  ondansetron, 4 mg, Q6H PRN  labetalol, 10 mg, Q4H PRN  glucose, 15 g, PRN  dextrose, 12.5 g, PRN  glucagon (rDNA), 1 mg, PRN  dextrose, 100 mL/hr, PRN  oxyCODONE, 5 mg, Q4H PRN  HYDROmorphone, 0.5 mg, Q3H PRN        SUPPORT DEVICES: [] Ventilator [] BIPAP  [] Nasal Cannula [x] Room Air    DATA:  Complete Blood Count:   Recent Labs     09/03/20 2317 09/04/20 0356 09/05/20  0434   WBC 26.7* 21.3* 19.1*   HGB 11.0* 10.2* 10.1*   MCV 94.9 91.0 94.2    214 207   RBC 3.89* 3.57* 3.47*   HCT 36.9 32.5* 32.7*   MCH 28.3 28.6 29.1   MCHC 29.8 31.4 30.9   RDW 13.5 13.4 13.8   MPV 11.3 10.9 11.7        PT/INR:    Lab Results   Component Value Date    PROTIME 9.9 09/03/2020    INR 0.9 09/03/2020     PTT:    Lab Results   Component Value Date    APTT 22.2 09/03/2020       Basal Metabolic Profile:   Recent Labs     09/03/20 2317 09/04/20 0356 09/05/20  0434    145* 139   K 3.3* 3.9 4.0   BUN 18 19 20   CREATININE 1.26* 1.36* 1.45*    107 105   CO2 26 27 25      Magnesium:   Lab Results   Component Value Date    MG 1.7 09/05/2020    MG 1.8 09/03/2020    MG 2.0 09/03/2020     Phosphorus:   Lab Results   Component Value Date    PHOS 3.7 09/03/2020     S. Calcium:  Recent Labs     09/05/20  0434   CALCIUM 8.4*     S. Ionized Calcium:No results for input(s): IONCA in the last 72 hours.       Urinalysis:   Lab Results   Component Value Date    NITRU NEGATIVE 09/03/2020    COLORU YELLOW 09/03/2020    PHUR 7.0 09/03/2020    WBCUA 0 TO 2 11/07/2018    RBCUA 0 TO 2 11/07/2018    MUCUS NOT REPORTED 11/07/2018    TRICHOMONAS NOT REPORTED 11/07/2018    YEAST NOT REPORTED 11/07/2018    BACTERIA FEW 11/07/2018    CLARITYU clear 01/30/2020    SPECGRAV 1.015 09/03/2020    LEUKOCYTESUR NEGATIVE 09/03/2020    UROBILINOGEN Normal 09/03/2020    BILIRUBINUR NEGATIVE 09/03/2020    BILIRUBINUR - 01/30/2020    BLOODU - 01/30/2020    GLUCOSEU TRACE 09/03/2020    KETUA NEGATIVE 09/03/2020    AMORPHOUS NOT REPORTED 11/07/2018       CARDIAC ENZYMES: No results for input(s): CKMB, CKMBINDEX, TROPONINI in the last 72 hours. Invalid input(s): CKTOTAL;3  BNP: No results for input(s): BNP in the last 72 hours. LFTS  Recent Labs     09/03/20  0402 09/03/20  1010   ALKPHOS 121* 102   ALT 12 13   AST 17 21   BILITOT 0.31 0.32   LABALBU 4.1 3.6       AMYLASE/LIPASE/AMMONIA  Recent Labs     09/03/20  0402   LIPASE 52       Last 3 Blood Glucose:   Recent Labs     09/03/20  0402 09/03/20  1010 09/03/20  2317 09/04/20  0356 09/05/20  0434   GLUCOSE 225* 149* 186* 159* 123*      HgBA1c:    Lab Results   Component Value Date    LABA1C 5.6 09/03/2020         TSH:    Lab Results   Component Value Date    TSH 1.35 06/02/2020     ANEMIA STUDIES  Recent Labs     09/03/20  1010 09/03/20  1631   LABIRON 18*  --    TIBC 221*  --    FERRITIN 329*  --    [id number]  --  560   FOLATE  --  18.0           Cultures during this admission:     Blood cultures:                 [] None drawn      [x] Negative             []  Positive (Details:  )  Urine Culture:                   [] None drawn      [x] Negative             []  Positive    Radiological imaging  Ct Abdomen Pelvis Wo Contrast Additional Contrast? Non 9/3/2020  Intrathoracic stomach full of contents. Volvulus/outlet obstruction not excluded.   Further evaluation can be performed with the administration of oral contrast in repeat study if clinically indicated.      Ct Head Wo Contrast9/3/2020  No acute intracranial abnormality.      Ct Chest Wo Contrast9/3/2020   A type IV paraesophageal hiatal hernia which also contains the distal half of the transverse colon. NG tube in place. Some contrast has been administered which collects in the gastric fundus. The rest of the stomach is fluid-filled. Mild gastric distension noted which could be due to administered contrast.  Difficult to determine if there is some degree of obstruction on the basis of this study. No evidence for gastric pneumatosis. 2. Some bibasilar lower lobe atelectasis/scarring around the hiatal hernia noted along with trace left pleural effusion.      Ct Cervical Spine Wo Contrast     Result Date: 9/3/2020  EXAMINATION: CT OF THE CERVICAL SPINE WITHOUT CONTRAST 9/3/2020 4:01 am TECHNIQUE: CT of the cervical spine was performed without the administration of intravenous contrast. Multiplanar reformatted images are provided for review. Dose modulation, iterative reconstruction, and/or weight based adjustment of the mA/kV was utilized to reduce the radiation dose to as low as reasonably achievable. COMPARISON: None. HISTORY: ORDERING SYSTEM PROVIDED HISTORY: fall TECHNOLOGIST PROVIDED HISTORY: fall Reason for Exam: fall Acuity: Acute Type of Exam: Initial FINDINGS: BONES/ALIGNMENT: There is no acute fracture or traumatic malalignment. DEGENERATIVE CHANGES: Multilevel facet arthrosis and degenerative disc space narrowing. SOFT TISSUES: There is no prevertebral soft tissue swelling. Esophageal dilatation.      No acute abnormality of the cervical spine.      Xr Chest Portable 9/3/2020. No acute cardiopulmonary abnormality. Large hiatal hernia.           ASSESSMENT:     Active Problems:    Gastric outlet obstruction    Volvulus (HCC)  Resolved Problems:    * No resolved hospital problems.  *      PLAN:       Gastric volvulus causing partial gastric outlet obstruction and bowel ischemia  Type IV paraesophageal hiatal hernia    S/p  Ex lap, paraesophageal hernia repair with mesh bridge, double gastropexy, Kocherization of the duodenum. POD-2 postoperatively stable. No bowel movement yet. Denies nausea, vomiting. Urine output 509ml in 24 hours. Hb-10.1. Diet N.p.o. until return of bowel function. .  For pain management, robaxin,  tylneol 1g every 8 fentanyl 50- every 5 PRN. Dilaudid 0.5 every 3 PRN and Roxicodone 5 every 4 PRN. IV Zofran. -DOMINIQUE- prerenal and nonoliguric type secondary to hypovolemia from dehydration-resolving, Baseline creatinine around 1. Urine output around 509 mL in 24 hours. Daily BMP. Intake output.     -Hypernatremia-resolving. sodium 139 this morning. Continue with IV hydration.    -Hyperglycemia- HbA1c 5.6. Glucose control adequate.  medium dose correction scale. POCT glucose checks every 6 hours    -Leukocytosis- WBC trending down 19.1. Afebrile. Urine culture showed no growth till date  Continued on IV hydration. Empirical coverage with I.v  Zosyn.     -Lactic acidosis-Improving. 2.6 (9/4). Continued on IV hydration. IV Zosyn for empirical coverage. Blood and urine culture showed no growth till date    -Hypertension-current systolic blood pressure 473. on IV labetalol 10 every 6 hours as needed. On lisinopril 20, Lasix 40 at home. Confirm medication with pharmacy and resume when tolerate p.o.    -Hyperlipidemia-resume home medicines when tolerates p.o.  -CVA in the past-stable currently. -Gout- on allopurinol at home. Resume when pt tolerates p/o and renal function better     Feeding Diet: NPO-ok for Popsicles, ice chips  Fluids-dextrose 5% in half-normal saline with KCl 20 mEq infusion at 125 mils per hour  Family-updated  Analgesic-  robaxin,  tylneol 1g every 8 fentanyl 50- every 5 PRN.  Dilaudid 0.5 every 3 PRN and Roxicodone 5 every 4 PRN  Sedation None  Thrombo-prophylaxis- heparin 5000 units subcutaneous every 8 hourly  [employer]

## 2020-09-06 PROBLEM — Z98.890 S/P EXPLORATORY LAPAROTOMY: Status: ACTIVE | Noted: 2020-09-06

## 2020-09-06 PROBLEM — K31.89 ACUTE GASTRIC VOLVULUS: Status: ACTIVE | Noted: 2020-09-03

## 2020-09-06 PROBLEM — E87.6 HYPOKALEMIA: Status: ACTIVE | Noted: 2020-09-06

## 2020-09-06 PROBLEM — K55.9 SMALL BOWEL ISCHEMIA (HCC): Status: ACTIVE | Noted: 2020-09-06

## 2020-09-06 PROBLEM — N17.9 AKI (ACUTE KIDNEY INJURY) (HCC): Status: ACTIVE | Noted: 2020-09-06

## 2020-09-06 PROBLEM — D64.9 NORMOCYTIC ANEMIA: Status: ACTIVE | Noted: 2020-09-06

## 2020-09-06 PROBLEM — F03.90 DEMENTIA (HCC): Status: ACTIVE | Noted: 2020-09-06

## 2020-09-06 PROBLEM — K44.9 PARAESOPHAGEAL HERNIA: Status: ACTIVE | Noted: 2020-09-06

## 2020-09-06 LAB
ABSOLUTE EOS #: 0.16 K/UL (ref 0–0.44)
ABSOLUTE IMMATURE GRANULOCYTE: 0 K/UL (ref 0–0.3)
ABSOLUTE LYMPH #: 1.97 K/UL (ref 1.1–3.7)
ABSOLUTE MONO #: 1.64 K/UL (ref 0.1–1.2)
ANION GAP SERPL CALCULATED.3IONS-SCNC: 11 MMOL/L (ref 9–17)
BASOPHILS # BLD: 1 % (ref 0–2)
BASOPHILS ABSOLUTE: 0.16 K/UL (ref 0–0.2)
BUN BLDV-MCNC: 14 MG/DL (ref 8–23)
BUN/CREAT BLD: ABNORMAL (ref 9–20)
CALCIUM SERPL-MCNC: 8.1 MG/DL (ref 8.6–10.4)
CHLORIDE BLD-SCNC: 107 MMOL/L (ref 98–107)
CO2: 22 MMOL/L (ref 20–31)
CREAT SERPL-MCNC: 1.02 MG/DL (ref 0.5–0.9)
DIFFERENTIAL TYPE: ABNORMAL
EOSINOPHILS RELATIVE PERCENT: 1 % (ref 1–4)
GFR AFRICAN AMERICAN: >60 ML/MIN
GFR NON-AFRICAN AMERICAN: 52 ML/MIN
GFR SERPL CREATININE-BSD FRML MDRD: ABNORMAL ML/MIN/{1.73_M2}
GFR SERPL CREATININE-BSD FRML MDRD: ABNORMAL ML/MIN/{1.73_M2}
GLUCOSE BLD-MCNC: 132 MG/DL (ref 65–105)
GLUCOSE BLD-MCNC: 134 MG/DL (ref 70–99)
HCT VFR BLD CALC: 28.8 % (ref 36.3–47.1)
HEMOGLOBIN: 9.3 G/DL (ref 11.9–15.1)
IMMATURE GRANULOCYTES: 0 %
LYMPHOCYTES # BLD: 12 % (ref 24–43)
MCH RBC QN AUTO: 29.2 PG (ref 25.2–33.5)
MCHC RBC AUTO-ENTMCNC: 32.3 G/DL (ref 28.4–34.8)
MCV RBC AUTO: 90.6 FL (ref 82.6–102.9)
MONOCYTES # BLD: 10 % (ref 3–12)
MORPHOLOGY: NORMAL
NRBC AUTOMATED: 0 PER 100 WBC
PDW BLD-RTO: 13.4 % (ref 11.8–14.4)
PLATELET # BLD: 206 K/UL (ref 138–453)
PLATELET ESTIMATE: ABNORMAL
PMV BLD AUTO: 11.4 FL (ref 8.1–13.5)
POTASSIUM SERPL-SCNC: 3.6 MMOL/L (ref 3.7–5.3)
RBC # BLD: 3.18 M/UL (ref 3.95–5.11)
RBC # BLD: ABNORMAL 10*6/UL
SEG NEUTROPHILS: 76 % (ref 36–65)
SEGMENTED NEUTROPHILS ABSOLUTE COUNT: 12.47 K/UL (ref 1.5–8.1)
SODIUM BLD-SCNC: 140 MMOL/L (ref 135–144)
WBC # BLD: 16.4 K/UL (ref 3.5–11.3)
WBC # BLD: ABNORMAL 10*3/UL

## 2020-09-06 PROCEDURE — 6370000000 HC RX 637 (ALT 250 FOR IP): Performed by: STUDENT IN AN ORGANIZED HEALTH CARE EDUCATION/TRAINING PROGRAM

## 2020-09-06 PROCEDURE — 6360000002 HC RX W HCPCS: Performed by: STUDENT IN AN ORGANIZED HEALTH CARE EDUCATION/TRAINING PROGRAM

## 2020-09-06 PROCEDURE — 85025 COMPLETE CBC W/AUTO DIFF WBC: CPT

## 2020-09-06 PROCEDURE — 2580000003 HC RX 258: Performed by: STUDENT IN AN ORGANIZED HEALTH CARE EDUCATION/TRAINING PROGRAM

## 2020-09-06 PROCEDURE — 1200000000 HC SEMI PRIVATE

## 2020-09-06 PROCEDURE — 2500000003 HC RX 250 WO HCPCS: Performed by: STUDENT IN AN ORGANIZED HEALTH CARE EDUCATION/TRAINING PROGRAM

## 2020-09-06 PROCEDURE — 2500000003 HC RX 250 WO HCPCS: Performed by: SURGERY

## 2020-09-06 PROCEDURE — 80048 BASIC METABOLIC PNL TOTAL CA: CPT

## 2020-09-06 PROCEDURE — 99291 CRITICAL CARE FIRST HOUR: CPT | Performed by: INTERNAL MEDICINE

## 2020-09-06 PROCEDURE — 82947 ASSAY GLUCOSE BLOOD QUANT: CPT

## 2020-09-06 PROCEDURE — 36415 COLL VENOUS BLD VENIPUNCTURE: CPT

## 2020-09-06 RX ORDER — ACETAMINOPHEN 325 MG/1
650 TABLET ORAL EVERY 4 HOURS PRN
Status: DISCONTINUED | OUTPATIENT
Start: 2020-09-06 | End: 2020-09-07

## 2020-09-06 RX ADMIN — METOCLOPRAMIDE 10 MG: 5 INJECTION, SOLUTION INTRAMUSCULAR; INTRAVENOUS at 20:59

## 2020-09-06 RX ADMIN — METHOCARBAMOL TABLETS 750 MG: 750 TABLET, COATED ORAL at 16:17

## 2020-09-06 RX ADMIN — PIPERACILLIN AND TAZOBACTAM 3.38 G: 3; .375 INJECTION, POWDER, FOR SOLUTION INTRAVENOUS at 20:59

## 2020-09-06 RX ADMIN — METHOCARBAMOL TABLETS 750 MG: 750 TABLET, COATED ORAL at 06:00

## 2020-09-06 RX ADMIN — PIPERACILLIN AND TAZOBACTAM 3.38 G: 3; .375 INJECTION, POWDER, FOR SOLUTION INTRAVENOUS at 08:14

## 2020-09-06 RX ADMIN — METHOCARBAMOL TABLETS 750 MG: 750 TABLET, COATED ORAL at 21:23

## 2020-09-06 RX ADMIN — HEPARIN SODIUM 5000 UNITS: 5000 INJECTION INTRAVENOUS; SUBCUTANEOUS at 21:00

## 2020-09-06 RX ADMIN — METOCLOPRAMIDE 10 MG: 5 INJECTION, SOLUTION INTRAMUSCULAR; INTRAVENOUS at 11:59

## 2020-09-06 RX ADMIN — ACETAMINOPHEN 1000 MG: 650 SOLUTION ORAL at 06:00

## 2020-09-06 RX ADMIN — POTASSIUM CHLORIDE, DEXTROSE MONOHYDRATE AND SODIUM CHLORIDE: 150; 5; 450 INJECTION, SOLUTION INTRAVENOUS at 21:00

## 2020-09-06 RX ADMIN — POTASSIUM CHLORIDE, DEXTROSE MONOHYDRATE AND SODIUM CHLORIDE: 150; 5; 450 INJECTION, SOLUTION INTRAVENOUS at 08:27

## 2020-09-06 RX ADMIN — FAMOTIDINE 20 MG: 10 INJECTION INTRAVENOUS at 09:26

## 2020-09-06 RX ADMIN — HEPARIN SODIUM 5000 UNITS: 5000 INJECTION INTRAVENOUS; SUBCUTANEOUS at 08:14

## 2020-09-06 RX ADMIN — ACETAMINOPHEN 1000 MG: 650 SOLUTION ORAL at 16:19

## 2020-09-06 RX ADMIN — HEPARIN SODIUM 5000 UNITS: 5000 INJECTION INTRAVENOUS; SUBCUTANEOUS at 16:16

## 2020-09-06 RX ADMIN — METOCLOPRAMIDE 10 MG: 5 INJECTION, SOLUTION INTRAMUSCULAR; INTRAVENOUS at 06:00

## 2020-09-06 RX ADMIN — METHOCARBAMOL TABLETS 750 MG: 750 TABLET, COATED ORAL at 11:59

## 2020-09-06 ASSESSMENT — ENCOUNTER SYMPTOMS
CONSTIPATION: 0
BLOOD IN STOOL: 0
SHORTNESS OF BREATH: 0
VOMITING: 0
BACK PAIN: 0
RECTAL PAIN: 0
DIARRHEA: 0
WHEEZING: 0
ABDOMINAL DISTENTION: 0
NAUSEA: 0

## 2020-09-06 ASSESSMENT — PAIN SCALES - GENERAL
PAINLEVEL_OUTOF10: 0
PAINLEVEL_OUTOF10: 6
PAINLEVEL_OUTOF10: 0
PAINLEVEL_OUTOF10: 0

## 2020-09-06 NOTE — PROGRESS NOTES
2. Some bibasilar lower lobe atelectasis/scarring around the hiatal hernia noted along with trace left pleural effusion. Ct Cervical Spine Wo Contrast    Result Date: 9/3/2020  No acute abnormality of the cervical spine. Xr Chest Portable    Result Date: 9/3/2020  No acute cardiopulmonary abnormality. Large hiatal hernia. ASSESSMENT:  Active Hospital Problems    Diagnosis Date Noted    Gastric outlet obstruction [K31.1] 09/03/2020    Volvulus ([address] [K56.2] 09/03/2020       79 y.o. 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 Continue NPO, ok for sips with meds and ice chips  2. IVF: D5 1/2NS +20meq KCl. 3.  Liquid stool leakage overnight, no hard stools found on MARIE  4. Clamp both G tubes. If tolerates, can consider starting CLD  5. Creatinine: improved, 1.02 from 1.45 from 1.36 from 1.26  6. Cont abx day 3/4: zosyn. 7.  Ok for txf out of ICU  8. Encourage ambulation, IS use  9.   Medical and supportive care per primary         Electronically signed by Trinh Galindo DO  on 9/6/2020 at 3:49 AM

## 2020-09-06 NOTE — PLAN OF CARE
Problem: Falls - Risk of:  Goal: Will remain free from falls  Description: Will remain free from falls  9/6/2020 0623 by Deedee Swift RN  Outcome: Met This Shift  9/5/2020 1759 by Rheorquidea Ortiz  Outcome: Ongoing  9/5/2020 1759 by Rhenda Fent  Outcome: Ongoing  Goal: Absence of physical injury  Description: Absence of physical injury  9/6/2020 0623 by Deedee Swift RN  Outcome: Met This Shift  9/5/2020 1759 by Rheorquidea Ortiz  Outcome: Ongoing  9/5/2020 1759 by Rhenda Fent  Outcome: Ongoing     Problem: Skin Integrity:  Goal: Will show no infection signs and symptoms  Description: Will show no infection signs and symptoms  9/6/2020 0623 by Deedee Switf RN  Outcome: Ongoing  9/5/2020 1759 by Rheorquidea Ortiz  Outcome: Ongoing  9/5/2020 1759 by Rhenda Fent  Outcome: Ongoing  Goal: Absence of new skin breakdown  Description: Absence of new skin breakdown  9/6/2020 0623 by Deedee Swift RN  Outcome: Ongoing  9/5/2020 1759 by Rheorquidea Ortiz  Outcome: Ongoing  9/5/2020 1759 by Rhenda Fent  Outcome: Ongoing     Problem: Pain:  Goal: Pain level will decrease  Description: Pain level will decrease  9/6/2020 0623 by Deedee Swift RN  Outcome: Ongoing  9/5/2020 1759 by Rheorquidea Ortiz  Outcome: Ongoing  9/5/2020 1759 by Rhenda Fent  Outcome: Ongoing  Goal: Control of acute pain  Description: Control of acute pain  9/6/2020 0623 by Deedee Swift RN  Outcome: Ongoing  9/5/2020 1759 by Rheorquidea Ortiz  Outcome: Ongoing  9/5/2020 1759 by Rhenda Fent  Outcome: Ongoing  Goal: Control of chronic pain  Description: Control of chronic pain  9/6/2020 0623 by Deedee Swift RN  Outcome: Ongoing  9/5/2020 1759 by Darya Ortiz  Outcome: Ongoing  9/5/2020 1759 by Rhenda Fent  Outcome: Ongoing     Problem: Nutrition  Goal: Optimal nutrition therapy  Description: Nutrition Problem #1: Inadequate oral intake  Intervention: Food and/or Nutrient Delivery: Start Oral Diet  Nutritional Goals: initiation of nutrition within 72 hours     9/6/2020 6162 by Vera Navarro RN  Outcome: Ongoing  9/5/2020 1759 by Niya Ayala  Outcome: Ongoing  9/5/2020 1759 by Niya Ayala  Outcome: Ongoing     Problem: Infection - Surgical Site:  Goal: Will show no infection signs and symptoms  Description: Will show no infection signs and symptoms  9/6/2020 0623 by Vera Navarro RN  Outcome: Ongoing  9/5/2020 1759 by Niya Ayala  Outcome: Ongoing  9/5/2020 1759 by Niya Ayala  Outcome: Ongoing

## 2020-09-06 NOTE — PLAN OF CARE
Problem: Falls - Risk of:  Goal: Will remain free from falls  Description: Will remain free from falls  9/6/2020 1906 by Oralia Ga RN  Outcome: Ongoing  9/6/2020 0623 by Malvin Perry RN  Outcome: Met This Shift  Goal: Absence of physical injury  Description: Absence of physical injury  9/6/2020 1906 by Oralia Ga RN  Outcome: Ongoing  9/6/2020 0623 by Malvin Perry RN  Outcome: Met This Shift     Problem: Skin Integrity:  Goal: Will show no infection signs and symptoms  Description: Will show no infection signs and symptoms  9/6/2020 1906 by Oralia Ga RN  Outcome: Ongoing  9/6/2020 0623 by Malvin Perry RN  Outcome: Ongoing  Goal: Absence of new skin breakdown  Description: Absence of new skin breakdown  9/6/2020 1906 by Oralia Ga RN  Outcome: Ongoing  9/6/2020 0623 by Malvin Perry RN  Outcome: Ongoing     Problem: Pain:  Goal: Pain level will decrease  Description: Pain level will decrease  9/6/2020 1906 by Oralia Ga RN  Outcome: Ongoing  9/6/2020 0623 by Malvin Perry RN  Outcome: Ongoing  Goal: Control of acute pain  Description: Control of acute pain  9/6/2020 1906 by Oralia Ga RN  Outcome: Ongoing  9/6/2020 0623 by Malvin Perry RN  Outcome: Ongoing  Goal: Control of chronic pain  Description: Control of chronic pain  9/6/2020 1906 by Oralia Ga RN  Outcome: Ongoing  9/6/2020 0623 by Malvin Perry RN  Outcome: Ongoing     Problem: Nutrition  Goal: Optimal nutrition therapy  Description: Nutrition Problem #1: Inadequate oral intake  Intervention: Food and/or Nutrient Delivery: Start Oral Diet  Nutritional Goals: initiation of nutrition within 72 hours     9/6/2020 1906 by Oralia Ga RN  Outcome: Ongoing  9/6/2020 0623 by Malvin Perry RN  Outcome: Ongoing     Problem: Infection - Surgical Site:  Goal: Will show no infection signs and symptoms  Description: Will show no infection signs and symptoms  9/6/2020 1906 by Oralia Ga RN  Outcome: Ongoing  9/6/2020 0623 by

## 2020-09-06 NOTE — PROGRESS NOTES
ICU PATIENT TRANSFER NOTE        Patient:  Jacki Doherty  YOB: 1940    MRN: [id number]     Acct: [de-identified]     Admit date: 9/3/2020    Code Status:-      Reason for ICU Admission:-       SUPPORT DEVICES: [] Ventilator [] BIPAP  [] Nasal Cannula [] Room Air    Consultations:- [] Cardiology [] Nephrology  [] Hemo onco  [x] GI                               [] ID [] ENT  [] Rheum [] Endo   []Physiotherapy                                 Others:-GEN SURGERY    NUTRITION:  [] NPO [] Tube Feeding (Specify: ) [] TPN  [x] PO    Central Lines:- [x] No   [] Yes           If yes - Days/Date of Insertion        Pt seen and Chart reviewed. ICU COURSE :-   79-year-old female with past medical history of hypertension,stroke ,heart failure ,HPL dementia, gout and hiatal hernia. Patient presented initially to [facility] emergency department with complaining of nausea and vomiting for 2 days. prior to that patient had constipation from 3 days. CT abdomen was done which  showed volvulus or gastric obstruction . Patient then presented to [facility] ER where   x-rays cervical spine showed large hiatal hernia  CT scan of abdomen showed intrathoracic stomach containing food particles  Showing either volvulus or gastric obstruction  Chest CT was done which showed paraesophageal hiatal hernia type IV      Surgery consulted the GI ,     IN ICU ,EGD was done showing tortuous esophagus, transthoracic stomach with volvulus causing partial gastric obstruction and suspicion of of bowel ischemia. Patient stomach was decompressed but multiple attempts at derotating the stomach were unsuccessful. surgery was consulted intra-procedure due to suspicion of bowel ischemia.       She underwent Ex lap, paraesophageal hernia repair with mesh bridge, double gastropexy, Kocherization of the duodenum.            CURRENT EVALUATION: September 6, 2020    Patient is in no equal, round, and reactive to light. Neck:      Musculoskeletal: No neck rigidity or muscular tenderness. Pulmonary:      Effort: Pulmonary effort is normal.      Breath sounds: Normal breath sounds. No rhonchi. Abdominal:      General: Bowel sounds are normal. There is no distension. Tenderness: There is abdominal tenderness. There is guarding. Comments:  linear scar on the abdomen ,stitched with stapler  , no surgical site infection, surgical scar seems to be healing. Neurological:      General: No focal deficit present. Mental Status: She is alert. Cranial Nerves: No cranial nerve deficit. Sensory: No sensory deficit. Motor: No weakness.            Medications:Current Inpatient  Scheduled Meds:   metoclopramide  10 mg Intravenous Q6H    sodium chloride flush  10 mL Intravenous 2 times per day    famotidine (PEPCID) injection  20 mg Intravenous Daily    heparin (porcine)  5,000 Units Subcutaneous 3 times per day    insulin lispro  0-12 Units Subcutaneous TID WC    insulin lispro  0-6 Units Subcutaneous Nightly    piperacillin-tazobactam  3.375 g Intravenous Q8H    acetaminophen  1,000 mg Oral Q8H    lidocaine  2 patch Transdermal Daily    methocarbamol  750 mg Oral Q6H     Continuous Infusions:   dextrose 5% and 0.45% NaCl with KCl 20 mEq 60 mL/hr (09/06/20 1153)    dextrose       PRN Meds:sodium chloride flush, potassium chloride **OR** potassium alternative oral replacement **OR** potassium chloride, magnesium sulfate, polyethylene glycol, [DISCONTINUED] promethazine **OR** ondansetron, labetalol, glucose, dextrose, glucagon (rDNA), dextrose, oxyCODONE, HYDROmorphone    Objective:    CBC:   Recent Labs     09/04/20  0356 09/05/20  0434 09/06/20  0502   WBC 21.3* 19.1* 16.4*   HGB 10.2* 10.1* 9.3*    207 206     BMP:    Recent Labs     09/04/20  0356 09/05/20  0434 09/06/20  0502   * 139 140   K 3.9 4.0 3.6*    105 107   CO2 27 25 22   BUN 19 20 14   CREATININE 1.36* 1.45* 1.02*   GLUCOSE 159* 123* 134*     Calcium:  Recent Labs     09/06/20  0502   CALCIUM 8.1*     Ionized Calcium:No results for input(s): IONCA in the last 72 hours. Magnesium:  Recent Labs     09/05/20  0434   MG 1.7     Phosphorus:  Recent Labs     09/03/20  2317   PHOS 3.7     BNP:No results for input(s): BNP in the last 72 hours. Glucose:  Recent Labs     09/05/20  1220 09/05/20  1617 09/05/20  2054   POCGLU 138* 124* 118*     HgbA1C: No results for input(s): LABA1C in the last 72 hours. INR: No results for input(s): INR in the last 72 hours. Hepatic: No results for input(s): ALKPHOS, ALT, AST, PROT, BILITOT, BILIDIR, LABALBU in the last 72 hours. Amylase and Lipase:No results for input(s): LACTA, AMYLASE in the last 72 hours. Lactic Acid: No results for input(s): LACTA in the last 72 hours. CARDIAC ENZYMES:No results for input(s): CKTOTAL, CKMB, CKMBINDEX, TROPONINI in the last 72 hours. BNP: No results for input(s): BNP in the last 72 hours. Lipids: No results for input(s): CHOL, TRIG, HDL, LDLCALC in the last 72 hours. Invalid input(s): LDL  ABGs: No results found for: PH, PCO2, PO2, HCO3, O2SAT  Thyroid:   Lab Results   Component Value Date    TSH 1.35 06/02/2020      Urinalysis: No results for input(s): BACTERIA, BLOODU, CLARITYU, COLORU, PHUR, PROTEINU, RBCUA, SPECGRAV, BILIRUBINUR, NITRU, WBCUA, LEUKOCYTESUR, GLUCOSEU in the last 72 hours. EKG:First-degree AV block with premature ventricular complex  Echo : inaccessible              Assessment and plan:       Principal Problem:    Gastric outlet obstruction  Active Problems:    Essential hypertension    Cerebrovascular accident (CVA) ([facility]    Acute gastric volvulus    Paraesophageal hernia    Hypokalemia    DOMINIQUE (acute kidney injury) ([facility]    Normocytic anemia    S/P exploratory laparotomy    Small bowel ischemia (HCC)    Dementia (HCC)  Resolved Problems:    * No resolved hospital problems. *        1. Gastric outlet obstruction resolved  Secondary to gastric volvulus and paraesophageal hernia. ,EGD  done showing tortuous esophagus, transthoracic stomach with volvulus causing partial gastric obstruction . Status post exploratory laparotomy, paraesophageal hernia repair with mesh, gastropexy with gastrostomy tubes and cauterization of the duodenum 9/3    Started clear liquid diet today. Monitor if she is tolerating  IV fluids with 5% dextrose half-normal saline and 20 mEq potassium chloride. Patient has passed minimal smeared stool. Ambulate the patient  Continue antibiotics, IV Zosyn day 4  On IV Pepcid and Reglan as needed  Postoperative pain control. Minimize opioids  GI and general surgery following    2. DOMINIQUE. Likely secondary to hypoperfusion. Improving. Baseline creatinine 1  Continue IV fluids. Monitor strict I/O's and follow creatinine    3. Electrolyte imbalance. Hypokalemia. Replace and monitor potassium  4. Hypertension. Home medications on hold for now. Will resume as needed      PT/OT and ambulation  GI prophylaxis: Pepcid IV  DVT prophylaxis: Heparin SC       Lauren Calzada MD             9/6/2020, 2:30 PM   Attending Physician Statement  I have discussed the care of Watson Cerda, including pertinent history and exam findings,  with the resident. I have seen and examined the patient and the key elements of all parts of the encounter have been performed by me. I agree with the assessment, plan and orders as documented by the resident with additions . CC time 30 minutes    Treatment plan Discussed with nursing staff in detail , all questions answered . Electronically signed by Taylor Rangel MD on   9/6/20 at 4:49 PM EDT    Please note that this chart was generated using voice recognition Dragon dictation software. Although every effort was made to ensure the accuracy of this automated transcription, some errors in transcription may have occurred.

## 2020-09-06 NOTE — PROGRESS NOTES
Critical Care Team - Daily Progress Note      Date and time: 2020 1:03 PM  Patient's name:  Mj Collado Record Number: [id number]  Patient's account/billing number: [de-identified]  Patient's YOB: 1940  Age: 79 y.o. Date of Admission: 9/3/2020  3:53 AM  Length of stay during current admission: 3      Primary Care Physician: Jose Radford, KAREEM - CNP  ICU Attending Physician: Dr. Leeroy Love Status: Full Code    Reason for ICU admission: Gastric volvulus  Chief Complaint   Patient presents with    Emesis     Pt c/o vomiting for 2 days. SUBJECTIVE:          POD 3, no acute events overnight. Current evaluation: This morning pt was lying comfortably on bed. Denies nausea/vomiting and abd pain. Urine output 1L in 24 hours. Patient on Noe catheter replaced last night for retention and after multiple straight caths. Pertinent labs showed sodium trending down 140, improving creatinine at 1.02, WBC trending down to 16.4, hemoglobin stable 9.3. Diet N.p.o. until return of bowel function. For pain management, robaxin,  tylneol 1g every 8 fentanyl 50- every 5 PRN. Dilaudid 0.5 every 3 PRN and Roxicodone 5 every 4 PRN. But the patient did not need any as needed medications. Patient did not have any PACs on telemetry, no PVCs after receiving 1 dose of mag sulfate 2 g IV. Patient stable to be transferred out of ICU today.                                                                                                               OBJECTIVE:     VITAL SIGNS:  BP (!) 150/72   Pulse 109   Temp 98.2 °F (36.8 °C) (Oral)   Resp 21   Ht 5' 4\" (1.626 m)   Wt 181 lb 7 oz (82.3 kg)   SpO2 93%   BMI 31.14 kg/m²   Tmax over 24 hours:  Temp (24hrs), Av.7 °F (37.1 °C), Min:98.2 °F (36.8 °C), Max:100 °F (37.8 °C)      Patient Vitals for the past 8 hrs:   BP Temp Temp src Pulse Resp SpO2   20 1156 (!) 150/72 98.2 °F (36.8 °C) Oral 109 21 93 %   20 0800 Continuous Infusions:   dextrose 5% and 0.45% NaCl with KCl 20 mEq 60 mL/hr (09/06/20 1153)    dextrose       PRN Meds:   sodium chloride flush, 10 mL, PRN  potassium chloride, 40 mEq, PRN    Or  potassium alternative oral replacement, 40 mEq, PRN    Or  potassium chloride, 10 mEq, PRN  magnesium sulfate, 1 g, PRN  polyethylene glycol, 17 g, Daily PRN  ondansetron, 4 mg, Q6H PRN  labetalol, 10 mg, Q4H PRN  glucose, 15 g, PRN  dextrose, 12.5 g, PRN  glucagon (rDNA), 1 mg, PRN  dextrose, 100 mL/hr, PRN  oxyCODONE, 5 mg, Q4H PRN  HYDROmorphone, 0.5 mg, Q3H PRN        SUPPORT DEVICES: [] Ventilator [] BIPAP  [] Nasal Cannula [x] Room Air    DATA:  Complete Blood Count:   Recent Labs     09/04/20  0356 09/05/20  0434 09/06/20  0502   WBC 21.3* 19.1* 16.4*   HGB 10.2* 10.1* 9.3*   MCV 91.0 94.2 90.6    207 206   RBC 3.57* 3.47* 3.18*   HCT 32.5* 32.7* 28.8*   MCH 28.6 29.1 29.2   MCHC 31.4 30.9 32.3   RDW 13.4 13.8 13.4   MPV 10.9 11.7 11.4        PT/INR:    Lab Results   Component Value Date    PROTIME 9.9 09/03/2020    INR 0.9 09/03/2020     PTT:    Lab Results   Component Value Date    APTT 22.2 09/03/2020       Basal Metabolic Profile:   Recent Labs     09/04/20  0356 09/05/20  0434 09/06/20  0502   * 139 140   K 3.9 4.0 3.6*   BUN 19 20 14   CREATININE 1.36* 1.45* 1.02*    105 107   CO2 27 25 22      Magnesium:   Lab Results   Component Value Date    MG 1.7 09/05/2020    MG 1.8 09/03/2020    MG 2.0 09/03/2020     Phosphorus:   Lab Results   Component Value Date    PHOS 3.7 09/03/2020     S. Calcium:  Recent Labs     09/06/20  0502   CALCIUM 8.1*     S. Ionized Calcium:No results for input(s): IONCA in the last 72 hours.       Urinalysis:   Lab Results   Component Value Date    NITRU NEGATIVE 09/03/2020    COLORU YELLOW 09/03/2020    PHUR 7.0 09/03/2020    WBCUA 0 TO 2 11/07/2018    RBCUA 0 TO 2 11/07/2018    MUCUS NOT REPORTED 11/07/2018    TRICHOMONAS NOT REPORTED 11/07/2018    YEAST NOT the stomach is fluid-filled. Mild gastric distension noted which could be due to administered contrast.  Difficult to determine if there is some degree of obstruction on the basis of this study. No evidence for gastric pneumatosis. 2. Some bibasilar lower lobe atelectasis/scarring around the hiatal hernia noted along with trace left pleural effusion.      Ct Cervical Spine Wo Contrast     Result Date: 9/3/2020  EXAMINATION: CT OF THE CERVICAL SPINE WITHOUT CONTRAST 9/3/2020 4:01 am TECHNIQUE: CT of the cervical spine was performed without the administration of intravenous contrast. Multiplanar reformatted images are provided for review. Dose modulation, iterative reconstruction, and/or weight based adjustment of the mA/kV was utilized to reduce the radiation dose to as low as reasonably achievable. COMPARISON: None. HISTORY: ORDERING SYSTEM PROVIDED HISTORY: fall TECHNOLOGIST PROVIDED HISTORY: fall Reason for Exam: fall Acuity: Acute Type of Exam: Initial FINDINGS: BONES/ALIGNMENT: There is no acute fracture or traumatic malalignment. DEGENERATIVE CHANGES: Multilevel facet arthrosis and degenerative disc space narrowing. SOFT TISSUES: There is no prevertebral soft tissue swelling. Esophageal dilatation.      No acute abnormality of the cervical spine.      Xr Chest Portable 9/3/2020. No acute cardiopulmonary abnormality. Large hiatal hernia.           ASSESSMENT:     Active Problems:    Gastric outlet obstruction    Volvulus (HCC)  Resolved Problems:    * No resolved hospital problems. *      PLAN:       Gastric volvulus causing partial gastric outlet obstruction and bowel ischemia  Type IV paraesophageal hiatal hernia    S/p  Ex lap, paraesophageal hernia repair with mesh bridge, double gastropexy, Kocherization of the duodenum. POD-3 postoperatively stable. No bowel movement yet. Denies nausea, vomiting. Urine output 1L in 24 hours.   Patient on Noe catheter replaced last night for retention and after multiple straight caths. Hb-9.3. Diet N.p.o. until return of bowel function. .  For pain management, robaxin,  tylneol 1g every 8 fentanyl 50- every 5 PRN. Dilaudid 0.5 every 3 PRN and Roxicodone 5 every 4 PRN. Patient did not receive any as needed medications. -DOMINIQUE- prerenal and nonoliguric type secondary to hypovolemia from dehydration-resolving, Baseline creatinine around 1. Urine output around 1L in 24 hours. Daily BMP. Intake output.     -Hypernatremia-resolved. sodium 140. Continue with IV hydration.    -Hyperglycemia- HbA1c 5.6. Glucose control adequate.  medium dose correction scale. POCT glucose checks every 6 hours    -Leukocytosis- WBC trending down 19.1>16.4. Afebrile. Urine culture showed no growth till date  Continued on IV hydration. Empirical coverage with I.v  Zosyn (started on 9/3/2020).    -Lactic acidosis-Improved. Continued on IV hydration. IV Zosyn for empirical coverage. Blood and urine culture showed no growth till date    -Hypertension-well-controlled, current systolic blood pressure 144. on IV labetalol 10 every 6 hours as needed. On lisinopril 20, Lasix 40 at home. Confirm medication with pharmacy and resume when tolerate p.o.    -Hyperlipidemia-resume home medicines when tolerates p.o.  -CVA in the past-stable currently. -Gout- on allopurinol at home. Resume when pt tolerates p/o and renal function better     Feeding Diet: NPO-ok for Popsicles, ice chips  Fluids-dextrose 5% in half-normal saline with KCl 20 mEq infusion at 125 mils per hour  Family-updated  Analgesic-  robaxin,  tylneol 1g every 8 fentanyl 50- every 5 PRN. Dilaudid 0.5 every 3 PRN and Roxicodone 5 every 4 PRN  Sedation None  Thrombo-prophylaxis- heparin 5000 units subcutaneous every 8 hourly  Mobility fair   Heads up 30 Degrees  Ulcer prophylaxis. Pepcid 20 mg IV daily  Glycemic control. Well-controlled.   On med dose correction insulin  Spontaneous breathing trial N/A  Bowel regimen/urine

## 2020-09-06 NOTE — FLOWSHEET NOTE
DATE: 2020    NAME: Annelise Peraza  MRN: [id number]   : 1940    Patient not seen this date for Physical Therapy due to:  [] Blood transfusion in progress  [] Hemodialysis  []  Patient Declined  [] Spine Precautions   [] Strict Bedrest  [] Surgery/ Procedure  [] Testing      [x] Other: Pt transferred to cardiac unit        [] PT being discontinued at this time. Patient independent. No further needs. [] PT being discontinued at this time as the patient has been transferred to palliative care. No further needs.     Amari Howard, PTA

## 2020-09-07 LAB
ABSOLUTE EOS #: 0.22 K/UL (ref 0–0.44)
ABSOLUTE IMMATURE GRANULOCYTE: 0.07 K/UL (ref 0–0.3)
ABSOLUTE LYMPH #: 1.96 K/UL (ref 1.1–3.7)
ABSOLUTE MONO #: 1.38 K/UL (ref 0.1–1.2)
ANION GAP SERPL CALCULATED.3IONS-SCNC: 13 MMOL/L (ref 9–17)
BASOPHILS # BLD: 1 % (ref 0–2)
BASOPHILS ABSOLUTE: 0.08 K/UL (ref 0–0.2)
BUN BLDV-MCNC: 16 MG/DL (ref 8–23)
BUN/CREAT BLD: ABNORMAL (ref 9–20)
CALCIUM SERPL-MCNC: 8 MG/DL (ref 8.6–10.4)
CHLORIDE BLD-SCNC: 105 MMOL/L (ref 98–107)
CO2: 20 MMOL/L (ref 20–31)
CREAT SERPL-MCNC: 1.03 MG/DL (ref 0.5–0.9)
DIFFERENTIAL TYPE: ABNORMAL
EOSINOPHILS RELATIVE PERCENT: 2 % (ref 1–4)
GFR AFRICAN AMERICAN: >60 ML/MIN
GFR NON-AFRICAN AMERICAN: 51 ML/MIN
GFR SERPL CREATININE-BSD FRML MDRD: ABNORMAL ML/MIN/{1.73_M2}
GFR SERPL CREATININE-BSD FRML MDRD: ABNORMAL ML/MIN/{1.73_M2}
GLUCOSE BLD-MCNC: 129 MG/DL (ref 70–99)
GLUCOSE BLD-MCNC: 139 MG/DL (ref 65–105)
HCT VFR BLD CALC: 29.9 % (ref 36.3–47.1)
HEMOGLOBIN: 9.5 G/DL (ref 11.9–15.1)
IMMATURE GRANULOCYTES: 1 %
LYMPHOCYTES # BLD: 15 % (ref 24–43)
MAGNESIUM: 1.8 MG/DL (ref 1.6–2.6)
MCH RBC QN AUTO: 28.5 PG (ref 25.2–33.5)
MCHC RBC AUTO-ENTMCNC: 31.8 G/DL (ref 28.4–34.8)
MCV RBC AUTO: 89.8 FL (ref 82.6–102.9)
MONOCYTES # BLD: 10 % (ref 3–12)
NRBC AUTOMATED: 0 PER 100 WBC
PDW BLD-RTO: 13.7 % (ref 11.8–14.4)
PLATELET # BLD: 251 K/UL (ref 138–453)
PLATELET ESTIMATE: ABNORMAL
PMV BLD AUTO: 11 FL (ref 8.1–13.5)
POTASSIUM SERPL-SCNC: 3.5 MMOL/L (ref 3.7–5.3)
RBC # BLD: 3.33 M/UL (ref 3.95–5.11)
RBC # BLD: ABNORMAL 10*6/UL
SEG NEUTROPHILS: 71 % (ref 36–65)
SEGMENTED NEUTROPHILS ABSOLUTE COUNT: 9.68 K/UL (ref 1.5–8.1)
SODIUM BLD-SCNC: 138 MMOL/L (ref 135–144)
WBC # BLD: 13.4 K/UL (ref 3.5–11.3)
WBC # BLD: ABNORMAL 10*3/UL

## 2020-09-07 PROCEDURE — 80048 BASIC METABOLIC PNL TOTAL CA: CPT

## 2020-09-07 PROCEDURE — 97535 SELF CARE MNGMENT TRAINING: CPT

## 2020-09-07 PROCEDURE — 6370000000 HC RX 637 (ALT 250 FOR IP): Performed by: STUDENT IN AN ORGANIZED HEALTH CARE EDUCATION/TRAINING PROGRAM

## 2020-09-07 PROCEDURE — 82947 ASSAY GLUCOSE BLOOD QUANT: CPT

## 2020-09-07 PROCEDURE — 85025 COMPLETE CBC W/AUTO DIFF WBC: CPT

## 2020-09-07 PROCEDURE — 2580000003 HC RX 258: Performed by: STUDENT IN AN ORGANIZED HEALTH CARE EDUCATION/TRAINING PROGRAM

## 2020-09-07 PROCEDURE — 2500000003 HC RX 250 WO HCPCS: Performed by: STUDENT IN AN ORGANIZED HEALTH CARE EDUCATION/TRAINING PROGRAM

## 2020-09-07 PROCEDURE — 1200000000 HC SEMI PRIVATE

## 2020-09-07 PROCEDURE — 6360000002 HC RX W HCPCS: Performed by: STUDENT IN AN ORGANIZED HEALTH CARE EDUCATION/TRAINING PROGRAM

## 2020-09-07 PROCEDURE — 99232 SBSQ HOSP IP/OBS MODERATE 35: CPT | Performed by: INTERNAL MEDICINE

## 2020-09-07 PROCEDURE — 97116 GAIT TRAINING THERAPY: CPT

## 2020-09-07 PROCEDURE — 93005 ELECTROCARDIOGRAM TRACING: CPT | Performed by: STUDENT IN AN ORGANIZED HEALTH CARE EDUCATION/TRAINING PROGRAM

## 2020-09-07 PROCEDURE — 36415 COLL VENOUS BLD VENIPUNCTURE: CPT

## 2020-09-07 PROCEDURE — 97110 THERAPEUTIC EXERCISES: CPT

## 2020-09-07 PROCEDURE — 83735 ASSAY OF MAGNESIUM: CPT

## 2020-09-07 RX ORDER — BISACODYL 10 MG
10 SUPPOSITORY, RECTAL RECTAL DAILY
Status: DISCONTINUED | OUTPATIENT
Start: 2020-09-07 | End: 2020-09-09

## 2020-09-07 RX ORDER — ACETAMINOPHEN 500 MG
1000 TABLET ORAL EVERY 8 HOURS SCHEDULED
Status: DISCONTINUED | OUTPATIENT
Start: 2020-09-07 | End: 2020-09-12 | Stop reason: HOSPADM

## 2020-09-07 RX ORDER — UREA 10 %
1 LOTION (ML) TOPICAL ONCE
Status: COMPLETED | OUTPATIENT
Start: 2020-09-07 | End: 2020-09-07

## 2020-09-07 RX ORDER — SODIUM CHLORIDE 9 MG/ML
INJECTION, SOLUTION INTRAVENOUS CONTINUOUS
Status: DISCONTINUED | OUTPATIENT
Start: 2020-09-07 | End: 2020-09-08

## 2020-09-07 RX ORDER — OLANZAPINE 10 MG/1
5 INJECTION, POWDER, LYOPHILIZED, FOR SOLUTION INTRAMUSCULAR ONCE
Status: COMPLETED | OUTPATIENT
Start: 2020-09-08 | End: 2020-09-08

## 2020-09-07 RX ORDER — OXYCODONE HYDROCHLORIDE 5 MG/1
5 TABLET ORAL EVERY 6 HOURS PRN
Status: DISCONTINUED | OUTPATIENT
Start: 2020-09-07 | End: 2020-09-09

## 2020-09-07 RX ORDER — AMLODIPINE BESYLATE 5 MG/1
5 TABLET ORAL DAILY
Status: DISCONTINUED | OUTPATIENT
Start: 2020-09-07 | End: 2020-09-09

## 2020-09-07 RX ORDER — FAMOTIDINE 20 MG/1
20 TABLET, FILM COATED ORAL DAILY
Status: DISCONTINUED | OUTPATIENT
Start: 2020-09-08 | End: 2020-09-12 | Stop reason: HOSPADM

## 2020-09-07 RX ORDER — POLYETHYLENE GLYCOL 3350 17 G/17G
17 POWDER, FOR SOLUTION ORAL 2 TIMES DAILY
Status: DISCONTINUED | OUTPATIENT
Start: 2020-09-07 | End: 2020-09-07

## 2020-09-07 RX ADMIN — OXYCODONE HYDROCHLORIDE 5 MG: 5 TABLET ORAL at 21:34

## 2020-09-07 RX ADMIN — METHOCARBAMOL TABLETS 750 MG: 750 TABLET, COATED ORAL at 05:06

## 2020-09-07 RX ADMIN — SODIUM CHLORIDE, PRESERVATIVE FREE 10 ML: 5 INJECTION INTRAVENOUS at 08:27

## 2020-09-07 RX ADMIN — SODIUM CHLORIDE, PRESERVATIVE FREE 10 ML: 5 INJECTION INTRAVENOUS at 20:48

## 2020-09-07 RX ADMIN — HEPARIN SODIUM 5000 UNITS: 5000 INJECTION INTRAVENOUS; SUBCUTANEOUS at 05:06

## 2020-09-07 RX ADMIN — METHOCARBAMOL TABLETS 750 MG: 750 TABLET, COATED ORAL at 22:29

## 2020-09-07 RX ADMIN — OXYCODONE HYDROCHLORIDE 5 MG: 5 TABLET ORAL at 08:10

## 2020-09-07 RX ADMIN — FAMOTIDINE 20 MG: 10 INJECTION INTRAVENOUS at 08:10

## 2020-09-07 RX ADMIN — POTASSIUM BICARBONATE 40 MEQ: 782 TABLET, EFFERVESCENT ORAL at 12:17

## 2020-09-07 RX ADMIN — HEPARIN SODIUM 5000 UNITS: 5000 INJECTION INTRAVENOUS; SUBCUTANEOUS at 15:43

## 2020-09-07 RX ADMIN — ACETAMINOPHEN 1000 MG: 500 TABLET ORAL at 08:10

## 2020-09-07 RX ADMIN — PIPERACILLIN AND TAZOBACTAM 3.38 G: 3; .375 INJECTION, POWDER, FOR SOLUTION INTRAVENOUS at 05:06

## 2020-09-07 RX ADMIN — METHOCARBAMOL TABLETS 750 MG: 750 TABLET, COATED ORAL at 12:16

## 2020-09-07 RX ADMIN — AMLODIPINE BESYLATE 5 MG: 5 TABLET ORAL at 08:25

## 2020-09-07 RX ADMIN — METOCLOPRAMIDE 10 MG: 5 INJECTION, SOLUTION INTRAMUSCULAR; INTRAVENOUS at 20:48

## 2020-09-07 RX ADMIN — SODIUM CHLORIDE: 9 INJECTION, SOLUTION INTRAVENOUS at 15:36

## 2020-09-07 RX ADMIN — METOCLOPRAMIDE 10 MG: 5 INJECTION, SOLUTION INTRAMUSCULAR; INTRAVENOUS at 08:10

## 2020-09-07 RX ADMIN — HYDROMORPHONE HYDROCHLORIDE 0.5 MG: 1 INJECTION, SOLUTION INTRAMUSCULAR; INTRAVENOUS; SUBCUTANEOUS at 03:35

## 2020-09-07 RX ADMIN — ACETAMINOPHEN 1000 MG: 500 TABLET ORAL at 20:48

## 2020-09-07 RX ADMIN — PIPERACILLIN AND TAZOBACTAM 3.38 G: 3; .375 INJECTION, POWDER, FOR SOLUTION INTRAVENOUS at 16:32

## 2020-09-07 RX ADMIN — Medication 1 MG: at 04:06

## 2020-09-07 RX ADMIN — METOCLOPRAMIDE 10 MG: 5 INJECTION, SOLUTION INTRAMUSCULAR; INTRAVENOUS at 01:39

## 2020-09-07 RX ADMIN — HEPARIN SODIUM 5000 UNITS: 5000 INJECTION INTRAVENOUS; SUBCUTANEOUS at 20:48

## 2020-09-07 RX ADMIN — METOCLOPRAMIDE 10 MG: 5 INJECTION, SOLUTION INTRAMUSCULAR; INTRAVENOUS at 15:38

## 2020-09-07 RX ADMIN — BISACODYL 10 MG: 10 SUPPOSITORY RECTAL at 08:27

## 2020-09-07 ASSESSMENT — PAIN DESCRIPTION - LOCATION: LOCATION: ABDOMEN

## 2020-09-07 ASSESSMENT — PAIN SCALES - GENERAL
PAINLEVEL_OUTOF10: 7
PAINLEVEL_OUTOF10: 7
PAINLEVEL_OUTOF10: 6

## 2020-09-07 ASSESSMENT — PAIN DESCRIPTION - ORIENTATION: ORIENTATION: LEFT;MID

## 2020-09-07 ASSESSMENT — PAIN DESCRIPTION - DESCRIPTORS: DESCRIPTORS: DISCOMFORT

## 2020-09-07 ASSESSMENT — PAIN DESCRIPTION - PAIN TYPE: TYPE: ACUTE PAIN;SURGICAL PAIN

## 2020-09-07 NOTE — PROGRESS NOTES
Abdomen surgery (09/03/2020); Upper gastrointestinal endoscopy (N/A, 9/3/2020); and laparotomy (N/A, 9/3/2020). Restrictions  Restrictions/Precautions  Restrictions/Precautions: Cardiac, Surgical Protocols, Fall Risk, Up as Tolerated  Required Braces or Orthoses?: No  Position Activity Restriction  Other position/activity restrictions: G-tube to gravity  Subjective   General  Response To Previous Treatment: Patient with no complaints from previous session. Family / Caregiver Present: No  Subjective  Subjective: RN and pt agreed to PT, pt awake is chair upon arrival and reports L flank and abd pain  Pain Screening  Patient Currently in Pain: Yes  Vital Signs  Patient Currently in Pain: Yes       Orientation  Orientation  Overall Orientation Status: Within Functional Limits       Objective      Transfers  Sit to Stand: Minimal Assistance  Stand to sit: Minimal Assistance  Ambulation  Ambulation?: Yes  Ambulation 1  Surface: level tile  Device: Rolling Walker  Assistance: Minimal assistance  Quality of Gait: antalgic gait, step to gait  Gait Deviations: Slow Toshia;Decreased step length;Decreased step height;Shuffles  Distance: 25ft  Stairs/Curb  Stairs?: No        Exercises  Seated LE exercise program: Long Arc Quads, hip abduction/adduction, heel/toe raises, and marches.  Reps: x 10  Upper extremity exercises: Bicep curl, shoulder flexion/extension, punches, tricep curl,  Reps:  x 10 AAROM; R shoulder pain, B shoulder ROM limited to ~90 degrees                      Goals  Short term goals  Time Frame for Short term goals: 14  Short term goal 1: Pt to perform bed mobility CGA  Short term goal 2: Pt to demonstrate functional transfers CGA  Short term goal 3: Ambulate 100ft w/ RW CGA  Short term goal 4: Tolerate 30 minutes of therapy to demo increased endurnace  Patient Goals   Patient goals : Did not state    Plan    Plan  Times per week: 5-6x/week  Current Treatment Recommendations: Strengthening, Transfer

## 2020-09-07 NOTE — PLAN OF CARE
Problem: Falls - Risk of:  Goal: Will remain free from falls  Description: Will remain free from falls  9/6/2020 2231 by Joseph Cruz RN  Outcome: Ongoing  9/6/2020 1906 by Virgilio Rae RN  Outcome: Ongoing  Goal: Absence of physical injury  Description: Absence of physical injury  9/6/2020 2231 by Joseph Cruz RN  Outcome: Ongoing  9/6/2020 1906 by Virgilio Rae RN  Outcome: Ongoing     Problem: Skin Integrity:  Goal: Will show no infection signs and symptoms  Description: Will show no infection signs and symptoms  9/6/2020 2231 by Joseph Cruz RN  Outcome: Ongoing  9/6/2020 1906 by Virgilio Rae RN  Outcome: Ongoing  Goal: Absence of new skin breakdown  Description: Absence of new skin breakdown  9/6/2020 2231 by Joseph Cruz RN  Outcome: Ongoing  9/6/2020 1906 by Virgilio Rae RN  Outcome: Ongoing     Problem: Pain:  Goal: Pain level will decrease  Description: Pain level will decrease  9/6/2020 2231 by Joseph Cruz RN  Outcome: Ongoing  9/6/2020 1906 by Virgilio Rae RN  Outcome: Ongoing  Goal: Control of acute pain  Description: Control of acute pain  9/6/2020 2231 by Joseph Cruz RN  Outcome: Ongoing  9/6/2020 1906 by Virgilio Rae RN  Outcome: Ongoing  Goal: Control of chronic pain  Description: Control of chronic pain  9/6/2020 2231 by Joseph Cruz RN  Outcome: Ongoing  9/6/2020 1906 by Virgilio Rae RN  Outcome: Ongoing     Problem: Nutrition  Goal: Optimal nutrition therapy  Description: Nutrition Problem #1: Inadequate oral intake  Intervention: Food and/or Nutrient Delivery: Start Oral Diet  Nutritional Goals: initiation of nutrition within 72 hours     9/6/2020 2231 by Joseph Cruz RN  Outcome: Ongoing  9/6/2020 1906 by Virgilio Rae RN  Outcome: Ongoing     Problem: Infection - Surgical Site:  Goal: Will show no infection signs and symptoms  Description: Will show no infection signs and symptoms  9/6/2020 2231 by Pedrito Gomez RN  Outcome: Ongoing  9/6/2020 1906 by Laurent Rabago RN  Outcome: Ongoing

## 2020-09-07 NOTE — PROGRESS NOTES
[facility]  Internal Medicine Teaching Residency Program  Inpatient Daily Progress Note  ______________________________________________________________________________    Patient: Olga Lidia Pimentel  YOB: 1940   [facility]:[id number]    Acct: [de-identified]     Room: 2017/2017-01  Admit date: 9/3/2020  Today's date: 09/07/20  Number of days in the hospital: 4    SUBJECTIVE   Admitting Diagnosis: Gastric outlet obstruction  CC:   Nausea vomiting  Constipation    Patient examined in the morning , patient is hemodynamially stable . Complaining of the pain in between great toe and first.  Wound care   Patient is oriented in time place person. Patient reports of having liquid bowel movement and denies fever chills nausea. ROS:  Constitutional:  negative for chills, fevers, sweats  Respiratory:  negative for cough, dyspnea on exertion, hemoptysis, shortness of breath, wheezing  Cardiovascular:  negative for chest pain, chest pressure/discomfort, lower extremity edema, palpitations  Gastrointestinal: Patient is having pain on palpation   neurological: Patient is improving  Extremities: Wound between the great toe and first toe    BRIEF HISTORY     77-year-old female with past medical history of hypertension,stroke ,heart failure ,HPL dementia, gout and hiatal hernia.   Patient presented initially to [facility] emergency department with complaining of nausea and vomiting for 2 days. prior to that patient had constipation from 3 days. CT abdomen was done which  showed volvulus or gastric obstruction .   Patient then presented to [facility] ER where   x-rays cervical spine showed large hiatal hernia  CT scan of abdomen showed intrathoracic stomach containing food particles  Showing either volvulus or gastric obstruction  Chest CT was done which showed paraesophageal hiatal hernia type IV   Surgery consulted the GI ,    IN ICU ,EGD was done showing tortuous esophagus, transthoracic stomach with volvulus causing partial gastric obstruction and suspicion of of bowel ischemia.    Patient stomach was decompressed but multiple attempts at derotating the stomach were unsuccessful. surgery was consulted intra-procedure due to suspicion of bowel ischemia.   She underwent Ex lap, paraesophageal hernia repair with mesh bridge, double gastropexy, Kocherization of the duodenum.     OBJECTIVE     Vital Signs:  BP (!) 144/75   Pulse 102   Temp 97.5 °F (36.4 °C) (Oral)   Resp 15   Ht 5' 4\" (1.626 m)   Wt 194 lb 3.6 oz (88.1 kg)   SpO2 97%   BMI 33.34 kg/m²     Temp (24hrs), Av.8 °F (36.6 °C), Min:97.5 °F (36.4 °C), Max:98.2 °F (36.8 °C)    In: -   Out: 550 [Urine:550]    Physical Exam:  Constitutional:       Appearance: Normal appearance. HENT:      Right Ear: Tympanic membrane and ear canal normal.      Left Ear: Tympanic membrane and ear canal normal.   Eyes:      General: No scleral icterus. Right eye: No discharge. Left eye: No discharge. Extraocular Movements: Extraocular movements intact. Pupils: Pupils are equal, round, and reactive to light. Neck:      Musculoskeletal: No neck rigidity or muscular tenderness. Pulmonary:      Effort: Pulmonary effort is normal.      Breath sounds: Normal breath sounds. No rhonchi. Abdominal:      General: Bowel sounds are normal. There is no distension. Tenderness: There is abdominal tenderness. There is guarding. Comments:  linear scar on the abdomen ,stitched with stapler  , no surgical site infection, surgical scar seems to be healing. Neurological:      General: No focal deficit present. Mental Status: She is alert. Cranial Nerves: No cranial nerve deficit. Sensory: No sensory deficit. Motor: No weakness.      Medications:  Scheduled Medications:    bisacodyl  10 mg Rectal Daily    acetaminophen  1,000 mg Oral 3 times per day    amLODIPine  5 mg Oral Daily    potassium bicarb-citric acid  40 mEq Oral Once    metoclopramide  10 mg Intravenous Q6H    sodium chloride flush  10 mL Intravenous 2 times per day    famotidine (PEPCID) injection  20 mg Intravenous Daily    heparin (porcine)  5,000 Units Subcutaneous 3 times per day    insulin lispro  0-12 Units Subcutaneous TID WC    insulin lispro  0-6 Units Subcutaneous Nightly    piperacillin-tazobactam  3.375 g Intravenous Q8H    lidocaine  2 patch Transdermal Daily    methocarbamol  750 mg Oral Q6H     Continuous Infusions:    dextrose 5% and 0.45% NaCl with KCl 20 mEq 60 mL/hr at 09/06/20 2100    dextrose       PRN MedicationsHYDROmorphone, 0.25 mg, Q6H PRN  oxyCODONE, 5 mg, Q6H PRN  sodium chloride flush, 10 mL, PRN  potassium chloride, 40 mEq, PRN    Or  potassium alternative oral replacement, 40 mEq, PRN    Or  potassium chloride, 10 mEq, PRN  magnesium sulfate, 1 g, PRN  ondansetron, 4 mg, Q6H PRN  labetalol, 10 mg, Q4H PRN  glucose, 15 g, PRN  dextrose, 12.5 g, PRN  glucagon (rDNA), 1 mg, PRN  dextrose, 100 mL/hr, PRN        Diagnostic Labs:  CBC:   Recent Labs     09/05/20  0434 09/06/20  0502 09/07/20  1012   WBC 19.1* 16.4* 13.4*   RBC 3.47* 3.18* 3.33*   HGB 10.1* 9.3* 9.5*   HCT 32.7* 28.8* 29.9*   MCV 94.2 90.6 89.8   RDW 13.8 13.4 13.7    206 251     BMP:   Recent Labs     09/05/20  0434 09/06/20  0502 09/07/20  1012    140 138   K 4.0 3.6* 3.5*    107 105   CO2 25 22 20   BUN 20 14 16   CREATININE 1.45* 1.02* 1.03*     BNP: No results for input(s): BNP in the last 72 hours. PT/INR: No results for input(s): PROTIME, INR in the last 72 hours. APTT: No results for input(s): APTT in the last 72 hours. CARDIAC ENZYMES: No results for input(s): CKMB, CKMBINDEX, TROPONINI in the last 72 hours.     Invalid input(s): CKTOTAL;3  FASTING LIPID PANEL:  Lab Results   Component Value Date    CHOL 158 05/10/2018    HDL 57 05/10/2018    TRIG 154 (H) 05/10/2018     LIVER PROFILE: No results for input(s): AST, ALT, ALB, BILIDIR, BILITOT, ALKPHOS in the last 72 hours. MICROBIOLOGY:   Lab Results   Component Value Date/Time    CULTURE NO GROWTH 3 DAYS 09/03/2020 11:23 PM       Imaging:    Ct Abdomen Pelvis Wo Contrast Additional Contrast? None    Result Date: 9/3/2020  Intrathoracic stomach full of contents. Volvulus/outlet obstruction not excluded. Further evaluation can be performed with the administration of oral contrast in repeat study if clinically indicated. Ct Head Wo Contrast    Result Date: 9/3/2020  No acute intracranial abnormality. Ct Chest Wo Contrast    Result Date: 9/3/2020  1. A type IV paraesophageal hiatal hernia which also contains the distal half of the transverse colon. NG tube in place. Some contrast has been administered which collects in the gastric fundus. The rest of the stomach is fluid-filled. Mild gastric distension noted which could be due to administered contrast.  Difficult to determine if there is some degree of obstruction on the basis of this study. No evidence for gastric pneumatosis. 2. Some bibasilar lower lobe atelectasis/scarring around the hiatal hernia noted along with trace left pleural effusion. Ct Cervical Spine Wo Contrast    Result Date: 9/3/2020  No acute abnormality of the cervical spine. Xr Chest Portable    Result Date: 9/3/2020  No acute cardiopulmonary abnormality. Large hiatal hernia. ASSESSMENT & PLAN     ASSESSMENT / PLAN:         1. Gastric outlet obstruction resolved  Secondary to gastric volvulus and paraesophageal hernia. EGD  done showing tortuous esophagus, transthoracic stomach with volvulus causing partial gastric obstruction .   Status post exploratory laparotomy, paraesophageal hernia repair with mesh, gastropexy with gastrostomy tubes and cauterization of the duodenum 9/3.      2)Started clear liquid diet yesterday.   Monitor if she is tolerating  IV fluids with 5% dextrose half-normal saline and 20 mEq potassium chloride. Patient has passed minimal smeared stool. Ambulate the patient  Continue antibiotics, IV Zosyn day 5. On IV Pepcid and Reglan as needed  Postoperative pain control. Minimize opioids  GI and general surgery following.     3)DOMINIQUE. Likely secondary to hypoperfusion. Improving. Baseline creatinine 1  Continue IV fluids. Monitor strict I/O's and follow creatinine.    4)Electrolyte imbalance. Hypokalemia. Replace and monitor potassium    5)Hypertension. patient is on amlodipine 5 mg .    6) wound between first and 2nd toe . Wound care consulted        DVT ppx : Heparin  GI ppx: Famotidine    PT/OT:   Discharge Planning / SW:     Gustabo Krishnamurthy MD  Internal Medicine Resident, PGY-1  [address], [place]  9/7/2020, 11:31 AM   Attending Physician Statement  I have discussed the care of Jonathan Rojas, including pertinent history and exam findings,  with the resident. I have seen and examined the patient and the key elements of all parts of the encounter have been performed by me. I agree with the assessment, plan and orders as documented by the resident with additions . Treatment plan Discussed with nursing staff in detail , all questions answered . Electronically signed by Shayna Brown MD on   9/7/20 at 2:44 PM EDT    Please note that this chart was generated using voice recognition Dragon dictation software. Although every effort was made to ensure the accuracy of this automated transcription, some errors in transcription may have occurred.

## 2020-09-07 NOTE — PROGRESS NOTES
General Surgery:  Daily Progress Note          POD # 4 s/p ex lap, paraesophageal hernia repair with mesh bridge, double gastropexy, kocherization of the duodenum          PATIENT NAME: Wesly Wall     TODAY'S DATE: 9/7/2020, 6:25 AM  CC:  Abdominal pain    SUBJECTIVE:     Pt seen and examined at bedside. No acute overnight events. Patient complains of abdominal pain in lower abdomen. Patient reports having liquidy bowel movement. Patient denies fever, chills, nausea, or vomiting. OBJECTIVE:   VITALS:  BP (!) 155/102   Pulse 102   Temp 97.7 °F (36.5 °C) (Oral)   Resp 18   Ht 5' 4\" (1.626 m)   Wt 194 lb 3.6 oz (88.1 kg)   SpO2 93%   BMI 33.34 kg/m²      INTAKE/OUTPUT:      Intake/Output Summary (Last 24 hours) at 9/7/2020 7069  Last data filed at 9/7/2020 0513  Gross per 24 hour   Intake 578 ml   Output 1000 ml   Net -422 ml       PHYSICAL EXAM:  General Appearance: awake, alert, oriented, in no acute distress  HEENT:  Normocephalic, atraumatic, mucus membranes moist   Heart: Heart sounds are normal.  Regular rate and rhythm without murmur, gallop or rub. Lungs: Normal expansion. Clear to auscultation. No rales, rhonchi, or wheezing. Abdomen:Soft, mild ttp, mild mild distention, incision c/d/i w/ staples. Gastrostomy tubes X2 intact. Extremities: No cyanosis, pitting edema, rashes noted. Skin: Skin color, texture, turgor normal. No rashes or lesions.     IV Access:  AV  Noe:  Yes    Data:  CBC with Differential:    Lab Results   Component Value Date    WBC 16.4 09/06/2020    RBC 3.18 09/06/2020    HGB 9.3 09/06/2020    HCT 28.8 09/06/2020     09/06/2020    MCV 90.6 09/06/2020    MCH 29.2 09/06/2020    MCHC 32.3 09/06/2020    RDW 13.4 09/06/2020    LYMPHOPCT 12 09/06/2020    MONOPCT 10 09/06/2020    BASOPCT 1 09/06/2020    MONOSABS 1.64 09/06/2020    LYMPHSABS 1.97 09/06/2020    EOSABS 0.16 09/06/2020    BASOSABS 0.16 09/06/2020    DIFFTYPE NOT REPORTED 09/06/2020     CMP:    Lab Results   Component Value Date     09/06/2020    K 3.6 09/06/2020     09/06/2020    CO2 22 09/06/2020    BUN 14 09/06/2020    CREATININE 1.02 09/06/2020    GFRAA >60 09/06/2020    LABGLOM 52 09/06/2020    GLUCOSE 134 09/06/2020    PROT 6.8 09/03/2020    LABALBU 3.6 09/03/2020    CALCIUM 8.1 09/06/2020    BILITOT 0.32 09/03/2020    ALKPHOS 102 09/03/2020    AST 21 09/03/2020    ALT 13 09/03/2020     BMP:    Lab Results   Component Value Date     09/06/2020    K 3.6 09/06/2020     09/06/2020    CO2 22 09/06/2020    BUN 14 09/06/2020    LABALBU 3.6 09/03/2020    CREATININE 1.02 09/06/2020    CALCIUM 8.1 09/06/2020    GFRAA >60 09/06/2020    LABGLOM 52 09/06/2020    GLUCOSE 134 09/06/2020     LDH:    Lab Results   Component Value Date     12/12/2018     PT/INR:    Lab Results   Component Value Date    PROTIME 9.9 09/03/2020    INR 0.9 09/03/2020     Warfarin PT/INR:  No components found for: PTPATWAR, PTINRWAR  PTT:    Lab Results   Component Value Date    APTT 22.2 09/03/2020   [APTT}    Radiology Review:      Ct Abdomen Pelvis Wo Contrast Additional Contrast? None     Result Date: 9/3/2020  Intrathoracic stomach full of contents. Volvulus/outlet obstruction not excluded. Further evaluation can be performed with the administration of oral contrast in repeat study if clinically indicated.      Ct Head Wo Contrast     Result Date: 9/3/2020  No acute intracranial abnormality.      Ct Chest Wo Contrast     Result Date: 9/3/2020  1. A type IV paraesophageal hiatal hernia which also contains the distal half of the transverse colon. NG tube in place. Some contrast has been administered which collects in the gastric fundus. The rest of the stomach is fluid-filled. Mild gastric distension noted which could be due to administered contrast.  Difficult to determine if there is some degree of obstruction on the basis of this study. No evidence for gastric pneumatosis.  2. Some bibasilar lower lobe atelectasis/scarring around the hiatal hernia noted along with trace left pleural effusion.      Ct Cervical Spine Wo Contrast     Result Date: 9/3/2020  No acute abnormality of the cervical spine.      Xr Chest Portable     Result Date: 9/3/2020  No acute cardiopulmonary abnormality. Large hiatal hernia. ASSESSMENT:  Active Hospital Problems    Diagnosis Date Noted    Paraesophageal hernia [K44.9] 09/06/2020    Hypokalemia [E87.6] 09/06/2020    DOMINIQUE (acute kidney injury) ([facility] [address] [N17.9] 09/06/2020    Normocytic anemia [D64.9] 09/06/2020    S/P exploratory laparotomy [Z98.890] 09/06/2020    Small bowel ischemia ([facility] [address] [K55.9] 09/06/2020    Dementia ([facility] [address] [F03.90] 09/06/2020    Gastric outlet obstruction [K31.1] 09/03/2020    Acute gastric volvulus [K31.89] 09/03/2020    Cerebrovascular accident (CVA) ([facility] [address] [I63.9] 01/17/2019    Essential hypertension [I10] 04/10/2017       79 y.o. 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 Diet: cld  2. Bowel regimen: Soapsuds enema, Dulcolax suppository MiraLAX twice daily until bowel movement,   3. Continue NPO, ok for sips with meds and ice chips  4. IVF: D5 1/2NS +20meq KCl. 5.  Liquid stool leakage overnight, no hard stools found on MARIE  6. Clamp both G tubes. If tolerates, can consider starting CLD  7. Creatinine: improved, 1.02 from 1.45 from 1.36 from 1.26  8. Cont abx day 4/4: zosyn. 9.  Encourage ambulation, IS use  10. Medical and supportive care per primary    Electronically signed by Fatemeh Webster MD  on 9/7/2020 at 6:25 AM     Pt overall improving. Minimal flatus and small liquid bm yesterday. Abd is slightly distended but soft and attp. Gastrostomy tubes X2 in place and capped. At this time continue cld w/ supplements. Will give soap suds enema and dulcolax suppository.  Start miralax bid until daily bm. Anticipate some gastroparesis given type 4 paraesophageal hernia and chronicity. Cont regalan. She clinically doing well.      Paty [id number]

## 2020-09-07 NOTE — PROGRESS NOTES
9/3/2020); and laparotomy (N/A, 9/3/2020). Restrictions  Restrictions/Precautions  Restrictions/Precautions: Cardiac, Surgical Protocols, Fall Risk, Up as Tolerated  Required Braces or Orthoses?: No  Position Activity Restriction  Other position/activity restrictions: G-tube to gravity  Subjective   General  Chart Reviewed: Orders, Progress Notes, History and Physical, Imaging, Labs, Operative Notes  Patient assessed for rehabilitation services?: Yes  Family / Caregiver Present: No  General Comment  Comments: Rn ok'd for therapy visit this date. Pt agreeable to session, pleasent/cooperative throughout. Pain Assessment  Pain Assessment: 0-10  Pain Level: 6  Patient's Stated Pain Goal: No pain  Pain Type: Acute pain;Surgical pain  Pain Location: Abdomen  Pain Orientation: Left;Mid  Pain Descriptors: Discomfort  Response to Pain Intervention: Patient Satisfied  Vital Signs  Patient Currently in Pain: Yes      Objective    ADL  Feeding: Stand by assistance;Setup; Increased time to complete(able to open containers and feed self)  Grooming: Stand by assistance; Moderate assistance;Setup; Increased time to complete(SBA-wash face, brush teeth, comb hair, Mod-wash/dry hair)  UE Bathing: Minimal assistance;Setup; Increased time to complete(w/back)  LE Bathing: Maximum assistance;Setup; Increased time to complete(w/lower legs and feet)  UE Dressing: Minimal assistance;Setup; Increased time to complete(w/gown)  LE Dressing: Maximum assistance;Setup; Increased time to complete(w/footies)  Toileting: Maximum assistance;Setup; Increased time to complete(abraham cath, assist w/preston care after BM)      Pt up in chair upon arrival. Setup for self care at tray table (see above for LOF). Pt stood for preston care and to change linen in chair. Pt retired to recliner, BLE elevated, call light and phone in reach. RN notified.        Balance  Sitting Balance: Stand by assistance(seated in recliner)  Standing Balance: Contact guard assistance(w/SW)  Standing Balance  Time: Pt stood for approx 3-4 min for preston care and to change linen in chair  Comment: w/SW     Transfers  Sit to stand: Minimal assistance  Stand to sit: Contact guard assistance  Transfer Comments: w/SW and vc's for hand placement w/poor carryover    Plan   Plan  Times per week: 4-5x/week  Current Treatment Recommendations: Safety Education & Training, Patient/Caregiver Education & Training, Self-Care / ADL, Functional Mobility Training, Endurance Training, ROM, Strengthening, Equipment Evaluation, Education, & procurement    Goals  Short term goals  Time Frame for Short term goals: By discharge:  Short term goal 1: Pt will complete functional transfers/functional mobility with Mod I, use of AD/AE PRN. Short term goal 2: Pt will complete ADLs with Mod I, use of AD/AE PRN.   Short term goal 3: Pt will demo ~15 minutes of standing tolerance during functional tasks to increase participation in ADL/IADL tasks  Short term goal 4: Pt will demo good safety awareness throughout all ADL/functional mobility tasks  Short term goal 5: Pt will demo energy conservation techniques throughout all ADL/functional mobility tasks     Therapy Time   Individual Concurrent Group Co-treatment   Time In 1255         Time Out  1355         Minutes  60 total tx time                 LANNY HUERTA/CYNTHIA

## 2020-09-08 PROBLEM — E44.0 MODERATE MALNUTRITION (HCC): Status: ACTIVE | Noted: 2020-09-08

## 2020-09-08 LAB
ABSOLUTE EOS #: 0.27 K/UL (ref 0–0.44)
ABSOLUTE IMMATURE GRANULOCYTE: 0.08 K/UL (ref 0–0.3)
ABSOLUTE LYMPH #: 2.4 K/UL (ref 1.1–3.7)
ABSOLUTE MONO #: 1.36 K/UL (ref 0.1–1.2)
ANION GAP SERPL CALCULATED.3IONS-SCNC: 11 MMOL/L (ref 9–17)
BASOPHILS # BLD: 1 % (ref 0–2)
BASOPHILS ABSOLUTE: 0.08 K/UL (ref 0–0.2)
BUN BLDV-MCNC: 15 MG/DL (ref 8–23)
BUN/CREAT BLD: ABNORMAL (ref 9–20)
CALCIUM SERPL-MCNC: 8.7 MG/DL (ref 8.6–10.4)
CHLORIDE BLD-SCNC: 106 MMOL/L (ref 98–107)
CO2: 20 MMOL/L (ref 20–31)
CREAT SERPL-MCNC: 1.08 MG/DL (ref 0.5–0.9)
DIFFERENTIAL TYPE: ABNORMAL
EOSINOPHILS RELATIVE PERCENT: 2 % (ref 1–4)
GFR AFRICAN AMERICAN: 59 ML/MIN
GFR NON-AFRICAN AMERICAN: 49 ML/MIN
GFR SERPL CREATININE-BSD FRML MDRD: ABNORMAL ML/MIN/{1.73_M2}
GFR SERPL CREATININE-BSD FRML MDRD: ABNORMAL ML/MIN/{1.73_M2}
GLUCOSE BLD-MCNC: 104 MG/DL (ref 70–99)
GLUCOSE BLD-MCNC: 116 MG/DL (ref 65–105)
GLUCOSE BLD-MCNC: 120 MG/DL (ref 65–105)
GLUCOSE BLD-MCNC: 151 MG/DL (ref 65–105)
GLUCOSE BLD-MCNC: 95 MG/DL (ref 65–105)
HCT VFR BLD CALC: 32.9 % (ref 36.3–47.1)
HEMOGLOBIN: 10.3 G/DL (ref 11.9–15.1)
IMMATURE GRANULOCYTES: 1 %
LYMPHOCYTES # BLD: 21 % (ref 24–43)
MCH RBC QN AUTO: 28.5 PG (ref 25.2–33.5)
MCHC RBC AUTO-ENTMCNC: 31.3 G/DL (ref 28.4–34.8)
MCV RBC AUTO: 91.1 FL (ref 82.6–102.9)
MONOCYTES # BLD: 12 % (ref 3–12)
NRBC AUTOMATED: 0 PER 100 WBC
PDW BLD-RTO: 13.5 % (ref 11.8–14.4)
PLATELET # BLD: 275 K/UL (ref 138–453)
PLATELET ESTIMATE: ABNORMAL
PMV BLD AUTO: 11.1 FL (ref 8.1–13.5)
POTASSIUM SERPL-SCNC: 3.6 MMOL/L (ref 3.7–5.3)
RBC # BLD: 3.61 M/UL (ref 3.95–5.11)
RBC # BLD: ABNORMAL 10*6/UL
SEG NEUTROPHILS: 63 % (ref 36–65)
SEGMENTED NEUTROPHILS ABSOLUTE COUNT: 7.5 K/UL (ref 1.5–8.1)
SODIUM BLD-SCNC: 137 MMOL/L (ref 135–144)
WBC # BLD: 11.7 K/UL (ref 3.5–11.3)
WBC # BLD: ABNORMAL 10*3/UL

## 2020-09-08 PROCEDURE — 6370000000 HC RX 637 (ALT 250 FOR IP): Performed by: STUDENT IN AN ORGANIZED HEALTH CARE EDUCATION/TRAINING PROGRAM

## 2020-09-08 PROCEDURE — 2580000003 HC RX 258: Performed by: STUDENT IN AN ORGANIZED HEALTH CARE EDUCATION/TRAINING PROGRAM

## 2020-09-08 PROCEDURE — 82947 ASSAY GLUCOSE BLOOD QUANT: CPT

## 2020-09-08 PROCEDURE — 6360000002 HC RX W HCPCS: Performed by: STUDENT IN AN ORGANIZED HEALTH CARE EDUCATION/TRAINING PROGRAM

## 2020-09-08 PROCEDURE — 6370000000 HC RX 637 (ALT 250 FOR IP): Performed by: NURSE PRACTITIONER

## 2020-09-08 PROCEDURE — 97530 THERAPEUTIC ACTIVITIES: CPT

## 2020-09-08 PROCEDURE — 97535 SELF CARE MNGMENT TRAINING: CPT

## 2020-09-08 PROCEDURE — 2500000003 HC RX 250 WO HCPCS: Performed by: STUDENT IN AN ORGANIZED HEALTH CARE EDUCATION/TRAINING PROGRAM

## 2020-09-08 PROCEDURE — 36415 COLL VENOUS BLD VENIPUNCTURE: CPT

## 2020-09-08 PROCEDURE — 80048 BASIC METABOLIC PNL TOTAL CA: CPT

## 2020-09-08 PROCEDURE — 85025 COMPLETE CBC W/AUTO DIFF WBC: CPT

## 2020-09-08 PROCEDURE — 97110 THERAPEUTIC EXERCISES: CPT

## 2020-09-08 PROCEDURE — 1200000000 HC SEMI PRIVATE

## 2020-09-08 PROCEDURE — 97116 GAIT TRAINING THERAPY: CPT

## 2020-09-08 PROCEDURE — 99232 SBSQ HOSP IP/OBS MODERATE 35: CPT | Performed by: INTERNAL MEDICINE

## 2020-09-08 RX ORDER — QUETIAPINE FUMARATE 25 MG/1
25 TABLET, FILM COATED ORAL NIGHTLY
Status: DISCONTINUED | OUTPATIENT
Start: 2020-09-08 | End: 2020-09-12 | Stop reason: HOSPADM

## 2020-09-08 RX ORDER — MAGNESIUM SULFATE 1 G/100ML
2 INJECTION INTRAVENOUS ONCE
Status: COMPLETED | OUTPATIENT
Start: 2020-09-08 | End: 2020-09-08

## 2020-09-08 RX ADMIN — ACETAMINOPHEN 1000 MG: 500 TABLET ORAL at 22:11

## 2020-09-08 RX ADMIN — METHOCARBAMOL TABLETS 750 MG: 750 TABLET, COATED ORAL at 11:14

## 2020-09-08 RX ADMIN — HEPARIN SODIUM 5000 UNITS: 5000 INJECTION INTRAVENOUS; SUBCUTANEOUS at 22:10

## 2020-09-08 RX ADMIN — PIPERACILLIN AND TAZOBACTAM 3.38 G: 3; .375 INJECTION, POWDER, FOR SOLUTION INTRAVENOUS at 15:53

## 2020-09-08 RX ADMIN — METOCLOPRAMIDE 10 MG: 5 INJECTION, SOLUTION INTRAMUSCULAR; INTRAVENOUS at 09:14

## 2020-09-08 RX ADMIN — ACETAMINOPHEN 1000 MG: 500 TABLET ORAL at 16:13

## 2020-09-08 RX ADMIN — METOCLOPRAMIDE 10 MG: 5 INJECTION, SOLUTION INTRAMUSCULAR; INTRAVENOUS at 17:02

## 2020-09-08 RX ADMIN — QUETIAPINE FUMARATE 25 MG: 25 TABLET ORAL at 22:10

## 2020-09-08 RX ADMIN — AMLODIPINE BESYLATE 5 MG: 5 TABLET ORAL at 09:14

## 2020-09-08 RX ADMIN — SODIUM CHLORIDE, PRESERVATIVE FREE 10 ML: 5 INJECTION INTRAVENOUS at 09:14

## 2020-09-08 RX ADMIN — METHOCARBAMOL TABLETS 750 MG: 750 TABLET, COATED ORAL at 05:35

## 2020-09-08 RX ADMIN — MAGNESIUM SULFATE HEPTAHYDRATE 2 G: 1 INJECTION, SOLUTION INTRAVENOUS at 01:48

## 2020-09-08 RX ADMIN — METHOCARBAMOL TABLETS 750 MG: 750 TABLET, COATED ORAL at 18:52

## 2020-09-08 RX ADMIN — HEPARIN SODIUM 5000 UNITS: 5000 INJECTION INTRAVENOUS; SUBCUTANEOUS at 05:35

## 2020-09-08 RX ADMIN — ACETAMINOPHEN 1000 MG: 500 TABLET ORAL at 05:35

## 2020-09-08 RX ADMIN — INSULIN LISPRO 1 UNITS: 100 INJECTION, SOLUTION INTRAVENOUS; SUBCUTANEOUS at 22:26

## 2020-09-08 RX ADMIN — SODIUM CHLORIDE, PRESERVATIVE FREE 10 ML: 5 INJECTION INTRAVENOUS at 22:11

## 2020-09-08 RX ADMIN — FAMOTIDINE 20 MG: 20 TABLET, FILM COATED ORAL at 09:14

## 2020-09-08 RX ADMIN — METOCLOPRAMIDE 10 MG: 5 INJECTION, SOLUTION INTRAMUSCULAR; INTRAVENOUS at 02:31

## 2020-09-08 RX ADMIN — WATER 2.1 ML: 1 INJECTION INTRAMUSCULAR; INTRAVENOUS; SUBCUTANEOUS at 04:18

## 2020-09-08 RX ADMIN — ONDANSETRON 4 MG: 2 INJECTION INTRAMUSCULAR; INTRAVENOUS at 09:17

## 2020-09-08 RX ADMIN — OLANZAPINE 5 MG: 10 INJECTION, POWDER, LYOPHILIZED, FOR SOLUTION INTRAMUSCULAR at 04:17

## 2020-09-08 RX ADMIN — HEPARIN SODIUM 5000 UNITS: 5000 INJECTION INTRAVENOUS; SUBCUTANEOUS at 17:02

## 2020-09-08 RX ADMIN — PIPERACILLIN AND TAZOBACTAM 3.38 G: 3; .375 INJECTION, POWDER, FOR SOLUTION INTRAVENOUS at 07:06

## 2020-09-08 RX ADMIN — METOCLOPRAMIDE 10 MG: 5 INJECTION, SOLUTION INTRAMUSCULAR; INTRAVENOUS at 22:10

## 2020-09-08 ASSESSMENT — PAIN SCALES - GENERAL
PAINLEVEL_OUTOF10: 0
PAINLEVEL_OUTOF10: 6
PAINLEVEL_OUTOF10: 0
PAINLEVEL_OUTOF10: 4
PAINLEVEL_OUTOF10: 0
PAINLEVEL_OUTOF10: 6
PAINLEVEL_OUTOF10: 0
PAINLEVEL_OUTOF10: 0

## 2020-09-08 ASSESSMENT — PAIN DESCRIPTION - ORIENTATION: ORIENTATION: LEFT;MID

## 2020-09-08 ASSESSMENT — PAIN DESCRIPTION - PAIN TYPE: TYPE: ACUTE PAIN;SURGICAL PAIN

## 2020-09-08 ASSESSMENT — PAIN DESCRIPTION - LOCATION: LOCATION: ABDOMEN

## 2020-09-08 NOTE — PLAN OF CARE
Problem: Falls - Risk of:  Goal: Will remain free from falls  Description: Will remain free from falls  Outcome: Ongoing  Goal: Absence of physical injury  Description: Absence of physical injury  Outcome: Ongoing     Problem: Skin Integrity:  Goal: Will show no infection signs and symptoms  Description: Will show no infection signs and symptoms  Outcome: Ongoing  Goal: Absence of new skin breakdown  Description: Absence of new skin breakdown  Outcome: Ongoing     Problem: Pain:  Goal: Pain level will decrease  Description: Pain level will decrease  Outcome: Ongoing  Goal: Control of acute pain  Description: Control of acute pain  Outcome: Ongoing  Goal: Control of chronic pain  Description: Control of chronic pain  Outcome: Ongoing     Problem: Nutrition  Goal: Optimal nutrition therapy  Description: Nutrition Problem #1: Inadequate oral intake  Intervention: Food and/or Nutrient Delivery: Start Oral Diet  Nutritional Goals: initiation of nutrition within 72 hours     Outcome: Ongoing     Problem: Infection - Surgical Site:  Goal: Will show no infection signs and symptoms  Description: Will show no infection signs and symptoms  Outcome: Ongoing

## 2020-09-08 NOTE — PROGRESS NOTES
[facility]  Internal Medicine Teaching Residency Program  Inpatient Daily Progress Note  ______________________________________________________________________________    Patient: Kate Recinos  YOB: 1940   PXL:[id number]    Acct: [de-identified]     Room: 2017/2017-01  Admit date: 9/3/2020  Today's date: 09/08/20  Number of days in the hospital: 5    SUBJECTIVE   Admitting Diagnosis: Gastric outlet obstruction  CC: Nausea vomiting  Constipation     Pt examined at bedside. Chart & results reviewed. Patient examined in the morning , patient is hemodynamially stable 127/59, hr 93. Afebrile  Patient has been advised ambulation. Abdominal pain is improving flatus passed and bowel movement positive. Patient is tolerating clear liquid diet without any nausea vomiting    As of now low fiber but once the patient started tolerating slowly. Skilled nursing facility advised   No sign of infection WBC on downtrending to 11.6    ROS:  Constitutional:  negative for chills, fevers, sweats  Respiratory:  negative for cough, dyspnea on exertion, hemoptysis, shortness of breath, wheezing  Cardiovascular:  negative for chest pain, chest pressure/discomfort, lower extremity edema, palpitations  Gastrointestinal: Patient is having pain on palpation   neurological: Patient is improving  Extremities: Wound between the great toe and first toe,bandage applied      BRIEF HISTORY     54-year-old female with past medical history of hypertension,stroke ,heart failure ,HPL dementia, gout and hiatal hernia.   Patient presented initially to [facility] emergency department with complaining of nausea and vomiting for 2 days. prior to that patient had constipation from 3 days.  CT abdomen was done which  showed volvulus or gastric obstruction .   Patient then presented to [facility] ER where   x-rays cervical spine showed large hiatal hernia  CT scan of abdomen showed intrathoracic stomach containing food particles  Showing either volvulus or gastric obstruction  Chest CT was done which showed paraesophageal hiatal hernia type IV   Surgery consulted the GI ,    IN ICU ,EGD was done showing tortuous esophagus, transthoracic stomach with volvulus causing partial gastric obstruction and suspicion of of bowel ischemia.    Patient stomach was decompressed but multiple attempts at derotating the stomach were unsuccessful. surgery was consulted intra-procedure due to suspicion of bowel ischemia.   She underwent Ex lap, paraesophageal hernia repair with mesh bridge, double gastropexy, Kocherization of the duodenum.     OBJECTIVE     Vital Signs:  BP (!) 127/59   Pulse 93   Temp 98.1 °F (36.7 °C) (Oral)   Resp 16   Ht 5' 4\" (1.626 m)   Wt 187 lb 2.7 oz (84.9 kg)   SpO2 97%   BMI 32.13 kg/m²     Temp (24hrs), Av °F (36.7 °C), Min:97.8 °F (36.6 °C), Max:98.1 °F (36.7 °C)    In: 613   Out: 1875 [Urine:1875]    Physical Exam:  Constitutional:       Appearance: Normal appearance. Eyes:      General: No scleral icterus.        Right eye: No discharge.         Left eye: No discharge.      Extraocular Movements: Extraocular movements intact.      Pupils: Pupils are equal, round, and reactive to light. Neck:      Musculoskeletal: No neck rigidity or muscular tenderness. Asked  Pulmonary:      Effort: Pulmonary effort is normal.      Breath sounds: Normal breath sounds. No rhonchi. Abdominal:      General: Bowel sounds are normal. There is no distension.      Tenderness: There is no abdominal tenderness.  There is guarding.      Comments:  linear scar on the abdomen ,stitched with stapler  , no surgical site infection, surgical scar seems to be healing.   Neurological:      General: No focal deficit present.      Mental Status: She is alert.      Cranial Nerves: No cranial nerve deficit.      Sensory: No sensory deficit.      Motor: No weakness.   Medications:  Scheduled Medications:    input(s): AST, ALT, ALB, BILIDIR, BILITOT, ALKPHOS in the last 72 hours. MICROBIOLOGY:   Lab Results   Component Value Date/Time    CULTURE NO GROWTH 4 DAYS 09/03/2020 11:23 PM       Imaging:    Ct Abdomen Pelvis Wo Contrast Additional Contrast? None    Result Date: 9/3/2020  Intrathoracic stomach full of contents. Volvulus/outlet obstruction not excluded. Further evaluation can be performed with the administration of oral contrast in repeat study if clinically indicated. Ct Head Wo Contrast    Result Date: 9/3/2020  No acute intracranial abnormality. Ct Chest Wo Contrast    Result Date: 9/3/2020  1. A type IV paraesophageal hiatal hernia which also contains the distal half of the transverse colon. NG tube in place. Some contrast has been administered which collects in the gastric fundus. The rest of the stomach is fluid-filled. Mild gastric distension noted which could be due to administered contrast.  Difficult to determine if there is some degree of obstruction on the basis of this study. No evidence for gastric pneumatosis. 2. Some bibasilar lower lobe atelectasis/scarring around the hiatal hernia noted along with trace left pleural effusion. Ct Cervical Spine Wo Contrast    Result Date: 9/3/2020  No acute abnormality of the cervical spine. Xr Chest Portable    Result Date: 9/3/2020  No acute cardiopulmonary abnormality. Large hiatal hernia. ASSESSMENT & PLAN     ASSESSMENT / PLAN:     1. Gastric outlet obstruction resolved  Secondary to gastric volvulus and paraesophageal hernia. EGD  done showing tortuous esophagus, transthoracic stomach with volvulus causing partial gastric obstruction .   Status post exploratory laparotomy, paraesophageal hernia repair with mesh, gastropexy with gastrostomy tubes and cauterization of the duodenum 9/3. Started clear liquid diet 2 days before,patient is tolerating well. Patient ambulated Zosyn day 6.   Minimize opioids  GI and general surgery follow-up     2. DOMINIQUE. -  Likely secondary to hypoperfusion.  Improving.  Baseline creatinine 1 and 1.08 today .  -Continue IV fluids.    -Monitor strict I/O's    -follow RFT's.       3)Electrolyte imbalance.  Hypokalemia.  Replace and monitor potassium. Potassium on September 8 is 3.6     4) hypertension: Patient is on amlodipine 5 mg and labetalol as needed. 5) wound between first and 2nd toe . Wound care consulted            DVT ppx : Heparin  GI ppx: Famotidine    PT/OT:   Discharge Planning / SW:     Peter Cheung MD  Internal Medicine Resident, PGY-1  [address], [place]  9/8/2020, 12:49 PM   Attending Physician Statement  I have discussed the care of Beverly Thapa, including pertinent history and exam findings,  with the resident. I have seen and examined the patient and the key elements of all parts of the encounter have been performed by me. I agree with the assessment, plan and orders as documented by the resident with additions . Treatment plan Discussed with nursing staff in detail , all questions answered . Electronically signed by Flakita Willis MD on   9/8/20 at 5:34 PM EDT    Please note that this chart was generated using voice recognition Dragon dictation software. Although every effort was made to ensure the accuracy of this automated transcription, some errors in transcription may have occurred.

## 2020-09-08 NOTE — PROGRESS NOTES
CREATININE 1.03 09/07/2020    GFRAA >60 09/07/2020    LABGLOM 51 09/07/2020    GLUCOSE 129 09/07/2020    PROT 6.8 09/03/2020    LABALBU 3.6 09/03/2020    CALCIUM 8.0 09/07/2020    BILITOT 0.32 09/03/2020    ALKPHOS 102 09/03/2020    AST 21 09/03/2020    ALT 13 09/03/2020     BMP:    Lab Results   Component Value Date     09/07/2020    K 3.5 09/07/2020     09/07/2020    CO2 20 09/07/2020    BUN 16 09/07/2020    LABALBU 3.6 09/03/2020    CREATININE 1.03 09/07/2020    CALCIUM 8.0 09/07/2020    GFRAA >60 09/07/2020    LABGLOM 51 09/07/2020    GLUCOSE 129 09/07/2020     LDH:    Lab Results   Component Value Date     12/12/2018     PT/INR:    Lab Results   Component Value Date    PROTIME 9.9 09/03/2020    INR 0.9 09/03/2020     Warfarin PT/INR:  No components found for: PTPATWAR, PTINRWAR  PTT:    Lab Results   Component Value Date    APTT 22.2 09/03/2020   [APTT}    Radiology Review:      Ct Abdomen Pelvis Wo Contrast Additional Contrast? None     Result Date: 9/3/2020  Intrathoracic stomach full of contents. Volvulus/outlet obstruction not excluded. Further evaluation can be performed with the administration of oral contrast in repeat study if clinically indicated.      Ct Head Wo Contrast     Result Date: 9/3/2020  No acute intracranial abnormality.      Ct Chest Wo Contrast     Result Date: 9/3/2020  1. A type IV paraesophageal hiatal hernia which also contains the distal half of the transverse colon. NG tube in place. Some contrast has been administered which collects in the gastric fundus. The rest of the stomach is fluid-filled. Mild gastric distension noted which could be due to administered contrast.  Difficult to determine if there is some degree of obstruction on the basis of this study. No evidence for gastric pneumatosis.  2. Some bibasilar lower lobe atelectasis/scarring around the hiatal hernia noted along with trace left pleural effusion.      Ct Cervical Spine Wo

## 2020-09-08 NOTE — PROGRESS NOTES
Comprehensive Nutrition Assessment    Type and Reason for Visit:  Reassess    Nutrition Recommendations/Plan:   -Continue low-fiber diet   -Recommend ensure enlive supplements BID   -Will monitor po intake and weights     Nutrition Assessment:  Pt improving from a nutritional standpoint aeb pt is not started on a low-fiber diet as of this morning and reports that her appetite is \"good\" and consumed 25% of her b-fast tray this morning. Pt now meets the criteria for moderate malnutrition. Pt agreed to ensure enlive supplements to compliment her po intake. Will monitor. Malnutrition Assessment:  Malnutrition Status: Moderate malnutrition    Context:  Acute Illness     Findings of the 6 clinical characteristics of malnutrition:  Energy Intake:  7 - 50% or less of estimated energy requirements for 5 or more days  Weight Loss:  No significant weight loss     Body Fat Loss:  No significant body fat loss     Muscle Mass Loss:  No significant muscle mass loss    Fluid Accumulation:  1 - Mild Extremities   Strength:  Not Performed    Estimated Daily Nutrient Needs:  Energy (kcal):  1500 kcal; Weight Used for Energy Requirements:  Current     Protein (g):  1.2g/kg= 65 g protein or more; Weight Used for Protein Requirements:  Ideal          Nutrition Related Findings:  K 3.6      Wounds:  Surgical Wound       Current Nutrition Therapies:    DIET LOW FIBER; Anthropometric Measures:  · Height: 5' 4\" (162.6 cm)  · Current Body Weight: 187 lb (84.8 kg)   · Admission Body Weight: 178 lb (80.7 kg)    · Ideal Body Weight: 120 lbs; % Ideal Body Weight 155.8 %   · BMI: 32.1  · BMI Categories: Obese Class 1 (BMI 30.0-34. 9)       Nutrition Diagnosis:   · Inadequate oral intake related to altered GI function as evidenced by intake 0-25%(Need for ONS)      Nutrition Interventions:   Food and/or Nutrient Delivery:  Continue Current Diet, Start Oral Nutrition Supplement  Nutrition Education/Counseling:  Education not indicated

## 2020-09-08 NOTE — CARE COORDINATION
Transitional planning-talked with patient-planning on going home. States has daughter, sister, and friend that lives close by. Wanting home care-list given.

## 2020-09-08 NOTE — PROGRESS NOTES
Physical Therapy  Facility/Department: [facility]  Daily Treatment Note  NAME: Kalyan Michel  : 1940  MRN: [id number]    Date of Service: 2020    Discharge Recommendations:  Patient would benefit from continued therapy after discharge  Equipment: Rolling Walker        Assessment   Body structures, Functions, Activity limitations: Decreased functional mobility ; Decreased posture;Decreased endurance;Decreased ROM; Decreased strength;Decreased balance; Increased pain  Assessment: Pt is limited by fatigue and lack of endurance. Pt demos unsteadiness and BLE weakness with ambulation. due to these deficits, pt is unsafe to return home at this time. pt would benefit from continued skilled therapy. Prognosis: Good  REQUIRES PT FOLLOW UP: Yes  Activity Tolerance  Activity Tolerance: Patient limited by pain; Patient Tolerated treatment well;Patient limited by endurance     Patient Diagnosis(es): The primary encounter diagnosis was Gastric outlet obstruction. A diagnosis of Nausea and vomiting, intractability of vomiting not specified, unspecified vomiting type was also pertinent to this visit. has a past medical history of Anemia, Cataract, Cerebral artery occlusion with cerebral infarction ([address] CHF (congestive heart failure) ([address] Depression, Diabetes mellitus ([address] Eczema, Gout, Hearing loss, Hiatal hernia, History of blood transfusion, Hyperlipidemia, Hypertension, PONV (postoperative nausea and vomiting), Rectal urgency, and Stress incontinence, female. has a past surgical history that includes Hysterectomy; Appendectomy; eye surgery; Cataract removal with implant (Bilateral); Colonoscopy; joint replacement; pr total hip arthroplasty (Left, 2018); Abdomen surgery (2020); Upper gastrointestinal endoscopy (N/A, 9/3/2020); and laparotomy (N/A, 9/3/2020).     Restrictions  Restrictions/Precautions  Restrictions/Precautions: Cardiac, Surgical Protocols, Fall Risk, Up as Tolerated  Required Braces or Orthoses?: No  Position Activity Restriction  Subjective   General  Chart Reviewed: Yes  Response To Previous Treatment: Patient with no complaints from previous session. Family / Caregiver Present: Yes  Subjective  Subjective: Pt in bed upon arrival, agreeable to PT. General Comment  Comments: Pt retired in recliner after session with call light in reach  Pain Screening  Patient Currently in Pain: Denies  Vital Signs  Patient Currently in Pain: Denies       Orientation  Orientation  Overall Orientation Status: Within Normal Limits  Cognition      Objective   Bed mobility  Supine to Sit: Minimal assistance  Comment: HOB elevated  Transfers  Sit to Stand: Minimal Assistance  Stand to sit: Contact guard assistance  Ambulation  Ambulation?: Yes  Ambulation 1  Surface: level tile  Device: Rolling Walker  Quality of Gait: flexed posture, antalgic gait, LLE step to gait, very slow  Gait Deviations: Slow Toshia;Decreased step length;Decreased step height;Shuffles  Distance: 60 ft x2  Comments: one seated rest break noted, increased time and effort to complete. Stairs/Curb  Stairs?: No     Balance  Posture: Fair  Sitting - Static: Good  Sitting - Dynamic: Good;-  Standing - Static: Fair;+  Standing - Dynamic: Fair  Comments: standing balance assessed with RW  Exercises   Seated LE exercise program: Long Arc Quads, hip abduction/adduction, heel/toe raises, and marches. Reps: x10   Upper extremity exercises: Bicep curl, shoulder flexion/extension, punches, tricep curl, shoulder abduction/adduction.  Reps: x10  Goals  Short term goals  Time Frame for Short term goals: 15  Short term goal 1: Pt to perform bed mobility CGA  Short term goal 2: Pt to demonstrate functional transfers CGA  Short term goal 3: Ambulate 100ft w/ RW CGA  Short term goal 4: Tolerate 30 minutes of therapy to demo increased endurnace  Patient Goals   Patient goals : Did not state    Plan    Plan  Times per week: 5-6x/week  Current Treatment

## 2020-09-08 NOTE — PROGRESS NOTES
surgical history that includes Hysterectomy; Appendectomy; eye surgery; Cataract removal with implant (Bilateral); Colonoscopy; joint replacement; pr total hip arthroplasty (Left, 11/6/2018); Abdomen surgery (09/03/2020); Upper gastrointestinal endoscopy (N/A, 9/3/2020); and laparotomy (N/A, 9/3/2020). Restrictions  Restrictions/Precautions  Restrictions/Precautions: Cardiac, Surgical Protocols, Fall Risk, Up as Tolerated, General Precautions  Required Braces or Orthoses?: No    Subjective   General  Patient assessed for rehabilitation services?: Yes  Family / Caregiver Present: Yes(pt's sister and daughter arrived during tx)  Pain Assessment  Pain Assessment: 0-10  Pain Level: 6  Pain Type: Acute pain;Surgical pain  Pain Location: Abdomen  Pain Orientation: Left;Mid  Non-Pharmaceutical Pain Intervention(s): Ambulation/Increased Activity; Distraction;Repositioned  Vital Signs  Patient Currently in Pain: Yes     Orientation  Orientation  Overall Orientation Status: Within Functional Limits     Objective    ADL  Grooming: Independent; Increased time to complete;Setup(pt performed hair grooming seated in chair)  UE Bathing: Minimal assistance; Increased time to complete;Setup(pt performed full bathing ADL seated in chair with min A to wash back)  LE Bathing: Moderate assistance;Setup; Increased time to complete(pt performed full bathing ADL seated in bed, pt reported having just performed pericare with brief management, did not perform in tx session, pt required A to wash B feet)  UE Dressing: Minimal assistance; Increased time to complete;Setup(to don/doff gown seated in chair)  LE Dressing: Maximum assistance; Increased time to complete;Setup(to don/doff B socks seated in chair)  Additional Comments: Pt seated in chair on OT arrival. Pt reported fatigue d/t working with PT prior to OT arrival. Pt agreeable to seated ADLs on this date. Pt doffed B socks seated in chair. Pt doffed gown seated in chair.  Pt performed full bathing ADL seated in chair. Pt donned socks seated in chair. Pt donned gown. Pt performed sit > stand to remove back gown and adjust linens. Pt performed hair grooming seated in chair. Pt left seated in chair, family members present, call light within reach, LEs elevated, and RN notified on OT exit. Balance  Sitting Balance: Supervision(~25 min seated in chair)  Standing Balance: Contact guard assistance  Standing Balance  Time: ~1 min  Activity: standing in front of chair to allow for brief adjustment and linen adjustment  Comment: using standard walker    Functional Mobility  Functional Mobility Comments: pt declined on this date d/t working with PT prior to OT arrival     Transfers  Sit to stand: Contact guard assistance  Stand to sit: Contact guard assistance  Transfer Comments: with standard walker     Cognition  Overall Cognitive Status: WFL     Perception  Overall Perceptual Status: [facility]     Plan   Plan  Times per week: 4-5x/week  Current Treatment Recommendations: Safety Education & Training, Patient/Caregiver Education & Training, Self-Care / ADL, Functional Mobility Training, Endurance Training, ROM, Strengthening, Equipment Evaluation, Education, & procurement    Goals  Short term goals  Time Frame for Short term goals: By discharge:  Short term goal 1: Pt will complete functional transfers/functional mobility with Mod I, use of AD/AE PRN. Short term goal 2: Pt will complete ADLs with Mod I, use of AD/AE PRN.   Short term goal 3: Pt will demo ~15 minutes of standing tolerance during functional tasks to increase participation in ADL/IADL tasks  Short term goal 4: Pt will demo good safety awareness throughout all ADL/functional mobility tasks  Short term goal 5: Pt will demo energy conservation techniques throughout all ADL/functional mobility tasks       Therapy Time   Individual Concurrent Group Co-treatment   Time In 1430         Time Out 1457         Minutes 27         Timed Code Treatment Minutes: 27 Minutes   See above for LOF. RN reports patient is medically stable for therapy treatment this date. Chart reviewed prior to treatment and patient is agreeable for therapy. All lines intact and patient positioned comfortably at end of treatment. All patient needs addressed prior to ending therapy session.       eTj Arroyo, OTS

## 2020-09-09 LAB
ABSOLUTE EOS #: 0.57 K/UL (ref 0–0.44)
ABSOLUTE IMMATURE GRANULOCYTE: 0.11 K/UL (ref 0–0.3)
ABSOLUTE LYMPH #: 2.34 K/UL (ref 1.1–3.7)
ABSOLUTE MONO #: 1.27 K/UL (ref 0.1–1.2)
ANION GAP SERPL CALCULATED.3IONS-SCNC: 14 MMOL/L (ref 9–17)
BASOPHILS # BLD: 1 % (ref 0–2)
BASOPHILS ABSOLUTE: 0.09 K/UL (ref 0–0.2)
BUN BLDV-MCNC: 15 MG/DL (ref 8–23)
BUN/CREAT BLD: ABNORMAL (ref 9–20)
CALCIUM SERPL-MCNC: 8.4 MG/DL (ref 8.6–10.4)
CHLORIDE BLD-SCNC: 110 MMOL/L (ref 98–107)
CO2: 21 MMOL/L (ref 20–31)
CREAT SERPL-MCNC: 1.09 MG/DL (ref 0.5–0.9)
CULTURE: NORMAL
DIFFERENTIAL TYPE: ABNORMAL
EKG ATRIAL RATE: 103 BPM
EKG P-R INTERVAL: 194 MS
EKG Q-T INTERVAL: 382 MS
EKG QRS DURATION: 92 MS
EKG QTC CALCULATION (BAZETT): 500 MS
EKG R AXIS: -36 DEGREES
EKG T AXIS: 38 DEGREES
EKG VENTRICULAR RATE: 103 BPM
EOSINOPHILS RELATIVE PERCENT: 5 % (ref 1–4)
GFR AFRICAN AMERICAN: 58 ML/MIN
GFR NON-AFRICAN AMERICAN: 48 ML/MIN
GFR SERPL CREATININE-BSD FRML MDRD: ABNORMAL ML/MIN/{1.73_M2}
GFR SERPL CREATININE-BSD FRML MDRD: ABNORMAL ML/MIN/{1.73_M2}
GLUCOSE BLD-MCNC: 101 MG/DL (ref 70–99)
GLUCOSE BLD-MCNC: 109 MG/DL (ref 65–105)
GLUCOSE BLD-MCNC: 78 MG/DL (ref 65–105)
GLUCOSE BLD-MCNC: 81 MG/DL (ref 65–105)
GLUCOSE BLD-MCNC: 83 MG/DL (ref 65–105)
GLUCOSE BLD-MCNC: 83 MG/DL (ref 65–105)
HCT VFR BLD CALC: 31.3 % (ref 36.3–47.1)
HEMOGLOBIN: 9.6 G/DL (ref 11.9–15.1)
IMMATURE GRANULOCYTES: 1 %
LYMPHOCYTES # BLD: 19 % (ref 24–43)
Lab: NORMAL
MCH RBC QN AUTO: 28.6 PG (ref 25.2–33.5)
MCHC RBC AUTO-ENTMCNC: 30.7 G/DL (ref 28.4–34.8)
MCV RBC AUTO: 93.2 FL (ref 82.6–102.9)
MONOCYTES # BLD: 10 % (ref 3–12)
NRBC AUTOMATED: 0 PER 100 WBC
PDW BLD-RTO: 14.1 % (ref 11.8–14.4)
PLATELET # BLD: 302 K/UL (ref 138–453)
PLATELET ESTIMATE: ABNORMAL
PMV BLD AUTO: 10.5 FL (ref 8.1–13.5)
POTASSIUM SERPL-SCNC: 3.6 MMOL/L (ref 3.7–5.3)
RBC # BLD: 3.36 M/UL (ref 3.95–5.11)
RBC # BLD: ABNORMAL 10*6/UL
SEG NEUTROPHILS: 64 % (ref 36–65)
SEGMENTED NEUTROPHILS ABSOLUTE COUNT: 7.96 K/UL (ref 1.5–8.1)
SODIUM BLD-SCNC: 145 MMOL/L (ref 135–144)
SPECIMEN DESCRIPTION: NORMAL
WBC # BLD: 12.3 K/UL (ref 3.5–11.3)
WBC # BLD: ABNORMAL 10*3/UL

## 2020-09-09 PROCEDURE — 6370000000 HC RX 637 (ALT 250 FOR IP): Performed by: NURSE PRACTITIONER

## 2020-09-09 PROCEDURE — 93010 ELECTROCARDIOGRAM REPORT: CPT | Performed by: INTERNAL MEDICINE

## 2020-09-09 PROCEDURE — 6360000002 HC RX W HCPCS: Performed by: STUDENT IN AN ORGANIZED HEALTH CARE EDUCATION/TRAINING PROGRAM

## 2020-09-09 PROCEDURE — 6370000000 HC RX 637 (ALT 250 FOR IP): Performed by: STUDENT IN AN ORGANIZED HEALTH CARE EDUCATION/TRAINING PROGRAM

## 2020-09-09 PROCEDURE — 80048 BASIC METABOLIC PNL TOTAL CA: CPT

## 2020-09-09 PROCEDURE — 51701 INSERT BLADDER CATHETER: CPT

## 2020-09-09 PROCEDURE — 51798 US URINE CAPACITY MEASURE: CPT

## 2020-09-09 PROCEDURE — 85025 COMPLETE CBC W/AUTO DIFF WBC: CPT

## 2020-09-09 PROCEDURE — 2580000003 HC RX 258: Performed by: STUDENT IN AN ORGANIZED HEALTH CARE EDUCATION/TRAINING PROGRAM

## 2020-09-09 PROCEDURE — 97535 SELF CARE MNGMENT TRAINING: CPT

## 2020-09-09 PROCEDURE — 82947 ASSAY GLUCOSE BLOOD QUANT: CPT

## 2020-09-09 PROCEDURE — 99233 SBSQ HOSP IP/OBS HIGH 50: CPT | Performed by: INTERNAL MEDICINE

## 2020-09-09 PROCEDURE — 97116 GAIT TRAINING THERAPY: CPT

## 2020-09-09 PROCEDURE — 1200000000 HC SEMI PRIVATE

## 2020-09-09 PROCEDURE — 97530 THERAPEUTIC ACTIVITIES: CPT

## 2020-09-09 PROCEDURE — 36415 COLL VENOUS BLD VENIPUNCTURE: CPT

## 2020-09-09 RX ORDER — AMLODIPINE BESYLATE 10 MG/1
10 TABLET ORAL DAILY
Status: DISCONTINUED | OUTPATIENT
Start: 2020-09-09 | End: 2020-09-12 | Stop reason: HOSPADM

## 2020-09-09 RX ORDER — POLYETHYLENE GLYCOL 3350 17 G/17G
17 POWDER, FOR SOLUTION ORAL DAILY PRN
Qty: 527 G | Refills: 1 | Status: SHIPPED | OUTPATIENT
Start: 2020-09-09 | End: 2020-10-09

## 2020-09-09 RX ORDER — OXYCODONE HYDROCHLORIDE 5 MG/1
2.5 TABLET ORAL EVERY 6 HOURS PRN
Status: DISCONTINUED | OUTPATIENT
Start: 2020-09-09 | End: 2020-09-10

## 2020-09-09 RX ORDER — BISACODYL 10 MG
10 SUPPOSITORY, RECTAL RECTAL DAILY PRN
Status: DISCONTINUED | OUTPATIENT
Start: 2020-09-09 | End: 2020-09-10

## 2020-09-09 RX ORDER — POLYETHYLENE GLYCOL 3350 17 G/17G
17 POWDER, FOR SOLUTION ORAL DAILY PRN
Status: DISCONTINUED | OUTPATIENT
Start: 2020-09-09 | End: 2020-09-12 | Stop reason: HOSPADM

## 2020-09-09 RX ORDER — FAMOTIDINE 20 MG/1
20 TABLET, FILM COATED ORAL DAILY
Qty: 60 TABLET | Refills: 3 | Status: SHIPPED | OUTPATIENT
Start: 2020-09-10 | End: 2021-10-18 | Stop reason: SDUPTHER

## 2020-09-09 RX ORDER — OXYCODONE HYDROCHLORIDE 5 MG/1
5 TABLET ORAL EVERY 8 HOURS PRN
Qty: 15 TABLET | Refills: 0 | Status: SHIPPED | OUTPATIENT
Start: 2020-09-09 | End: 2020-09-14

## 2020-09-09 RX ADMIN — ACETAMINOPHEN 1000 MG: 500 TABLET ORAL at 22:19

## 2020-09-09 RX ADMIN — SODIUM CHLORIDE, PRESERVATIVE FREE 10 ML: 5 INJECTION INTRAVENOUS at 09:52

## 2020-09-09 RX ADMIN — PIPERACILLIN AND TAZOBACTAM 3.38 G: 3; .375 INJECTION, POWDER, FOR SOLUTION INTRAVENOUS at 05:15

## 2020-09-09 RX ADMIN — ACETAMINOPHEN 1000 MG: 500 TABLET ORAL at 15:47

## 2020-09-09 RX ADMIN — FAMOTIDINE 20 MG: 20 TABLET, FILM COATED ORAL at 09:51

## 2020-09-09 RX ADMIN — HEPARIN SODIUM 5000 UNITS: 5000 INJECTION INTRAVENOUS; SUBCUTANEOUS at 16:13

## 2020-09-09 RX ADMIN — SODIUM CHLORIDE, PRESERVATIVE FREE 10 ML: 5 INJECTION INTRAVENOUS at 20:09

## 2020-09-09 RX ADMIN — METHOCARBAMOL TABLETS 750 MG: 750 TABLET, COATED ORAL at 01:40

## 2020-09-09 RX ADMIN — HEPARIN SODIUM 5000 UNITS: 5000 INJECTION INTRAVENOUS; SUBCUTANEOUS at 22:19

## 2020-09-09 RX ADMIN — QUETIAPINE FUMARATE 25 MG: 25 TABLET ORAL at 20:09

## 2020-09-09 RX ADMIN — ACETAMINOPHEN 1000 MG: 500 TABLET ORAL at 05:13

## 2020-09-09 RX ADMIN — AMLODIPINE BESYLATE 10 MG: 10 TABLET ORAL at 09:51

## 2020-09-09 RX ADMIN — METOCLOPRAMIDE 10 MG: 5 INJECTION, SOLUTION INTRAMUSCULAR; INTRAVENOUS at 05:12

## 2020-09-09 RX ADMIN — METHOCARBAMOL TABLETS 750 MG: 750 TABLET, COATED ORAL at 05:12

## 2020-09-09 RX ADMIN — HEPARIN SODIUM 5000 UNITS: 5000 INJECTION INTRAVENOUS; SUBCUTANEOUS at 05:13

## 2020-09-09 ASSESSMENT — PAIN SCALES - GENERAL
PAINLEVEL_OUTOF10: 2
PAINLEVEL_OUTOF10: 4
PAINLEVEL_OUTOF10: 0

## 2020-09-09 NOTE — DISCHARGE INSTR - COC
Assisted  Toileting  Assisted  Feeding  Independent  Med Admin  Independent  Med Delivery   whole    Wound Care Documentation and Therapy:        Elimination:  Continence:   · Bowel: Yes  · Bladder: No  Urinary Catheter: None   Colostomy/Ileostomy/Ileal Conduit: No       Date of Last BM: 09/10/2020    Intake/Output Summary (Last 24 hours) at 9/9/2020 1551  Last data filed at 9/9/2020 0400  Gross per 24 hour   Intake 185 ml   Output 1425 ml   Net -1240 ml     I/O last 3 completed shifts: In: 185 [P.O.:150; I.V.:35]  Out: 1425 [Urine:1425]    Safety Concerns: At Risk for Falls    Impairments/Disabilities:      Vision    Nutrition Therapy:  Current Nutrition Therapy:   - Oral Diet:  Low Fiber    Routes of Feeding: Oral  Liquids: No Restrictions  Daily Fluid Restriction: no  Last Modified Barium Swallow with Video (Video Swallowing Test): not done    Treatments at the Time of Hospital Discharge:   Respiratory Treatments: None  Oxygen Therapy:  is not on home oxygen therapy. Ventilator:    - No ventilator support    Rehab Therapies: Physical Therapy and Occupational Therapy  Weight Bearing Status/Restrictions: No weight bearing restirctions  Other Medical Equipment (for information only, NOT a DME order):  walker  Other Treatments: skilled nursing, Home health aides, . Continue daily wound care with wound open to air and shower washing w/ soap and water.     Patient's personal belongings (please select all that are sent with patient):  Willie    RN SIGNATURE:  Electronically signed by Mono Garcia RN on 9/11/20 at 12:52 PM EDT    CASE MANAGEMENT/SOCIAL WORK SECTION    Inpatient Status Date: 9-3-2020    Readmission Risk Assessment Score:  Readmission Risk              Risk of Unplanned Readmission:        25           Discharging to Facility/ Agency   · Name:   · Address:  · Phone:  · Fax:    Have [facility] to follow up when discharged from facility  Dialysis Facility (if applicable) · Name:  · Address:  · Dialysis Schedule:  · Phone:  · Fax:    / signature: Electronically signed by Markus Gonsalves RN on 9/9/20 at 4:25 PM EDT Electronically signed by Markus Gonsalves RN on 9/11/2020 at 10:51 AM    PHYSICIAN SECTION    Prognosis: Good    Condition at Discharge: Stable    Rehab Potential (if transferring to Rehab): Fair    Recommended Labs or Other Treatments After Discharge:     Cbc , bmp in one week  Wound care  Ambulation  Advance diet as tolerated  Stool softener and glycolax  if constipated  Follow up with surgery in one week,call to make up an appointment      Physician Certification: I certify the above information and transfer of Beverly Thapa  is necessary for the continuing treatment of the diagnosis listed and that she requires SNF for less 30 days.      Update Admission H&P: See discharge summary    PHYSICIAN SIGNATURE:  Electronically signed by Flakita Willis MD on 9/11/20 at 9:14 PM EDT

## 2020-09-09 NOTE — PROGRESS NOTES
Physical Therapy  Facility/Department: [facility] 2  Daily Treatment Note  NAME: Roland Jaramillo  : 1940  MRN: [id number]    Date of Service: 2020    Discharge Recommendations:  Patient would benefit from continued therapy after discharge        Assessment   Body structures, Functions, Activity limitations: Decreased functional mobility ; Decreased posture;Decreased endurance;Decreased ROM; Decreased strength;Decreased balance; Increased pain  Assessment: Pt ambulated 30ft x 2 w/ RW CGA, Antalgic gait, Pt is a fall risk and would be unsafe to return home at this time. Recommending continued skilled physical therapy to address functional mobility deficits and return pt to prior level of independence. Prognosis: Good  Decision Making: Medium Complexity     PT Education: Goals;PT Role;Plan of Care;Gait Training;General Safety;Transfer Training  REQUIRES PT FOLLOW UP: Yes  Activity Tolerance  Activity Tolerance: Patient limited by pain; Patient Tolerated treatment well;Patient limited by endurance     Patient Diagnosis(es): The primary encounter diagnosis was Gastric outlet obstruction. A diagnosis of Nausea and vomiting, intractability of vomiting not specified, unspecified vomiting type was also pertinent to this visit. has a past medical history of Anemia, Cataract, Cerebral artery occlusion with cerebral infarction ([address] CHF (congestive heart failure) ([address] Depression, Diabetes mellitus ([address] Eczema, Gout, Hearing loss, Hiatal hernia, History of blood transfusion, Hyperlipidemia, Hypertension, PONV (postoperative nausea and vomiting), Rectal urgency, and Stress incontinence, female. has a past surgical history that includes Hysterectomy; Appendectomy; eye surgery; Cataract removal with implant (Bilateral); Colonoscopy; joint replacement; pr total hip arthroplasty (Left, 2018); Abdomen surgery (2020);  Upper gastrointestinal endoscopy (N/A, 9/3/2020); and laparotomy (N/A, 9/3/2020). Restrictions  Restrictions/Precautions  Restrictions/Precautions: Cardiac, Surgical Protocols, Fall Risk, Up as Tolerated, General Precautions  Required Braces or Orthoses?: No  Position Activity Restriction  Other position/activity restrictions: G-tube to gravity  Subjective   Pt sleeping in bed upon arrival. Pt is agreeable to walk. Pt has no c/o pain. Orientation    Overall Orientation Status: Within Functional Limits    Objective    Bed Mobility  Supine to sit: Min. A x 1  Scooting: Min. A x 1   Transfers  Sit to Stand: Minimal Assistance  Stand to sit: Minimal Assistance  Comment: Verbal cues for hand placement with poor return. Ambulation  Ambulation?: Yes  Ambulation 1  Surface: level tile  Device: Rolling Walker  Assistance: CGA/Minimal assistance  Quality of Gait: antalgic gait, step to gait  Gait Deviations: Slow Toshia;Decreased step length;Decreased step height;Shuffles  Distance: 30ft x 2 with 1 seated rest break. Stairs/Curb  Stairs?: No  No ex's d/t RN wants to wash pt's hair.    Goals  Short term goals  Time Frame for Short term goals: 15  Short term goal 1: Pt to perform bed mobility CGA  Short term goal 2: Pt to demonstrate functional transfers CGA  Short term goal 3: Ambulate 100ft w/ RW CGA  Short term goal 4: Tolerate 30 minutes of therapy to demo increased endurnace  Patient Goals   Patient goals : Did not state    Plan    Plan  Times per week: 5-6x/week  Current Treatment Recommendations: Strengthening, Transfer Training, Endurance Training, Patient/Caregiver Education & Training, ROM, Balance Training, Gait Training, Home Exercise Program, Functional Mobility Training, Stair training, Safety Education & Training  Safety Devices  Type of devices: Left in chair, Call light within reach, Chair alarm in place, Gait belt, Nurse notified, Patient at risk for falls  Restraints  Initially in place: No     Therapy Time   Individual Concurrent Group Co-treatment   Time In  1130 Time Out  1200         Minutes  [address], [place], [place]

## 2020-09-09 NOTE — CARE COORDINATION
Transitional planning-talked with patient about home care-chose [facility]- referral faxed and called to Chelita at [facility]. Informed her of patient's discharge today. [address] talked with patient's daughter Sheila Bowers her mother to go to [facility]'s. Patient agreed to go. Referral faxed and left message with Chano Zhong at [facility]. Notified Silvia with [facility] of patient's choice to go to SNF.

## 2020-09-09 NOTE — PROGRESS NOTES
09/08/2020    GLUCOSE 104 09/08/2020    PROT 6.8 09/03/2020    LABALBU 3.6 09/03/2020    CALCIUM 8.7 09/08/2020    BILITOT 0.32 09/03/2020    ALKPHOS 102 09/03/2020    AST 21 09/03/2020    ALT 13 09/03/2020     BMP:    Lab Results   Component Value Date     09/08/2020    K 3.6 09/08/2020     09/08/2020    CO2 20 09/08/2020    BUN 15 09/08/2020    LABALBU 3.6 09/03/2020    CREATININE 1.08 09/08/2020    CALCIUM 8.7 09/08/2020    GFRAA 59 09/08/2020    LABGLOM 49 09/08/2020    GLUCOSE 104 09/08/2020     LDH:    Lab Results   Component Value Date     12/12/2018     PT/INR:    Lab Results   Component Value Date    PROTIME 9.9 09/03/2020    INR 0.9 09/03/2020     Warfarin PT/INR:  No components found for: PTPATWAR, PTINRWAR  PTT:    Lab Results   Component Value Date    APTT 22.2 09/03/2020   [APTT}    Radiology Review:      Ct Abdomen Pelvis Wo Contrast Additional Contrast? None     Result Date: 9/3/2020  Intrathoracic stomach full of contents. Volvulus/outlet obstruction not excluded. Further evaluation can be performed with the administration of oral contrast in repeat study if clinically indicated.      Ct Head Wo Contrast     Result Date: 9/3/2020  No acute intracranial abnormality.      Ct Chest Wo Contrast     Result Date: 9/3/2020  1. A type IV paraesophageal hiatal hernia which also contains the distal half of the transverse colon. NG tube in place. Some contrast has been administered which collects in the gastric fundus. The rest of the stomach is fluid-filled. Mild gastric distension noted which could be due to administered contrast.  Difficult to determine if there is some degree of obstruction on the basis of this study. No evidence for gastric pneumatosis. 2. Some bibasilar lower lobe atelectasis/scarring around the hiatal hernia noted along with trace left pleural effusion.      Ct Cervical Spine Wo Contrast     Result Date: 9/3/2020  No acute abnormality of the cervical spine.    Xr Chest Portable     Result Date: 9/3/2020  No acute cardiopulmonary abnormality. Large hiatal hernia. ASSESSMENT:  Active Hospital Problems    Diagnosis Date Noted    Moderate malnutrition ([facility] [E44.0] 09/08/2020    Paraesophageal hernia [K44.9] 09/06/2020    Hypokalemia [E87.6] 09/06/2020    DOMINIQUE (acute kidney injury) ([facility] [address] [N17.9] 09/06/2020    Normocytic anemia [D64.9] 09/06/2020    S/P exploratory laparotomy [Z98.890] 09/06/2020    Small bowel ischemia ([address] [K55.9] 09/06/2020    Dementia ([facility] [address] [F03.90] 09/06/2020    Gastric outlet obstruction [K31.1] 09/03/2020    Acute gastric volvulus [K31.89] 09/03/2020    Cerebrovascular accident (CVA) ([facility] [address] [I63.9] 01/17/2019    Essential hypertension [I10] 04/10/2017       79 y.o. 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 Continue daily wound care with wound open to air and shower washing w/ soap and water. Patient is ok for d/c from surgery stand point. She can have general diet.  Will have her follow up with Dr Triny Vargas in 7 days for staple removal.   - medical and supportive care per primary       [address]

## 2020-09-09 NOTE — PROGRESS NOTES
[facility]  Internal Medicine Teaching Residency Program  Inpatient Daily Progress Note  ______________________________________________________________________________    Patient: Annelise Peraza  YOB: 1940   YUF:[id number]    Acct: [de-identified]     Room: 2017/2017-01  Admit date: 9/3/2020  Today's date: 09/09/20  Number of days in the hospital: 6    SUBJECTIVE   Admitting Diagnosis: Gastric outlet obstruction  CC:Nausea vomiting  Constipation  Patient examined in the morning patient is hemodynamically stable 133/52, pulse 86, afebrile ,respiratory 20  No abdominal pain, flatus positive,  Patient can tolerate clear fluid  No nausea vomiting  Pain controlled  ROS:  Constitutional:  negative for chills, fevers, sweats  Respiratory:  negative for cough, dyspnea on exertion, hemoptysis, shortness of breath, wheezing  Cardiovascular:  negative for chest pain, chest pressure/discomfort, lower extremity edema, palpitations  Gastrointestinal:  negative for abdominal pain, constipation, diarrhea, nausea, vomiting  Neurological:  negative for dizziness, headache  BRIEF HISTORY     79-year-old female with past medical history of hypertension,stroke ,heart failure ,HPL dementia, gout and hiatal hernia.   Patient presented initially to [facility] emergency department with complaining of nausea and vomiting for 2 days. prior to that patient had constipation from 3 days.  CT abdomen was done which  showed volvulus or gastric obstruction .   Patient then presented to [facility] ER where   x-rays cervical spine showed large hiatal hernia  CT scan of abdomen showed intrathoracic stomach containing food particles  Showing either volvulus or gastric obstruction  Chest CT was done which showed paraesophageal hiatal hernia type IV   Surgery consulted the GI ,    IN ICU ,EGD was done showing tortuous esophagus, transthoracic stomach with volvulus causing partial gastric obstruction and suspicion of of bowel ischemia.    Patient stomach was decompressed but multiple attempts at derotating the stomach were unsuccessful. surgery was consulted intra-procedure due to suspicion of bowel ischemia.   She underwent Ex lap, paraesophageal hernia repair with mesh bridge, double gastropexy, Kocherization of the duodenum.     OBJECTIVE     Vital Signs:  BP (!) 133/52   Pulse 86   Temp 96.8 °F (36 °C) (Oral)   Resp 20   Ht 5' 4\" (1.626 m)   Wt 182 lb 12.2 oz (82.9 kg)   SpO2 95%   BMI 31.37 kg/m²     Temp (24hrs), Av.1 °F (36.2 °C), Min:96.8 °F (36 °C), Max:97.4 °F (36.3 °C)    In: 185   Out: 1425 [Urine:1425]    Physical Exam:  Constitutional:       Appearance: Normal appearance. Eyes:      General: No scleral icterus.        Right eye: No discharge.         Left eye: No discharge.      Extraocular Movements: Extraocular movements intact.      Pupils: Pupils are equal, round, and reactive to light. Neck:      Musculoskeletal: No neck rigidity or muscular tenderness. Asked  Pulmonary:      Effort: Pulmonary effort is normal.      Breath sounds: Normal breath sounds. No rhonchi. Abdominal:      General: Bowel sounds are normal. There is no distension.      Tenderness: There is no abdominal tenderness.  There is no guarding.      Comments:  linear scar on the abdomen ,stitched with stapler  , no surgical site infection, surgical scar seems to be healing.   Neurological:      General: No focal deficit present.      Mental Status: She is alert.      Cranial Nerves: No cranial nerve deficit.      Sensory: No sensory deficit.      Motor: No weakness.         Medications:  Scheduled Medications:    amLODIPine  10 mg Oral Daily    docusate  100 mg Oral Daily    QUEtiapine  25 mg Oral Nightly    acetaminophen  1,000 mg Oral 3 times per day    famotidine  20 mg Oral Daily    sodium chloride flush  10 mL Intravenous 2 times per day    heparin (porcine)  5,000 Units Subcutaneous 3 times per day    insulin lispro  0-12 Units Subcutaneous TID     insulin lispro  0-6 Units Subcutaneous Nightly    lidocaine  2 patch Transdermal Daily     Continuous Infusions:    dextrose       PRN MedicationsoxyCODONE, 2.5 mg, Q6H PRN  bisacodyl, 10 mg, Daily PRN  polyethylene glycol, 17 g, Daily PRN  sodium chloride flush, 10 mL, PRN  potassium chloride, 40 mEq, PRN    Or  potassium alternative oral replacement, 40 mEq, PRN    Or  potassium chloride, 10 mEq, PRN  magnesium sulfate, 1 g, PRN  ondansetron, 4 mg, Q6H PRN  labetalol, 10 mg, Q4H PRN  glucose, 15 g, PRN  dextrose, 12.5 g, PRN  glucagon (rDNA), 1 mg, PRN  dextrose, 100 mL/hr, PRN        Diagnostic Labs:  CBC:   Recent Labs     09/07/20  1012 09/08/20  0623 09/09/20  0909   WBC 13.4* 11.7* 12.3*   RBC 3.33* 3.61* 3.36*   HGB 9.5* 10.3* 9.6*   HCT 29.9* 32.9* 31.3*   MCV 89.8 91.1 93.2   RDW 13.7 13.5 14.1    275 302     BMP:   Recent Labs     09/07/20  1012 09/08/20  0623 09/09/20  0909    137 145*   K 3.5* 3.6* 3.6*    106 110*   CO2 20 20 21   BUN 16 15 15   CREATININE 1.03* 1.08* 1.09*     BNP: No results for input(s): BNP in the last 72 hours. PT/INR: No results for input(s): PROTIME, INR in the last 72 hours. APTT: No results for input(s): APTT in the last 72 hours. CARDIAC ENZYMES: No results for input(s): CKMB, CKMBINDEX, TROPONINI in the last 72 hours. Invalid input(s): CKTOTAL;3  FASTING LIPID PANEL:  Lab Results   Component Value Date    CHOL 158 05/10/2018    HDL 57 05/10/2018    TRIG 154 (H) 05/10/2018     LIVER PROFILE: No results for input(s): AST, ALT, ALB, BILIDIR, BILITOT, ALKPHOS in the last 72 hours. MICROBIOLOGY:   Lab Results   Component Value Date/Time    CULTURE NO GROWTH 5 DAYS 09/03/2020 11:23 PM       Imaging:    Ct Abdomen Pelvis Wo Contrast Additional Contrast? None    Result Date: 9/3/2020  Intrathoracic stomach full of contents. Volvulus/outlet obstruction not excluded.   Further evaluation can be performed with the administration of oral contrast in repeat study if clinically indicated. Ct Head Wo Contrast    Result Date: 9/3/2020  No acute intracranial abnormality. Ct Chest Wo Contrast    Result Date: 9/3/2020  1. A type IV paraesophageal hiatal hernia which also contains the distal half of the transverse colon. NG tube in place. Some contrast has been administered which collects in the gastric fundus. The rest of the stomach is fluid-filled. Mild gastric distension noted which could be due to administered contrast.  Difficult to determine if there is some degree of obstruction on the basis of this study. No evidence for gastric pneumatosis. 2. Some bibasilar lower lobe atelectasis/scarring around the hiatal hernia noted along with trace left pleural effusion. Ct Cervical Spine Wo Contrast    Result Date: 9/3/2020  No acute abnormality of the cervical spine. Xr Chest Portable    Result Date: 9/3/2020  No acute cardiopulmonary abnormality. Large hiatal hernia. ASSESSMENT & PLAN     ASSESSMENT / PLAN:     1. Gastric outlet obstruction resolved  Secondary to gastric volvulus and paraesophageal hernia. EGD  done showing tortuous esophagus, transthoracic stomach with volvulus causing partial gastric obstruction .   Status post exploratory laparotomy, paraesophageal hernia repair with mesh, gastropexy with gastrostomy tubes and cauterization of the duodenum 9/3.     Started clear liquid diet 3 days before,patient is tolerating well. Patient zosyn stopped . Minimize opioids  GI and general surgery follow-up      2. DOMINIQUE. -  Likely secondary to hypoperfusion.  Improving.  Baseline creatinine 1 and 1.09 today .  -Continue IV fluids.    -Monitor strict I/O's    -follow RFT's.         3)Electrolyte imbalance.  Hypokalemia.  Replace and monitor potassium. Potassium on September 9 is 3.6      4) hypertension:    increase amlodipine dose to 10 mg. Continue labetalol prn.   Get EKG    5) add stool softener    6) patient is ambulated . Noe's catheter can be pulled out        5) wound between first and 2nd toe .  Wound care consulted    6)patoent can be discharged      DVT ppx : Heparin 5000 unit every 8 hours  GI ppx:     PT/OT:   Discharge Planning / SW:     Estephania Solomon MD  Internal Medicine Resident, PGY-1  [facility]; [place], [place]  9/9/2020, 9:53 AM   Attending Physician Statement  I have discussed the care of Olga Lidia Pimentel, including pertinent history and exam findings,  with the resident. I have seen and examined the patient and the key elements of all parts of the encounter have been performed by me. I agree with the assessment, plan and orders as documented by the resident with additions . Treatment plan Discussed with nursing staff in detail , all questions answered . Electronically signed by Kiki Dash MD on   9/9/20 at 5:26 PM EDT    Please note that this chart was generated using voice recognition Dragon dictation software. Although every effort was made to ensure the accuracy of this automated transcription, some errors in transcription may have occurred.

## 2020-09-09 NOTE — PLAN OF CARE
Problem: Falls - Risk of:  Goal: Will remain free from falls  Description: Will remain free from falls  9/9/2020 0634 by Cesar Haynes RN  Outcome: Ongoing  9/8/2020 1913 by Diogenes Self RN  Outcome: Ongoing  Goal: Absence of physical injury  Description: Absence of physical injury  9/9/2020 0634 by Cesar Haynes RN  Outcome: Ongoing  9/8/2020 1913 by Diogenes Self RN  Outcome: Ongoing     Problem: Skin Integrity:  Goal: Will show no infection signs and symptoms  Description: Will show no infection signs and symptoms  9/9/2020 0634 by Cesar Haynes RN  Outcome: Ongoing  9/8/2020 1913 by Diogenes Self RN  Outcome: Ongoing  Goal: Absence of new skin breakdown  Description: Absence of new skin breakdown  9/9/2020 0634 by Cesar Haynes RN  Outcome: Ongoing  9/8/2020 1913 by Diogenes Self RN  Outcome: Ongoing     Problem: Pain:  Goal: Pain level will decrease  Description: Pain level will decrease  9/9/2020 0634 by Cesar Haynes RN  Outcome: Ongoing  9/8/2020 1913 by Diogenes Self RN  Outcome: Ongoing  Goal: Control of acute pain  Description: Control of acute pain  9/9/2020 0634 by Cesar Haynes RN  Outcome: Ongoing  9/8/2020 1913 by Diogenes Self RN  Outcome: Ongoing  Goal: Control of chronic pain  Description: Control of chronic pain  9/9/2020 0634 by Cesar Haynes RN  Outcome: Ongoing  9/8/2020 1913 by Diogenes Self RN  Outcome: Ongoing     Problem: Nutrition  Goal: Optimal nutrition therapy  Description: Nutrition Problem #1: Inadequate oral intake  Intervention: Food and/or Nutrient Delivery: Start Oral Diet  Nutritional Goals: initiation of nutrition within 72 hours     9/9/2020 0634 by Cesar Haynes RN  Outcome: Ongoing  9/8/2020 1913 by Diogenes Self RN  Outcome: Ongoing     Problem: Infection - Surgical Site:  Goal: Will show no infection signs and symptoms  Description: Will show no infection signs and symptoms  9/9/2020 0634 by Cesar Haynes RN  Outcome: Ongoing  9/8/2020 1913 by Rissa Alcantara RN  Outcome: Ongoing

## 2020-09-09 NOTE — PROGRESS NOTES
Occupational Therapy  Facility/Department: [facility] CAR 2  Daily Treatment Note  NAME: William Zuleta  : 1940  MRN: [id number]    Date of Service: 2020    Discharge Recommendations:  Patient would benefit from continued therapy after discharge     OT Equipment Recommendations  Equipment Needed: Yes  ADL Assistive Devices: Shower Chair with back    Assessment   Performance deficits / Impairments: Decreased functional mobility ; Decreased safe awareness;Decreased ADL status; Decreased endurance;Decreased high-level IADLs;Decreased strength;Decreased ROM  Treatment Diagnosis: Gastric outlet obstruction  Prognosis: Good  Decision Making: Medium Complexity  Patient Education: pt edu on purpose of session, role of OT, POC, EC/WS tech, adaptive dressing technique, purpose of gait belt. good return  REQUIRES OT FOLLOW UP: Yes  Activity Tolerance  Activity Tolerance: Patient Tolerated treatment well;Patient limited by fatigue  Safety Devices  Safety Devices in place: Yes  Type of devices: Left in chair;Nurse notified;Call light within reach;Gait belt;Patient at risk for falls       Patient Diagnosis(es): The primary encounter diagnosis was Gastric outlet obstruction. A diagnosis of Nausea and vomiting, intractability of vomiting not specified, unspecified vomiting type was also pertinent to this visit. has a past medical history of Anemia, Cataract, Cerebral artery occlusion with cerebral infarction ([address] CHF (congestive heart failure) ([address] Depression, Diabetes mellitus ([address] Eczema, Gout, Hearing loss, Hiatal hernia, History of blood transfusion, Hyperlipidemia, Hypertension, PONV (postoperative nausea and vomiting), Rectal urgency, and Stress incontinence, female. has a past surgical history that includes Hysterectomy; Appendectomy; eye surgery; Cataract removal with implant (Bilateral); Colonoscopy; joint replacement; pr total hip arthroplasty (Left, 2018); Abdomen surgery (2020);  Upper gastrointestinal endoscopy (N/A, 9/3/2020); and laparotomy (N/A, 9/3/2020). Restrictions  Restrictions/Precautions  Restrictions/Precautions: Surgical Protocols, Fall Risk, General Precautions, Cardiac  Required Braces or Orthoses?: No  Position Activity Restriction  Other position/activity restrictions: up as tolerated, G-tube to gravity     Subjective   General  Chart Reviewed: Orders, Progress Notes, History and Physical, Imaging, Labs, Operative Notes  Patient assessed for rehabilitation services?: Yes  Family / Caregiver Present: No  Diagnosis: Gastric outlet obstruction  General Comment  Comments: RN ok'd for therapy this afternoon. pt was agreeable, cooperative and pleasant throughout session  Vital Signs  Patient Currently in Pain: Denies     Orientation  Orientation  Overall Orientation Status: Within Functional Limits     Objective    ADL  Grooming: Increased time to complete;Setup;Contact guard assistance(pt completed oral hygiene care while standing at sink in bathroom. pt completed face washing while sitting in chair at sink.)  UE Bathing: Contact guard assistance; Increased time to complete;Setup(pt wash UE and chest area while seated in chair in bathroom. pt required assistance to wash back)  LE Bathing: Stand by assistance; Increased time to complete;Setup(pt washed BLE from hips>1/2 down calf while sitting in chair in bathroom.)  UE Dressing: Minimal assistance; Increased time to complete;Setup(pt doffed/donned gown in front and back while seated in chair. pt required assistance for line mngmt and for donning LUE. pt was edu on adaptive dressing techique with good return.)     Balance  Sitting Balance: Supervision(~20 min while sitting in recliner and in chair in bathroom)  Standing Balance: Contact guard assistance(with RW)  Standing Balance  Time: ~8 min  Activity: pt complete functional mobility from recliner>bathroom sink.  pt completed dynamic standing balance at sink for oral care  Comment: pt had no LOB or reported dizziness with functional mobility. pt reported increased fatigue with functional mobility and prolonged standing. pt required a rest break prior to completing oral hygiene task and after completing task. due to increased fatigue pt complete rest of ADLs while seated. Bed mobility  Comment: pt seated in recliner upon arrival and exit  Transfers  Sit to stand: Minimal assistance  Stand to sit: Minimal assistance  Transfer Comments: with RW    Cognition  Overall Cognitive Status: [facility]      Plan   Plan  Times per week: 4-5x/week  Current Treatment Recommendations: Safety Education & Training, Patient/Caregiver Education & Training, Self-Care / ADL, Functional Mobility Training, Endurance Training, ROM, Strengthening, Equipment Evaluation, Education, & procurement    Goals  Short term goals  Time Frame for Short term goals: By discharge:  Short term goal 1: Pt will complete functional transfers/functional mobility with Mod I, use of AD/AE PRN. Short term goal 2: Pt will complete ADLs with Mod I, use of AD/AE PRN.   Short term goal 3: Pt will demo ~15 minutes of standing tolerance during functional tasks to increase participation in ADL/IADL tasks  Short term goal 4: Pt will demo good safety awareness throughout all ADL/functional mobility tasks  Short term goal 5: Pt will demo energy conservation techniques throughout all ADL/functional mobility tasks       Therapy Time   Individual Concurrent Group Co-treatment   Time In 1410         Time Out 1447         Minutes 37         Timed Code Treatment Minutes: [address] [facility] S/OT

## 2020-09-10 PROBLEM — K31.1 GASTRIC OUTLET OBSTRUCTION: Status: RESOLVED | Noted: 2020-09-03 | Resolved: 2020-09-10

## 2020-09-10 PROBLEM — N17.9 AKI (ACUTE KIDNEY INJURY) (HCC): Status: RESOLVED | Noted: 2020-09-06 | Resolved: 2020-09-10

## 2020-09-10 PROBLEM — K31.89 ACUTE GASTRIC VOLVULUS: Status: RESOLVED | Noted: 2020-09-03 | Resolved: 2020-09-10

## 2020-09-10 PROBLEM — K55.9 SMALL BOWEL ISCHEMIA (HCC): Status: RESOLVED | Noted: 2020-09-06 | Resolved: 2020-09-10

## 2020-09-10 LAB
ABSOLUTE EOS #: 0.54 K/UL (ref 0–0.44)
ABSOLUTE IMMATURE GRANULOCYTE: 0.12 K/UL (ref 0–0.3)
ABSOLUTE LYMPH #: 1.93 K/UL (ref 1.1–3.7)
ABSOLUTE MONO #: 1.28 K/UL (ref 0.1–1.2)
ANION GAP SERPL CALCULATED.3IONS-SCNC: 13 MMOL/L (ref 9–17)
BASOPHILS # BLD: 0 % (ref 0–2)
BASOPHILS ABSOLUTE: 0.06 K/UL (ref 0–0.2)
BUN BLDV-MCNC: 12 MG/DL (ref 8–23)
BUN/CREAT BLD: ABNORMAL (ref 9–20)
CALCIUM IONIZED: 1.12 MMOL/L (ref 1.13–1.33)
CALCIUM SERPL-MCNC: 8.4 MG/DL (ref 8.6–10.4)
CHLORIDE BLD-SCNC: 109 MMOL/L (ref 98–107)
CO2: 21 MMOL/L (ref 20–31)
CREAT SERPL-MCNC: 0.92 MG/DL (ref 0.5–0.9)
CULTURE: NORMAL
DIFFERENTIAL TYPE: ABNORMAL
EOSINOPHILS RELATIVE PERCENT: 4 % (ref 1–4)
GFR AFRICAN AMERICAN: >60 ML/MIN
GFR NON-AFRICAN AMERICAN: 59 ML/MIN
GFR SERPL CREATININE-BSD FRML MDRD: ABNORMAL ML/MIN/{1.73_M2}
GFR SERPL CREATININE-BSD FRML MDRD: ABNORMAL ML/MIN/{1.73_M2}
GLUCOSE BLD-MCNC: 110 MG/DL (ref 65–105)
GLUCOSE BLD-MCNC: 126 MG/DL (ref 65–105)
GLUCOSE BLD-MCNC: 75 MG/DL (ref 65–105)
GLUCOSE BLD-MCNC: 91 MG/DL (ref 70–99)
GLUCOSE BLD-MCNC: 99 MG/DL (ref 65–105)
HCT VFR BLD CALC: 32.6 % (ref 36.3–47.1)
HEMOGLOBIN: 10 G/DL (ref 11.9–15.1)
IMMATURE GRANULOCYTES: 1 %
LYMPHOCYTES # BLD: 13 % (ref 24–43)
Lab: NORMAL
MAGNESIUM: 2 MG/DL (ref 1.6–2.6)
MCH RBC QN AUTO: 28.2 PG (ref 25.2–33.5)
MCHC RBC AUTO-ENTMCNC: 30.7 G/DL (ref 28.4–34.8)
MCV RBC AUTO: 92.1 FL (ref 82.6–102.9)
MONOCYTES # BLD: 9 % (ref 3–12)
NRBC AUTOMATED: 0 PER 100 WBC
PDW BLD-RTO: 14.4 % (ref 11.8–14.4)
PLATELET # BLD: 333 K/UL (ref 138–453)
PLATELET ESTIMATE: ABNORMAL
PMV BLD AUTO: 10.4 FL (ref 8.1–13.5)
POTASSIUM SERPL-SCNC: 3.3 MMOL/L (ref 3.7–5.3)
RBC # BLD: 3.54 M/UL (ref 3.95–5.11)
RBC # BLD: ABNORMAL 10*6/UL
SEG NEUTROPHILS: 73 % (ref 36–65)
SEGMENTED NEUTROPHILS ABSOLUTE COUNT: 10.66 K/UL (ref 1.5–8.1)
SODIUM BLD-SCNC: 143 MMOL/L (ref 135–144)
SPECIMEN DESCRIPTION: NORMAL
WBC # BLD: 14.6 K/UL (ref 3.5–11.3)
WBC # BLD: ABNORMAL 10*3/UL

## 2020-09-10 PROCEDURE — 6370000000 HC RX 637 (ALT 250 FOR IP): Performed by: STUDENT IN AN ORGANIZED HEALTH CARE EDUCATION/TRAINING PROGRAM

## 2020-09-10 PROCEDURE — 6370000000 HC RX 637 (ALT 250 FOR IP): Performed by: NURSE PRACTITIONER

## 2020-09-10 PROCEDURE — 82330 ASSAY OF CALCIUM: CPT

## 2020-09-10 PROCEDURE — 51701 INSERT BLADDER CATHETER: CPT

## 2020-09-10 PROCEDURE — 99233 SBSQ HOSP IP/OBS HIGH 50: CPT | Performed by: INTERNAL MEDICINE

## 2020-09-10 PROCEDURE — 6360000002 HC RX W HCPCS: Performed by: STUDENT IN AN ORGANIZED HEALTH CARE EDUCATION/TRAINING PROGRAM

## 2020-09-10 PROCEDURE — 1200000000 HC SEMI PRIVATE

## 2020-09-10 PROCEDURE — 85025 COMPLETE CBC W/AUTO DIFF WBC: CPT

## 2020-09-10 PROCEDURE — 82947 ASSAY GLUCOSE BLOOD QUANT: CPT

## 2020-09-10 PROCEDURE — 51798 US URINE CAPACITY MEASURE: CPT

## 2020-09-10 PROCEDURE — 36415 COLL VENOUS BLD VENIPUNCTURE: CPT

## 2020-09-10 PROCEDURE — 97116 GAIT TRAINING THERAPY: CPT

## 2020-09-10 PROCEDURE — 97110 THERAPEUTIC EXERCISES: CPT

## 2020-09-10 PROCEDURE — 83735 ASSAY OF MAGNESIUM: CPT

## 2020-09-10 PROCEDURE — 80048 BASIC METABOLIC PNL TOTAL CA: CPT

## 2020-09-10 RX ORDER — POTASSIUM CHLORIDE 20 MEQ/1
20 TABLET, EXTENDED RELEASE ORAL
Status: DISCONTINUED | OUTPATIENT
Start: 2020-09-10 | End: 2020-09-12 | Stop reason: HOSPADM

## 2020-09-10 RX ADMIN — ACETAMINOPHEN 1000 MG: 500 TABLET ORAL at 14:13

## 2020-09-10 RX ADMIN — HEPARIN SODIUM 5000 UNITS: 5000 INJECTION INTRAVENOUS; SUBCUTANEOUS at 21:40

## 2020-09-10 RX ADMIN — HEPARIN SODIUM 5000 UNITS: 5000 INJECTION INTRAVENOUS; SUBCUTANEOUS at 06:21

## 2020-09-10 RX ADMIN — ACETAMINOPHEN 1000 MG: 500 TABLET ORAL at 21:40

## 2020-09-10 RX ADMIN — QUETIAPINE FUMARATE 25 MG: 25 TABLET ORAL at 21:40

## 2020-09-10 RX ADMIN — AMLODIPINE BESYLATE 10 MG: 10 TABLET ORAL at 08:56

## 2020-09-10 RX ADMIN — POTASSIUM CHLORIDE 40 MEQ: 1500 TABLET, EXTENDED RELEASE ORAL at 08:56

## 2020-09-10 RX ADMIN — ACETAMINOPHEN 1000 MG: 500 TABLET ORAL at 06:20

## 2020-09-10 RX ADMIN — HEPARIN SODIUM 5000 UNITS: 5000 INJECTION INTRAVENOUS; SUBCUTANEOUS at 14:30

## 2020-09-10 RX ADMIN — FAMOTIDINE 20 MG: 20 TABLET, FILM COATED ORAL at 08:56

## 2020-09-10 ASSESSMENT — PAIN SCALES - GENERAL
PAINLEVEL_OUTOF10: 5
PAINLEVEL_OUTOF10: 3
PAINLEVEL_OUTOF10: 3

## 2020-09-10 ASSESSMENT — PAIN DESCRIPTION - ORIENTATION: ORIENTATION: LEFT;MID

## 2020-09-10 ASSESSMENT — PAIN DESCRIPTION - PAIN TYPE: TYPE: ACUTE PAIN;SURGICAL PAIN

## 2020-09-10 ASSESSMENT — PAIN DESCRIPTION - DESCRIPTORS: DESCRIPTORS: DISCOMFORT

## 2020-09-10 ASSESSMENT — PAIN DESCRIPTION - LOCATION: LOCATION: ABDOMEN

## 2020-09-10 NOTE — PLAN OF CARE
Problem: Falls - Risk of:  Goal: Will remain free from falls  Description: Will remain free from falls  9/9/2020 2008 by Roselyn Litten, RN  Outcome: Ongoing  9/9/2020 0634 by Yolanda Aviles RN  Outcome: Ongoing  Goal: Absence of physical injury  Description: Absence of physical injury  9/9/2020 2008 by Roselyn Litten, RN  Outcome: Ongoing  9/9/2020 0634 by Yolanda Aviles RN  Outcome: Ongoing     Problem: Skin Integrity:  Goal: Will show no infection signs and symptoms  Description: Will show no infection signs and symptoms  9/9/2020 2008 by Roselyn Litten, RN  Outcome: Ongoing  9/9/2020 0634 by Yolanda Aviles RN  Outcome: Ongoing  Goal: Absence of new skin breakdown  Description: Absence of new skin breakdown  9/9/2020 2008 by Roselyn Litten, RN  Outcome: Ongoing  9/9/2020 0634 by Yolanda Aviles RN  Outcome: Ongoing     Problem: Pain:  Goal: Pain level will decrease  Description: Pain level will decrease  9/9/2020 2008 by Roselyn Litten, RN  Outcome: Ongoing  9/9/2020 0634 by Yolanda Aviles RN  Outcome: Ongoing  Goal: Control of acute pain  Description: Control of acute pain  9/9/2020 2008 by Roselyn Litten, RN  Outcome: Ongoing  9/9/2020 0634 by Yolanda Aviles RN  Outcome: Ongoing  Goal: Control of chronic pain  Description: Control of chronic pain  9/9/2020 2008 by Roselyn Litten, RN  Outcome: Ongoing  9/9/2020 0634 by Yolanda Aviles RN  Outcome: Ongoing     Problem: Nutrition  Goal: Optimal nutrition therapy  Description: Nutrition Problem #1: Inadequate oral intake  Intervention: Food and/or Nutrient Delivery: Start Oral Diet  Nutritional Goals: initiation of nutrition within 72 hours     9/9/2020 2008 by Roselyn Litten, RN  Outcome: Ongoing  9/9/2020 0634 by Yolanda Aviles RN  Outcome: Ongoing     Problem: Infection - Surgical Site:  Goal: Will show no infection signs and symptoms  Description: Will show no infection signs and symptoms  9/9/2020 2008 by Roselyn Litten, RN  Outcome: Ongoing  9/9/2020 0634 by Chris Nieves RN  Outcome: Ongoing

## 2020-09-10 NOTE — PROGRESS NOTES
General Surgery:  Daily Progress Note      PATIENT NAME: Gail Sarabia     TODAY'S DATE: 9/10/2020, 5:56 AM  CC:  Abdominal pain    SUBJECTIVE:     Pt seen and examined at bedside. No acute events overnight. Decrease appatitie, no n/v. Hungry this am. Continued bowel function. Afebrile. Waiting precert for snf. OBJECTIVE:   VITALS:  /60   Pulse 81   Temp 97.3 °F (36.3 °C) (Oral)   Resp 16   Ht 5' 4\" (1.626 m)   Wt 182 lb 12.2 oz (82.9 kg)   SpO2 95%   BMI 31.37 kg/m²      INTAKE/OUTPUT:      Intake/Output Summary (Last 24 hours) at 9/10/2020 0556  Last data filed at 9/10/2020 0436  Gross per 24 hour   Intake 470 ml   Output 1300 ml   Net -830 ml       PHYSICAL EXAM:  General Appearance: awake, alert, oriented, in no acute distress  HEENT:  Normocephalic, atraumatic, mucus membranes moist   Heart: Heart sounds are normal.  Regular rate and rhythm without murmur, gallop or rub. Lungs: Normal expansion. Clear to auscultation. No rales, rhonchi, or wheezing. Abdomen:soft, nd, attp. Incision c/d/i. Gastrostomy tubes X2 c/d/i and both capped. Extremities: No cyanosis, pitting edema, rashes noted. Skin: Skin color, texture, turgor normal. No rashes or lesions.     IV Access:  AV  Noe:  Yes    Data:  CBC with Differential:    Lab Results   Component Value Date    WBC 12.3 09/09/2020    RBC 3.36 09/09/2020    HGB 9.6 09/09/2020    HCT 31.3 09/09/2020     09/09/2020    MCV 93.2 09/09/2020    MCH 28.6 09/09/2020    MCHC 30.7 09/09/2020    RDW 14.1 09/09/2020    LYMPHOPCT 19 09/09/2020    MONOPCT 10 09/09/2020    BASOPCT 1 09/09/2020    MONOSABS 1.27 09/09/2020    LYMPHSABS 2.34 09/09/2020    EOSABS 0.57 09/09/2020    BASOSABS 0.09 09/09/2020    DIFFTYPE NOT REPORTED 09/09/2020     CMP:    Lab Results   Component Value Date     09/09/2020    K 3.6 09/09/2020     09/09/2020    CO2 21 09/09/2020    BUN 15 09/09/2020    CREATININE 1.09 09/09/2020    GFRAA 58 09/09/2020    LABGLOM 48 09/09/2020    GLUCOSE 101 09/09/2020    PROT 6.8 09/03/2020    LABALBU 3.6 09/03/2020    CALCIUM 8.4 09/09/2020    BILITOT 0.32 09/03/2020    ALKPHOS 102 09/03/2020    AST 21 09/03/2020    ALT 13 09/03/2020     BMP:    Lab Results   Component Value Date     09/09/2020    K 3.6 09/09/2020     09/09/2020    CO2 21 09/09/2020    BUN 15 09/09/2020    LABALBU 3.6 09/03/2020    CREATININE 1.09 09/09/2020    CALCIUM 8.4 09/09/2020    GFRAA 58 09/09/2020    LABGLOM 48 09/09/2020    GLUCOSE 101 09/09/2020     LDH:    Lab Results   Component Value Date     12/12/2018     PT/INR:    Lab Results   Component Value Date    PROTIME 9.9 09/03/2020    INR 0.9 09/03/2020     Warfarin PT/INR:  No components found for: PTPATWAR, PTINRWAR  PTT:    Lab Results   Component Value Date    APTT 22.2 09/03/2020   [APTT}    Radiology Review:      Ct Abdomen Pelvis Wo Contrast Additional Contrast? None     Result Date: 9/3/2020  Intrathoracic stomach full of contents. Volvulus/outlet obstruction not excluded. Further evaluation can be performed with the administration of oral contrast in repeat study if clinically indicated.      Ct Head Wo Contrast     Result Date: 9/3/2020  No acute intracranial abnormality.      Ct Chest Wo Contrast     Result Date: 9/3/2020  1. A type IV paraesophageal hiatal hernia which also contains the distal half of the transverse colon. NG tube in place. Some contrast has been administered which collects in the gastric fundus. The rest of the stomach is fluid-filled. Mild gastric distension noted which could be due to administered contrast.  Difficult to determine if there is some degree of obstruction on the basis of this study. No evidence for gastric pneumatosis. 2. Some bibasilar lower lobe atelectasis/scarring around the hiatal hernia noted along with trace left pleural effusion.      Ct Cervical Spine Wo Contrast     Result Date: 9/3/2020  No acute abnormality of the cervical spine.    Xr Chest Portable     Result Date: 9/3/2020  No acute cardiopulmonary abnormality. Large hiatal hernia. ASSESSMENT:  Active Hospital Problems    Diagnosis Date Noted    Moderate malnutrition ([facility] [E44.0] 09/08/2020    Paraesophageal hernia [K44.9] 09/06/2020    Hypokalemia [E87.6] 09/06/2020    DOMINIQUE (acute kidney injury) ([facility] [N17.9] 09/06/2020    Normocytic anemia [D64.9] 09/06/2020    S/P exploratory laparotomy [Z98.890] 09/06/2020    Small bowel ischemia ([address] [K55.9] 09/06/2020    Dementia ([facility] [F03.90] 09/06/2020    Gastric outlet obstruction [K31.1] 09/03/2020    Acute gastric volvulus [K31.89] 09/03/2020    Cerebrovascular accident (CVA) ([facility] [I63.9] 01/17/2019    Essential hypertension [I10] 04/10/2017       79 y.o. 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  Pt to follow up with Dr. Kate Olivas in 1 week for staple removal.   D/C instructions in.   - medical and supportive care per primary     Joy [id number]

## 2020-09-10 NOTE — PROGRESS NOTES
[facility]  Internal Medicine Teaching Residency Program  Inpatient Daily Progress Note  ______________________________________________________________________________    Patient: Aleyda Pearson  YOB: 1940   CDL:[id number]    Acct: [de-identified]     Room: 2017/2017-01  Admit date: 9/3/2020  Today's date: 09/10/20  Number of days in the hospital: 7    SUBJECTIVE   Admitting Diagnosis: Gastric outlet obstruction  CC: Nausea vomiting  Patient mentating time place person  No abdominal pain, flatus positive,  Patient can tolerate clear fluid  No nausea vomiting  Pain controlled  Zosyn discontinued  Sodium 145 potassium 3.6-3.3 and creatinine 1.09  Noe is removed ,the bladder scan showed 180 ml. Patient has not urinated since Noe and has wet herself  ROS:  Constitutional:  negative for chills, fevers, sweats  Respiratory:  negative for cough, dyspnea on exertion, hemoptysis, shortness of breath, wheezing  Cardiovascular:  negative for chest pain, chest pressure/discomfort, lower extremity edema, palpitations  Gastrointestinal:  negative for abdominal pain, constipation, diarrhea, nausea, vomiting  Neurological:  negative for dizziness, headache  BRIEF HISTORY     79year-old female with past medical history of hypertension,stroke ,heart failure ,HPL dementia, gout and hiatal hernia.   Patient presented initially to [facility] emergency department with complaining of nausea and vomiting for 2 days. prior to that patient had constipation from 3 days.  CT abdomen was done which  showed volvulus or gastric obstruction .   Patient then presented to [facility] ER where   x-rays cervical spine showed large hiatal hernia  CT scan of abdomen showed intrathoracic stomach containing food particles  Showing either volvulus or gastric obstruction  Chest CT was done which showed paraesophageal hiatal hernia type IV   Surgery consulted the GI ,    IN ICU ,EGD was done showing tortuous esophagus, transthoracic stomach with volvulus causing partial gastric obstruction and suspicion of of bowel ischemia.    Patient stomach was decompressed but multiple attempts at derotating the stomach were unsuccessful. surgery was consulted intra-procedure due to suspicion of bowel ischemia.   She underwent Ex lap, paraesophageal hernia repair with mesh bridge, double gastropexy, Kocherization of the duodenum.     OBJECTIVE     Vital Signs:  BP (!) 148/87   Pulse 88   Temp 97.2 °F (36.2 °C) (Oral)   Resp 16   Ht 5' 4\" (1.626 m)   Wt 186 lb 1.1 oz (84.4 kg)   SpO2 97%   BMI 31.94 kg/m²     Temp (24hrs), Av.3 °F (36.3 °C), Min:97.2 °F (36.2 °C), Max:97.3 °F (36.3 °C)    In: 910   Out: 1300 [Urine:1300]    Physical Exam:  Constitutional:       Appearance: Normal appearance. Eyes:      General: No scleral icterus.        Right eye: No discharge.         Left eye: No discharge.      Extraocular Movements: Extraocular movements intact.      Pupils: Pupils are equal, round, and reactive to light. Neck:      Musculoskeletal: No neck rigidity or muscular tenderness.  Asked  Pulmonary:      Effort: Pulmonary effort is normal.      Breath sounds: Normal breath sounds. No rhonchi. Abdominal:      General: Bowel sounds are normal. There is no distension.      Tenderness: There is no abdominal tenderness.  There is no guarding.      Comments:  linear scar on the abdomen ,stitched with stapler  , no surgical site infection, surgical scar seems to be healing.   Neurological:      General: No focal deficit present.      Mental Status: She is alert.      Cranial Nerves: No cranial nerve deficit.      Sensory: No sensory deficit.      Motor: No weakness.         Medications:  Scheduled Medications:    potassium chloride  20 mEq Oral Daily with breakfast    amLODIPine  10 mg Oral Daily    docusate  100 mg Oral Daily    QUEtiapine  25 mg Oral Nightly    acetaminophen  1,000 mg Oral 3 times per day    famotidine  20 mg Oral Daily    sodium chloride flush  10 mL Intravenous 2 times per day    heparin (porcine)  5,000 Units Subcutaneous 3 times per day    insulin lispro  0-12 Units Subcutaneous TID WC    lidocaine  2 patch Transdermal Daily     Continuous Infusions:    dextrose       PRN Medicationspolyethylene glycol, 17 g, Daily PRN  sodium chloride flush, 10 mL, PRN  magnesium sulfate, 1 g, PRN  glucose, 15 g, PRN  dextrose, 12.5 g, PRN  glucagon (rDNA), 1 mg, PRN  dextrose, 100 mL/hr, PRN        Diagnostic Labs:  CBC:   Recent Labs     09/08/20  0623 09/09/20  0909 09/10/20  0626   WBC 11.7* 12.3* 14.6*   RBC 3.61* 3.36* 3.54*   HGB 10.3* 9.6* 10.0*   HCT 32.9* 31.3* 32.6*   MCV 91.1 93.2 92.1   RDW 13.5 14.1 14.4    302 333     BMP:   Recent Labs     09/08/20  0623 09/09/20  0909 09/10/20  0626    145* 143   K 3.6* 3.6* 3.3*    110* 109*   CO2 20 21 21   BUN 15 15 12   CREATININE 1.08* 1.09* 0.92*     BNP: No results for input(s): BNP in the last 72 hours. PT/INR: No results for input(s): PROTIME, INR in the last 72 hours. APTT: No results for input(s): APTT in the last 72 hours. CARDIAC ENZYMES: No results for input(s): CKMB, CKMBINDEX, TROPONINI in the last 72 hours. Invalid input(s): CKTOTAL;3  FASTING LIPID PANEL:  Lab Results   Component Value Date    CHOL 158 05/10/2018    HDL 57 05/10/2018    TRIG 154 (H) 05/10/2018     LIVER PROFILE: No results for input(s): AST, ALT, ALB, BILIDIR, BILITOT, ALKPHOS in the last 72 hours. MICROBIOLOGY:   Lab Results   Component Value Date/Time    CULTURE NO GROWTH 6 DAYS 09/03/2020 11:23 PM       Imaging:    Ct Chest Wo Contrast    Result Date: 9/3/2020  1. A type IV paraesophageal hiatal hernia which also contains the distal half of the transverse colon. NG tube in place. Some contrast has been administered which collects in the gastric fundus. The rest of the stomach is fluid-filled.   Mild gastric distension noted which could be due to administered contrast.  Difficult to determine if there is some degree of obstruction on the basis of this study. No evidence for gastric pneumatosis. 2. Some bibasilar lower lobe atelectasis/scarring around the hiatal hernia noted along with trace left pleural effusion. ASSESSMENT & PLAN     ASSESSMENT / PLAN:     ASSESSMENT / PLAN:      1. Gastric outlet obstruction resolved  Secondary to gastric volvulus and paraesophageal hernia. EGD  done showing tortuous esophagus, transthoracic stomach with volvulus causing partial gastric obstruction .   Status post exploratory laparotomy, paraesophageal hernia repair with mesh, gastropexy with gastrostomy tubes and cauterization of the duodenum 9/3.     Started clear liquid diet  4 days days before,patient is tolerating well.    Patient zosyn stopped . Minimize opioids  Patient can be discharged from GI point of view      2. DOMINIQUE. Rebecca Carmen secondary to hypoperfusion.  Improving.  Baseline creatinine 1 and 1.09 today .  -Continue IV fluids.    -Monitor strict I/O's    -follow RFT's.         3)Electrolyte imbalance.  Hypokalemia.  Replace and monitor potassium.  Potassium on September 10 is 3.3 4)     4)hypertension:    increase amlodipine dose to 10 mg. Continue labetalol prn. Patient blood pressures 148/87 157/101 . Monitor blood pressure and beta-blocker can be added to amlodipine  Get EKG     5)  stool softener added     6) patient is ambulated . Noe's catheter out. WBC is 14.6. Urine Analysis can be ordered. Patient otherwise asymptomatic afebrile ,no dysuria , mild retention since Noe's was out.   Bladder scan showed 180 but patient is stable and does not complaint of any symptoms.        5) wound between first and 2nd toe .  Wound care consulted     6)patoent can be discharged        DVT ppx : Heparin 5000 unit every 8 hours  GI ppx:      PT/OT:   Discharge Planning / SW:      DVT ppx :   GI ppx:     PT/OT:   Discharge Planning / Yair Heritage:     Walker Noss Carmela Orozco MD  Internal Medicine Resident, PGY-1  [facility]; [place], [place]  9/10/2020, 11:09 AM Attending Physician Statement  I have discussed the care of Amna Izaguirre, including pertinent history and exam findings,  with the resident. I have seen and examined the patient and the key elements of all parts of the encounter have been performed by me. I agree with the assessment, plan and orders as documented by the resident with additions . CC Poor urine output  This is likely due to poor fluid intake that also caused hyper natremia. Will increase flid intake by gasrostomy. Treatment plan Discussed with nursing staff in detail , all questions answered . Electronically signed by Benedict Lovett MD on   9/10/20 at 5:29 PM EDT    Please note that this chart was generated using voice recognition Dragon dictation software. Although every effort was made to ensure the accuracy of this automated transcription, some errors in transcription may have occurred.

## 2020-09-10 NOTE — PROGRESS NOTES
Physical Therapy  Facility/Department: [facility] CAR 2  Daily Treatment Note  NAME: Margaret Alas  : 1940  MRN: [id number]    Date of Service: 9/10/2020    Discharge Recommendations:  Patient would benefit from continued therapy after discharge        Assessment   Body structures, Functions, Activity limitations: Decreased functional mobility ; Decreased posture;Decreased endurance;Decreased ROM; Decreased strength;Decreased balance; Increased pain  Assessment: Pt is limited by fatigue and lack of endurance. Pt demos  BLE weakness with ambulation. due to these deficits, pt is unsafe to return home at this time. pt would benefit from continued skilled therapy. Prognosis: Good  PT Education: Goals;PT Role;Plan of Care;Gait Training;General Safety;Transfer Training  REQUIRES PT FOLLOW UP: Yes  Activity Tolerance  Activity Tolerance: Patient limited by pain; Patient Tolerated treatment well;Patient limited by endurance     Patient Diagnosis(es): The primary encounter diagnosis was Gastric outlet obstruction. Diagnoses of Nausea and vomiting, intractability of vomiting not specified, unspecified vomiting type, S/P exploratory laparotomy, Paraesophageal hernia, Hypokalemia, and DOMINIQUE (acute kidney injury) ([address] were also pertinent to this visit. has a past medical history of Anemia, Cataract, Cerebral artery occlusion with cerebral infarction ([address] CHF (congestive heart failure) ([address] Depression, Diabetes mellitus ([address] Eczema, Gout, Hearing loss, Hiatal hernia, History of blood transfusion, Hyperlipidemia, Hypertension, PONV (postoperative nausea and vomiting), Rectal urgency, and Stress incontinence, female. has a past surgical history that includes Hysterectomy; Appendectomy; eye surgery; Cataract removal with implant (Bilateral); Colonoscopy; joint replacement; pr total hip arthroplasty (Left, 2018); Abdomen surgery (2020);  Upper gastrointestinal endoscopy (N/A, 9/3/2020); and laparotomy (N/A, 9/3/2020). Restrictions  Restrictions/Precautions  Restrictions/Precautions: Surgical Protocols, Fall Risk, General Precautions, Cardiac  Required Braces or Orthoses?: No  Position Activity Restriction  Other position/activity restrictions: up as tolerated, G-tube to gravity  Subjective   General  Response To Previous Treatment: Patient with no complaints from previous session. Family / Caregiver Present: No  Subjective  Subjective: RN and pt agreed to PT, pt awake in chair upon arrival  Pain Screening  Patient Currently in Pain: No  Vital Signs  Patient Currently in Pain: No       Orientation  Orientation  Overall Orientation Status: Within Normal Limits       Objective      Transfers  Sit to Stand: Contact guard assistance  Stand to sit: Contact guard assistance  Ambulation  Ambulation?: Yes  Ambulation 1  Surface: level tile  Device: Rolling Walker  Quality of Gait: antalgic gait, LLE step to gait, very slow  Gait Deviations: Slow Toshia;Decreased step length;Decreased step height;Shuffles  Distance: 50ft grossly CGA, Miranda to maneuver AD during directional changes  Comments: HR 110bpm following gait  Stairs/Curb  Stairs?: No     Balance  Posture: Fair  Sitting - Static: Good  Sitting - Dynamic: Good;-  Standing - Static: Fair  Standing - Dynamic: Fair;-  Comments: Static standing x 45\" w/SW SBA  Exercises  Core Strengthening: mini crunches in chair x 10  Seated LE exercise program: Long Arc Quads, hip abduction/adduction, heel/toe raises, and marches.  Reps:  x 20  sit to stands x 5, HR increased up to 117bpm              Goals  Short term goals  Time Frame for Short term goals: 14  Short term goal 1: Pt to perform bed mobility CGA  Short term goal 2: Pt to demonstrate functional transfers CGA  Short term goal 3: Ambulate 100ft w/ RW CGA  Short term goal 4: Tolerate 30 minutes of therapy to demo increased endurnace  Patient Goals   Patient goals : Did not state    Plan    Plan  Times per week: 5-6x/week  Current Treatment Recommendations: Strengthening, Transfer Training, Endurance Training, Patient/Caregiver Education & Training, ROM, Balance Training, Gait Training, Home Exercise Program, Functional Mobility Training, Stair training, Safety Education & Training  Safety Devices  Type of devices: Left in chair, Call light within reach, Gait belt, Nurse notified, Patient at risk for falls(No chair alarm upon arrival)  Restraints  Initially in place: No     Therapy Time   Individual Concurrent Group Co-treatment   Time In  1121         Time Out  1146         Minutes  25          Time coded minutes SARA Schwarz

## 2020-09-10 NOTE — PLAN OF CARE
Problem: Falls - Risk of:  Goal: Will remain free from falls  Description: Will remain free from falls  Outcome: Ongoing  Goal: Absence of physical injury  Description: Absence of physical injury  Outcome: Ongoing   Patient remained free from injury. Patient verbalized understanding of need for the safety precautions. Demonstrates proper use of assistive devices. Bed remains in the lowest position. Call light remains within reach. [place] [facility] in use. Problem: Skin Integrity:  Goal: Will show no infection signs and symptoms  Description: Will show no infection signs and symptoms  Outcome: Ongoing  Goal: Absence of new skin breakdown  Description: Absence of new skin breakdown  Outcome: Ongoing   Patient risk for impaired skin integrity is reduced. Patient assessed for impaired skin integrity risk and structural intactness. Waffle mattress in place and appropriately filled. Patient assisted with turns and pillows placed under pressure points. Patient skin kept clean and dry. Patient free of pressure ulcers and reddened areas this shift. Will continue to monitor. Problem: Pain:  Description: Pain management should include both nonpharmacologic and pharmacologic interventions.   Goal: Pain level will decrease  Description: Pain level will decrease  Outcome: Ongoing  Goal: Control of acute pain  Description: Control of acute pain  Outcome: Ongoing  Goal: Control of chronic pain  Description: Control of chronic pain  Outcome: Ongoing     Problem: Nutrition  Goal: Optimal nutrition therapy  Description: Nutrition Problem #1: Inadequate oral intake  Intervention: Food and/or Nutrient Delivery: Start Oral Diet  Nutritional Goals: initiation of nutrition within 72 hours     Outcome: Ongoing     Problem: Infection - Surgical Site:  Goal: Will show no infection signs and symptoms  Description: Will show no infection signs and symptoms  Outcome: Ongoing

## 2020-09-10 NOTE — PROGRESS NOTES
Patient with multiple comorbidities like hypertension stroke and heart failure came in with nausea and vomiting, was found to have volvulus/gastric outlet obstruction on CT abdomen. Surgery was consulted patient underwent ex lap with paraesophageal hernia repair on 9/3/2020. Patient seen and examined bedside  Afebrile  Vitals stable  On room air and saturating well  Labs reviewed  Hypokalemia  Creatinine improving  Hypocalcemia-we will check ionized calcium    Leukocytosis 14.6  Hemoglobin 10.0  Platelets 206  Blood sugars under control. 1. Worsening leukocytosis, no fevers, surgical site unremarkable, no tachypnea or any respiratory symptoms. will monitor. Could be because of a atelectasis. Encourage incentive spirometry and ambulation. 2. Essential hypertension- continue amlodipine. 3. Urinary retention. Will monitor. Discharge planning- patient and patient's daughter agreed that patient needs to go to skilled nursing facility.  on board.

## 2020-09-11 LAB
ABSOLUTE EOS #: 0.15 K/UL (ref 0–0.4)
ABSOLUTE IMMATURE GRANULOCYTE: 0 K/UL (ref 0–0.3)
ABSOLUTE LYMPH #: 3.8 K/UL (ref 1–4.8)
ABSOLUTE MONO #: 0.91 K/UL (ref 0.1–0.8)
ANION GAP SERPL CALCULATED.3IONS-SCNC: 10 MMOL/L (ref 9–17)
BASOPHILS # BLD: 0 % (ref 0–2)
BASOPHILS ABSOLUTE: 0 K/UL (ref 0–0.2)
BUN BLDV-MCNC: 10 MG/DL (ref 8–23)
BUN/CREAT BLD: ABNORMAL (ref 9–20)
CALCIUM SERPL-MCNC: 8.1 MG/DL (ref 8.6–10.4)
CHLORIDE BLD-SCNC: 110 MMOL/L (ref 98–107)
CO2: 21 MMOL/L (ref 20–31)
CREAT SERPL-MCNC: 0.88 MG/DL (ref 0.5–0.9)
DIFFERENTIAL TYPE: ABNORMAL
EOSINOPHILS RELATIVE PERCENT: 1 % (ref 1–4)
GFR AFRICAN AMERICAN: >60 ML/MIN
GFR NON-AFRICAN AMERICAN: >60 ML/MIN
GFR SERPL CREATININE-BSD FRML MDRD: ABNORMAL ML/MIN/{1.73_M2}
GFR SERPL CREATININE-BSD FRML MDRD: ABNORMAL ML/MIN/{1.73_M2}
GLUCOSE BLD-MCNC: 106 MG/DL (ref 70–99)
GLUCOSE BLD-MCNC: 116 MG/DL (ref 65–105)
GLUCOSE BLD-MCNC: 153 MG/DL (ref 65–105)
GLUCOSE BLD-MCNC: 59 MG/DL (ref 65–105)
GLUCOSE BLD-MCNC: 88 MG/DL (ref 65–105)
GLUCOSE BLD-MCNC: 95 MG/DL (ref 65–105)
HCT VFR BLD CALC: 31.8 % (ref 36.3–47.1)
HEMOGLOBIN: 9.6 G/DL (ref 11.9–15.1)
IMMATURE GRANULOCYTES: 0 %
LYMPHOCYTES # BLD: 25 % (ref 24–44)
MCH RBC QN AUTO: 28.6 PG (ref 25.2–33.5)
MCHC RBC AUTO-ENTMCNC: 30.2 G/DL (ref 28.4–34.8)
MCV RBC AUTO: 94.6 FL (ref 82.6–102.9)
MONOCYTES # BLD: 6 % (ref 1–7)
MORPHOLOGY: ABNORMAL
NRBC AUTOMATED: 0 PER 100 WBC
PDW BLD-RTO: 14.9 % (ref 11.8–14.4)
PLATELET # BLD: 336 K/UL (ref 138–453)
PLATELET ESTIMATE: ABNORMAL
PMV BLD AUTO: 10 FL (ref 8.1–13.5)
POTASSIUM SERPL-SCNC: 3.6 MMOL/L (ref 3.7–5.3)
RBC # BLD: 3.36 M/UL (ref 3.95–5.11)
RBC # BLD: ABNORMAL 10*6/UL
SEG NEUTROPHILS: 68 % (ref 36–66)
SEGMENTED NEUTROPHILS ABSOLUTE COUNT: 10.34 K/UL (ref 1.8–7.7)
SODIUM BLD-SCNC: 141 MMOL/L (ref 135–144)
WBC # BLD: 15.2 K/UL (ref 3.5–11.3)
WBC # BLD: ABNORMAL 10*3/UL

## 2020-09-11 PROCEDURE — 6360000002 HC RX W HCPCS: Performed by: STUDENT IN AN ORGANIZED HEALTH CARE EDUCATION/TRAINING PROGRAM

## 2020-09-11 PROCEDURE — 97110 THERAPEUTIC EXERCISES: CPT

## 2020-09-11 PROCEDURE — 85025 COMPLETE CBC W/AUTO DIFF WBC: CPT

## 2020-09-11 PROCEDURE — 36415 COLL VENOUS BLD VENIPUNCTURE: CPT

## 2020-09-11 PROCEDURE — 6370000000 HC RX 637 (ALT 250 FOR IP): Performed by: STUDENT IN AN ORGANIZED HEALTH CARE EDUCATION/TRAINING PROGRAM

## 2020-09-11 PROCEDURE — 2580000003 HC RX 258: Performed by: STUDENT IN AN ORGANIZED HEALTH CARE EDUCATION/TRAINING PROGRAM

## 2020-09-11 PROCEDURE — 99232 SBSQ HOSP IP/OBS MODERATE 35: CPT | Performed by: INTERNAL MEDICINE

## 2020-09-11 PROCEDURE — 6370000000 HC RX 637 (ALT 250 FOR IP): Performed by: NURSE PRACTITIONER

## 2020-09-11 PROCEDURE — 51798 US URINE CAPACITY MEASURE: CPT

## 2020-09-11 PROCEDURE — 1200000000 HC SEMI PRIVATE

## 2020-09-11 PROCEDURE — 97116 GAIT TRAINING THERAPY: CPT

## 2020-09-11 PROCEDURE — 80048 BASIC METABOLIC PNL TOTAL CA: CPT

## 2020-09-11 PROCEDURE — 82947 ASSAY GLUCOSE BLOOD QUANT: CPT

## 2020-09-11 PROCEDURE — 6370000000 HC RX 637 (ALT 250 FOR IP): Performed by: SURGERY

## 2020-09-11 RX ORDER — NYSTATIN 100000 U/G
OINTMENT TOPICAL
Qty: 1 TUBE | Refills: 1 | Status: SHIPPED | OUTPATIENT
Start: 2020-09-11 | End: 2022-07-18

## 2020-09-11 RX ORDER — NYSTATIN 100000 U/G
OINTMENT TOPICAL 2 TIMES DAILY
Status: DISCONTINUED | OUTPATIENT
Start: 2020-09-11 | End: 2020-09-12 | Stop reason: HOSPADM

## 2020-09-11 RX ADMIN — ACETAMINOPHEN 1000 MG: 500 TABLET ORAL at 13:23

## 2020-09-11 RX ADMIN — QUETIAPINE FUMARATE 25 MG: 25 TABLET ORAL at 21:34

## 2020-09-11 RX ADMIN — AMLODIPINE BESYLATE 10 MG: 10 TABLET ORAL at 08:18

## 2020-09-11 RX ADMIN — ACETAMINOPHEN 1000 MG: 500 TABLET ORAL at 06:59

## 2020-09-11 RX ADMIN — SODIUM CHLORIDE, PRESERVATIVE FREE 10 ML: 5 INJECTION INTRAVENOUS at 21:39

## 2020-09-11 RX ADMIN — FAMOTIDINE 20 MG: 20 TABLET, FILM COATED ORAL at 08:14

## 2020-09-11 RX ADMIN — NYSTATIN: 100000 OINTMENT TOPICAL at 09:45

## 2020-09-11 RX ADMIN — ACETAMINOPHEN 1000 MG: 500 TABLET ORAL at 21:33

## 2020-09-11 RX ADMIN — HEPARIN SODIUM 5000 UNITS: 5000 INJECTION INTRAVENOUS; SUBCUTANEOUS at 13:23

## 2020-09-11 RX ADMIN — POTASSIUM CHLORIDE 20 MEQ: 1500 TABLET, EXTENDED RELEASE ORAL at 08:18

## 2020-09-11 RX ADMIN — SODIUM CHLORIDE, PRESERVATIVE FREE 10 ML: 5 INJECTION INTRAVENOUS at 08:18

## 2020-09-11 RX ADMIN — HEPARIN SODIUM 5000 UNITS: 5000 INJECTION INTRAVENOUS; SUBCUTANEOUS at 06:58

## 2020-09-11 RX ADMIN — NYSTATIN: 100000 OINTMENT TOPICAL at 21:32

## 2020-09-11 RX ADMIN — HEPARIN SODIUM 5000 UNITS: 5000 INJECTION INTRAVENOUS; SUBCUTANEOUS at 21:33

## 2020-09-11 ASSESSMENT — PAIN SCALES - GENERAL
PAINLEVEL_OUTOF10: 5
PAINLEVEL_OUTOF10: 0
PAINLEVEL_OUTOF10: 0
PAINLEVEL_OUTOF10: 5

## 2020-09-11 NOTE — PLAN OF CARE
Problem: Falls - Risk of:  Goal: Will remain free from falls  Description: Will remain free from falls  9/11/2020 1141 by Ruth Ann Pagan RN  Outcome: Ongoing  9/11/2020 0250 by Nicki Malloy RN  Outcome: Ongoing  Goal: Absence of physical injury  Description: Absence of physical injury  9/11/2020 1141 by Ruth Ann Pagan RN  Outcome: Ongoing  9/11/2020 0250 by Nicki Malloy RN  Outcome: Ongoing     Problem: Skin Integrity:  Goal: Will show no infection signs and symptoms  Description: Will show no infection signs and symptoms  9/11/2020 1141 by Ruth Ann Pagan RN  Outcome: Ongoing  9/11/2020 0250 by Nicki Malloy RN  Outcome: Ongoing  Goal: Absence of new skin breakdown  Description: Absence of new skin breakdown  9/11/2020 1141 by Ruth Ann Pagan RN  Outcome: Ongoing  9/11/2020 0250 by Nicki Malloy RN  Outcome: Ongoing     Problem: Pain:  Goal: Pain level will decrease  Description: Pain level will decrease  9/11/2020 1141 by Ruth Ann Pagan RN  Outcome: Ongoing  9/11/2020 0250 by Nicki Malloy RN  Outcome: Ongoing  Goal: Control of acute pain  Description: Control of acute pain  9/11/2020 1141 by Ruth Ann Pagan RN  Outcome: Ongoing  9/11/2020 0250 by Nicki Malloy RN  Outcome: Ongoing  Goal: Control of chronic pain  Description: Control of chronic pain  9/11/2020 1141 by Ruth Ann Pagan RN  Outcome: Ongoing  9/11/2020 0250 by Nicki Malloy RN  Outcome: Ongoing     Problem: Nutrition  Goal: Optimal nutrition therapy  Description: Nutrition Problem #1: Inadequate oral intake  Intervention: Food and/or Nutrient Delivery: Start Oral Diet  Nutritional Goals: initiation of nutrition within 72 hours     9/11/2020 1141 by Ruth Ann Pagan RN  Outcome: Ongoing  9/11/2020 0250 by Nicki Malloy RN  Outcome: Ongoing     Problem: Infection - Surgical Site:  Goal: Will show no infection signs and symptoms  Description: Will show no infection signs and symptoms  9/11/2020 1141 by Ruth Ann Pagan RN  Outcome:

## 2020-09-11 NOTE — PROGRESS NOTES
[facility]  Internal Medicine Teaching Residency Program  Inpatient Daily Progress Note  ______________________________________________________________________________    Patient: Ruth Ann Barrett  YOB: 1940   RKI:[id number]    Acct: [de-identified]     Room: 2017/2017-01  Admit date: 9/3/2020  Today's date: 09/11/20  Number of days in the hospital: 8    SUBJECTIVE   Admitting Diagnosis: Gastric outlet obstruction  CC: Nausea vomiting  Pt examined at bedside. Chart & results reviewed. Patient mentating time place person  No abdominal pain, flatus positive,  Patient can tolerate clear fluid  No nausea vomiting  Pain controlled  Zosyn discontinued  Sodium 145 potassium 3.6 and creatinine 1.10    ROS:  Constitutional:  negative for chills, fevers, sweats  Respiratory:  negative for cough, dyspnea on exertion, hemoptysis, shortness of breath, wheezing  Cardiovascular:  negative for chest pain, chest pressure/discomfort, lower extremity edema, palpitations  Gastrointestinal:  negative for abdominal pain, constipation, diarrhea, nausea, vomiting  Neurological:  negative for dizziness, headache  BRIEF HISTORY     78-year-old female with past medical history of hypertension,stroke ,heart failure ,HPL dementia, gout and hiatal hernia.   Patient presented initially to sent [facility] emergency department with complaining of nausea and vomiting for 2 days. prior to that patient had constipation from 3 days.  CT abdomen was done which  showed volvulus or gastric obstruction .   Patient then presented to [facility] ER where   x-rays cervical spine showed large hiatal hernia  CT scan of abdomen showed intrathoracic stomach containing food particles  Showing either volvulus or gastric obstruction  Chest CT was done which showed paraesophageal hiatal hernia type IV   Surgery consulted the GI ,    IN ICU ,EGD was done showing tortuous esophagus, transthoracic stomach with volvulus causing partial gastric obstruction and suspicion of of bowel ischemia.    Patient stomach was decompressed but multiple attempts at derotating the stomach were unsuccessful. surgery was consulted intra-procedure due to suspicion of bowel ischemia.   She underwent Ex lap, paraesophageal hernia repair with mesh bridge, double gastropexy, Kocherization of the duodenum. OBJECTIVE     Vital Signs:  BP (!) 147/57   Pulse 80   Temp 97.9 °F (36.6 °C) (Oral)   Resp 18   Ht 5' 4\" (1.626 m)   Wt 188 lb 6.4 oz (85.5 kg)   SpO2 97%   BMI 32.34 kg/m²     Temp (24hrs), Av.9 °F (36.6 °C), Min:97.9 °F (36.6 °C), Max:97.9 °F (36.6 °C)    In: 240   Out: -     Physical Exam:  Constitutional:       Appearance: Normal appearance. Eyes:      General: No scleral icterus.        Right eye: No discharge.         Left eye: No discharge.      Extraocular Movements: Extraocular movements intact.      Pupils: Pupils are equal, round, and reactive to light. Neck:      Musculoskeletal: No neck rigidity or muscular tenderness.  Asked  Pulmonary:      Effort: Pulmonary effort is normal.      Breath sounds: Normal breath sounds. No rhonchi. Abdominal:      General: Bowel sounds are normal. There is no distension.      Tenderness: There is no abdominal tenderness. There is no guarding.      Comments:  linear scar on the abdomen ,stitched with stapler  , no surgical site infection, surgical scar seems to be healing.   Neurological:      General: No focal deficit present.      Mental Status: She is alert.      Cranial Nerves: No cranial nerve deficit.      Sensory: No sensory deficit.      Motor: No weakness.   Medications:  Scheduled Medications:    nystatin   Topical BID    potassium chloride  20 mEq Oral Daily with breakfast    amLODIPine  10 mg Oral Daily    docusate  100 mg Oral Daily    QUEtiapine  25 mg Oral Nightly    acetaminophen  1,000 mg Oral 3 times per day    famotidine  20 mg Oral Daily    sodium chloride flush 10 mL Intravenous 2 times per day    heparin (porcine)  5,000 Units Subcutaneous 3 times per day    insulin lispro  0-12 Units Subcutaneous TID WC    lidocaine  2 patch Transdermal Daily     Continuous Infusions:    dextrose       PRN Medicationspolyethylene glycol, 17 g, Daily PRN  sodium chloride flush, 10 mL, PRN  magnesium sulfate, 1 g, PRN  glucose, 15 g, PRN  dextrose, 12.5 g, PRN  glucagon (rDNA), 1 mg, PRN  dextrose, 100 mL/hr, PRN        Diagnostic Labs:  CBC:   Recent Labs     09/09/20  0909 09/10/20  0626 09/11/20  0736   WBC 12.3* 14.6* 15.2*   RBC 3.36* 3.54* 3.36*   HGB 9.6* 10.0* 9.6*   HCT 31.3* 32.6* 31.8*   MCV 93.2 92.1 94.6   RDW 14.1 14.4 14.9*    333 336     BMP:   Recent Labs     09/09/20  0909 09/10/20  0626 09/11/20  0736   * 143 141   K 3.6* 3.3* 3.6*   * 109* 110*   CO2 21 21 21   BUN 15 12 10   CREATININE 1.09* 0.92* 0.88     BNP: No results for input(s): BNP in the last 72 hours. PT/INR: No results for input(s): PROTIME, INR in the last 72 hours. APTT: No results for input(s): APTT in the last 72 hours. CARDIAC ENZYMES: No results for input(s): CKMB, CKMBINDEX, TROPONINI in the last 72 hours. Invalid input(s): CKTOTAL;3  FASTING LIPID PANEL:  Lab Results   Component Value Date    CHOL 158 05/10/2018    HDL 57 05/10/2018    TRIG 154 (H) 05/10/2018     LIVER PROFILE: No results for input(s): AST, ALT, ALB, BILIDIR, BILITOT, ALKPHOS in the last 72 hours. MICROBIOLOGY:   Lab Results   Component Value Date/Time    CULTURE NO GROWTH 6 DAYS 09/03/2020 11:23 PM       Imaging:    No results found. ASSESSMENT & PLAN     ASSESSMENT / PLAN:     1. Gastric outlet obstruction resolved  Secondary to gastric volvulus and paraesophageal hernia.  EGD  done showing tortuous esophagus, transthoracic stomach with volvulus causing partial gastric obstruction .   Status post exploratory laparotomy, paraesophageal hernia repair with mesh, gastropexy with gastrostomy tubes and cauterization of the duodenum 9/3.     Started clear liquid diet  4 days days before,patient is tolerating well.    Patient zosyn stopped . Minimize opioids  Patient can be discharged from GI point of view      2. DOMINIQUE. [place] Getting secondary to hypoperfusion.  Improving.  Baseline creatinine 1 and 1.09 today .  -Continue IV fluids.    -Monitor strict I/O's    -follow RFT's.         3)Electrolyte imbalance.  Hypokalemia.  Replace and monitor potassium.  Potassium on September 10 is 3.3 4)      4)hypertension:    increase amlodipine dose to 10 mg.    Continue labetalol prn. Patient blood pressures 148/87 157/101 . Monitor blood pressure and beta-blocker can be added to amlodipine  Get EKG     5)  stool softener added     6) patient is ambulated . Noe's catheter out. WBC is 15.2 . Urine Analysis can be ordered. Patient otherwise asymptomatic afebrile ,no dysuria , mild retention since Noe's was out. Bladder scan showed 180 but patient is stable and does not complaint of any symptoms.        5) wound between first and 2nd toe .  Wound care consulted     6)patoent can be discharged        DVT ppx : Heparin 5000 unit every 8 hours  GI ppx:      PT/OT: On board  Discharge Planning / SW: On board       Francheska Jung MD  Internal Medicine Resident, PGY-1  [address], [place]  9/11/2020, 12:11 PM   Attending Physician Statement  I have discussed the care of Wesly Wall, including pertinent history and exam findings,  with the resident. I have seen and examined the patient and the key elements of all parts of the encounter have been performed by me. I agree with the assessment, plan and orders as documented by the resident with additions . Treatment plan Discussed with nursing staff in detail , all questions answered .    Electronically signed by Nato Doherty MD on   9/11/20 at 7:28 PM EDT    Please note that this chart was generated using voice recognition Dragon dictation

## 2020-09-11 NOTE — PROGRESS NOTES
General Surgery:  Daily Progress Note      PATIENT NAME: Chas Artist     TODAY'S DATE: 9/11/2020, 5:51 AM  CC:  Abdominal pain    SUBJECTIVE:     Pt seen and examined at bedside. No acute events overnight. Tolerating diet, no n/v. Afebrile. Continued bowel function. OBJECTIVE:   VITALS:  BP (!) 156/58   Pulse 94   Temp 97.9 °F (36.6 °C) (Oral)   Resp 18   Ht 5' 4\" (1.626 m)   Wt 186 lb 1.1 oz (84.4 kg)   SpO2 97%   BMI 31.94 kg/m²      INTAKE/OUTPUT:      Intake/Output Summary (Last 24 hours) at 9/11/2020 0551  Last data filed at 9/10/2020 1814  Gross per 24 hour   Intake 920 ml   Output 357 ml   Net 563 ml       PHYSICAL EXAM:  General Appearance: awake, alert, oriented, in no acute distress  HEENT:  Normocephalic, atraumatic, mucus membranes moist   Heart: Heart sounds are normal.  Regular rate and rhythm without murmur, gallop or rub. Lungs: Normal expansion. Clear to auscultation. No rales, rhonchi, or wheezing. Abdomen:soft, nd, attp. Incision c/d/i. Gastrostomy tubes X2 c/d/i and both capped. Extremities: No cyanosis, pitting edema, rashes noted. Skin: Skin color, texture, turgor normal. No rashes or lesions.     IV Access:  AV  Noe:  Yes    Data:  CBC with Differential:    Lab Results   Component Value Date    WBC 14.6 09/10/2020    RBC 3.54 09/10/2020    HGB 10.0 09/10/2020    HCT 32.6 09/10/2020     09/10/2020    MCV 92.1 09/10/2020    MCH 28.2 09/10/2020    MCHC 30.7 09/10/2020    RDW 14.4 09/10/2020    LYMPHOPCT 13 09/10/2020    MONOPCT 9 09/10/2020    BASOPCT 0 09/10/2020    MONOSABS 1.28 09/10/2020    LYMPHSABS 1.93 09/10/2020    EOSABS 0.54 09/10/2020    BASOSABS 0.06 09/10/2020    DIFFTYPE NOT REPORTED 09/10/2020     CMP:    Lab Results   Component Value Date     09/10/2020    K 3.3 09/10/2020     09/10/2020    CO2 21 09/10/2020    BUN 12 09/10/2020    CREATININE 0.92 09/10/2020    GFRAA >60 09/10/2020    LABGLOM 59 09/10/2020    GLUCOSE 91 09/10/2020    PROT 6.8 09/03/2020    LABALBU 3.6 09/03/2020    CALCIUM 8.4 09/10/2020    BILITOT 0.32 09/03/2020    ALKPHOS 102 09/03/2020    AST 21 09/03/2020    ALT 13 09/03/2020     BMP:    Lab Results   Component Value Date     09/10/2020    K 3.3 09/10/2020     09/10/2020    CO2 21 09/10/2020    BUN 12 09/10/2020    LABALBU 3.6 09/03/2020    CREATININE 0.92 09/10/2020    CALCIUM 8.4 09/10/2020    GFRAA >60 09/10/2020    LABGLOM 59 09/10/2020    GLUCOSE 91 09/10/2020     LDH:    Lab Results   Component Value Date     12/12/2018     PT/INR:    Lab Results   Component Value Date    PROTIME 9.9 09/03/2020    INR 0.9 09/03/2020     Warfarin PT/INR:  No components found for: PTPATWAR, PTINRWAR  PTT:    Lab Results   Component Value Date    APTT 22.2 09/03/2020   [APTT}    Radiology Review:      Ct Abdomen Pelvis Wo Contrast Additional Contrast? None     Result Date: 9/3/2020  Intrathoracic stomach full of contents. Volvulus/outlet obstruction not excluded. Further evaluation can be performed with the administration of oral contrast in repeat study if clinically indicated.      Ct Head Wo Contrast     Result Date: 9/3/2020  No acute intracranial abnormality.      Ct Chest Wo Contrast     Result Date: 9/3/2020  1. A type IV paraesophageal hiatal hernia which also contains the distal half of the transverse colon. NG tube in place. Some contrast has been administered which collects in the gastric fundus. The rest of the stomach is fluid-filled. Mild gastric distension noted which could be due to administered contrast.  Difficult to determine if there is some degree of obstruction on the basis of this study. No evidence for gastric pneumatosis.  2. Some bibasilar lower lobe atelectasis/scarring around the hiatal hernia noted along with trace left pleural effusion.      Ct Cervical Spine Wo Contrast     Result Date: 9/3/2020  No acute abnormality of the cervical spine.      Xr Chest Portable     Result Date: 9/3/2020  No acute cardiopulmonary abnormality. Large hiatal hernia.        ASSESSMENT:  Active Hospital Problems    Diagnosis Date Noted    Mod[facility] [E44.0] 09/08/2020    Paraesophageal hernia [K44.9] 09/06/2020    Hypokalemia [E87.6] 09/06/2020    Normocytic anemia [D64.9] 09/06/2020    S/P exploratory laparotomy [Z98.890] 09/06/2020    Dementia ([facility] [F03.90] 09/06/2020    Cerebrovascular accident (CVA) ([facility] [I63.9] 01/17/2019    Essential hypertension [I10] 04/10/2017       79 y.o. 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      [id number]

## 2020-09-11 NOTE — PLAN OF CARE
Problem: Falls - Risk of:  Goal: Will remain free from falls  Description: Will remain free from falls  9/11/2020 0250 by Brittany Ziegler RN  Outcome: Ongoing  9/10/2020 1259 by Leela Grey RN  Outcome: Ongoing  Goal: Absence of physical injury  Description: Absence of physical injury  9/11/2020 0250 by Brittany Ziegler RN  Outcome: Ongoing  9/10/2020 1259 by Leela Grey RN  Outcome: Ongoing     Problem: Skin Integrity:  Goal: Will show no infection signs and symptoms  Description: Will show no infection signs and symptoms  9/11/2020 0250 by Brittany Ziegler RN  Outcome: Ongoing  9/10/2020 1259 by Leela Grey RN  Outcome: Ongoing  Goal: Absence of new skin breakdown  Description: Absence of new skin breakdown  9/11/2020 0250 by Brittany Ziegler RN  Outcome: Ongoing  9/10/2020 1259 by Leela Grey RN  Outcome: Ongoing     Problem: Pain:  Goal: Pain level will decrease  Description: Pain level will decrease  9/11/2020 0250 by Brittany Ziegler RN  Outcome: Ongoing  9/10/2020 1259 by Leela Grey RN  Outcome: Ongoing  Goal: Control of acute pain  Description: Control of acute pain  9/11/2020 0250 by Brittany Ziegler RN  Outcome: Ongoing  9/10/2020 1259 by Leela Grey RN  Outcome: Ongoing  Goal: Control of chronic pain  Description: Control of chronic pain  9/11/2020 0250 by Brittany Ziegler RN  Outcome: Ongoing  9/10/2020 1259 by Leela Grey RN  Outcome: Ongoing     Problem: Nutrition  Goal: Optimal nutrition therapy  Description: Nutrition Problem #1: Inadequate oral intake  Intervention: Food and/or Nutrient Delivery: Start Oral Diet  Nutritional Goals: initiation of nutrition within 72 hours     9/11/2020 0250 by Brittany Ziegler RN  Outcome: Ongoing  9/10/2020 1259 by Leela Grey RN  Outcome: Ongoing     Problem: Infection - Surgical Site:  Goal: Will show no infection signs and symptoms  Description: Will show no infection signs and symptoms  9/11/2020 0250 by Brittany Ziegler RN  Outcome: Ongoing  9/10/2020 1259 by Sherrie Bacon RN  Outcome: Ongoing

## 2020-09-11 NOTE — PROGRESS NOTES
Physical Therapy  Facility/Department: [facility] CAR 2  Daily Treatment Note  NAME: Margaret Alas  : 1940  MRN: [id number]    Date of Service: 2020    Discharge Recommendations:  Patient would benefit from continued therapy after discharge   PT Equipment Recommendations  Equipment Needed: No    Assessment   Body structures, Functions, Activity limitations: Decreased functional mobility ; Decreased posture;Decreased endurance;Decreased ROM; Decreased strength;Decreased balance; Increased pain  Assessment: Pt is limited by fatigue and lack of endurance. Pt demos BLE weakness  and unsteadiness but no LOB with ambulation. due to these deficits, pt is unsafe to return home at this time. pt would benefit from continued skilled therapy. Prognosis: Good  REQUIRES PT FOLLOW UP: Yes  Activity Tolerance  Activity Tolerance: Patient Tolerated treatment well;Patient limited by endurance; Patient limited by fatigue     Patient Diagnosis(es): The primary encounter diagnosis was Gastric outlet obstruction. Diagnoses of Nausea and vomiting, intractability of vomiting not specified, unspecified vomiting type, S/P exploratory laparotomy, Paraesophageal hernia, Hypokalemia, and DOMINIQUE (acute kidney injury) ([address] were also pertinent to this visit. has a past medical history of Anemia, Cataract, Cerebral artery occlusion with cerebral infarction ([address] CHF (congestive heart failure) ([address] Depression, Diabetes mellitus ([address] Eczema, Gout, Hearing loss, Hiatal hernia, History of blood transfusion, Hyperlipidemia, Hypertension, PONV (postoperative nausea and vomiting), Rectal urgency, and Stress incontinence, female. has a past surgical history that includes Hysterectomy; Appendectomy; eye surgery; Cataract removal with implant (Bilateral); Colonoscopy; joint replacement; pr total hip arthroplasty (Left, 2018); Abdomen surgery (2020);  Upper gastrointestinal endoscopy (N/A, 9/3/2020); and laparotomy (N/A, 9/3/2020). Restrictions  Restrictions/Precautions  Restrictions/Precautions: Surgical Protocols, Fall Risk, General Precautions, Cardiac  Required Braces or Orthoses?: No  Position Activity Restriction  Other position/activity restrictions: up as tolerated,   Subjective   General  Chart Reviewed: Yes  Response To Previous Treatment: Patient with no complaints from previous session. Family / Caregiver Present: No  Subjective  Subjective: RN and pt agreed to PT, pt awake in chair upon arrival  General Comment  Comments: Pt retired in [facility] after session with call light in reach  Pain Screening  Patient Currently in Pain: Denies  Vital Signs  Patient Currently in Pain: Denies       Orientation  Orientation  Overall Orientation Status: Within Normal Limits  Cognition      Objective      Transfers  Sit to Stand: Contact guard assistance  Stand to sit: Contact guard assistance  Comment: STS x2 with RW  Ambulation  Ambulation?: Yes  More Ambulation?: Yes  Ambulation 1  Surface: level tile  Device: Rolling Walker  Assistance: Contact guard assistance  Quality of Gait: antalgic gait, LLE step to gait, very slow  Gait Deviations: Slow Toshia;Decreased step length;Decreased step height;Shuffles  Distance: 20ft  Comments: seated rest break after completing ;wheel chair follow  Ambulation 2  Surface - 2: level tile  Device 2: Rolling Walker  Assistance 2: Contact guard assistance  Distance: 60ft     Balance  Posture: Fair  Sitting - Static: Good  Sitting - Dynamic: Good;-  Standing - Static: Fair  Standing - Dynamic: Fair;-  Comments: standing balance assessed with RW  Exercises  Seated LE exercise program: Long Arc Quads, hip abduction/adduction, heel/toe raises, and marches.  Reps: x10  Goals  Short term goals  Time Frame for Short term goals: 15  Short term goal 1: Pt to perform bed mobility CGA  Short term goal 2: Pt to demonstrate functional transfers CGA  Short term goal 3: Ambulate 100ft w/ RW CGA  Short term goal

## 2020-09-11 NOTE — CARE COORDINATION
Transitional planning-called [facility] and talked with Juan Warner- regarding precert-she will get back with me. 1120 call from Baldwin Bumpers at [facility] waiting on precert. 1615 call from Baldwin Bumpers at [facility]. 1630 set up transportation with [employer] for 11AM on 9-, no transportation for tonight until 1230 AM, no ambulette transportation. Called [facility]-no ambulette transportation. Notified daughter Thomas Perales and patient of transport time. 1700 Faxed AVS, HENS and script to [facility].

## 2020-09-12 VITALS
DIASTOLIC BLOOD PRESSURE: 58 MMHG | BODY MASS INDEX: 33.72 KG/M2 | SYSTOLIC BLOOD PRESSURE: 171 MMHG | OXYGEN SATURATION: 96 % | HEART RATE: 81 BPM | TEMPERATURE: 97.9 F | RESPIRATION RATE: 20 BRPM | HEIGHT: 64 IN | WEIGHT: 197.53 LBS

## 2020-09-12 LAB
ABSOLUTE EOS #: 0.43 K/UL (ref 0–0.4)
ABSOLUTE IMMATURE GRANULOCYTE: 0.29 K/UL (ref 0–0.3)
ABSOLUTE LYMPH #: 3 K/UL (ref 1–4.8)
ABSOLUTE MONO #: 1.43 K/UL (ref 0.1–0.8)
ANION GAP SERPL CALCULATED.3IONS-SCNC: 8 MMOL/L (ref 9–17)
BASOPHILS # BLD: 0 % (ref 0–2)
BASOPHILS ABSOLUTE: 0 K/UL (ref 0–0.2)
BUN BLDV-MCNC: 10 MG/DL (ref 8–23)
BUN/CREAT BLD: ABNORMAL (ref 9–20)
CALCIUM SERPL-MCNC: 7.9 MG/DL (ref 8.6–10.4)
CHLORIDE BLD-SCNC: 110 MMOL/L (ref 98–107)
CO2: 21 MMOL/L (ref 20–31)
CREAT SERPL-MCNC: 0.75 MG/DL (ref 0.5–0.9)
DIFFERENTIAL TYPE: ABNORMAL
EOSINOPHILS RELATIVE PERCENT: 3 % (ref 1–4)
GFR AFRICAN AMERICAN: >60 ML/MIN
GFR NON-AFRICAN AMERICAN: >60 ML/MIN
GFR SERPL CREATININE-BSD FRML MDRD: ABNORMAL ML/MIN/{1.73_M2}
GFR SERPL CREATININE-BSD FRML MDRD: ABNORMAL ML/MIN/{1.73_M2}
GLUCOSE BLD-MCNC: 76 MG/DL (ref 65–105)
GLUCOSE BLD-MCNC: 93 MG/DL (ref 70–99)
HCT VFR BLD CALC: 33 % (ref 36.3–47.1)
HEMOGLOBIN: 9.8 G/DL (ref 11.9–15.1)
IMMATURE GRANULOCYTES: 2 %
LYMPHOCYTES # BLD: 21 % (ref 24–44)
MCH RBC QN AUTO: 28.6 PG (ref 25.2–33.5)
MCHC RBC AUTO-ENTMCNC: 29.7 G/DL (ref 28.4–34.8)
MCV RBC AUTO: 96.2 FL (ref 82.6–102.9)
MONOCYTES # BLD: 10 % (ref 1–7)
MORPHOLOGY: ABNORMAL
NRBC AUTOMATED: 0 PER 100 WBC
PDW BLD-RTO: 15.3 % (ref 11.8–14.4)
PLATELET # BLD: 322 K/UL (ref 138–453)
PLATELET ESTIMATE: ABNORMAL
PMV BLD AUTO: 10.4 FL (ref 8.1–13.5)
POTASSIUM SERPL-SCNC: 3.7 MMOL/L (ref 3.7–5.3)
RBC # BLD: 3.43 M/UL (ref 3.95–5.11)
RBC # BLD: ABNORMAL 10*6/UL
SEG NEUTROPHILS: 64 % (ref 36–66)
SEGMENTED NEUTROPHILS ABSOLUTE COUNT: 9.15 K/UL (ref 1.8–7.7)
SODIUM BLD-SCNC: 139 MMOL/L (ref 135–144)
WBC # BLD: 14.3 K/UL (ref 3.5–11.3)
WBC # BLD: ABNORMAL 10*3/UL

## 2020-09-12 PROCEDURE — 36415 COLL VENOUS BLD VENIPUNCTURE: CPT

## 2020-09-12 PROCEDURE — 99238 HOSP IP/OBS DSCHRG MGMT 30/<: CPT | Performed by: INTERNAL MEDICINE

## 2020-09-12 PROCEDURE — 6370000000 HC RX 637 (ALT 250 FOR IP): Performed by: STUDENT IN AN ORGANIZED HEALTH CARE EDUCATION/TRAINING PROGRAM

## 2020-09-12 PROCEDURE — 6360000002 HC RX W HCPCS: Performed by: STUDENT IN AN ORGANIZED HEALTH CARE EDUCATION/TRAINING PROGRAM

## 2020-09-12 PROCEDURE — 85025 COMPLETE CBC W/AUTO DIFF WBC: CPT

## 2020-09-12 PROCEDURE — 2580000003 HC RX 258: Performed by: STUDENT IN AN ORGANIZED HEALTH CARE EDUCATION/TRAINING PROGRAM

## 2020-09-12 PROCEDURE — 82947 ASSAY GLUCOSE BLOOD QUANT: CPT

## 2020-09-12 PROCEDURE — 80048 BASIC METABOLIC PNL TOTAL CA: CPT

## 2020-09-12 PROCEDURE — 6370000000 HC RX 637 (ALT 250 FOR IP): Performed by: NURSE PRACTITIONER

## 2020-09-12 RX ORDER — ONDANSETRON 2 MG/ML
4 INJECTION INTRAMUSCULAR; INTRAVENOUS EVERY 6 HOURS PRN
Status: DISCONTINUED | OUTPATIENT
Start: 2020-09-12 | End: 2020-09-12 | Stop reason: HOSPADM

## 2020-09-12 RX ADMIN — FAMOTIDINE 20 MG: 20 TABLET, FILM COATED ORAL at 08:39

## 2020-09-12 RX ADMIN — NYSTATIN: 100000 OINTMENT TOPICAL at 08:40

## 2020-09-12 RX ADMIN — AMLODIPINE BESYLATE 10 MG: 10 TABLET ORAL at 08:39

## 2020-09-12 RX ADMIN — POTASSIUM CHLORIDE 20 MEQ: 1500 TABLET, EXTENDED RELEASE ORAL at 08:39

## 2020-09-12 RX ADMIN — ONDANSETRON 4 MG: 2 INJECTION INTRAMUSCULAR; INTRAVENOUS at 09:20

## 2020-09-12 RX ADMIN — DOCUSATE SODIUM 100 MG: 50 LIQUID ORAL at 08:39

## 2020-09-12 RX ADMIN — SODIUM CHLORIDE, PRESERVATIVE FREE 10 ML: 5 INJECTION INTRAVENOUS at 08:41

## 2020-09-12 RX ADMIN — ACETAMINOPHEN 1000 MG: 500 TABLET ORAL at 05:33

## 2020-09-12 RX ADMIN — HEPARIN SODIUM 5000 UNITS: 5000 INJECTION INTRAVENOUS; SUBCUTANEOUS at 05:33

## 2020-09-12 ASSESSMENT — PAIN SCALES - GENERAL: PAINLEVEL_OUTOF10: 5

## 2020-09-12 NOTE — PROGRESS NOTES
[facility]  Internal Medicine Teaching Residency Program  Inpatient Daily Progress Note  ______________________________________________________________________________    Patient: Jaclyn Veronica  YOB: 1940   UCI:[id number]    Acct: [de-identified]     Room: 2017/2017-01  Admit date: 9/3/2020  Today's date: 09/12/20  Number of days in the hospital: 9    SUBJECTIVE   Admitting Diagnosis: Gastric outlet obstruction  CC: Nausea vomiting  Pt examined at bedside. Chart & results reviewed. Patient mentating time place person  No abdominal pain, flatus positive,  Patient can tolerate clear fluid  No nausea vomiting  Pain controlled  Zosyn discontinued  Sodium 145 potassium 3.6 and creatinine 1.10    ROS:  Constitutional:  negative for chills, fevers, sweats  Respiratory:  negative for cough, dyspnea on exertion, hemoptysis, shortness of breath, wheezing  Cardiovascular:  negative for chest pain, chest pressure/discomfort, lower extremity edema, palpitations  Gastrointestinal:  negative for abdominal pain, constipation, diarrhea, nausea, vomiting  Neurological:  negative for dizziness, headache  BRIEF HISTORY     79-year-old female with past medical history of hypertension,stroke ,heart failure ,HPL dementia, gout and hiatal hernia.   Patient presented initially to sent [facility] emergency department with complaining of nausea and vomiting for 2 days. prior to that patient had constipation from 3 days.  CT abdomen was done which  showed volvulus or gastric obstruction .   Patient then presented to [facility] ER where   x-rays cervical spine showed large hiatal hernia  CT scan of abdomen showed intrathoracic stomach containing food particles  Showing either volvulus or gastric obstruction  Chest CT was done which showed paraesophageal hiatal hernia type IV   Surgery consulted the GI ,    IN ICU ,EGD was done showing tortuous esophagus, transthoracic stomach with volvulus causing partial gastric obstruction and suspicion of of bowel ischemia.    Patient stomach was decompressed but multiple attempts at derotating the stomach were unsuccessful. surgery was consulted intra-procedure due to suspicion of bowel ischemia.   She underwent Ex lap, paraesophageal hernia repair with mesh bridge, double gastropexy, Kocherization of the duodenum. OBJECTIVE     Vital Signs:  BP (!) 171/58 Comment: rechecked at 169/88  Pulse 81   Temp 97.9 °F (36.6 °C) (Oral)   Resp 20   Ht 5' 4\" (1.626 m)   Wt 197 lb 8.5 oz (89.6 kg)   SpO2 96%   BMI 33.91 kg/m²     Temp (24hrs), Av.9 °F (36.6 °C), Min:97.9 °F (36.6 °C), Max:97.9 °F (36.6 °C)    In: -   Out: 250 [Urine:250]    Physical Exam:  Constitutional:       Appearance: Normal appearance. Eyes:      General: No scleral icterus.        Right eye: No discharge.         Left eye: No discharge.      Extraocular Movements: Extraocular movements intact.      Pupils: Pupils are equal, round, and reactive to light. Neck:      Musculoskeletal: No neck rigidity or muscular tenderness.  Asked  Pulmonary:      Effort: Pulmonary effort is normal.      Breath sounds: Normal breath sounds. No rhonchi. Abdominal:      General: Bowel sounds are normal. There is no distension.      Tenderness: There is no abdominal tenderness. There is no guarding.      Comments:  linear scar on the abdomen ,stitched with stapler  , no surgical site infection, surgical scar seems to be healing.    Neurological:      General: No focal deficit present.      Mental Status: She is alert.      Cranial Nerves: No cranial nerve deficit.      Sensory: No sensory deficit.      Motor: No weakness.   Medications:  Scheduled Medications:     Continuous Infusions:     PRN Medications    Diagnostic Labs:  CBC:   Recent Labs     09/10/20  0626 20  0736 20  0740   WBC 14.6* 15.2* 14.3*   RBC 3.54* 3.36* 3.43*   HGB 10.0* 9.6* 9.8*   HCT 32.6* 31.8* 33.0*   MCV 92.1 94.6 96.2 RDW 14.4 14.9* 15.3*    336 322     BMP:   Recent Labs     09/10/20  0626 09/11/20  0736 09/12/20  0740    141 139   K 3.3* 3.6* 3.7   * 110* 110*   CO2 21 21 21   BUN 12 10 10   CREATININE 0.92* 0.88 0.75     BNP: No results for input(s): BNP in the last 72 hours. PT/INR: No results for input(s): PROTIME, INR in the last 72 hours. APTT: No results for input(s): APTT in the last 72 hours. CARDIAC ENZYMES: No results for input(s): CKMB, CKMBINDEX, TROPONINI in the last 72 hours. Invalid input(s): CKTOTAL;3  FASTING LIPID PANEL:  Lab Results   Component Value Date    CHOL 158 05/10/2018    HDL 57 05/10/2018    TRIG 154 (H) 05/10/2018     LIVER PROFILE: No results for input(s): AST, ALT, ALB, BILIDIR, BILITOT, ALKPHOS in the last 72 hours. MICROBIOLOGY:   Lab Results   Component Value Date/Time    CULTURE NO GROWTH 6 DAYS 09/03/2020 11:23 PM       Imaging:    No results found. ASSESSMENT & PLAN     ASSESSMENT / PLAN:     1. Gastric outlet obstruction resolved  Secondary to gastric volvulus and paraesophageal hernia. EGD  done showing tortuous esophagus, transthoracic stomach with volvulus causing partial gastric obstruction .   Status post exploratory laparotomy, paraesophageal hernia repair with mesh, gastropexy with gastrostomy tubes and cauterization of the duodenum 9/3.     Started clear liquid diet  6 days days before,patient is tolerating well.    Patient zosyn stopped . Minimize opioids  Patient can be discharged from GI point of view      2. DOMINIQUE. -  Likely secondary to hypoperfusion.  Improving.  Baseline creatinine 1 and .75 today . Omer Hauser Follow nephrology  As outpatient     3)Electrolyte imbalance. Joe Plunk     4)hypertension:   -amlodipine change t home med lisinopril 20 mg because patient creat is 0.75       5)  stool softener added     6) patient is ambulated . Noe's catheter out.   patient is stable and does not complaint of any symptoms.           6)patoent can be discharged        DVT ppx : Heparin 5000 unit every 8 hours  GI ppx:      PT/OT: On board  Discharge Planning / SW: On board       Lauren Streeter MD  Internal Medicine Resident, PGY-1  [facility]; [place], [place]  9/12/2020, 1:15 PM   Attending Physician Statement  I have discussed the care of Blair Covington, including pertinent history and exam findings,  with the resident. I have seen and examined the patient and the key elements of all parts of the encounter have been performed by me. I agree with the assessment, plan and orders as documented by the resident with additions . Treatment plan Discussed with nursing staff in detail , all questions answered . Electronically signed by Tere Rivers MD on   9/12/20 at 3:04 PM EDT    Please note that this chart was generated using voice recognition Dragon dictation software. Although every effort was made to ensure the accuracy of this automated transcription, some errors in transcription may have occurred.

## 2020-09-12 NOTE — CARE COORDINATION
Spoke with Ismael Wong at [facility], confirmed  time of 11 am.  Spoke with [facility] at [employer], who is aware that patient is coming today.   Bahman Garcia RN updated and given number for report    Discharge [facility]  Written by: Terry Hernandez RN    Patient Name: Aminta Talamantes  Attending Provider: Chula Barajas MD  Admit Date: 9/3/2020  3:53 AM  MRN: [id number]  Account: [de-identified]                     : 1940  Discharge Date:  2020        Disposition: [place] of Kristina Lorenzana RN

## 2020-09-12 NOTE — PLAN OF CARE
Problem: Falls - Risk of:  Goal: Will remain free from falls  Description: Will remain free from falls  9/11/2020 2044 by Sabiha Thomas RN  Outcome: Ongoing  9/11/2020 1141 by Harry Nunes RN  Outcome: Ongoing  Goal: Absence of physical injury  Description: Absence of physical injury  9/11/2020 2044 by Sabiha Thomas RN  Outcome: Ongoing  9/11/2020 1141 by Harry Nunes RN  Outcome: Ongoing     Problem: Skin Integrity:  Goal: Will show no infection signs and symptoms  Description: Will show no infection signs and symptoms  9/11/2020 2044 by Sabiha Thomas RN  Outcome: Ongoing  9/11/2020 1141 by Harry Nunes RN  Outcome: Ongoing  Goal: Absence of new skin breakdown  Description: Absence of new skin breakdown  9/11/2020 2044 by Sabiha Thomas RN  Outcome: Ongoing  9/11/2020 1141 by Harry Nunes RN  Outcome: Ongoing     Problem: Pain:  Goal: Pain level will decrease  Description: Pain level will decrease  9/11/2020 2044 by Sabiha Thomas RN  Outcome: Ongoing  9/11/2020 1141 by Harry Nunes RN  Outcome: Ongoing  Goal: Control of acute pain  Description: Control of acute pain  9/11/2020 2044 by Sabiha Thomas RN  Outcome: Ongoing  9/11/2020 1141 by Harry Nunes RN  Outcome: Ongoing  Goal: Control of chronic pain  Description: Control of chronic pain  9/11/2020 2044 by Sabiha Thomas RN  Outcome: Ongoing  9/11/2020 1141 by Harry Nunes RN  Outcome: Ongoing     Problem: Nutrition  Goal: Optimal nutrition therapy  Description: Nutrition Problem #1: Inadequate oral intake  Intervention: Food and/or Nutrient Delivery: Start Oral Diet  Nutritional Goals: initiation of nutrition within 72 hours     9/11/2020 2044 by Sabiha Thomas RN  Outcome: Ongoing  9/11/2020 1141 by Harry Nunes RN  Outcome: Ongoing     Problem: Infection - Surgical Site:  Goal: Will show no infection signs and symptoms  Description: Will show no infection signs and symptoms  9/11/2020 2044 by Sabiha Thomas RN  Outcome: Ongoing  9/11/2020 1141 by Agus Navarro RN  Outcome: Ongoing

## 2020-09-12 NOTE — PROGRESS NOTES
General Surgery:  Daily Progress Note      PATIENT NAME: Wesly Wall     TODAY'S DATE: 9/12/2020, 6:50 AM  CC:  Abdominal pain    SUBJECTIVE:     Pt seen and examined at bedside. No acute events overnight. Tolerating diet, no n/v. Afebrile. Continued bowel function. OBJECTIVE:   VITALS:  BP (!) 130/58   Pulse 80   Temp 97.9 °F (36.6 °C) (Oral)   Resp 19   Ht 5' 4\" (1.626 m)   Wt 197 lb 8.5 oz (89.6 kg)   SpO2 96%   BMI 33.91 kg/m²      INTAKE/OUTPUT:      Intake/Output Summary (Last 24 hours) at 9/12/2020 0650  Last data filed at 9/11/2020 2007  Gross per 24 hour   Intake --   Output 250 ml   Net -250 ml       PHYSICAL EXAM:  General Appearance: awake, alert, oriented, in no acute distress  HEENT:  Normocephalic, atraumatic, mucus membranes moist   Heart: Heart sounds are normal.  Regular rate and rhythm without murmur, gallop or rub. Lungs: Normal expansion. Clear to auscultation. No rales, rhonchi, or wheezing. Abdomen:soft, nd, attp. Incision c/d/i. Gastrostomy tubes X2 c/d/i and both capped. Extremities: No cyanosis, pitting edema, rashes noted. Skin: Skin color, texture, turgor normal. No rashes or lesions.     IV Access:  AV  Noe:  Yes    Data:  CBC with Differential:    Lab Results   Component Value Date    WBC 15.2 09/11/2020    RBC 3.36 09/11/2020    HGB 9.6 09/11/2020    HCT 31.8 09/11/2020     09/11/2020    MCV 94.6 09/11/2020    MCH 28.6 09/11/2020    MCHC 30.2 09/11/2020    RDW 14.9 09/11/2020    LYMPHOPCT 25 09/11/2020    MONOPCT 6 09/11/2020    BASOPCT 0 09/11/2020    MONOSABS 0.91 09/11/2020    LYMPHSABS 3.80 09/11/2020    EOSABS 0.15 09/11/2020    BASOSABS 0.00 09/11/2020    DIFFTYPE NOT REPORTED 09/11/2020     CMP:    Lab Results   Component Value Date     09/11/2020    K 3.6 09/11/2020     09/11/2020    CO2 21 09/11/2020    BUN 10 09/11/2020    CREATININE 0.88 09/11/2020    GFRAA >60 09/11/2020    LABGLOM >60 09/11/2020    GLUCOSE 106 09/11/2020    PROT 6.8 09/03/2020    LABALBU 3.6 09/03/2020    CALCIUM 8.1 09/11/2020    BILITOT 0.32 09/03/2020    ALKPHOS 102 09/03/2020    AST 21 09/03/2020    ALT 13 09/03/2020     BMP:    Lab Results   Component Value Date     09/11/2020    K 3.6 09/11/2020     09/11/2020    CO2 21 09/11/2020    BUN 10 09/11/2020    LABALBU 3.6 09/03/2020    CREATININE 0.88 09/11/2020    CALCIUM 8.1 09/11/2020    GFRAA >60 09/11/2020    LABGLOM >60 09/11/2020    GLUCOSE 106 09/11/2020     LDH:    Lab Results   Component Value Date     12/12/2018     PT/INR:    Lab Results   Component Value Date    PROTIME 9.9 09/03/2020    INR 0.9 09/03/2020     Warfarin PT/INR:  No components found for: PTPATWAR, PTINRWAR  PTT:    Lab Results   Component Value Date    APTT 22.2 09/03/2020   [APTT}    Radiology Review:      Ct Abdomen Pelvis Wo Contrast Additional Contrast? None     Result Date: 9/3/2020  Intrathoracic stomach full of contents. Volvulus/outlet obstruction not excluded. Further evaluation can be performed with the administration of oral contrast in repeat study if clinically indicated.      Ct Head Wo Contrast     Result Date: 9/3/2020  No acute intracranial abnormality.      Ct Chest Wo Contrast     Result Date: 9/3/2020  1. A type IV paraesophageal hiatal hernia which also contains the distal half of the transverse colon. NG tube in place. Some contrast has been administered which collects in the gastric fundus. The rest of the stomach is fluid-filled. Mild gastric distension noted which could be due to administered contrast.  Difficult to determine if there is some degree of obstruction on the basis of this study. No evidence for gastric pneumatosis.  2. Some bibasilar lower lobe atelectasis/scarring around the hiatal hernia noted along with trace left pleural effusion.      Ct Cervical Spine Wo Contrast     Result Date: 9/3/2020  No acute abnormality of the cervical spine.      Xr Chest Portable     Result Date: 9/3/2020  No acute cardiopulmonary abnormality. Large hiatal hernia.        ASSESSMENT:  Active Hospital Problems    Diagnosis Date Noted    Moderate malnutrition ([facility] [E44.0] 09/08/2020    Paraesophageal hernia [K44.9] 09/06/2020    Hypokalemia [E87.6] 09/06/2020    Normocytic anemia [D64.9] 09/06/2020    S/P exploratory laparotomy [Z98.890] 09/06/2020    Dementia ([facility] [F03.90] 09/06/2020    [facility]vascular [facility] ([facility]) ([facility] [I63.9] 01/17/2019    Essential hypertension [I10] 04/10/2017       79 y.o. 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      Thank you,    Electronically signed by Jace Palma DO on 9/12/2020 at 6:50 AM

## 2020-09-13 ENCOUNTER — HOSPITAL ENCOUNTER (OUTPATIENT)
Age: 80
Setting detail: SPECIMEN
Discharge: HOME | End: 2020-09-13
Payer: MEDICARE

## 2020-09-13 LAB — GLUCOSE BLD-MCNC: 64 MG/DL (ref 65–105)

## 2020-09-14 ENCOUNTER — HOSPITAL ENCOUNTER (OUTPATIENT)
Age: 80
Setting detail: SPECIMEN
Discharge: HOME | End: 2020-09-14
Payer: MEDICARE

## 2020-09-14 LAB
ALBUMIN SERPL-MCNC: 2.8 G/DL (ref 3.5–5.2)
ALBUMIN/GLOBULIN RATIO: 0.9 (ref 1–2.5)
ALP BLD-CCNC: 110 U/L (ref 35–104)
ALT SERPL-CCNC: 26 U/L (ref 5–33)
ANION GAP SERPL CALCULATED.3IONS-SCNC: 12 MMOL/L (ref 9–17)
AST SERPL-CCNC: 35 U/L
BILIRUB SERPL-MCNC: 0.3 MG/DL (ref 0.3–1.2)
BUN BLDV-MCNC: 10 MG/DL (ref 8–23)
BUN/CREAT BLD: ABNORMAL (ref 9–20)
CALCIUM SERPL-MCNC: 8.2 MG/DL (ref 8.6–10.4)
CHLORIDE BLD-SCNC: 106 MMOL/L (ref 98–107)
CO2: 20 MMOL/L (ref 20–31)
CREAT SERPL-MCNC: 1.03 MG/DL (ref 0.5–0.9)
GFR AFRICAN AMERICAN: >60 ML/MIN
GFR NON-AFRICAN AMERICAN: 51 ML/MIN
GFR SERPL CREATININE-BSD FRML MDRD: ABNORMAL ML/MIN/{1.73_M2}
GFR SERPL CREATININE-BSD FRML MDRD: ABNORMAL ML/MIN/{1.73_M2}
GLUCOSE BLD-MCNC: 111 MG/DL (ref 70–99)
HCT VFR BLD CALC: 32.6 % (ref 36.3–47.1)
HEMOGLOBIN: 10.1 G/DL (ref 11.9–15.1)
MCH RBC QN AUTO: 29.8 PG (ref 25.2–33.5)
MCHC RBC AUTO-ENTMCNC: 31 G/DL (ref 28.4–34.8)
MCV RBC AUTO: 96.2 FL (ref 82.6–102.9)
NRBC AUTOMATED: 0 PER 100 WBC
PDW BLD-RTO: 15.2 % (ref 11.8–14.4)
PLATELET # BLD: 367 K/UL (ref 138–453)
PMV BLD AUTO: 10.5 FL (ref 8.1–13.5)
POTASSIUM SERPL-SCNC: 4.3 MMOL/L (ref 3.7–5.3)
RBC # BLD: 3.39 M/UL (ref 3.95–5.11)
SODIUM BLD-SCNC: 138 MMOL/L (ref 135–144)
TOTAL PROTEIN: 5.9 G/DL (ref 6.4–8.3)
WBC # BLD: 12.2 K/UL (ref 3.5–11.3)

## 2020-09-14 PROCEDURE — 85027 COMPLETE CBC AUTOMATED: CPT

## 2020-09-14 PROCEDURE — 80053 COMPREHEN METABOLIC PANEL: CPT

## 2020-09-14 PROCEDURE — P9603 ONE-WAY ALLOW PRORATED MILES: HCPCS

## 2020-09-14 PROCEDURE — 36415 COLL VENOUS BLD VENIPUNCTURE: CPT

## 2020-09-16 NOTE — DISCHARGE SUMMARY
[address] [facility]     Department of Internal Medicine - Staff Internal Medicine Teaching Service    INPATIENT DISCHARGE SUMMARY      Patient Identification:  Jacki Doherty is a 79 y.o. female. :  1940  MRN: [id number]     Acct: [de-identified]   PCP: KAREEM Grimm CNP  Admit Date:  9/3/2020  Discharge date and time: 2020 11:12 AM   Attending Provider: No att. providers found                                     [address] Problem Lists:  Principal Problem (Resolved):    Gastric outlet obstruction  Active Problems:    Essential hypertension    Cerebrovascular accident (CVA) ([address]    Paraesophageal hernia    Hypokalemia    Normocytic anemia    S/P exploratory laparotomy    Dementia ([address]    Moderate malnutrition ([address]  Resolved Problems:    Acute gastric volvulus    DOMINIQUE (acute kidney injury) ([address]    Small bowel ischemia ([address]      HOSPITAL STAY     Brief Inpatient course:     56-year-old female with past medical history of hypertension,stroke ,heart failure ,HPL dementia, gout and hiatal hernia.    presented initially to [facility] emergency department with complaining of nausea and vomiting  CT abdomen was done which  showed volvulus or gastric obstruction . Patient then presented to [facility] ER where   x-rays cervical spine showed large hiatal hernia. CT scan of abdomen showed intrathoracic stomach containing food particles Showing either volvulus or gastric obstruction    Chest CT was done which showed paraesophageal hiatal hernia type IV    Surgery consulted the GI ,    IN ICU ,EGD was done showing tortuous esophagus, transthoracic stomach with volvulus causing partial gastric obstruction and suspicion of of bowel ischemia.    Patient stomach was decompressed but multiple attempts at derotating the stomach were unsuccessful. surgery was consulted intra-procedure due to suspicion of bowel ischemia.     She underwent Ex lap, paraesophageal hernia repair with mesh bridge, double gastropexy, Kocherization of the duodenum. Discharge planning- patient and patient's daughter agreed that patient needs to go to skilled nursing facility. Patient need followup with dr Huber Rodriguez for staple removal     Procedures/ Significant Interventions:     Ex lap, paraesophageal hernia repair with mesh bridge, double gastropexy, Kocherization of the duodenum. Consults:     Consults:     Final Specialist Recommendations/Findings:   IP CONSULT TO CASE MANAGEMENT  IP CONSULT TO GI  IP CONSULT TO GI  IP CONSULT TO HOME CARE NEEDS      Any Hospital Acquired Infections: none    Discharge Functional Status:  stable    DISCHARGE PLAN     Disposition: SNF    Patient Instructions:   Discharge Medication List as of 9/11/2020  4:39 PM      START taking these medications    Details   nystatin (MYCOSTATIN) 533659 UNIT/GM ointment Apply topically 2 times daily. , Disp-1 Tube,R-1, Normal      oxyCODONE (ROXICODONE) 5 MG immediate release tablet Take 1 tablet by mouth every 8 hours as needed for Pain for up to 5 days. , Disp-15 tablet,R-0Print      polyethylene glycol (GLYCOLAX) 17 g packet Take 17 g by mouth daily as needed for Constipation, Disp-527 g,R-1Normal      famotidine (PEPCID) 20 MG tablet Take 1 tablet by mouth daily, Disp-60 tablet,R-3Normal         CONTINUE these medications which have NOT CHANGED    Details   KLOR-CON M20 20 MEQ extended release tablet Take 1 tablet by mouth once daily, Disp-90 tablet,R-0,DAWNormal      lisinopril (PRINIVIL;ZESTRIL) 20 MG tablet Take 1 tablet by mouth daily, Disp-90 tablet,R-0Normal      pravastatin (PRAVACHOL) 40 MG tablet Take 1 tablet by mouth daily, Disp-90 tablet,R-0Normal      furosemide (LASIX) 40 MG tablet Take 1 tablet by mouth once daily, Disp-90 tablet,R-0Normal      allopurinol (ZYLOPRIM) 100 MG tablet Take 1 tablet by mouth once daily, Disp-90 tablet,R-3Normal      citalopram (CELEXA) 20 MG tablet Take 1 tablet by mouth once daily, Disp-90 tablet, R-0Normal      mupirocin (BACTROBAN) 2 % cream Apply topically 3 times daily Apply topically 3 times daily. , Topical, 3 TIMES DAILY Starting Sat 2/15/2020, Disp-15 g, R-0, Print      docusate sodium (COLACE) 100 MG capsule Take 1 capsule by mouth 2 times daily as needed for Constipation, Disp-60 capsule, R-3Normal      Handicap Placard [facility] Starting Thu 11/7/2019, Disp-1 each, R-0, PrintExpires 11/2024      meclizine (ANTIVERT) 12.5 MG tablet TAKE 1 TABLET BY MOUTH THREE TIMES DAILY AS NEEDED FOR DIZZINESS, Disp-30 tablet, R-0Please consider 90 day supplies to promote better adherenceNormal      memantine (NAMENDA) 10 MG tablet Take 1 tablet by mouth daily, Disp-30 tablet, R-5Please consider 90 day supplies to promote better adherenceNormal      Calcium Carbonate-Vitamin D (OYSTER SHELL CALCIUM/D) 500-200 MG-UNIT TABS Take 1 tablet by mouth daily, Disp-30 tablet, R-5Please consider 90 day supplies to promote better adherenceNormal      Multiple Vitamins-Minerals (THERAPEUTIC MULTIVITAMIN-MINERALS) tablet Take 1 tablet by mouth dailyHistorical Med      !! Misc. Devices MISC Disp-1 Device, R-0, PrintShower transfer bench      !! Misc. Devices MISC Disp-100 Device, R-0, PrintAdult pull up briefs Dispense 100      ferrous sulfate 325 (65 Fe) MG tablet Take 325 mg by mouth 2 times daily Historical Med      Acetaminophen 325 MG CAPS Take 1 capsule by mouth daily as needed Historical Med      Blood Pressure KIT DAILY Starting Wed 9/12/2018, Disp-1 kit, R-0, Print       !! - Potential duplicate medications found. Please discuss with provider. Activity: activity as tolerated    Diet: cardiac diet    Follow-up:    Vanessa Palencia, APRN - CNP  [address] [facility]   [address] [address]  [address] [facility] [address] [phone]    In 1 week  post hospital fllow up    [facility], [address] [place] [address] Way [address] [place] [id number] [phone]    In 2 weeks  post op follow up.  Sushil removal, For wound re-check      Patient Instructions:Cbc , bmp in one week  Wound care  Ambulation  Advance diet as tolerated  Stool softener and glycolax  if constipated  Follow up with surgery in one week,call to make up an appointment      Follow up labs: none  Follow up imaging: none    Note that over 30 minutes was spent in preparing discharge papers, discussing discharge with patient, medication review, etc.      Neris France MD, MD  Internal Medicine Resident, PGY-1  [address], [place]  9/16/2020, 2:33 PM

## 2020-09-18 ENCOUNTER — HOSPITAL ENCOUNTER (OUTPATIENT)
Age: 80
Setting detail: SPECIMEN
Discharge: HOME | End: 2020-09-18
Payer: MEDICARE

## 2020-09-18 LAB
ANION GAP SERPL CALCULATED.3IONS-SCNC: 10 MMOL/L (ref 9–17)
BUN BLDV-MCNC: 13 MG/DL (ref 8–23)
BUN/CREAT BLD: ABNORMAL (ref 9–20)
CALCIUM SERPL-MCNC: 8.6 MG/DL (ref 8.6–10.4)
CHLORIDE BLD-SCNC: 101 MMOL/L (ref 98–107)
CO2: 24 MMOL/L (ref 20–31)
CREAT SERPL-MCNC: 1.28 MG/DL (ref 0.5–0.9)
GFR AFRICAN AMERICAN: 49 ML/MIN
GFR NON-AFRICAN AMERICAN: 40 ML/MIN
GFR SERPL CREATININE-BSD FRML MDRD: ABNORMAL ML/MIN/{1.73_M2}
GFR SERPL CREATININE-BSD FRML MDRD: ABNORMAL ML/MIN/{1.73_M2}
GLUCOSE BLD-MCNC: 80 MG/DL (ref 70–99)
HCT VFR BLD CALC: 34.2 % (ref 36.3–47.1)
HEMOGLOBIN: 10.5 G/DL (ref 11.9–15.1)
MCH RBC QN AUTO: 29.3 PG (ref 25.2–33.5)
MCHC RBC AUTO-ENTMCNC: 30.7 G/DL (ref 28.4–34.8)
MCV RBC AUTO: 95.5 FL (ref 82.6–102.9)
NRBC AUTOMATED: 0 PER 100 WBC
PDW BLD-RTO: 15 % (ref 11.8–14.4)
PLATELET # BLD: 407 K/UL (ref 138–453)
PMV BLD AUTO: 10.6 FL (ref 8.1–13.5)
POTASSIUM SERPL-SCNC: 4.3 MMOL/L (ref 3.7–5.3)
RBC # BLD: 3.58 M/UL (ref 3.95–5.11)
SODIUM BLD-SCNC: 135 MMOL/L (ref 135–144)
WBC # BLD: 13.7 K/UL (ref 3.5–11.3)

## 2020-09-18 PROCEDURE — 80048 BASIC METABOLIC PNL TOTAL CA: CPT

## 2020-09-18 PROCEDURE — 85027 COMPLETE CBC AUTOMATED: CPT

## 2020-09-18 PROCEDURE — 36415 COLL VENOUS BLD VENIPUNCTURE: CPT

## 2020-09-18 PROCEDURE — P9603 ONE-WAY ALLOW PRORATED MILES: HCPCS

## 2020-09-25 ENCOUNTER — HOSPITAL ENCOUNTER (OUTPATIENT)
Age: 80
Setting detail: SPECIMEN
Discharge: HOME | End: 2020-09-25
Payer: MEDICARE

## 2020-09-25 LAB
HCT VFR BLD CALC: 32.3 % (ref 36.3–47.1)
HEMOGLOBIN: 10.1 G/DL (ref 11.9–15.1)
MCH RBC QN AUTO: 29.4 PG (ref 25.2–33.5)
MCHC RBC AUTO-ENTMCNC: 31.3 G/DL (ref 28.4–34.8)
MCV RBC AUTO: 94.2 FL (ref 82.6–102.9)
NRBC AUTOMATED: 0 PER 100 WBC
PDW BLD-RTO: 14.1 % (ref 11.8–14.4)
PLATELET # BLD: 326 K/UL (ref 138–453)
PMV BLD AUTO: 11.1 FL (ref 8.1–13.5)
RBC # BLD: 3.43 M/UL (ref 3.95–5.11)
WBC # BLD: 8.9 K/UL (ref 3.5–11.3)

## 2020-09-25 PROCEDURE — 85027 COMPLETE CBC AUTOMATED: CPT

## 2020-09-25 PROCEDURE — P9603 ONE-WAY ALLOW PRORATED MILES: HCPCS

## 2020-09-25 PROCEDURE — 36415 COLL VENOUS BLD VENIPUNCTURE: CPT

## 2020-10-02 ENCOUNTER — TELEPHONE (OUTPATIENT)
Dept: PRIMARY CARE CLINIC | Age: 80
End: 2020-10-02

## 2021-05-06 ENCOUNTER — TELEPHONE (OUTPATIENT)
Dept: PRIMARY CARE CLINIC | Age: 81
End: 2021-05-06

## 2021-05-06 NOTE — TELEPHONE ENCOUNTER
Patient currently residing at the [facility] in [place]. House doctor following patient for the time being.

## 2021-06-21 ENCOUNTER — HOSPITAL ENCOUNTER (OUTPATIENT)
Age: 81
Setting detail: SPECIMEN
Discharge: HOME | End: 2021-06-21
Payer: MEDICARE

## 2021-06-21 LAB
ALBUMIN SERPL-MCNC: 3.6 G/DL (ref 3.5–5.2)
ALBUMIN/GLOBULIN RATIO: 1.2 (ref 1–2.5)
ALP BLD-CCNC: 120 U/L (ref 35–104)
ALT SERPL-CCNC: 8 U/L (ref 5–33)
ANION GAP SERPL CALCULATED.3IONS-SCNC: 10 MMOL/L (ref 9–17)
AST SERPL-CCNC: 15 U/L
BILIRUB SERPL-MCNC: 0.29 MG/DL (ref 0.3–1.2)
BUN BLDV-MCNC: 16 MG/DL (ref 8–23)
BUN/CREAT BLD: ABNORMAL (ref 9–20)
CALCIUM SERPL-MCNC: 8.6 MG/DL (ref 8.6–10.4)
CHLORIDE BLD-SCNC: 104 MMOL/L (ref 98–107)
CHOLESTEROL/HDL RATIO: 1.9
CHOLESTEROL: 120 MG/DL
CO2: 27 MMOL/L (ref 20–31)
CREAT SERPL-MCNC: 1.1 MG/DL (ref 0.5–0.9)
GFR AFRICAN AMERICAN: 58 ML/MIN
GFR NON-AFRICAN AMERICAN: 48 ML/MIN
GFR SERPL CREATININE-BSD FRML MDRD: ABNORMAL ML/MIN/{1.73_M2}
GFR SERPL CREATININE-BSD FRML MDRD: ABNORMAL ML/MIN/{1.73_M2}
GLUCOSE BLD-MCNC: 83 MG/DL (ref 70–99)
HCT VFR BLD CALC: 34 % (ref 36.3–47.1)
HDLC SERPL-MCNC: 62 MG/DL
HEMOGLOBIN: 10.4 G/DL (ref 11.9–15.1)
LDL CHOLESTEROL: 37 MG/DL (ref 0–130)
MCH RBC QN AUTO: 28.1 PG (ref 25.2–33.5)
MCHC RBC AUTO-ENTMCNC: 30.6 G/DL (ref 28.4–34.8)
MCV RBC AUTO: 91.9 FL (ref 82.6–102.9)
NRBC AUTOMATED: 0 PER 100 WBC
PDW BLD-RTO: 13.1 % (ref 11.8–14.4)
PLATELET # BLD: 212 K/UL (ref 138–453)
PMV BLD AUTO: 11.9 FL (ref 8.1–13.5)
POTASSIUM SERPL-SCNC: 3.8 MMOL/L (ref 3.7–5.3)
RBC # BLD: 3.7 M/UL (ref 3.95–5.11)
SODIUM BLD-SCNC: 141 MMOL/L (ref 135–144)
TOTAL PROTEIN: 6.6 G/DL (ref 6.4–8.3)
TRIGL SERPL-MCNC: 103 MG/DL
URIC ACID: 5 MG/DL (ref 2.4–5.7)
VLDLC SERPL CALC-MCNC: NORMAL MG/DL (ref 1–30)
WBC # BLD: 9.2 K/UL (ref 3.5–11.3)

## 2021-06-21 PROCEDURE — 80061 LIPID PANEL: CPT

## 2021-06-21 PROCEDURE — 84550 ASSAY OF BLOOD/URIC ACID: CPT

## 2021-06-21 PROCEDURE — 80053 COMPREHEN METABOLIC PANEL: CPT

## 2021-06-21 PROCEDURE — P9603 ONE-WAY ALLOW PRORATED MILES: HCPCS

## 2021-06-21 PROCEDURE — 85027 COMPLETE CBC AUTOMATED: CPT

## 2021-06-21 PROCEDURE — 36415 COLL VENOUS BLD VENIPUNCTURE: CPT

## 2021-08-16 ENCOUNTER — HOSPITAL ENCOUNTER (OUTPATIENT)
Age: 81
Setting detail: SPECIMEN
Discharge: HOME | End: 2021-08-16
Payer: MEDICARE

## 2021-08-16 LAB
ALBUMIN SERPL-MCNC: 3.5 G/DL (ref 3.5–5.2)
ALBUMIN/GLOBULIN RATIO: 1.4 (ref 1–2.5)
ALP BLD-CCNC: 113 U/L (ref 35–104)
ALT SERPL-CCNC: 11 U/L (ref 5–33)
ANION GAP SERPL CALCULATED.3IONS-SCNC: 10 MMOL/L (ref 9–17)
AST SERPL-CCNC: 16 U/L
BILIRUB SERPL-MCNC: 0.26 MG/DL (ref 0.3–1.2)
BUN BLDV-MCNC: 13 MG/DL (ref 8–23)
BUN/CREAT BLD: ABNORMAL (ref 9–20)
CALCIUM SERPL-MCNC: 8.9 MG/DL (ref 8.6–10.4)
CHLORIDE BLD-SCNC: 104 MMOL/L (ref 98–107)
CO2: 26 MMOL/L (ref 20–31)
CREAT SERPL-MCNC: 1.01 MG/DL (ref 0.5–0.9)
GFR AFRICAN AMERICAN: >60 ML/MIN
GFR NON-AFRICAN AMERICAN: 52 ML/MIN
GFR SERPL CREATININE-BSD FRML MDRD: ABNORMAL ML/MIN/{1.73_M2}
GFR SERPL CREATININE-BSD FRML MDRD: ABNORMAL ML/MIN/{1.73_M2}
GLUCOSE BLD-MCNC: 77 MG/DL (ref 70–99)
HCT VFR BLD CALC: 34.8 % (ref 36.3–47.1)
HEMOGLOBIN: 11 G/DL (ref 11.9–15.1)
MCH RBC QN AUTO: 29.3 PG (ref 25.2–33.5)
MCHC RBC AUTO-ENTMCNC: 31.6 G/DL (ref 28.4–34.8)
MCV RBC AUTO: 92.6 FL (ref 82.6–102.9)
NRBC AUTOMATED: 0 PER 100 WBC
PDW BLD-RTO: 13 % (ref 11.8–14.4)
PLATELET # BLD: 212 K/UL (ref 138–453)
PMV BLD AUTO: 11.4 FL (ref 8.1–13.5)
POTASSIUM SERPL-SCNC: 4.4 MMOL/L (ref 3.7–5.3)
RBC # BLD: 3.76 M/UL (ref 3.95–5.11)
SODIUM BLD-SCNC: 140 MMOL/L (ref 135–144)
TOTAL PROTEIN: 6 G/DL (ref 6.4–8.3)
WBC # BLD: 6.6 K/UL (ref 3.5–11.3)

## 2021-08-16 PROCEDURE — 36415 COLL VENOUS BLD VENIPUNCTURE: CPT

## 2021-08-16 PROCEDURE — P9603 ONE-WAY ALLOW PRORATED MILES: HCPCS

## 2021-08-16 PROCEDURE — 85027 COMPLETE CBC AUTOMATED: CPT

## 2021-08-16 PROCEDURE — 80053 COMPREHEN METABOLIC PANEL: CPT

## 2021-09-17 ENCOUNTER — HOSPITAL ENCOUNTER (OUTPATIENT)
Age: 81
Setting detail: SPECIMEN
Discharge: HOME | End: 2021-09-17
Payer: MEDICARE

## 2021-09-17 LAB
T3 FREE: 2.7 PG/ML (ref 2.02–4.43)
THYROXINE, FREE: 1.2 NG/DL (ref 0.93–1.7)
TSH SERPL DL<=0.05 MIU/L-ACNC: 1.42 MIU/L (ref 0.3–5)

## 2021-09-17 PROCEDURE — 84481 FREE ASSAY (FT-3): CPT

## 2021-09-17 PROCEDURE — 84439 ASSAY OF FREE THYROXINE: CPT

## 2021-09-17 PROCEDURE — P9603 ONE-WAY ALLOW PRORATED MILES: HCPCS

## 2021-09-17 PROCEDURE — 84443 ASSAY THYROID STIM HORMONE: CPT

## 2021-09-17 PROCEDURE — 36415 COLL VENOUS BLD VENIPUNCTURE: CPT

## 2021-10-12 ENCOUNTER — TELEPHONE (OUTPATIENT)
Dept: PRIMARY CARE CLINIC | Age: 81
End: 2021-10-12

## 2021-10-12 NOTE — TELEPHONE ENCOUNTER
----- Message from Irais Small sent at 10/8/2021  4:33 PM EDT -----  Subject: Appointment Request    Reason for Call: Routine Well Child    QUESTIONS  Type of Appointment? Established Patient  Reason for appointment request? No appointments available during search  Additional Information for Provider? Patient would like to schedule   appointment for annual visit. There were no appointments coming up.   ---------------------------------------------------------------------------  --------------  [address]  What is the best way for the office to contact you? Do not leave any   message, patient will call back for answer  Preferred Call Back Phone Number? [phone]  ---------------------------------------------------------------------------  --------------  SCRIPT ANSWERS  Relationship to Patient? Other  Representative Name? daughter   Additional information verified (besides Name and Date of Birth)? Address  (Is the patient/parent requesting an urgent appointment?)? No  Is the child less than three years old? No  Has the child had a well child visit within the last year? (or it is   unknown when last well child was)? No  Have you been diagnosed with, awaiting test results for, or told that you   are suspected of having COVID-19 (Coronavirus)? (If patient has tested   negative or was tested as a requirement for work, school, or travel and   not based on symptoms, answer no)? No  Within the past two weeks have you developed any of the following symptoms   (answer no if symptoms have been present longer than 2 weeks or began   more than 2 weeks ago)? Fever or Chills, Cough, Shortness of breath or   difficulty breathing, Loss of taste or smell, Sore throat, Nasal   congestion, Sneezing or runny nose, Fatigue or generalized body aches   (answer no if pain is specific to a body part e.g. back pain), Diarrhea,   Headache? No  Have you had close contact with someone with COVID-19 in the last 14 days?

## 2021-10-18 ENCOUNTER — OFFICE VISIT (OUTPATIENT)
Dept: PRIMARY CARE CLINIC | Age: 81
End: 2021-10-18
Payer: MEDICARE

## 2021-10-18 VITALS
DIASTOLIC BLOOD PRESSURE: 72 MMHG | HEIGHT: 64 IN | SYSTOLIC BLOOD PRESSURE: 104 MMHG | WEIGHT: 146.6 LBS | RESPIRATION RATE: 14 BRPM | BODY MASS INDEX: 25.03 KG/M2 | OXYGEN SATURATION: 99 % | HEART RATE: 75 BPM

## 2021-10-18 DIAGNOSIS — Z23 NEED FOR INFLUENZA VACCINATION: ICD-10-CM

## 2021-10-18 DIAGNOSIS — F03.90 DEMENTIA WITHOUT BEHAVIORAL DISTURBANCE, UNSPECIFIED DEMENTIA TYPE: ICD-10-CM

## 2021-10-18 DIAGNOSIS — E44.0 MODERATE MALNUTRITION (HCC): ICD-10-CM

## 2021-10-18 DIAGNOSIS — I63.9 CEREBROVASCULAR ACCIDENT (CVA), UNSPECIFIED MECHANISM (HCC): ICD-10-CM

## 2021-10-18 DIAGNOSIS — Z00.00 ENCOUNTER FOR GENERAL ADULT MEDICAL EXAMINATION W/O ABNORMAL FINDINGS: Primary | ICD-10-CM

## 2021-10-18 PROCEDURE — G0439 PPPS, SUBSEQ VISIT: HCPCS | Performed by: NURSE PRACTITIONER

## 2021-10-18 PROCEDURE — 1123F ACP DISCUSS/DSCN MKR DOCD: CPT | Performed by: NURSE PRACTITIONER

## 2021-10-18 PROCEDURE — G0008 ADMIN INFLUENZA VIRUS VAC: HCPCS | Performed by: NURSE PRACTITIONER

## 2021-10-18 PROCEDURE — G8484 FLU IMMUNIZE NO ADMIN: HCPCS | Performed by: NURSE PRACTITIONER

## 2021-10-18 PROCEDURE — 4040F PNEUMOC VAC/ADMIN/RCVD: CPT | Performed by: NURSE PRACTITIONER

## 2021-10-18 PROCEDURE — 90694 VACC AIIV4 NO PRSRV 0.5ML IM: CPT | Performed by: NURSE PRACTITIONER

## 2021-10-18 RX ORDER — TRAZODONE HYDROCHLORIDE 50 MG/1
50 TABLET ORAL NIGHTLY PRN
Qty: 30 TABLET | Refills: 5 | Status: SHIPPED | OUTPATIENT
Start: 2021-10-18 | End: 2022-02-13 | Stop reason: SDUPTHER

## 2021-10-18 RX ORDER — FAMOTIDINE 20 MG/1
20 TABLET, FILM COATED ORAL DAILY
Qty: 60 TABLET | Refills: 3 | Status: SHIPPED | OUTPATIENT
Start: 2021-10-18 | End: 2022-02-13 | Stop reason: SDUPTHER

## 2021-10-18 RX ORDER — FUROSEMIDE 40 MG/1
40 TABLET ORAL DAILY
Qty: 90 TABLET | Refills: 0 | Status: SHIPPED | OUTPATIENT
Start: 2021-10-18 | End: 2022-03-06 | Stop reason: SDUPTHER

## 2021-10-18 SDOH — ECONOMIC STABILITY: FOOD INSECURITY: WITHIN THE PAST 12 MONTHS, YOU WORRIED THAT YOUR FOOD WOULD RUN OUT BEFORE YOU GOT MONEY TO BUY MORE.: NEVER TRUE

## 2021-10-18 SDOH — ECONOMIC STABILITY: FOOD INSECURITY: WITHIN THE PAST 12 MONTHS, THE FOOD YOU BOUGHT JUST DIDN'T LAST AND YOU DIDN'T HAVE MONEY TO GET MORE.: NEVER TRUE

## 2021-10-18 ASSESSMENT — PATIENT HEALTH QUESTIONNAIRE - PHQ9
SUM OF ALL RESPONSES TO PHQ QUESTIONS 1-9: 2
2. FEELING DOWN, DEPRESSED OR HOPELESS: 1
SUM OF ALL RESPONSES TO PHQ9 QUESTIONS 1 & 2: 2
SUM OF ALL RESPONSES TO PHQ QUESTIONS 1-9: 2
SUM OF ALL RESPONSES TO PHQ QUESTIONS 1-9: 2
1. LITTLE INTEREST OR PLEASURE IN DOING THINGS: 1

## 2021-10-18 ASSESSMENT — SOCIAL DETERMINANTS OF HEALTH (SDOH): HOW HARD IS IT FOR YOU TO PAY FOR THE VERY BASICS LIKE FOOD, HOUSING, MEDICAL CARE, AND HEATING?: NOT HARD AT ALL

## 2021-10-18 ASSESSMENT — LIFESTYLE VARIABLES: HOW OFTEN DO YOU HAVE A DRINK CONTAINING ALCOHOL: 0

## 2021-10-18 NOTE — PROGRESS NOTES
Medicare Annual Wellness Visit  Name: Jose Ramon Ware Date: 2021   MRN: [id number] Sex: Female   Age: 79 y.o. Ethnicity: Non- / Non    : 1940 Race: White (non-)      Krystyna Borges is here for Medicare AWV    Screenings for behavioral, psychosocial and functional/safety risks, and cognitive dysfunction are all negative except as indicated below. These results, as well as other patient data from the [address] form, are documented in Flowsheets linked to this Encounter. Allergies   Allergen Reactions    Codeine     Keflex [Cephalexin] Itching, Swelling and Rash         Prior to Visit Medications    Medication Sig Taking? Authorizing Provider   traZODone (DESYREL) 25 mg TABS Take 25 mg by mouth nightly Yes Historical Provider, MD   diclofenac sodium (VOLTAREN) 1 % GEL Apply topically 2 times daily Yes Historical Provider, MD   famotidine (PEPCID) 20 MG tablet Take 1 tablet by mouth daily Yes Orval Crane, KAREEM Moffett CNP   furosemide (LASIX) 40 MG tablet Take 1 tablet by mouth daily Yes Orval Crane, APRAMRITA - CNP   traZODone (DESYREL) 50 MG tablet Take 1 tablet by mouth nightly as needed for Sleep Yes Orval Crane, APRN - CNP   nystatin (MYCOSTATIN) 061227 UNIT/GM ointment Apply topically 2 times daily.  Yes Citlali Hopkins MD   KLOR-CON M20 20 MEQ extended release tablet Take 1 tablet by mouth once daily Yes Orval Crane, APRN - CNP   lisinopril (PRINIVIL;ZESTRIL) 20 MG tablet Take 1 tablet by mouth daily Yes Orval Crane, APRAMRITA - CNP   pravastatin (PRAVACHOL) 40 MG tablet Take 1 tablet by mouth daily Yes Orval Crane, APRAMRITA - CNP   allopurinol (ZYLOPRIM) 100 MG tablet Take 1 tablet by mouth once daily Yes KAREEM Laird CNP   citalopram (CELEXA) 20 MG tablet Take 1 tablet by mouth once daily Yes Orval Crane, APRN - CNP   docusate sodium (COLACE) 100 MG capsule Take 1 capsule by mouth 2 times daily as needed for LAPAROTOMY EXPLORATORY, REDUCTION PARESOPHOGEAL HERNIA CONTAINING COLON AND ENTIRE STOMACH, MESH BRIDGE OF HIATAL DEFECT, GASTROPEXY X2, KOCHERIZATION OF DUODENUM performed by Mark Cruz DO at [address] Left 11/6/2018    HIP TOTAL ARTHROPLASTY MINIMALLY INVASIVE ASI performed by Daniel Lynn MD at [address] [facility] N/A 9/3/2020    EGD ESOPHAGOGASTRODUODENOSCOPY performed by Evelyn Woodard MD at [address] History   Problem Relation Age of Onset    Heart Disease Mother     High Blood Pressure Mother     Cancer Father     Colon Cancer Father        CareTeam (Including outside providers/suppliers regularly involved in providing care):   Patient Care Team:  KAREEM Bailon CNP as PCP - General (Nurse Practitioner)  KAREEM Bailon CNP as PCP - [facility]    Wt Readings from Last 3 Encounters:   10/18/21 146 lb 9.6 oz (66.5 kg)   09/11/20 197 lb 8.5 oz (89.6 kg)   07/13/20 180 lb (81.6 kg)     Vitals:    10/18/21 1147   BP: 104/72   Pulse: 75   Resp: 14   SpO2: 99%   Weight: 146 lb 9.6 oz (66.5 kg)   Height: 5' 4\" (1.626 m)     Body mass index is 25.16 kg/m². Based upon direct observation of the patient, evaluation of cognition reveals recent and remote memory intact.     General Appearance: alert and oriented to person, place and time, well developed and well- nourished, in no acute distress  Skin: warm and dry, no rash or erythema  Head: normocephalic and atraumatic  Eyes: pupils equal, round, and reactive to light, extraocular eye movements intact, conjunctivae normal  ENT: tympanic membrane, external ear and ear canal normal bilaterally, nose without deformity, nasal mucosa and turbinates normal without polyps  Neck: supple and non-tender without mass, no thyromegaly or thyroid nodules, no cervical lymphadenopathy  Pulmonary/Chest: clear to auscultation bilaterally- no wheezes, rales or rhonchi, normal air movement, no respiratory distress  Cardiovascular: normal rate, regular rhythm, normal S1 and S2, no murmurs, rubs, clicks, or gallops, distal pulses intact, no carotid bruits  Abdomen: soft, non-tender, non-distended, normal bowel sounds, no masses or organomegaly  Extremities: no cyanosis, clubbing or edema  Musculoskeletal: normal range of motion, no joint swelling, deformity or tenderness  Neurologic: reflexes normal and symmetric, no cranial nerve deficit, gait, coordination and speech normal    Patient's complete Health Risk Assessment and screening values have been reviewed and are found in Flowsheets. The following problems were reviewed today and where indicated follow up appointments were made and/or referrals ordered. Positive Risk Factor Screenings with Interventions:            General Health and ACP:  General  In general, how would you say your health is?: Fair  In the past 7 days, have you experienced any of the following?  New or Increased Pain, New or Increased Fatigue, Loneliness, Social Isolation, Stress or Anger?: (!) New or Increased Pain, New or Increased Fatigue  Do you get the social and emotional support that you need?: Yes  Do you have a Living Will?: (!) No  Advance Directives     Power of  Living Will ACP-Advance Directive ACP-Power of     Not on File Not on File Not on File Not on File      General Health Risk Interventions:  · Pain issues: will apply voltaren to left hip and update office if no improvement  · No Living Will: Patient declines ACP discussion/assistance    Health Habits/Nutrition:  Health Habits/Nutrition  Do you exercise for at least 20 minutes 2-3 times per week?: Yes  Have you lost any weight without trying in the past 3 months?: (!) Yes  Do you eat only one meal per day?: No  Have you seen the dentist within the past year?: (!) No  Body mass index: (!) 25.16  Health Habits/Nutrition Interventions:  · Inadequate physical activity:  no issues Booster for Diaz Peter series) 08/03/2021    Shingles Vaccine (2 of 3) 10/18/2022 (Originally 8/7/2015)    Lipid screen  06/21/2022    Potassium monitoring  08/16/2022    Creatinine monitoring  08/16/2022    Annual Wellness Visit (AWV)  10/19/2022    DEXA (modify frequency per FRAX score)  Completed    Flu vaccine  Completed    Pneumococcal 65+ years Vaccine  Completed    Hepatitis A vaccine  Aged Out    Hib vaccine  Aged Out    Meningococcal (ACWY) vaccine  Aged Out     Recommendations for [employer] Due: see orders and patient instructions/AVS.  . Recommended screening schedule for the next 5-10 years is provided to the patient in written form: see Patient Zara Rivera was seen today for medicare awv. Diagnoses and all orders for this visit:    Encounter for general adult medical examination w/o abnormal findings    Need for influenza vaccination  -     INFLUENZA, QUADV, ADJUVANTED, 72 YRS =, IM, PF, PREFILL SYR, 0.5ML (FLUAD)    Moderate malnutrition ([address]    Cerebrovascular accident (CVA), unspecified mechanism ([address]    Dementia without behavioral disturbance, unspecified dementia type ([address]    Other orders  -     famotidine (PEPCID) 20 MG tablet; Take 1 tablet by mouth daily  -     furosemide (LASIX) 40 MG tablet; Take 1 tablet by mouth daily  -     traZODone (DESYREL) 50 MG tablet;  Take 1 tablet by mouth nightly as needed for Sleep           Presents with daughter for AWV  In wheelchair  BP well controlled, lower than usual per daughter  Has home care nurse coming to check, normally 120s/80s  Has lost 40lbs since last visit 1/20/21  Was in SNF x 1 year, daughter has moved her into her home  This is going well    Hx of BLE edema  Wearing pressure stockings  Using lasix without side effects    Increase in feelings of depression, will increase trazodone dose  Denies any SI    Willing to update flu vaccine  Plans to update covid 19 booster    Hx of CVA and dementia  Compliant with meds    Denies any other problems/concerns  Follow up in six months for recheck/earlier if needed

## 2021-10-28 ENCOUNTER — TELEPHONE (OUTPATIENT)
Dept: PRIMARY CARE CLINIC | Age: 81
End: 2021-10-28

## 2021-10-28 RX ORDER — CELECOXIB 100 MG/1
100 CAPSULE ORAL 2 TIMES DAILY
Qty: 60 CAPSULE | Refills: 5 | Status: SHIPPED | OUTPATIENT
Start: 2021-10-28 | End: 2021-12-03

## 2021-10-28 NOTE — TELEPHONE ENCOUNTER
Pt's child Stephanie Boo (hipaa) called to update PCP on the Voltaren gel use. Tuedinson Boo stated that the pt says it isn't working for her hip pain. It would work for only 15 mins and then the pt would be back in pain. She has been using the gel for a little over 1 wk. Wondering if there is another medication the pt could try for the hip pain. Please advise.

## 2021-11-19 ENCOUNTER — TELEPHONE (OUTPATIENT)
Dept: PRIMARY CARE CLINIC | Age: 81
End: 2021-11-19

## 2021-11-19 NOTE — TELEPHONE ENCOUNTER
Mily from [facility] did assessment on patient and she needs a Face to Face (can this be VV?) for hospital bed, shower bench, and incontinent supplies, in future will need lift chair. Incontinent supplies are: pull ups, pads for bed, and wipes and order needs faxed to [address] [place] [address] fax# [phone]. Please advise on face to face. Any questions can be answered by  Chintan Quevedo at [phone].

## 2021-11-19 NOTE — TELEPHONE ENCOUNTER
Can be virtual but would have to be through [employer] with camera- insurance rules    Or in office  thanks no

## 2021-11-23 NOTE — TELEPHONE ENCOUNTER
Miryam Tang returned phone call. Writer gave provider's orders. Miryam Tang stated [employer] will fax a form over and she will notify patient of the need for a face to face.

## 2021-11-29 NOTE — TELEPHONE ENCOUNTER
Gela Hill ,  for [employer], called in stating that patient's daughter will be scheduling the telephone visit     She is asking for all DME documentation and orders to just be faxed directly to her at [phone]    And if orders for Home Health, PT, and OT to be faxed to [address] when patient is seen.     Will route to PCP and MA so they are aware of patient's needs

## 2021-12-01 DIAGNOSIS — F01.50 VASCULAR DEMENTIA WITHOUT BEHAVIORAL DISTURBANCE (HCC): ICD-10-CM

## 2021-12-01 RX ORDER — MEMANTINE HYDROCHLORIDE 10 MG/1
10 TABLET ORAL DAILY
Qty: 30 TABLET | Refills: 5 | Status: SHIPPED | OUTPATIENT
Start: 2021-12-01 | End: 2022-06-14 | Stop reason: SDUPTHER

## 2021-12-03 ENCOUNTER — TELEMEDICINE (OUTPATIENT)
Dept: PRIMARY CARE CLINIC | Age: 81
End: 2021-12-03
Payer: COMMERCIAL

## 2021-12-03 DIAGNOSIS — N39.46 MIXED STRESS AND URGE URINARY INCONTINENCE: ICD-10-CM

## 2021-12-03 DIAGNOSIS — E44.0 MODERATE MALNUTRITION (HCC): ICD-10-CM

## 2021-12-03 DIAGNOSIS — M16.12 PRIMARY OSTEOARTHRITIS OF LEFT HIP: ICD-10-CM

## 2021-12-03 DIAGNOSIS — F03.90 DEMENTIA WITHOUT BEHAVIORAL DISTURBANCE, UNSPECIFIED DEMENTIA TYPE: ICD-10-CM

## 2021-12-03 DIAGNOSIS — M16.12 PRIMARY OSTEOARTHRITIS OF LEFT HIP: Primary | ICD-10-CM

## 2021-12-03 DIAGNOSIS — E11.9 TYPE 2 DIABETES MELLITUS WITHOUT COMPLICATION, WITHOUT LONG-TERM CURRENT USE OF INSULIN (HCC): ICD-10-CM

## 2021-12-03 DIAGNOSIS — I63.9 CEREBROVASCULAR ACCIDENT (CVA), UNSPECIFIED MECHANISM (HCC): ICD-10-CM

## 2021-12-03 DIAGNOSIS — I63.9 CEREBROVASCULAR ACCIDENT (CVA), UNSPECIFIED MECHANISM (HCC): Primary | ICD-10-CM

## 2021-12-03 DIAGNOSIS — F01.50 VASCULAR DEMENTIA WITHOUT BEHAVIORAL DISTURBANCE (HCC): ICD-10-CM

## 2021-12-03 PROCEDURE — G8400 PT W/DXA NO RESULTS DOC: HCPCS | Performed by: NURSE PRACTITIONER

## 2021-12-03 PROCEDURE — G8427 DOCREV CUR MEDS BY ELIG CLIN: HCPCS | Performed by: NURSE PRACTITIONER

## 2021-12-03 PROCEDURE — 0509F URINE INCON PLAN DOCD: CPT | Performed by: NURSE PRACTITIONER

## 2021-12-03 PROCEDURE — 1123F ACP DISCUSS/DSCN MKR DOCD: CPT | Performed by: NURSE PRACTITIONER

## 2021-12-03 PROCEDURE — 4040F PNEUMOC VAC/ADMIN/RCVD: CPT | Performed by: NURSE PRACTITIONER

## 2021-12-03 PROCEDURE — 1090F PRES/ABSN URINE INCON ASSESS: CPT | Performed by: NURSE PRACTITIONER

## 2021-12-03 PROCEDURE — 99214 OFFICE O/P EST MOD 30 MIN: CPT | Performed by: NURSE PRACTITIONER

## 2021-12-03 RX ORDER — SULFAMETHOXAZOLE AND TRIMETHOPRIM 800; 160 MG/1; MG/1
1 TABLET ORAL 2 TIMES DAILY
Qty: 14 TABLET | Refills: 0 | Status: SHIPPED | OUTPATIENT
Start: 2021-12-03 | End: 2021-12-10

## 2021-12-03 RX ORDER — TRAMADOL HYDROCHLORIDE 50 MG/1
50 TABLET ORAL 2 TIMES DAILY PRN
Qty: 60 TABLET | Refills: 0 | Status: SHIPPED | OUTPATIENT
Start: 2021-12-03 | End: 2022-01-02

## 2021-12-03 ASSESSMENT — ENCOUNTER SYMPTOMS
COUGH: 0
SHORTNESS OF BREATH: 0
BACK PAIN: 0
ABDOMINAL PAIN: 0

## 2021-12-03 NOTE — PROGRESS NOTES
[address] PRIMARY CARE  [address]   [address]  [facility] [address]  Dept: [phone]  Dept Fax: [phone]    Alfredo Howell is a 79 y.o. female who presentstoday for her medical conditions/complaints as noted below.   Alfredo Howell is c/o of  Chief Complaint   Patient presents with    Medication Check         HPI:     Presents with daughter Monie Guerra via telehealth for eval  She is her caregiver    Reports a fall a few weeks ago, skin tear to right knee  Noted blistering and redness  Will start antibiotic  Was getting help from [facility] at [facility] home health care and getting out of the shower when the fall occurred    Increase in pain to knee, bilateral feet and right hip  Celebrex is not controlling pain  Requesting stronger pain medication    Wheelchair bound, sitting in wheelchair daily with legs dependent  Noted edema  Requesting rx for hospital bed, shower bench, lift chair and incontinence supplies  Needs hospital bed for height adjustment to permit transfer to chair and wheechair  Also needs a hospital bed for positioning of the body not feasible with an ordinary bed to alleviate her pain for arthritis  Bed should have adjustable head for easier breathing and night and comfort for arthrisit    Also requires use of shower chair for safety during showers    I am in agreement that the patient will need all of these items to reduce fall risk and make it easier to complete ADLs/IADLs    Denies any other problems/concerns      Hemoglobin A1C (%)   Date Value   09/03/2020 5.6   03/22/2019 5.6   05/10/2018 5.6             ( goal A1C is < 7)   No results found for: LABMICR  LDL Cholesterol (mg/dL)   Date Value   06/21/2021 37   05/10/2018 70   06/07/2017 76     LDL Calculated (mg/dL)   Date Value   12/07/2016 76       (goal LDL is <100)   AST (U/L)   Date Value   08/16/2021 16     ALT (U/L)   Date Value   08/16/2021 11     BUN (mg/dL)   Date Value   08/16/2021 13     BP Readings from Last 3 Encounters:   10/18/21 104/72   09/12/20 (!) 171/58   09/03/20 112/63          ([id number])    Past Medical History:   Diagnosis Date    Anemia     Cataract     Cerebral artery occlusion with cerebral infarction (HCC)     TIA    CHF (congestive heart failure) (HCC)     QUESTIONABLE    Depression     Diabetes mellitus (HCC)     Eczema     Gout     Hearing loss     Hiatal hernia     History of blood transfusion     Hyperlipidemia     Hypertension     PONV (postoperative nausea and vomiting)     Rectal urgency     Stress incontinence, female       Past Surgical History:   Procedure Laterality Date    ABDOMEN SURGERY  09/03/2020    egd/exploratory lap/reduction of paraesopheal hernia/mesh bridge of hiatel hernia defect/gastropexy x 2/kocherization of duodenum    APPENDECTOMY      CATARACT REMOVAL WITH IMPLANT Bilateral     COLONOSCOPY      EYE SURGERY      HYSTERECTOMY      JOINT REPLACEMENT      LAPAROTOMY N/A 9/3/2020    LAPAROTOMY EXPLORATORY, REDUCTION PARESOPHOGEAL HERNIA CONTAINING COLON AND ENTIRE STOMACH, MESH BRIDGE OF HIATAL DEFECT, GASTROPEXY X2, KOCHERIZATION OF DUODENUM performed by Melissa Gardiner DO at [facility] 11/6/2018    HIP TOTAL ARTHROPLASTY MINIMALLY INVASIVE ASI performed by Ashok Whitney MD at [address] [place] [address] N/A 9/3/2020    EGD ESOPHAGOGASTRODUODENOSCOPY performed by Jean-Paul Mann MD at [place] History   Problem Relation Age of Onset    Heart Disease Mother     High Blood Pressure Mother     Cancer Father     Colon Cancer Father           Social History     Tobacco Use    Smoking status: Never Smoker    Smokeless tobacco: Never Used   Substance Use Topics    Alcohol use: No      Current Outpatient Medications   Medication Sig Dispense Refill    Lift Chair MISC 1 each by Does not apply route continuous 1 each 0    sulfamethoxazole-trimethoprim (BACTRIM DS) 800-160 MG per tablet Take 1 tablet by mouth 2 times daily for 7 days 14 tablet 0    traMADol (ULTRAM) 50 MG tablet Take 1 tablet by mouth 2 times daily as needed for Pain for up to 30 days. 60 tablet 0    memantine (NAMENDA) 10 MG tablet Take 1 tablet by mouth daily 30 tablet 5    traZODone (DESYREL) 25 mg TABS Take 25 mg by mouth nightly      diclofenac sodium (VOLTAREN) 1 % GEL Apply topically 2 times daily      famotidine (PEPCID) 20 MG tablet Take 1 tablet by mouth daily 60 tablet 3    furosemide (LASIX) 40 MG tablet Take 1 tablet by mouth daily 90 tablet 0    traZODone (DESYREL) 50 MG tablet Take 1 tablet by mouth nightly as needed for Sleep 30 tablet 5    nystatin (MYCOSTATIN) 123666 UNIT/GM ointment Apply topically 2 times daily. 1 Tube 1    KLOR-CON M20 20 MEQ extended release tablet Take 1 tablet by mouth once daily 90 tablet 0    lisinopril (PRINIVIL;ZESTRIL) 20 MG tablet Take 1 tablet by mouth daily 90 tablet 0    pravastatin (PRAVACHOL) 40 MG tablet Take 1 tablet by mouth daily 90 tablet 0    allopurinol (ZYLOPRIM) 100 MG tablet Take 1 tablet by mouth once daily 90 tablet 3    citalopram (CELEXA) 20 MG tablet Take 1 tablet by mouth once daily 90 tablet 0    docusate sodium (COLACE) 100 MG capsule Take 1 capsule by mouth 2 times daily as needed for Constipation 60 capsule 3    Handicap Placard MISC by Does not apply route Expires 11/2024 1 each 0    meclizine (ANTIVERT) 12.5 MG tablet TAKE 1 TABLET BY MOUTH THREE TIMES DAILY AS NEEDED FOR DIZZINESS 30 tablet 0    Multiple Vitamins-Minerals (THERAPEUTIC MULTIVITAMIN-MINERALS) tablet Take 1 tablet by mouth daily      Misc. Devices MISC Shower transfer bench 1 Device 0    Misc.  Devices MISC Adult pull up briefs  Dispense 100 100 Device 0    ferrous sulfate 325 (65 Fe) MG tablet Take 325 mg by mouth 2 times daily       Acetaminophen 325 MG CAPS Take 1 capsule by mouth daily as needed       Blood Pressure KIT 1 kit by Does not apply route daily 1 kit 0 No current facility-administered medications for this visit. Allergies   Allergen Reactions    Codeine     Keflex [Cephalexin] Itching, Swelling and Rash       Health Maintenance   Topic Date Due    COVID-19 Vaccine (3 - Booster for Pfizer series) 08/03/2021    DTaP/Tdap/Td vaccine (1 - Tdap) 12/24/2021 (Originally 4/16/1959)    Shingles Vaccine (2 of 3) 10/18/2022 (Originally 8/7/2015)    Lipid screen  06/21/2022    Potassium monitoring  08/16/2022    Creatinine monitoring  08/16/2022    Annual Wellness Visit (AWV)  10/19/2022    DEXA (modify frequency per FRAX score)  Completed    Flu vaccine  Completed    Pneumococcal 65+ years Vaccine  Completed    Hepatitis A vaccine  Aged Out    Hepatitis B vaccine  Aged Out    Hib vaccine  Aged Out    Meningococcal (ACWY) vaccine  Aged Out       Subjective:      Review of Systems   Constitutional: Negative for chills, fatigue and fever. HENT: Negative for congestion. Eyes: Negative for visual disturbance. Respiratory: Negative for cough and shortness of breath. Cardiovascular: Negative for chest pain and palpitations. Gastrointestinal: Negative for abdominal pain. Genitourinary: Negative for dysuria. Musculoskeletal: Negative for back pain. Skin: Positive for wound. Neurological: Negative for dizziness, numbness and headaches. Psychiatric/Behavioral: Negative for self-injury, sleep disturbance and suicidal ideas. The patient is not nervous/anxious. Objective:     Physical Exam  Vitals reviewed: unable to assess d/t telehealth. Constitutional:       Appearance: Normal appearance. Neurological:      General: No focal deficit present. Mental Status: She is alert and oriented to person, place, and time. Psychiatric:         Mood and Affect: Mood normal.         Behavior: Behavior normal.         Thought Content:  Thought content normal.         Judgment: Judgment normal.       There were no vitals taken for this visit. Assessment:       Diagnosis Orders   1. Primary osteoarthritis of left hip  Shower chair    DME Order for Hospital Bed as OP    Lift Chair [address] [facility]   2. Type 2 diabetes mellitus without complication, without long-term current use of insulin (HCC)     3. Vascular dementia without behavioral disturbance ([address]  Shower chair    DME Order for Hospital Bed as OP    Lift Chair [address] [facility] [address]   4. Moderate malnutrition ([address]     5. Cerebrovascular accident (CVA), unspecified mechanism ([address]  Shower chair    DME Order for Hospital Bed as OP    Lift Chair [address] [facility] [address]   6. Dementia without behavioral disturbance, unspecified dementia type [facility])  Shower chair    DME Order for Hospital Bed as OP    Lift Chair [address] [facility]   7. Mixed stress and urge urinary incontinence  DME Order for (Specify) as OP             Plan:      Return if symptoms worsen or fail to improve, for in April as scheduled. 1. Chronic conditions- Stable. Rx given for DME supplies as requested and will fax. Rx given for bactrim, follow up if no improvement in wound with use of antibiotic. Otherwise, follow up in April as scheduled. Charis Choe, was evaluated through a synchronous (real-time) audio-video encounter. The patient (or guardian if applicable) is aware that this is a billable service. Verbal consent to proceed has been obtained within the past 12 months. The visit was conducted pursuant to the emergency declaration under the [facility], [address] [place] [address] authority and the [employer] Act. Patient identification was verified, and a caregiver was present when appropriate. The patient was located in a state where the provider was credentialed to provide care. Total time spent for this encounter: Not billed by time    --KAREEM Townsend CNP on 12/3/2021 at 2:04 PM    An electronic signature was used to authenticate this note.           Orders Placed This Encounter Procedures    Shower chair    DME Order for Hospital Bed as OP     You must complete the order parameters below and add the medical necessity documentation for this DME in a separate note. Semi Electric hospital bed with mattress and any type rails    Current patient weight:    Current patient height:    Diagnosis: chronic pain, dementia  Length of need: Lifetime    DME Order for (Specify) as OP     - DME device ordered - pull ups, pads for bed, and wipes    - Diagnosis: urinary incontinence  - Length of Need: Lifetime        Orders Placed This Encounter   Medications    Lift Chair MISC     Si each by Does not apply route continuous     Dispense:  1 each     Refill:  0       Patient given educational materials - see patient instructions. Discussed use, benefit, and side effects of prescribed medications. All patientquestions answered. Pt voiced understanding. Reviewed health maintenance. Instructedto continue current medications, diet and exercise. Patient agreed with treatmentplan. Follow up as directed.      Electronicallysigned by KAREEM Carpio CNP on 12/3/2021 at 2:03 PM

## 2021-12-03 NOTE — PROGRESS NOTES
[facility] PRIMARY CARE  [address]  [address] [address]  Dept: [phone]  Dept Fax: [phone]    Starla Wolf is a 79 y.o. female who presentstoday for her medical conditions/complaints as noted below. Starla Wolf is c/o of  No chief complaint on file.         HPI:     HPI    Hemoglobin A1C (%)   Date Value   09/03/2020 5.6   03/22/2019 5.6   05/10/2018 5.6             ( goal A1C is < 7)   No results found for: LABMICR  LDL Cholesterol (mg/dL)   Date Value   06/21/2021 37   05/10/2018 70   06/07/2017 76     LDL Calculated (mg/dL)   Date Value   12/07/2016 76       (goal LDL is <100)   AST (U/L)   Date Value   08/16/2021 16     ALT (U/L)   Date Value   08/16/2021 11     BUN (mg/dL)   Date Value   08/16/2021 13     BP Readings from Last 3 Encounters:   10/18/21 104/72   09/12/20 (!) 171/58   09/03/20 112/63          ([id number])    Past Medical History:   Diagnosis Date    Anemia     Cataract     Cerebral artery occlusion with cerebral infarction (HCC)     TIA    CHF (congestive heart failure) (HCC)     QUESTIONABLE    Depression     Diabetes mellitus (HCC)     Eczema     Gout     Hearing loss     Hiatal hernia     History of blood transfusion     Hyperlipidemia     Hypertension     PONV (postoperative nausea and vomiting)     Rectal urgency     Stress incontinence, female       Past Surgical History:   Procedure Laterality Date    ABDOMEN SURGERY  09/03/2020    egd/exploratory lap/reduction of paraesopheal hernia/mesh bridge of hiatel hernia defect/gastropexy x 2/kocherization of duodenum    APPENDECTOMY      CATARACT REMOVAL WITH IMPLANT Bilateral     COLONOSCOPY      EYE SURGERY      HYSTERECTOMY      JOINT REPLACEMENT      LAPAROTOMY N/A 9/3/2020    LAPAROTOMY EXPLORATORY, REDUCTION PARESOPHOGEAL HERNIA CONTAINING COLON AND ENTIRE STOMACH, MESH BRIDGE OF HIATAL DEFECT, GASTROPEXY X2, KOCHERIZATION OF DUODENUM performed by 100 MG capsule Take 1 capsule by mouth 2 times daily as needed for Constipation 60 capsule 3    Handicap Placard MISC by Does not apply route Expires 11/2024 1 each 0    meclizine (ANTIVERT) 12.5 MG tablet TAKE 1 TABLET BY MOUTH THREE TIMES DAILY AS NEEDED FOR DIZZINESS 30 tablet 0    Multiple Vitamins-Minerals (THERAPEUTIC MULTIVITAMIN-MINERALS) tablet Take 1 tablet by mouth daily      Misc. Devices MISC Shower transfer bench 1 Device 0    Misc. Devices MISC Adult pull up briefs  Dispense 100 100 Device 0    ferrous sulfate 325 (65 Fe) MG tablet Take 325 mg by mouth 2 times daily       Acetaminophen 325 MG CAPS Take 1 capsule by mouth daily as needed       Blood Pressure KIT 1 kit by Does not apply route daily 1 kit 0     No current facility-administered medications for this visit. Allergies   Allergen Reactions    Codeine     Keflex [Cephalexin] Itching, Swelling and Rash       Health Maintenance   Topic Date Due    COVID-19 Vaccine (3 - Booster for Pfizer series) 08/03/2021    DTaP/Tdap/Td vaccine (1 - Tdap) 12/24/2021 (Originally 4/16/1959)    Shingles Vaccine (2 of 3) 10/18/2022 (Originally 8/7/2015)    Lipid screen  06/21/2022    Potassium monitoring  08/16/2022    Creatinine monitoring  08/16/2022    Annual Wellness Visit (AWV)  10/19/2022    DEXA (modify frequency per FRAX score)  Completed    Flu vaccine  Completed    Pneumococcal 65+ years Vaccine  Completed    Hepatitis A vaccine  Aged Out    Hepatitis B vaccine  Aged Out    Hib vaccine  Aged Out    Meningococcal (ACWY) vaccine  Aged Out       Subjective:      Review of Systems    Objective:     Physical Exam  There were no vitals taken for this visit. Assessment:       Diagnosis Orders   1. Cerebrovascular accident (CVA), unspecified mechanism ([address]     2. Dementia without behavioral disturbance, unspecified dementia type ([address]     3. Moderate malnutrition ([address]     4.  Primary osteoarthritis of left hip  traMADol (ULTRAM) 50 MG tablet             Plan:      No follow-ups on file. No orders of the defined types were placed in this encounter. Orders Placed This Encounter   Medications    sulfamethoxazole-trimethoprim (BACTRIM DS) 800-160 MG per tablet     Sig: Take 1 tablet by mouth 2 times daily for 7 days     Dispense:  14 tablet     Refill:  0    traMADol (ULTRAM) 50 MG tablet     Sig: Take 1 tablet by mouth 2 times daily as needed for Pain for up to 30 days. Dispense:  60 tablet     Refill:  0     Reduce doses taken as pain becomes manageable       Patient given educational materials - see patient instructions. Discussed use, benefit, and side effects of prescribed medications. All patientquestions answered. Pt voiced understanding. Reviewed health maintenance. Instructedto continue current medications, diet and exercise. Patient agreed with treatmentplan. Follow up as directed.      Electronicallysigned by KAREEM Ruano CNP on 12/3/2021 at 1:24 PM

## 2021-12-20 ENCOUNTER — TELEPHONE (OUTPATIENT)
Dept: PRIMARY CARE CLINIC | Age: 81
End: 2021-12-20

## 2021-12-20 NOTE — TELEPHONE ENCOUNTER
Re Del Rio from the [facility] office on aging called stating that patient has recently been enrolled into the [employer] program and is asking if provider has any impute.

## 2022-01-06 DIAGNOSIS — E78.2 MIXED HYPERLIPIDEMIA: ICD-10-CM

## 2022-01-06 DIAGNOSIS — I10 ESSENTIAL HYPERTENSION: ICD-10-CM

## 2022-01-06 RX ORDER — PRAVASTATIN SODIUM 40 MG
40 TABLET ORAL DAILY
Qty: 90 TABLET | Refills: 0 | Status: SHIPPED | OUTPATIENT
Start: 2022-01-06 | End: 2022-04-19 | Stop reason: SDUPTHER

## 2022-01-06 RX ORDER — POTASSIUM CHLORIDE 1500 MG/1
20 TABLET, EXTENDED RELEASE ORAL DAILY
Qty: 90 TABLET | Refills: 0 | Status: SHIPPED | OUTPATIENT
Start: 2022-01-06 | End: 2022-04-19 | Stop reason: SDUPTHER

## 2022-01-13 ENCOUNTER — TELEPHONE (OUTPATIENT)
Dept: PRIMARY CARE CLINIC | Age: 82
End: 2022-01-13

## 2022-01-13 NOTE — TELEPHONE ENCOUNTER
----- Message from Orien Card sent at 1/13/2022  9:04 AM EST -----  Subject: Message to Provider    QUESTIONS  Information for Provider? [employer] received a request for cotton   supplies for patient and form is incomplete. They will re-send the form   and it needs to be completed. ---------------------------------------------------------------------------  --------------  Pillo Danger INFO  What is the best way for the office to contact you? OK to leave message on   voicemail  Preferred Call Back Phone Number? [phone]  ---------------------------------------------------------------------------  --------------  SCRIPT ANSWERS  Relationship to Patient? Third Party  Representative Name?  [facility] of [employer]

## 2022-01-14 RX ORDER — MECLIZINE HCL 12.5 MG/1
12.5 TABLET ORAL 3 TIMES DAILY PRN
Qty: 30 TABLET | Refills: 1 | Status: SHIPPED | OUTPATIENT
Start: 2022-01-14 | End: 2022-02-13 | Stop reason: SDUPTHER

## 2022-02-07 ENCOUNTER — TELEPHONE (OUTPATIENT)
Dept: PRIMARY CARE CLINIC | Age: 82
End: 2022-02-07

## 2022-02-07 NOTE — TELEPHONE ENCOUNTER
----- Message from Shaunna Bass sent at 2/4/2022  2:41 PM EST -----  Subject: Message to Provider    QUESTIONS  Information for Provider? [employer] faxed an order over on 01/25 and   has not received a response. They are re-sending that order today. Please   respond. Questions? Call [phone]  ---------------------------------------------------------------------------  --------------  Rebekah VILLANUEVA  What is the best way for the office to contact you? OK to leave message on   voicemail  Preferred Call Back Phone Number?  [phone]  ---------------------------------------------------------------------------  --------------  SCRIPT ANSWERS  undefined

## 2022-02-07 NOTE — TELEPHONE ENCOUNTER
refaxed scanned in documents again to number on form. Will await fax confirmation to scan into chart.

## 2022-02-14 RX ORDER — ALLOPURINOL 100 MG/1
100 TABLET ORAL DAILY
Qty: 90 TABLET | Refills: 3 | Status: SHIPPED | OUTPATIENT
Start: 2022-02-14

## 2022-02-14 RX ORDER — TRAZODONE HYDROCHLORIDE 50 MG/1
50 TABLET ORAL NIGHTLY PRN
Qty: 30 TABLET | Refills: 5 | Status: SHIPPED | OUTPATIENT
Start: 2022-02-14 | End: 2022-10-18 | Stop reason: SDUPTHER

## 2022-02-14 RX ORDER — MECLIZINE HCL 12.5 MG/1
12.5 TABLET ORAL 3 TIMES DAILY PRN
Qty: 30 TABLET | Refills: 1 | Status: SHIPPED | OUTPATIENT
Start: 2022-02-14 | End: 2022-04-19 | Stop reason: SDUPTHER

## 2022-02-14 RX ORDER — FAMOTIDINE 20 MG/1
20 TABLET, FILM COATED ORAL DAILY
Qty: 60 TABLET | Refills: 3 | Status: SHIPPED | OUTPATIENT
Start: 2022-02-14 | End: 2022-03-06 | Stop reason: SDUPTHER

## 2022-02-14 RX ORDER — CITALOPRAM 20 MG/1
20 TABLET ORAL DAILY
Qty: 90 TABLET | Refills: 0 | Status: SHIPPED | OUTPATIENT
Start: 2022-02-14 | End: 2022-05-17 | Stop reason: SDUPTHER

## 2022-02-16 ENCOUNTER — TELEPHONE (OUTPATIENT)
Dept: PRIMARY CARE CLINIC | Age: 82
End: 2022-02-16

## 2022-02-16 NOTE — TELEPHONE ENCOUNTER
DME order sent to [facility] [phone] (S) [phone].  Daughter José Miguel France advised

## 2022-02-16 NOTE — TELEPHONE ENCOUNTER
Pt's dtr called about getting a hospital bed for the pt. She said she talked to someone about it in December. Any status on that?

## 2022-02-17 ENCOUNTER — TELEPHONE (OUTPATIENT)
Dept: PRIMARY CARE CLINIC | Age: 82
End: 2022-02-17

## 2022-02-23 ENCOUNTER — TELEPHONE (OUTPATIENT)
Dept: PRIMARY CARE CLINIC | Age: 82
End: 2022-02-23

## 2022-02-23 DIAGNOSIS — M16.12 PRIMARY OSTEOARTHRITIS OF LEFT HIP: Primary | ICD-10-CM

## 2022-02-23 RX ORDER — TRAMADOL HYDROCHLORIDE 50 MG/1
50 TABLET ORAL 2 TIMES DAILY PRN
Qty: 60 TABLET | Refills: 0 | Status: SHIPPED | OUTPATIENT
Start: 2022-02-23 | End: 2022-02-28

## 2022-02-23 NOTE — TELEPHONE ENCOUNTER
Patient's daughter John Martinez called and said that patient is still having arm and hip pain. The celebrex is not helping her. She is wondering if something else can be sent in to make her a bit more comfortable.  [facility] [place] is the correct pharmacy

## 2022-03-07 RX ORDER — LISINOPRIL 20 MG/1
20 TABLET ORAL DAILY
Qty: 90 TABLET | Refills: 0 | Status: SHIPPED | OUTPATIENT
Start: 2022-03-07 | End: 2022-05-31 | Stop reason: SDUPTHER

## 2022-03-07 RX ORDER — FUROSEMIDE 40 MG/1
40 TABLET ORAL DAILY
Qty: 90 TABLET | Refills: 0 | Status: SHIPPED | OUTPATIENT
Start: 2022-03-07 | End: 2022-05-17 | Stop reason: SDUPTHER

## 2022-03-07 RX ORDER — FAMOTIDINE 20 MG/1
20 TABLET, FILM COATED ORAL DAILY
Qty: 60 TABLET | Refills: 3 | Status: SHIPPED | OUTPATIENT
Start: 2022-03-07

## 2022-03-16 NOTE — TELEPHONE ENCOUNTER
Pt's daughter called stating they have not heard anything back about the hospital bed. They called the DME and they haven't received anything form the office. Writer advised that order and LOV will be re faxed to DME. Daughter verbalized understanding.

## 2022-03-17 ENCOUNTER — TELEPHONE (OUTPATIENT)
Dept: PRIMARY CARE CLINIC | Age: 82
End: 2022-03-17

## 2022-03-17 DIAGNOSIS — M16.12 PRIMARY OSTEOARTHRITIS OF LEFT HIP: Primary | ICD-10-CM

## 2022-03-17 NOTE — TELEPHONE ENCOUNTER
Ines Atwood from [facility] called. She states that they received an order for a hospital bed but that the visit notes do not meet criteria for patients insurance. She is asking if an addendum can be added and new notes faxed. Please advise.

## 2022-03-17 NOTE — TELEPHONE ENCOUNTER
Called and LM at [address] [place] [address] requesting what additional documentation is needed for insurance approval.

## 2022-03-23 NOTE — TELEPHONE ENCOUNTER
Called and spoke to Lizz at [facility] [employer] who is going to email writer insurance criteria for hospital bed. Writer received criteria information and placed on providers desk for completion of addendum.

## 2022-03-28 NOTE — TELEPHONE ENCOUNTER
DME must meet one of the fixed height criteria. So must read either fixed height bed to permit bed from chair to wheelchair or to a standing position.  Can fax new order to [phone]

## 2022-04-19 DIAGNOSIS — E78.2 MIXED HYPERLIPIDEMIA: ICD-10-CM

## 2022-04-19 DIAGNOSIS — I10 ESSENTIAL HYPERTENSION: ICD-10-CM

## 2022-04-20 RX ORDER — MECLIZINE HCL 12.5 MG/1
12.5 TABLET ORAL 3 TIMES DAILY PRN
Qty: 30 TABLET | Refills: 1 | Status: SHIPPED | OUTPATIENT
Start: 2022-04-20 | End: 2022-06-14 | Stop reason: SDUPTHER

## 2022-04-20 RX ORDER — PRAVASTATIN SODIUM 40 MG
40 TABLET ORAL DAILY
Qty: 90 TABLET | Refills: 0 | Status: SHIPPED | OUTPATIENT
Start: 2022-04-20 | End: 2022-07-20

## 2022-04-20 RX ORDER — POTASSIUM CHLORIDE 1500 MG/1
20 TABLET, EXTENDED RELEASE ORAL DAILY
Qty: 90 TABLET | Refills: 0 | Status: SHIPPED | OUTPATIENT
Start: 2022-04-20 | End: 2022-07-20

## 2022-05-04 ENCOUNTER — TELEPHONE (OUTPATIENT)
Dept: PRIMARY CARE CLINIC | Age: 82
End: 2022-05-04

## 2022-05-04 DIAGNOSIS — M16.12 PRIMARY OSTEOARTHRITIS OF LEFT HIP: Primary | ICD-10-CM

## 2022-05-04 DIAGNOSIS — I63.9 CEREBROVASCULAR ACCIDENT (CVA), UNSPECIFIED MECHANISM (HCC): ICD-10-CM

## 2022-05-04 NOTE — TELEPHONE ENCOUNTER
Addended note from 12/3/21  On that day I ordered shower chair and hospital bed. I included the information in the note that was required for hospital bed use  Thanks    Please print OV note and orders and refax.

## 2022-05-04 NOTE — TELEPHONE ENCOUNTER
----- Message from [employer] sent at 5/4/2022  3:21 PM EDT -----  Subject: Message to Provider    QUESTIONS  Information for Provider? Ramonita ARTHUR and  from [address] [place] [address] called in stating paperwork was faxed to Teri Pizarro in regards to the   hospital bed patient is wanting, but it was declined because it is   adjustable and they don't have documentation from pcp stating she needs   it. She also is following up to confirm office did request a shower chair   as well. Please follow up  ---------------------------------------------------------------------------  --------------  CALL BACK INFO  What is the best way for the office to contact you? OK to leave message on   voicemail  Preferred Call Back Phone Number? [phone]  ---------------------------------------------------------------------------  --------------  SCRIPT ANSWERS  Relationship to Patient? Third Party  Third Party Type? Insurance? Representative Name?  Ramonita ARTHUR and

## 2022-05-17 RX ORDER — CITALOPRAM 20 MG/1
TABLET ORAL
Qty: 90 TABLET | Refills: 0 | Status: SHIPPED | OUTPATIENT
Start: 2022-05-17 | End: 2022-07-18

## 2022-05-17 RX ORDER — FUROSEMIDE 40 MG/1
TABLET ORAL
Qty: 90 TABLET | Refills: 0 | Status: SHIPPED | OUTPATIENT
Start: 2022-05-17 | End: 2022-08-24

## 2022-05-31 RX ORDER — LISINOPRIL 20 MG/1
TABLET ORAL
Qty: 90 TABLET | Refills: 0 | Status: SHIPPED | OUTPATIENT
Start: 2022-05-31 | End: 2022-08-24

## 2022-06-14 DIAGNOSIS — F01.50 VASCULAR DEMENTIA WITHOUT BEHAVIORAL DISTURBANCE (HCC): ICD-10-CM

## 2022-06-14 RX ORDER — MECLIZINE HCL 12.5 MG/1
TABLET ORAL
Qty: 30 TABLET | Refills: 0 | Status: SHIPPED | OUTPATIENT
Start: 2022-06-14 | End: 2022-07-20

## 2022-06-14 RX ORDER — MEMANTINE HYDROCHLORIDE 10 MG/1
TABLET ORAL
Qty: 30 TABLET | Refills: 0 | Status: SHIPPED | OUTPATIENT
Start: 2022-06-14 | End: 2022-07-18 | Stop reason: SDUPTHER

## 2022-06-20 ENCOUNTER — TELEPHONE (OUTPATIENT)
Dept: PRIMARY CARE CLINIC | Age: 82
End: 2022-06-20

## 2022-06-20 NOTE — TELEPHONE ENCOUNTER
Patients daughter called wondering if there was anything provider could send in to help with her mothers hip pain. She states that patient is in need of a hip replacement but will not tolerate surgery at her age. Daughter states that patient is in so much pain today that she is refusing to even get out of bed. Daughter asking for something that will at least allow patient to walk and move around the house as tylenol is not helping to control her pain. Patient has upcoming appointment on 7/18.

## 2022-06-21 NOTE — TELEPHONE ENCOUNTER
Called and LVM for Daughter Irma De Oliveira to contact the office to schedule telephone visit to discuss medication options for pain control.

## 2022-06-24 ENCOUNTER — TELEMEDICINE (OUTPATIENT)
Dept: PRIMARY CARE CLINIC | Age: 82
End: 2022-06-24
Payer: COMMERCIAL

## 2022-06-24 DIAGNOSIS — M16.12 PRIMARY OSTEOARTHRITIS OF LEFT HIP: Primary | ICD-10-CM

## 2022-06-24 PROCEDURE — G8427 DOCREV CUR MEDS BY ELIG CLIN: HCPCS | Performed by: NURSE PRACTITIONER

## 2022-06-24 PROCEDURE — 1123F ACP DISCUSS/DSCN MKR DOCD: CPT | Performed by: NURSE PRACTITIONER

## 2022-06-24 PROCEDURE — 1090F PRES/ABSN URINE INCON ASSESS: CPT | Performed by: NURSE PRACTITIONER

## 2022-06-24 PROCEDURE — G8400 PT W/DXA NO RESULTS DOC: HCPCS | Performed by: NURSE PRACTITIONER

## 2022-06-24 PROCEDURE — 99214 OFFICE O/P EST MOD 30 MIN: CPT | Performed by: NURSE PRACTITIONER

## 2022-06-24 RX ORDER — HYDROCODONE BITARTRATE AND ACETAMINOPHEN 5; 325 MG/1; MG/1
1 TABLET ORAL 2 TIMES DAILY PRN
Qty: 60 TABLET | Refills: 0 | Status: SHIPPED | OUTPATIENT
Start: 2022-06-24 | End: 2022-07-18

## 2022-06-24 ASSESSMENT — ENCOUNTER SYMPTOMS
BACK PAIN: 0
SHORTNESS OF BREATH: 0
ABDOMINAL PAIN: 0
COUGH: 0

## 2022-06-24 NOTE — PROGRESS NOTES
[facility] PRIMARY CARE  [facility] [address]  [address] [id number]  Dept: [phone]  Dept Fax: [phone]    Qiana Perkins is a 79 y.o. female who presentstoday for her medical conditions/complaints as noted below.   Qiana Perkins is c/o of  Chief Complaint   Patient presents with    Hip Pain           HPI:     Presents with daughter via telehealth for worsening of hip pain  Unable to review vitals    Pain is so bad she is no longer able to get out of bed  Using tylenol with minimal relief  In the past she has tried celebrex, IBU, tramadol all with no improvement  Decrease in quality of life  She is not a surgical candidate  She would like to trial alternate medication to assist with her pain    Denies any other problems/concerns      Hemoglobin A1C (%)   Date Value   09/03/2020 5.6   03/22/2019 5.6   05/10/2018 5.6             ( goal A1C is < 7)   No results found for: LABMICR  LDL Cholesterol (mg/dL)   Date Value   06/21/2021 37   05/10/2018 70   06/07/2017 76     LDL Calculated (mg/dL)   Date Value   12/07/2016 76       (goal LDL is <100)   AST (U/L)   Date Value   08/16/2021 16     ALT (U/L)   Date Value   08/16/2021 11     BUN (mg/dL)   Date Value   08/16/2021 13     BP Readings from Last 3 Encounters:   10/18/21 104/72   09/12/20 (!) 171/58   09/03/20 112/63          ([id number]/80)    Past Medical History:   Diagnosis Date    Anemia     Cataract     Cerebral artery occlusion with cerebral infarction ([address]     TIA    CHF (congestive heart failure) ([address]     QUESTIONABLE    Depression     Diabetes mellitus ([address]     Eczema     Gout     Hearing loss     Hiatal hernia     History of blood transfusion     Hyperlipidemia     Hypertension     PONV (postoperative nausea and vomiting)     Rectal urgency     Stress incontinence, female       Past Surgical History:   Procedure Laterality Date    ABDOMEN SURGERY  09/03/2020    egd/exploratory lap/reduction of paraesopheal hernia/mesh bridge of hiatel hernia defect/gastropexy x 2/kocherization of duodenum    APPENDECTOMY      CATARACT REMOVAL WITH IMPLANT Bilateral     COLONOSCOPY      EYE SURGERY      HYSTERECTOMY (CERVIX STATUS UNKNOWN)      JOINT REPLACEMENT      LAPAROTOMY N/A 9/3/2020    LAPAROTOMY EXPLORATORY, REDUCTION PARESOPHOGEAL HERNIA CONTAINING COLON AND ENTIRE STOMACH, MESH BRIDGE OF HIATAL DEFECT, GASTROPEXY X2, KOCHERIZATION OF DUODENUM performed by Iram Sheridan DO at [address] Left 11/6/2018    HIP TOTAL ARTHROPLASTY MINIMALLY INVASIVE ASI performed by Alecia Olsen MD at [facility] N/A 9/3/2020    EGD ESOPHAGOGASTRODUODENOSCOPY performed by Keily Kirkland MD at [place] History   Problem Relation Age of Onset    Heart Disease Mother     High Blood Pressure Mother     Cancer Father     Colon Cancer Father           Social History     Tobacco Use    Smoking status: Never Smoker    Smokeless tobacco: Never Used   Substance Use Topics    Alcohol use: No      Current Outpatient Medications   Medication Sig Dispense Refill    HYDROcodone-acetaminophen (NORCO) 5-325 MG per tablet Take 1 tablet by mouth 2 times daily as needed for Pain for up to 30 days.  60 tablet 0    memantine (NAMENDA) 10 MG tablet Take 1 tablet by mouth once daily 30 tablet 0    meclizine (ANTIVERT) 12.5 MG tablet TAKE 1 TABLET BY MOUTH THREE TIMES DAILY AS NEEDED FOR DIZZINESS 30 tablet 0    lisinopril (PRINIVIL;ZESTRIL) 20 MG tablet Take 1 tablet by mouth once daily 90 tablet 0    citalopram (CELEXA) 20 MG tablet Take 1 tablet by mouth once daily 90 tablet 0    furosemide (LASIX) 40 MG tablet Take 1 tablet by mouth once daily 90 tablet 0    pravastatin (PRAVACHOL) 40 MG tablet Take 1 tablet by mouth daily 90 tablet 0    KLOR-CON M20 20 MEQ extended release tablet Take 1 tablet by mouth daily 90 tablet 0    famotidine (PEPCID) 20 MG tablet Take 1 tablet by mouth daily 60 tablet 3    allopurinol (ZYLOPRIM) 100 MG tablet Take 1 tablet by mouth daily 90 tablet 3    traZODone (DESYREL) 50 MG tablet Take 1 tablet by mouth nightly as needed for Sleep 30 tablet 5    Lift Chair MISC 1 each by Does not apply route continuous 1 each 0    traZODone (DESYREL) 25 mg TABS Take 25 mg by mouth nightly      diclofenac sodium (VOLTAREN) 1 % GEL Apply topically 2 times daily      nystatin (MYCOSTATIN) 022584 UNIT/GM ointment Apply topically 2 times daily. 1 Tube 1    docusate sodium (COLACE) 100 MG capsule Take 1 capsule by mouth 2 times daily as needed for Constipation 60 capsule 3    Handicap Placard MISC by Does not apply route Expires 11/2024 1 each 0    Multiple Vitamins-Minerals (THERAPEUTIC MULTIVITAMIN-MINERALS) tablet Take 1 tablet by mouth daily      Misc. Devices MISC Shower transfer bench 1 Device 0    Misc. Devices MISC Adult pull up briefs  Dispense 100 100 Device 0    ferrous sulfate 325 (65 Fe) MG tablet Take 325 mg by mouth 2 times daily       Acetaminophen 325 MG CAPS Take 1 capsule by mouth daily as needed       Blood Pressure KIT 1 kit by Does not apply route daily 1 kit 0     No current facility-administered medications for this visit.      Allergies   Allergen Reactions    Codeine     Keflex [Cephalexin] Itching, Swelling and Rash       Health Maintenance   Topic Date Due    COVID-19 Vaccine (3 - Booster for Pfizer series) 07/03/2021    Lipids  06/21/2022    DTaP/Tdap/Td vaccine (1 - Tdap) 07/15/2022 (Originally 4/16/1959)    Shingles vaccine (2 of 3) 10/18/2022 (Originally 8/7/2015)    Depression Monitoring  10/18/2022    Annual Wellness Visit (AWV)  10/19/2022    DEXA (modify frequency per FRAX score)  Completed    Flu vaccine  Completed    Pneumococcal 65+ years Vaccine  Completed    Hepatitis A vaccine  Aged Out    Hib vaccine  Aged Out    Meningococcal (ACWY) vaccine  Aged Out       Subjective:      Review of Systems   Constitutional: Negative for chills, fatigue and fever. HENT: Negative for congestion. Eyes: Negative for visual disturbance. Respiratory: Negative for cough and shortness of breath. Cardiovascular: Negative for chest pain and palpitations. Gastrointestinal: Negative for abdominal pain. Genitourinary: Negative for dysuria. Musculoskeletal: Positive for arthralgias. Negative for back pain. Neurological: Negative for dizziness, numbness and headaches. Psychiatric/Behavioral: Negative for self-injury, sleep disturbance and suicidal ideas. The patient is not nervous/anxious. Objective:     Physical Exam  Vitals reviewed: unable to assess d/t telehealth. Constitutional:       Appearance: Normal appearance. Neurological:      General: No focal deficit present. Mental Status: She is alert and oriented to person, place, and time. Psychiatric:         Mood and Affect: Mood normal.         Behavior: Behavior normal.         Thought Content: Thought content normal.         Judgment: Judgment normal.       There were no vitals taken for this visit. Assessment:       Diagnosis Orders   1. Primary osteoarthritis of left hip  HYDROcodone-acetaminophen (NORCO) 5-325 MG per tablet             Plan:      Return if symptoms worsen or fail to improve. 1. Left hip pain- Tried and failed multiple medications. Rx given for norco. Follow up in one month for recheck/earlier if needed. Olamide Mathias, was evaluated through a synchronous (real-time) audio-video encounter. The patient (or guardian if applicable) is aware that this is a billable service, which includes applicable co-pays. This Virtual Visit was conducted with patient's (and/or legal guardian's) consent. The visit was conducted pursuant to the emergency declaration under the [address] [place] [address] authority and the [employer] General Act. Patient identification was verified, and a caregiver was present when appropriate. The patient was located at Home: [facility]  [address]  [address]. Provider was [facility] [address] [place] [address] [address]  [address] [facility] [address]  [facility].. Total time spent for this encounter: Not billed by time    --KAREEM Garza CNP on 6/24/2022 at 11:54 AM    An electronic signature was used to authenticate this note. Orders Placed This Encounter   Medications    HYDROcodone-acetaminophen (NORCO) 5-325 MG per tablet     Sig: Take 1 tablet by mouth 2 times daily as needed for Pain for up to 30 days. Dispense:  60 tablet     Refill:  0     Reduce doses taken as pain becomes manageable       Patient given educational materials - see patient instructions. Discussed use, benefit, and side effects of prescribed medications. All patientquestions answered. Pt voiced understanding. Reviewed health maintenance. Instructedto continue current medications, diet and exercise. Patient agreed with treatmentplan. Follow up as directed.      Electronicallysigned by KAREEM Garza CNP on 6/24/2022 at 11:51 AM

## 2022-06-29 ENCOUNTER — TELEPHONE (OUTPATIENT)
Dept: PRIMARY CARE CLINIC | Age: 82
End: 2022-06-29

## 2022-06-29 DIAGNOSIS — M16.12 PRIMARY OSTEOARTHRITIS OF LEFT HIP: Primary | ICD-10-CM

## 2022-06-29 NOTE — TELEPHONE ENCOUNTER
Patient's daughter called stating the pain med mother is on has codeine in it and is allergic to codeine, does okay with morning dose but evening dose her face gets flushed and puffy. Asking about changing pain meds. Please advise.

## 2022-06-29 NOTE — TELEPHONE ENCOUNTER
Any pain med I give her will have the chance of reaction d/t allergy of codeine.     Unable to tolerate NSAIDS    My next suggestion would be referral to pain management for further eval if they would like

## 2022-07-12 ENCOUNTER — TELEPHONE (OUTPATIENT)
Dept: PRIMARY CARE CLINIC | Age: 82
End: 2022-07-12

## 2022-07-12 DIAGNOSIS — F01.50 VASCULAR DEMENTIA WITHOUT BEHAVIORAL DISTURBANCE (HCC): ICD-10-CM

## 2022-07-12 DIAGNOSIS — I63.9 CEREBROVASCULAR ACCIDENT (CVA), UNSPECIFIED MECHANISM (HCC): ICD-10-CM

## 2022-07-12 DIAGNOSIS — F03.90 DEMENTIA WITHOUT BEHAVIORAL DISTURBANCE, UNSPECIFIED DEMENTIA TYPE: ICD-10-CM

## 2022-07-12 DIAGNOSIS — M16.12 PRIMARY OSTEOARTHRITIS OF LEFT HIP: Primary | ICD-10-CM

## 2022-07-13 NOTE — TELEPHONE ENCOUNTER
Last Visit Date: 6/24/2022   Next Visit Date: 7/18/2022       Shower chair order has been faxed with office notes

## 2022-07-14 ENCOUNTER — TELEPHONE (OUTPATIENT)
Dept: PRIMARY CARE CLINIC | Age: 82
End: 2022-07-14

## 2022-07-14 DIAGNOSIS — I63.9 CEREBROVASCULAR ACCIDENT (CVA), UNSPECIFIED MECHANISM (HCC): ICD-10-CM

## 2022-07-14 DIAGNOSIS — M16.12 PRIMARY OSTEOARTHRITIS OF LEFT HIP: Primary | ICD-10-CM

## 2022-07-14 NOTE — TELEPHONE ENCOUNTER
Silvia from the [employer] ([phone]) is calling into the office and requesting a script for a lift chair for patient.  Would like faxed when placed    Fax [phone]

## 2022-07-18 ENCOUNTER — HOSPITAL ENCOUNTER (OUTPATIENT)
Age: 82
Setting detail: SPECIMEN
Discharge: HOME | End: 2022-07-18

## 2022-07-18 ENCOUNTER — OFFICE VISIT (OUTPATIENT)
Dept: PRIMARY CARE CLINIC | Age: 82
End: 2022-07-18
Payer: COMMERCIAL

## 2022-07-18 VITALS
BODY MASS INDEX: 24.99 KG/M2 | OXYGEN SATURATION: 99 % | SYSTOLIC BLOOD PRESSURE: 122 MMHG | HEART RATE: 74 BPM | RESPIRATION RATE: 13 BRPM | HEIGHT: 65 IN | DIASTOLIC BLOOD PRESSURE: 74 MMHG | WEIGHT: 150 LBS

## 2022-07-18 DIAGNOSIS — R06.02 SOB (SHORTNESS OF BREATH): ICD-10-CM

## 2022-07-18 DIAGNOSIS — R53.83 OTHER FATIGUE: Primary | ICD-10-CM

## 2022-07-18 DIAGNOSIS — E78.5 HYPERLIPIDEMIA, UNSPECIFIED HYPERLIPIDEMIA TYPE: ICD-10-CM

## 2022-07-18 DIAGNOSIS — R53.83 OTHER FATIGUE: ICD-10-CM

## 2022-07-18 DIAGNOSIS — F01.50 VASCULAR DEMENTIA WITHOUT BEHAVIORAL DISTURBANCE (HCC): ICD-10-CM

## 2022-07-18 DIAGNOSIS — R73.09 ELEVATED GLUCOSE: ICD-10-CM

## 2022-07-18 DIAGNOSIS — M16.12 PRIMARY OSTEOARTHRITIS OF LEFT HIP: ICD-10-CM

## 2022-07-18 DIAGNOSIS — S31.809S WOUND OF BUTTOCK, UNSPECIFIED LATERALITY, SEQUELA: ICD-10-CM

## 2022-07-18 LAB
ALBUMIN SERPL-MCNC: 3.8 G/DL (ref 3.5–5.2)
ALBUMIN/GLOBULIN RATIO: 1.2 (ref 1–2.5)
ALP BLD-CCNC: 137 U/L (ref 35–104)
ALT SERPL-CCNC: 7 U/L (ref 5–33)
ANION GAP SERPL CALCULATED.3IONS-SCNC: 12 MMOL/L (ref 9–17)
AST SERPL-CCNC: 14 U/L
BILIRUB SERPL-MCNC: 0.36 MG/DL (ref 0.3–1.2)
BUN BLDV-MCNC: 23 MG/DL (ref 8–23)
CALCIUM SERPL-MCNC: 8.8 MG/DL (ref 8.6–10.4)
CHLORIDE BLD-SCNC: 103 MMOL/L (ref 98–107)
CHOLESTEROL/HDL RATIO: 2.6
CHOLESTEROL: 125 MG/DL
CO2: 26 MMOL/L (ref 20–31)
CREAT SERPL-MCNC: 1.42 MG/DL (ref 0.5–0.9)
ESTIMATED AVERAGE GLUCOSE: 100 MG/DL
GFR AFRICAN AMERICAN: 43 ML/MIN
GFR NON-AFRICAN AMERICAN: 35 ML/MIN
GFR SERPL CREATININE-BSD FRML MDRD: ABNORMAL ML/MIN/{1.73_M2}
GLUCOSE BLD-MCNC: 91 MG/DL (ref 70–99)
HBA1C MFR BLD: 5.1 % (ref 4–6)
HCT VFR BLD CALC: 35.6 % (ref 36.3–47.1)
HDLC SERPL-MCNC: 49 MG/DL
HEMOGLOBIN: 11.6 G/DL (ref 11.9–15.1)
LDL CHOLESTEROL: 52 MG/DL (ref 0–130)
MCH RBC QN AUTO: 29.9 PG (ref 25.2–33.5)
MCHC RBC AUTO-ENTMCNC: 32.6 G/DL (ref 28.4–34.8)
MCV RBC AUTO: 91.8 FL (ref 82.6–102.9)
NRBC AUTOMATED: 0 PER 100 WBC
PDW BLD-RTO: 13.4 % (ref 11.8–14.4)
PLATELET # BLD: 202 K/UL (ref 138–453)
PMV BLD AUTO: 12.1 FL (ref 8.1–13.5)
POTASSIUM SERPL-SCNC: 4.4 MMOL/L (ref 3.7–5.3)
PRO-BNP: 2451 PG/ML
RBC # BLD: 3.88 M/UL (ref 3.95–5.11)
SODIUM BLD-SCNC: 141 MMOL/L (ref 135–144)
TOTAL PROTEIN: 7 G/DL (ref 6.4–8.3)
TRIGL SERPL-MCNC: 119 MG/DL
TSH SERPL DL<=0.05 MIU/L-ACNC: 1.11 UIU/ML (ref 0.3–5)
WBC # BLD: 13.5 K/UL (ref 3.5–11.3)

## 2022-07-18 PROCEDURE — G8427 DOCREV CUR MEDS BY ELIG CLIN: HCPCS | Performed by: NURSE PRACTITIONER

## 2022-07-18 PROCEDURE — G8400 PT W/DXA NO RESULTS DOC: HCPCS | Performed by: NURSE PRACTITIONER

## 2022-07-18 PROCEDURE — 1090F PRES/ABSN URINE INCON ASSESS: CPT | Performed by: NURSE PRACTITIONER

## 2022-07-18 PROCEDURE — G8420 CALC BMI NORM PARAMETERS: HCPCS | Performed by: NURSE PRACTITIONER

## 2022-07-18 PROCEDURE — 1036F TOBACCO NON-USER: CPT | Performed by: NURSE PRACTITIONER

## 2022-07-18 PROCEDURE — 99214 OFFICE O/P EST MOD 30 MIN: CPT | Performed by: NURSE PRACTITIONER

## 2022-07-18 PROCEDURE — 1123F ACP DISCUSS/DSCN MKR DOCD: CPT | Performed by: NURSE PRACTITIONER

## 2022-07-18 RX ORDER — OSTOMY SUPPLY 1"
1 EACH MISCELLANEOUS DAILY
Qty: 20 G | Refills: 3 | Status: SHIPPED | OUTPATIENT
Start: 2022-07-18

## 2022-07-18 RX ORDER — DONEPEZIL HYDROCHLORIDE 5 MG/1
5 TABLET, FILM COATED ORAL NIGHTLY
Qty: 30 TABLET | Refills: 3 | Status: SHIPPED | OUTPATIENT
Start: 2022-07-18

## 2022-07-18 RX ORDER — CITALOPRAM 40 MG/1
40 TABLET ORAL DAILY
Qty: 30 TABLET | Refills: 1 | Status: SHIPPED | OUTPATIENT
Start: 2022-07-18 | End: 2022-09-14

## 2022-07-18 RX ORDER — MEMANTINE HYDROCHLORIDE 10 MG/1
TABLET ORAL
Qty: 30 TABLET | Refills: 0 | Status: SHIPPED | OUTPATIENT
Start: 2022-07-18 | End: 2022-08-17

## 2022-07-18 RX ORDER — LIDOCAINE 50 MG/G
1 PATCH TOPICAL DAILY
Qty: 10 PATCH | Refills: 0 | Status: SHIPPED | OUTPATIENT
Start: 2022-07-18 | End: 2022-07-28

## 2022-07-18 ASSESSMENT — PATIENT HEALTH QUESTIONNAIRE - PHQ9
SUM OF ALL RESPONSES TO PHQ QUESTIONS 1-9: 7
10. IF YOU CHECKED OFF ANY PROBLEMS, HOW DIFFICULT HAVE THESE PROBLEMS MADE IT FOR YOU TO DO YOUR WORK, TAKE CARE OF THINGS AT HOME, OR GET ALONG WITH OTHER PEOPLE: 1
7. TROUBLE CONCENTRATING ON THINGS, SUCH AS READING THE NEWSPAPER OR WATCHING TELEVISION: 1
5. POOR APPETITE OR OVEREATING: 0
1. LITTLE INTEREST OR PLEASURE IN DOING THINGS: 0
SUM OF ALL RESPONSES TO PHQ QUESTIONS 1-9: 7
SUM OF ALL RESPONSES TO PHQ QUESTIONS 1-9: 7
4. FEELING TIRED OR HAVING LITTLE ENERGY: 2
2. FEELING DOWN, DEPRESSED OR HOPELESS: 3
9. THOUGHTS THAT YOU WOULD BE BETTER OFF DEAD, OR OF HURTING YOURSELF: 0
6. FEELING BAD ABOUT YOURSELF - OR THAT YOU ARE A FAILURE OR HAVE LET YOURSELF OR YOUR FAMILY DOWN: 1
8. MOVING OR SPEAKING SO SLOWLY THAT OTHER PEOPLE COULD HAVE NOTICED. OR THE OPPOSITE, BEING SO FIGETY OR RESTLESS THAT YOU HAVE BEEN MOVING AROUND A LOT MORE THAN USUAL: 0
3. TROUBLE FALLING OR STAYING ASLEEP: 0
SUM OF ALL RESPONSES TO PHQ QUESTIONS 1-9: 7
SUM OF ALL RESPONSES TO PHQ9 QUESTIONS 1 & 2: 3

## 2022-07-18 ASSESSMENT — ENCOUNTER SYMPTOMS
SHORTNESS OF BREATH: 1
BACK PAIN: 0
ABDOMINAL PAIN: 0
COUGH: 0

## 2022-07-18 NOTE — PROGRESS NOTES
[address] PRIMARY CARE  [address]  [address] [address]  Dept: [phone]  Dept Fax: [phone]    Haroldo Rosen is a 79 y.o. female who presentstoday for her medical conditions/complaints as noted below.   Haroldo Rosen is c/o of  Chief Complaint   Patient presents with    6 Month Follow-Up    Hip Pain     Left hip pain     Shaking    Wound Check     On buttocks     Cold Extremity     Feet get really cold     Discuss Medications     For memory            HPI:     Presents with daughters for 6 month recheck with multiple concerns  BP well controlled  Weight is stable    Chronic hip pain, unable to tolerate medications d/t flushing  Will continue lidocaine patch  Has appt with pain management 8/8/22  Severely impacts mobility, spends most of her time sitting in wheelchair with gel cushion  Has breakdown noted to bilateral buttocks, will use barrier cream and duoderm  Has been referred to home care for PT but this has not been started  Will send to alternate agency  Discussed need for lift chair d/t chronic hip arthritis  I believe this will help improve her condition  She is incapable of standing from a regular armchair or any chair in their home  Once standing she is able to ambulate with her walker    C/o bilateral cold extremities  Willing to update annual labs    Would like to increase medication for dementia  Nameda at max dose  Will add aricept    Patient is concerned for CHF d/t SOB and swelling  Will update BNP    Denies any other problems/concerns      Hemoglobin A1C (%)   Date Value   09/03/2020 5.6   03/22/2019 5.6   05/10/2018 5.6             ( goal A1C is < 7)   No results found for: LABMICR  LDL Cholesterol (mg/dL)   Date Value   06/21/2021 37   05/10/2018 70   06/07/2017 76     LDL Calculated (mg/dL)   Date Value   12/07/2016 76       (goal LDL is <100)   AST (U/L)   Date Value   08/16/2021 16     ALT (U/L)   Date Value   08/16/2021 11 BUN (mg/dL)   Date Value   08/16/2021 13     BP Readings from Last 3 Encounters:   07/18/22 122/74   10/18/21 104/72   09/12/20 (!) 171/58          ([id number]/80)    Past Medical History:   Diagnosis Date    Anemia     Cataract     Cerebral artery occlusion with cerebral infarction (HCC)     TIA    CHF (congestive heart failure) (HCC)     QUESTIONABLE    Depression     Diabetes mellitus (HCC)     Eczema     Gout     Hearing loss     Hiatal hernia     History of blood transfusion     Hyperlipidemia     Hypertension     PONV (postoperative nausea and vomiting)     Rectal urgency     Stress incontinence, female       Past Surgical History:   Procedure Laterality Date    ABDOMEN SURGERY  09/03/2020    egd/exploratory lap/reduction of paraesopheal hernia/mesh bridge of hiatel hernia defect/gastropexy x 2/kocherization of duodenum    APPENDECTOMY      CATARACT REMOVAL WITH IMPLANT Bilateral     COLONOSCOPY      EYE SURGERY      HYSTERECTOMY (CERVIX STATUS UNKNOWN)      JOINT REPLACEMENT      LAPAROTOMY N/A 9/3/2020    LAPAROTOMY EXPLORATORY, REDUCTION PARESOPHOGEAL HERNIA CONTAINING COLON AND ENTIRE STOMACH, MESH BRIDGE OF HIATAL DEFECT, GASTROPEXY X2, KOCHERIZATION OF DUODENUM performed by Mars Hernandez DO at [address] [facility] Left 11/6/2018    HIP TOTAL ARTHROPLASTY MINIMALLY INVASIVE ASI performed by Consuelo Jacinto MD at [facility] N/A 9/3/2020    EGD ESOPHAGOGASTRODUODENOSCOPY performed by Silvestre Charles MD at [place] History   Problem Relation Age of Onset    Heart Disease Mother     High Blood Pressure Mother     Cancer Father     Colon Cancer Father           Social History     Tobacco Use    Smoking status: Never    Smokeless tobacco: Never   Substance Use Topics    Alcohol use: No      Current Outpatient Medications   Medication Sig Dispense Refill    memantine (NAMENDA) 10 MG tablet Take 1 tablet by mouth kit by Does not apply route daily 1 kit 0     No current facility-administered medications for this visit. Allergies   Allergen Reactions    Codeine     Keflex [Cephalexin] Itching, Swelling and Rash       Health Maintenance   Topic Date Due    COVID-19 Vaccine (3 - Booster for Pfizer series) 07/03/2021    Lipids  06/21/2022    DTaP/Tdap/Td vaccine (1 - Tdap) 08/08/2022 (Originally 4/16/1959)    Shingles vaccine (2 of 3) 10/18/2022 (Originally 8/7/2015)    Flu vaccine (1) 09/01/2022    Depression Monitoring  10/18/2022    Annual Wellness Visit (AWV)  10/19/2022    DEXA (modify frequency per FRAX score)  Completed    Pneumococcal 65+ years Vaccine  Completed    Hepatitis A vaccine  Aged Out    Hib vaccine  Aged Out    Meningococcal (ACWY) vaccine  Aged Out       Subjective:      Review of Systems   Constitutional:  Positive for fatigue. Negative for chills and fever. HENT:  Negative for congestion. Respiratory:  Positive for shortness of breath. Negative for cough. Cardiovascular:  Positive for leg swelling. Negative for chest pain and palpitations. Gastrointestinal:  Negative for abdominal pain. Genitourinary:  Negative for dysuria. Musculoskeletal:  Positive for arthralgias and gait problem. Negative for back pain. Neurological:  Negative for dizziness, numbness and headaches. Psychiatric/Behavioral:  Negative for self-injury, sleep disturbance and suicidal ideas. The patient is not nervous/anxious. Objective:     Physical Exam  Vitals and nursing note reviewed. Constitutional:       Appearance: She is well-developed. HENT:      Head: Normocephalic and atraumatic. Eyes:      Pupils: Pupils are equal, round, and reactive to light. Cardiovascular:      Rate and Rhythm: Normal rate and regular rhythm. Heart sounds: Normal heart sounds. Pulmonary:      Effort: Pulmonary effort is normal.      Breath sounds: Normal breath sounds.    Abdominal:      General: Bowel sounds are normal.      Palpations: Abdomen is soft. Tenderness: There is no abdominal tenderness. Musculoskeletal:         General: Normal range of motion. Cervical back: Normal range of motion and neck supple. Skin:     General: Skin is warm and dry. Neurological:      Mental Status: She is alert and oriented to person, place, and time. Psychiatric:         Behavior: Behavior normal.         Thought Content: Thought content normal.         Judgment: Judgment normal.     /74   Pulse 74   Resp 13   Ht 5' 5\" (1.651 m)   Wt 150 lb (68 kg) Comment: wheelchair bound  SpO2 99%   BMI 24.96 kg/m²     Assessment:       Diagnosis Orders   1. Other fatigue  Comprehensive Metabolic Panel    CBC    TSH with Reflex      2. Vascular dementia without behavioral disturbance (HCC)  memantine (NAMENDA) 10 MG tablet      3. SOB (shortness of breath)  Brain Natriuretic Peptide      4. Hyperlipidemia, unspecified hyperlipidemia type  Lipid Panel      5. Elevated glucose  Hemoglobin A1C      6. Primary osteoarthritis of left hip  [place]      7. Wound of buttock, unspecified laterality, sequela  Control Gel Formula Dressing (DUODERM CGF DRESSING) MISC                Plan:      Return in about 3 months (around 10/18/2022). Chronic conditions- Update labs. Added aricept. Rx given for wound care. Referral to home care for SN/PT/OT. Follow up in 3 months for recheck/earlier if needed.     Orders Placed This Encounter   Procedures    Comprehensive Metabolic Panel     Standing Status:   Future     Number of Occurrences:   1     Standing Expiration Date:   7/18/2023    CBC     Standing Status:   Future     Number of Occurrences:   1     Standing Expiration Date:   7/18/2023    Lipid Panel     Standing Status:   Future     Number of Occurrences:   1     Standing Expiration Date:   7/18/2023     Order Specific Question:   Is Patient Fasting?/# of Hours     Answer:   12    Hemoglobin A1C Standing Status:   Future     Number of Occurrences:   1     Standing Expiration Date:   7/18/2023    TSH with Reflex     Standing Status:   Future     Number of Occurrences:   1     Standing Expiration Date:   7/18/2023    Brain Natriuretic Peptide     Standing Status:   Future     Number of Occurrences:   1     Standing Expiration Date:   7/18/2023 [facility]     Referral Priority:   Routine     Referral Type:   Home Health Care     Referral Reason:   Specialty Services Required     Referral Location:   [address]     Requested Specialty:   [address]     Number of Visits Requested:   1        Orders Placed This Encounter   Medications    memantine (NAMENDA) 10 MG tablet     Sig: Take 1 tablet by mouth once daily     Dispense:  30 tablet     Refill:  0    citalopram (CELEXA) 40 MG tablet     Sig: Take 1 tablet by mouth in the morning. Dispense:  30 tablet     Refill:  1    donepezil (ARICEPT) 5 MG tablet     Sig: Take 1 tablet by mouth nightly     Dispense:  30 tablet     Refill:  3    Control Gel Formula Dressing (DUODERM CGF DRESSING) MISC     Sig: Apply 1 each topically daily     Dispense:  20 g     Refill:  3    lidocaine (LIDODERM) 5 %     Sig: Place 1 patch onto the skin in the morning for 10 days. 12 hours on, 12 hours off. .     Dispense:  10 patch     Refill:  0    Zinc Oxide 10 % OINT     Sig: Apply 1 applicator topically daily     Dispense:  57 g     Refill:  1       Patient given educational materials - see patient instructions. Discussed use, benefit, and side effects of prescribed medications. All patientquestions answered. Pt voiced understanding. Reviewed health maintenance. Instructedto continue current medications, diet and exercise. Patient agreed with treatmentplan. Follow up as directed.      Electronicallysigned by KAREEM Diaz CNP on 7/18/2022 at 3:20 PM

## 2022-07-19 DIAGNOSIS — R79.89 ELEVATED BRAIN NATRIURETIC PEPTIDE (BNP) LEVEL: Primary | ICD-10-CM

## 2022-07-19 DIAGNOSIS — I10 ESSENTIAL HYPERTENSION: ICD-10-CM

## 2022-07-19 DIAGNOSIS — E78.2 MIXED HYPERLIPIDEMIA: ICD-10-CM

## 2022-07-20 ENCOUNTER — TELEPHONE (OUTPATIENT)
Dept: PRIMARY CARE CLINIC | Age: 82
End: 2022-07-20

## 2022-07-20 RX ORDER — MECLIZINE HCL 12.5 MG/1
TABLET ORAL
Qty: 30 TABLET | Refills: 0 | Status: SHIPPED | OUTPATIENT
Start: 2022-07-20 | End: 2022-07-21

## 2022-07-20 RX ORDER — PRAVASTATIN SODIUM 40 MG
TABLET ORAL
Qty: 90 TABLET | Refills: 0 | Status: SHIPPED | OUTPATIENT
Start: 2022-07-20 | End: 2022-10-18 | Stop reason: SDUPTHER

## 2022-07-20 RX ORDER — POTASSIUM CHLORIDE 1500 MG/1
TABLET, EXTENDED RELEASE ORAL
Qty: 90 TABLET | Refills: 0 | Status: SHIPPED | OUTPATIENT
Start: 2022-07-20 | End: 2022-11-03 | Stop reason: SDUPTHER

## 2022-07-20 NOTE — TELEPHONE ENCOUNTER
Yes delores the lidocaine. Sorry. The pharmacist told her that  it was the over the counter strength that was ordered and that she needed the higher strength.

## 2022-07-21 RX ORDER — MECLIZINE HCL 12.5 MG/1
TABLET ORAL
Qty: 30 TABLET | Refills: 0 | Status: SHIPPED | OUTPATIENT
Start: 2022-07-21 | End: 2022-09-21

## 2022-07-21 NOTE — TELEPHONE ENCOUNTER
I called the pharmacy and   Jose C Martinez said that she sent over as PA for the 5% lidocaine and is waiting for approval.

## 2022-08-08 ENCOUNTER — INITIAL CONSULT (OUTPATIENT)
Dept: PAIN MANAGEMENT | Age: 82
End: 2022-08-08
Payer: COMMERCIAL

## 2022-08-08 VITALS — HEIGHT: 65 IN | WEIGHT: 148 LBS | BODY MASS INDEX: 24.66 KG/M2

## 2022-08-08 DIAGNOSIS — Z79.891 ENCOUNTER FOR LONG-TERM OPIATE ANALGESIC USE: ICD-10-CM

## 2022-08-08 DIAGNOSIS — G89.29 OTHER CHRONIC PAIN: Primary | ICD-10-CM

## 2022-08-08 DIAGNOSIS — G89.29 CHRONIC BILATERAL LOW BACK PAIN, UNSPECIFIED WHETHER SCIATICA PRESENT: ICD-10-CM

## 2022-08-08 DIAGNOSIS — M54.50 CHRONIC BILATERAL LOW BACK PAIN, UNSPECIFIED WHETHER SCIATICA PRESENT: ICD-10-CM

## 2022-08-08 DIAGNOSIS — M25.551 BILATERAL HIP PAIN: ICD-10-CM

## 2022-08-08 DIAGNOSIS — M25.552 BILATERAL HIP PAIN: ICD-10-CM

## 2022-08-08 PROCEDURE — G8420 CALC BMI NORM PARAMETERS: HCPCS | Performed by: PAIN MEDICINE

## 2022-08-08 PROCEDURE — 1090F PRES/ABSN URINE INCON ASSESS: CPT | Performed by: PAIN MEDICINE

## 2022-08-08 PROCEDURE — G8427 DOCREV CUR MEDS BY ELIG CLIN: HCPCS | Performed by: PAIN MEDICINE

## 2022-08-08 PROCEDURE — 1123F ACP DISCUSS/DSCN MKR DOCD: CPT | Performed by: PAIN MEDICINE

## 2022-08-08 PROCEDURE — 1036F TOBACCO NON-USER: CPT | Performed by: PAIN MEDICINE

## 2022-08-08 PROCEDURE — G8400 PT W/DXA NO RESULTS DOC: HCPCS | Performed by: PAIN MEDICINE

## 2022-08-08 PROCEDURE — 99204 OFFICE O/P NEW MOD 45 MIN: CPT | Performed by: PAIN MEDICINE

## 2022-08-08 NOTE — PROGRESS NOTES
HPI:     Hip Pain   The incident occurred more than 1 week ago. The injury mechanism is unknown. The pain is present in the left hip. The quality of the pain is described as shooting. The pain is at a severity of 6/10. The pain is moderate. The pain has been Constant since onset. Associated symptoms include an inability to bear weight, a loss of motion and muscle weakness. Pertinent negatives include no loss of sensation, numbness or tingling. Possible foreign bodies include metal. The symptoms are aggravated by movement and weight bearing. She has tried non-weight bearing, rest, immobilization, heat, ice, acetaminophen and NSAIDs for the symptoms. The treatment provided no relief. Complains of significant hip pain. History of the left hip replacement. Continues to have pain. X-ray of the pelvis from 2019 with hip replacement and degenerative changes. On Namenda and Aricept. Patient denies any new neurological symptoms. No bowel or bladder incontinence, no weakness, and no falling. Review of OARRS does not show any aberrant prescription behavior.      Past Medical History:   Diagnosis Date    Anemia     Cataract     Cerebral artery occlusion with cerebral infarction ([address]     TIA    CHF (congestive heart failure) ([address]     QUESTIONABLE    Depression     Diabetes mellitus (HCC)     Eczema     Gout     Hearing loss     Hiatal hernia     History of blood transfusion     Hyperlipidemia     Hypertension     PONV (postoperative nausea and vomiting)     Rectal urgency     Stress incontinence, female        Past Surgical History:   Procedure Laterality Date    ABDOMEN SURGERY  09/03/2020    egd/exploratory lap/reduction of paraesopheal hernia/mesh bridge of hiatel hernia defect/gastropexy x 2/kocherization of duodenum    APPENDECTOMY      CATARACT REMOVAL WITH IMPLANT Bilateral     COLONOSCOPY      EYE SURGERY      HYSTERECTOMY (CERVIX STATUS UNKNOWN)      JOINT REPLACEMENT      LAPAROTOMY N/A 9/3/2020 LAPAROTOMY EXPLORATORY, REDUCTION PARESOPHOGEAL HERNIA CONTAINING COLON AND ENTIRE STOMACH, MESH BRIDGE OF HIATAL DEFECT, GASTROPEXY X2, KOCHERIZATION OF DUODENUM performed by Dashawn Torres DO at [address] [facility],  [address] Left 11/6/2018    HIP TOTAL ARTHROPLASTY MINIMALLY INVASIVE ASI performed by Hernán Mendez MD at [facility] [address] N/A 9/3/2020    EGD ESOPHAGOGASTRODUODENOSCOPY performed by Omid Ngo MD at [address] [place] [address] [address]   Allergen Reactions    Codeine     Keflex [Cephalexin] Itching, Swelling and Rash         Current Outpatient Medications:     meclizine (ANTIVERT) 12.5 MG tablet, TAKE 1 TABLET BY MOUTH THREE TIMES DAILY AS NEEDED FOR DIZZINESS, Disp: 30 tablet, Rfl: 0    pravastatin (PRAVACHOL) 40 MG tablet, Take 1 tablet by mouth once daily, Disp: 90 tablet, Rfl: 0    KLOR-CON M20 20 MEQ extended release tablet, Take 1 tablet by mouth once daily, Disp: 90 tablet, Rfl: 0    memantine (NAMENDA) 10 MG tablet, Take 1 tablet by mouth once daily, Disp: 30 tablet, Rfl: 0    citalopram (CELEXA) 40 MG tablet, Take 1 tablet by mouth in the morning., Disp: 30 tablet, Rfl: 1    donepezil (ARICEPT) 5 MG tablet, Take 1 tablet by mouth nightly, Disp: 30 tablet, Rfl: 3    Control Gel Formula Dressing (DUODERM CGF DRESSING) MISC, Apply 1 each topically daily, Disp: 20 g, Rfl: 3    Zinc Oxide 10 % OINT, Apply 1 applicator topically daily, Disp: 57 g, Rfl: 1    Lift Chair MISC, 1 each by Does not apply route continuous, Disp: 1 each, Rfl: 0    lisinopril (PRINIVIL;ZESTRIL) 20 MG tablet, Take 1 tablet by mouth once daily, Disp: 90 tablet, Rfl: 0    furosemide (LASIX) 40 MG tablet, Take 1 tablet by mouth once daily, Disp: 90 tablet, Rfl: 0    famotidine (PEPCID) 20 MG tablet, Take 1 tablet by mouth daily, Disp: 60 tablet, Rfl: 3    allopurinol (ZYLOPRIM) 100 MG tablet, Take 1 tablet by mouth daily, Disp: 90 tablet, Rfl: 3    traZODone (DESYREL) 50 MG tablet, Take 1 tablet by mouth nightly as needed for Sleep, Disp: 30 tablet, Rfl: 5    Lift Chair MISC, 1 each by Does not apply route continuous, Disp: 1 each, Rfl: 0    docusate sodium (COLACE) 100 MG capsule, Take 1 capsule by mouth 2 times daily as needed for Constipation, Disp: 60 capsule, Rfl: 3    Handicap Placard MISC, by Does not apply route Expires 11/2024, Disp: 1 each, Rfl: 0    Multiple Vitamins-Minerals (THERAPEUTIC MULTIVITAMIN-MINERALS) tablet, Take 1 tablet by mouth daily, Disp: , Rfl:     Misc. Devices MISC, Shower transfer bench, Disp: 1 Device, Rfl: 0    Misc.  Devices MISC, Adult pull up briefs Dispense 100, Disp: 100 Device, Rfl: 0    ferrous sulfate 325 (65 Fe) MG tablet, Take 325 mg by mouth 2 times daily , Disp: , Rfl:     Acetaminophen 325 MG CAPS, Take 1 capsule by mouth daily as needed , Disp: , Rfl:     Blood Pressure KIT, 1 kit by Does not apply route daily, Disp: 1 kit, Rfl: 0    Family History   Problem Relation Age of Onset    Heart Disease Mother     High Blood Pressure Mother     Cancer Father     Colon Cancer Father        Social History     Socioeconomic History    Marital status:      Spouse name: Not on file    Number of children: Not on file    Years of education: Not on file    Highest education level: Not on file   Occupational History    Not on file   Tobacco Use    Smoking status: Never    Smokeless tobacco: Never   Vaping Use    Vaping Use: Never used   Substance and Sexual Activity    Alcohol use: No    Drug use: No    Sexual activity: Not on file   Other Topics Concern    Not on file   Social History Narrative    Not on file     Social Determinants of Health     Financial Resource Strain: Low Risk     Difficulty of Paying Living Expenses: Not hard at all   Food Insecurity: No Food Insecurity    Worried About Running Out of Food in the Last Year: Never true    Ran Out of Food in the Last Year: Never true   Transportation Needs: Not on file   Physical Activity: Not on file   Stress: Not on file   Social Connections: Not on file   Intimate Partner Violence: Not on file   Housing Stability: Not on file       Review of Systems:  Review of Systems   Neurological:  Negative for numbness and tingling. Physical Exam:  Ht 5' 5\" (1.651 m)   Wt 148 lb (67.1 kg)   BMI 24.63 kg/m²     Physical Exam  Constitutional:       Appearance: Normal appearance. Pulmonary:      Effort: Pulmonary effort is normal.   Neurological:      Mental Status: She is alert. Psychiatric:         Attention and Perception: Attention and perception normal.         Mood and Affect: Mood and affect normal.       Record/Diagnostics Review:    As above, I did independently review the imaging    Orders:  Orders Placed This Encounter   Procedures    XR LUMBAR SPINE (2-3 VIEWS)    XR HIP BILATERAL W AP PELVIS (2 VIEWS)    DRUG SCREEN, PAIN    Ambulatory referral to Physical Therapy       Assessment:  1. Other chronic pain    2. Bilateral hip pain    3. Chronic bilateral low back pain, unspecified whether sciatica present    4. Encounter for long-term opiate analgesic use        Treatment Plan:  DISCUSSION: Treatment options discussed with patient and all questions answered to patient's satisfaction. OARRS Review: Reviewed and acceptable for medications prescribed. TREATMENT OPTIONS:     Try compounding cream to the affected area. UDS  Xrays L spine and Hips  PT  Consider SI joint injections. Shashank Sullivan M.D. I have reviewed the chief complaint and history of present illness (including ROS and PFSH) and vital documentation by my staff and I agree with their documentation and have added where applicable.

## 2022-08-16 DIAGNOSIS — F01.50 VASCULAR DEMENTIA WITHOUT BEHAVIORAL DISTURBANCE (HCC): ICD-10-CM

## 2022-08-17 RX ORDER — MEMANTINE HYDROCHLORIDE 10 MG/1
TABLET ORAL
Qty: 30 TABLET | Refills: 0 | Status: SHIPPED | OUTPATIENT
Start: 2022-08-17 | End: 2022-09-14

## 2022-08-19 ENCOUNTER — HOSPITAL ENCOUNTER (OUTPATIENT)
Facility: CLINIC | Age: 82
Discharge: HOME | End: 2022-08-21
Payer: COMMERCIAL

## 2022-08-19 ENCOUNTER — HOSPITAL ENCOUNTER (OUTPATIENT)
Dept: GENERAL RADIOLOGY | Facility: CLINIC | Age: 82
Discharge: HOME | End: 2022-08-21
Payer: COMMERCIAL

## 2022-08-19 DIAGNOSIS — G89.29 CHRONIC BILATERAL LOW BACK PAIN, UNSPECIFIED WHETHER SCIATICA PRESENT: ICD-10-CM

## 2022-08-19 DIAGNOSIS — M54.50 CHRONIC BILATERAL LOW BACK PAIN, UNSPECIFIED WHETHER SCIATICA PRESENT: ICD-10-CM

## 2022-08-19 DIAGNOSIS — M25.551 BILATERAL HIP PAIN: ICD-10-CM

## 2022-08-19 DIAGNOSIS — M25.552 BILATERAL HIP PAIN: ICD-10-CM

## 2022-08-19 PROCEDURE — 72100 X-RAY EXAM L-S SPINE 2/3 VWS: CPT

## 2022-08-19 PROCEDURE — 73521 X-RAY EXAM HIPS BI 2 VIEWS: CPT

## 2022-08-24 RX ORDER — FUROSEMIDE 40 MG/1
TABLET ORAL
Qty: 90 TABLET | Refills: 0 | Status: SHIPPED | OUTPATIENT
Start: 2022-08-24

## 2022-08-24 RX ORDER — LISINOPRIL 20 MG/1
TABLET ORAL
Qty: 90 TABLET | Refills: 0 | Status: SHIPPED | OUTPATIENT
Start: 2022-08-24

## 2022-08-25 ENCOUNTER — OFFICE VISIT (OUTPATIENT)
Dept: PAIN MANAGEMENT | Age: 82
End: 2022-08-25
Payer: COMMERCIAL

## 2022-08-25 VITALS — BODY MASS INDEX: 29.16 KG/M2 | HEIGHT: 65 IN | WEIGHT: 175 LBS

## 2022-08-25 DIAGNOSIS — M54.40 CHRONIC LOW BACK PAIN WITH SCIATICA, SCIATICA LATERALITY UNSPECIFIED, UNSPECIFIED BACK PAIN LATERALITY: Chronic | ICD-10-CM

## 2022-08-25 DIAGNOSIS — M25.551 RIGHT HIP PAIN: Chronic | ICD-10-CM

## 2022-08-25 DIAGNOSIS — G89.29 CHRONIC LOW BACK PAIN WITH SCIATICA, SCIATICA LATERALITY UNSPECIFIED, UNSPECIFIED BACK PAIN LATERALITY: Chronic | ICD-10-CM

## 2022-08-25 PROBLEM — M54.50 CHRONIC LOW BACK PAIN: Chronic | Status: ACTIVE | Noted: 2022-08-25

## 2022-08-25 PROCEDURE — 99213 OFFICE O/P EST LOW 20 MIN: CPT | Performed by: NURSE PRACTITIONER

## 2022-08-25 PROCEDURE — 1123F ACP DISCUSS/DSCN MKR DOCD: CPT | Performed by: NURSE PRACTITIONER

## 2022-08-25 ASSESSMENT — ENCOUNTER SYMPTOMS
SHORTNESS OF BREATH: 0
CONSTIPATION: 0
COUGH: 0

## 2022-08-25 NOTE — PROGRESS NOTES
Chief Complaint   Patient presents with    Hip Pain         [facility] Patient complains of right hip pain. XR bilateral hips with stable left hip arthoplasty and advanced right hip degenerative joint disease. Discussed injections but she declines - states these did not help in the past. She will start PT tomorrow. Also discussed SI joint injections. She would like to try PT before considering interventions. Hip Pain   The incident occurred more than 1 week ago. The pain is present in the right hip. The quality of the pain is described as stabbing. The pain is at a severity of 10/10. The pain is moderate. The pain has been Constant since onset. Associated symptoms include muscle weakness. Pertinent negatives include no numbness or tingling. The symptoms are aggravated by movement and weight bearing. She has tried acetaminophen, elevation, heat, ice, immobilization, NSAIDs, non-weight bearing and rest for the symptoms. The treatment provided no relief. Patient denies any new neurological symptoms. No bowel or bladder incontinence, no weakness, and no falling.       Past Medical History:   Diagnosis Date    Anemia     Cataract     Cerebral artery occlusion with cerebral infarction ([address]     TIA    CHF (congestive heart failure) ([address]     QUESTIONABLE    Depression     Diabetes mellitus (HCC)     Eczema     Gout     Hearing loss     Hiatal hernia     History of blood transfusion     Hyperlipidemia     Hypertension     PONV (postoperative nausea and vomiting)     Rectal urgency     Stress incontinence, female        Past Surgical History:   Procedure Laterality Date    ABDOMEN SURGERY  09/03/2020    egd/exploratory lap/reduction of paraesopheal hernia/mesh bridge of hiatel hernia defect/gastropexy x 2/kocherization of duodenum    APPENDECTOMY      CATARACT REMOVAL WITH IMPLANT Bilateral     COLONOSCOPY      EYE SURGERY      HYSTERECTOMY (CERVIX STATUS UNKNOWN)      JOINT REPLACEMENT      LAPAROTOMY N/A 9/3/2020    LAPAROTOMY EXPLORATORY, REDUCTION PARESOPHOGEAL HERNIA CONTAINING COLON AND ENTIRE STOMACH, MESH BRIDGE OF HIATAL DEFECT, GASTROPEXY X2, KOCHERIZATION OF DUODENUM performed by Baldemar Pedersen DO at [address] [facility], [address] Left 11/6/2018    HIP TOTAL ARTHROPLASTY MINIMALLY INVASIVE ASI performed by Elizabeth Lyon MD at [address] N/A 9/3/2020    EGD ESOPHAGOGASTRODUODENOSCOPY performed by Dedra Rios MD at [address] [facility]   Allergen Reactions    Codeine     Keflex [Cephalexin] Itching, Swelling and Rash         Current Outpatient Medications:     lisinopril (PRINIVIL;ZESTRIL) 20 MG tablet, Take 1 tablet by mouth once daily, Disp: 90 tablet, Rfl: 0    furosemide (LASIX) 40 MG tablet, Take 1 tablet by mouth once daily, Disp: 90 tablet, Rfl: 0    memantine (NAMENDA) 10 MG tablet, Take 1 tablet by mouth once daily, Disp: 30 tablet, Rfl: 0    meclizine (ANTIVERT) 12.5 MG tablet, TAKE 1 TABLET BY MOUTH THREE TIMES DAILY AS NEEDED FOR DIZZINESS, Disp: 30 tablet, Rfl: 0    pravastatin (PRAVACHOL) 40 MG tablet, Take 1 tablet by mouth once daily, Disp: 90 tablet, Rfl: 0    KLOR-CON M20 20 MEQ extended release tablet, Take 1 tablet by mouth once daily, Disp: 90 tablet, Rfl: 0    citalopram (CELEXA) 40 MG tablet, Take 1 tablet by mouth in the morning., Disp: 30 tablet, Rfl: 1    donepezil (ARICEPT) 5 MG tablet, Take 1 tablet by mouth nightly, Disp: 30 tablet, Rfl: 3    Control Gel Formula Dressing (DUODERM CGF DRESSING) MISC, Apply 1 each topically daily, Disp: 20 g, Rfl: 3    Zinc Oxide 10 % OINT, Apply 1 applicator topically daily, Disp: 57 g, Rfl: 1    Lift Chair MISC, 1 each by Does not apply route continuous, Disp: 1 each, Rfl: 0    famotidine (PEPCID) 20 MG tablet, Take 1 tablet by mouth daily, Disp: 60 tablet, Rfl: 3    allopurinol (ZYLOPRIM) 100 MG tablet, Take 1 tablet by mouth daily, Disp: 90 tablet, Rfl: 3    traZODone (DESYREL) 50 MG tablet, Take 1 tablet by mouth nightly as needed for Sleep, Disp: 30 tablet, Rfl: 5    Lift Chair MISC, 1 each by Does not apply route continuous, Disp: 1 each, Rfl: 0    docusate sodium (COLACE) 100 MG capsule, Take 1 capsule by mouth 2 times daily as needed for Constipation, Disp: 60 capsule, Rfl: 3    Handicap Placard MISC, by Does not apply route Expires 11/2024, Disp: 1 each, Rfl: 0    Multiple Vitamins-Minerals (THERAPEUTIC MULTIVITAMIN-MINERALS) tablet, Take 1 tablet by mouth daily, Disp: , Rfl:     Misc. Devices MISC, Shower transfer bench, Disp: 1 Device, Rfl: 0    Misc.  Devices MISC, Adult pull up briefs Dispense 100, Disp: 100 Device, Rfl: 0    ferrous sulfate 325 (65 Fe) MG tablet, Take 325 mg by mouth 2 times daily , Disp: , Rfl:     Acetaminophen 325 MG CAPS, Take 1 capsule by mouth daily as needed , Disp: , Rfl:     Blood Pressure KIT, 1 kit by Does not apply route daily, Disp: 1 kit, Rfl: 0    Family History   Problem Relation Age of Onset    Heart Disease Mother     High Blood Pressure Mother     Cancer Father     Colon Cancer Father        Social History     Socioeconomic History    Marital status:      Spouse name: Not on file    Number of children: Not on file    Years of education: Not on file    Highest education level: Not on file   Occupational History    Not on file   Tobacco Use    Smoking status: Never    Smokeless tobacco: Never   Vaping Use    Vaping Use: Never used   Substance and Sexual Activity    Alcohol use: No    Drug use: No    Sexual activity: Not on file   Other Topics Concern    Not on file   Social History Narrative    Not on file     Social Determinants of Health     Financial Resource Strain: Low Risk     Difficulty of Paying Living Expenses: Not hard at all   Food Insecurity: No Food Insecurity    Worried About Running Out of Food in the Last Year: Never true    Ran Out of Food in the Last Year: Never true   Transportation Needs: Not on file   Physical Activity: Not on file Stress: Not on file   Social Connections: Not on file   Intimate Partner Violence: Not on file   Housing Stability: Not on file       Review of Systems:  Review of Systems   Constitutional: Negative for chills and fever. Cardiovascular:  Negative for chest pain and palpitations. Respiratory:  Negative for cough and shortness of breath. Musculoskeletal:  Positive for joint pain. Gastrointestinal:  Negative for constipation. Neurological:  Negative for disturbances in coordination, loss of balance, numbness and tingling. Physical Exam:  Ht 5' 5\" (1.651 m)   Wt 148 lb (67.1 kg)   BMI 24.63 kg/m²     Physical Exam  HENT:      Head: Normocephalic. Pulmonary:      Effort: Pulmonary effort is normal.   Musculoskeletal:      Cervical back: Normal range of motion. Right hip: Tenderness present. Decreased range of motion. Decreased strength. Skin:     General: Skin is warm and dry. Neurological:      Mental Status: She is alert and oriented to person, place, and time. Record/Diagnostics Review:    FINDINGS:   Stable total left hip arthroplasty. Pelvis intact. Advanced right hip   degenerative joint disease with loss joint space and hypertrophic changes. Partial visualization levocurvature and degenerative change lumbar spine. Soft tissues unremarkable. Impression   Severe right hip osteoarthritis. Total left hip arthroplasty. FINDINGS:   Stable total left hip arthroplasty. Pelvis intact. Advanced right hip   degenerative joint disease with loss joint space and hypertrophic changes. Partial visualization levocurvature and degenerative change lumbar spine. Soft tissues unremarkable. Impression   Severe right hip osteoarthritis. Total left hip arthroplasty.      Assessment:  Problem List Items Addressed This Visit       Right hip pain (Chronic)    Chronic low back pain (Chronic)          Treatment Plan:  Patient continues to have right hip pain  Imaging reviewed with pt and her family  Discussed options including injections and surgical eval  She is scheduled to start PT tomorrow  Would like to try PT before scheduling any interventions  Consider SI joint injections if pain continues  Will  compound cream tomorrow and try this as well  Follow up in 6 weeks    I have reviewed the chief complaint and history of present illness (including ROS and PFSH) and vital documentation by my staff and I agree with their documentation and have added where applicable.

## 2022-08-26 ENCOUNTER — HOSPITAL ENCOUNTER (OUTPATIENT)
Dept: PHYSICAL THERAPY | Facility: CLINIC | Age: 82
Setting detail: THERAPIES SERIES
Discharge: HOME | End: 2022-08-26

## 2022-09-14 DIAGNOSIS — F01.50 VASCULAR DEMENTIA WITHOUT BEHAVIORAL DISTURBANCE (HCC): ICD-10-CM

## 2022-09-14 RX ORDER — CITALOPRAM 40 MG/1
40 TABLET ORAL DAILY
Qty: 30 TABLET | Refills: 5 | Status: SHIPPED | OUTPATIENT
Start: 2022-09-14

## 2022-09-14 RX ORDER — MEMANTINE HYDROCHLORIDE 10 MG/1
TABLET ORAL
Qty: 30 TABLET | Refills: 5 | Status: SHIPPED | OUTPATIENT
Start: 2022-09-14

## 2022-09-21 RX ORDER — MECLIZINE HCL 12.5 MG/1
TABLET ORAL
Qty: 30 TABLET | Refills: 0 | Status: SHIPPED | OUTPATIENT
Start: 2022-09-21 | End: 2022-11-03 | Stop reason: SDUPTHER

## 2022-10-17 DIAGNOSIS — E78.2 MIXED HYPERLIPIDEMIA: ICD-10-CM

## 2022-10-18 RX ORDER — TRAZODONE HYDROCHLORIDE 50 MG/1
50 TABLET ORAL NIGHTLY PRN
Qty: 30 TABLET | Refills: 0 | Status: SHIPPED | OUTPATIENT
Start: 2022-10-18

## 2022-10-18 RX ORDER — PRAVASTATIN SODIUM 40 MG
TABLET ORAL
Qty: 90 TABLET | Refills: 0 | Status: SHIPPED | OUTPATIENT
Start: 2022-10-18

## 2022-11-03 ENCOUNTER — OFFICE VISIT (OUTPATIENT)
Dept: PRIMARY CARE CLINIC | Age: 82
End: 2022-11-03
Payer: COMMERCIAL

## 2022-11-03 VITALS
OXYGEN SATURATION: 98 % | DIASTOLIC BLOOD PRESSURE: 84 MMHG | HEART RATE: 74 BPM | BODY MASS INDEX: 29.12 KG/M2 | SYSTOLIC BLOOD PRESSURE: 128 MMHG | RESPIRATION RATE: 13 BRPM | HEIGHT: 65 IN

## 2022-11-03 DIAGNOSIS — M16.12 PRIMARY OSTEOARTHRITIS OF LEFT HIP: Primary | ICD-10-CM

## 2022-11-03 DIAGNOSIS — I10 ESSENTIAL HYPERTENSION: ICD-10-CM

## 2022-11-03 PROCEDURE — G8417 CALC BMI ABV UP PARAM F/U: HCPCS | Performed by: NURSE PRACTITIONER

## 2022-11-03 PROCEDURE — 99214 OFFICE O/P EST MOD 30 MIN: CPT | Performed by: NURSE PRACTITIONER

## 2022-11-03 PROCEDURE — 1090F PRES/ABSN URINE INCON ASSESS: CPT | Performed by: NURSE PRACTITIONER

## 2022-11-03 PROCEDURE — G8427 DOCREV CUR MEDS BY ELIG CLIN: HCPCS | Performed by: NURSE PRACTITIONER

## 2022-11-03 PROCEDURE — 3074F SYST BP LT 130 MM HG: CPT | Performed by: NURSE PRACTITIONER

## 2022-11-03 PROCEDURE — 1036F TOBACCO NON-USER: CPT | Performed by: NURSE PRACTITIONER

## 2022-11-03 PROCEDURE — G8400 PT W/DXA NO RESULTS DOC: HCPCS | Performed by: NURSE PRACTITIONER

## 2022-11-03 PROCEDURE — 1123F ACP DISCUSS/DSCN MKR DOCD: CPT | Performed by: NURSE PRACTITIONER

## 2022-11-03 PROCEDURE — G8484 FLU IMMUNIZE NO ADMIN: HCPCS | Performed by: NURSE PRACTITIONER

## 2022-11-03 PROCEDURE — 3078F DIAST BP <80 MM HG: CPT | Performed by: NURSE PRACTITIONER

## 2022-11-03 RX ORDER — POTASSIUM CHLORIDE 1500 MG/1
TABLET, EXTENDED RELEASE ORAL
Qty: 90 TABLET | Refills: 3 | Status: SHIPPED | OUTPATIENT
Start: 2022-11-03

## 2022-11-03 RX ORDER — OXYCODONE HYDROCHLORIDE AND ACETAMINOPHEN 5; 325 MG/1; MG/1
1 TABLET ORAL EVERY 6 HOURS PRN
Qty: 20 TABLET | Refills: 0 | Status: SHIPPED | OUTPATIENT
Start: 2022-11-03 | End: 2022-11-13 | Stop reason: SDUPTHER

## 2022-11-03 RX ORDER — MECLIZINE HCL 12.5 MG/1
TABLET ORAL
Qty: 30 TABLET | Refills: 0 | Status: SHIPPED | OUTPATIENT
Start: 2022-11-03

## 2022-11-03 SDOH — ECONOMIC STABILITY: FOOD INSECURITY: WITHIN THE PAST 12 MONTHS, YOU WORRIED THAT YOUR FOOD WOULD RUN OUT BEFORE YOU GOT MONEY TO BUY MORE.: NEVER TRUE

## 2022-11-03 SDOH — ECONOMIC STABILITY: FOOD INSECURITY: WITHIN THE PAST 12 MONTHS, THE FOOD YOU BOUGHT JUST DIDN'T LAST AND YOU DIDN'T HAVE MONEY TO GET MORE.: NEVER TRUE

## 2022-11-03 ASSESSMENT — PATIENT HEALTH QUESTIONNAIRE - PHQ9
7. TROUBLE CONCENTRATING ON THINGS, SUCH AS READING THE NEWSPAPER OR WATCHING TELEVISION: 0
2. FEELING DOWN, DEPRESSED OR HOPELESS: 3
SUM OF ALL RESPONSES TO PHQ9 QUESTIONS 1 & 2: 3
3. TROUBLE FALLING OR STAYING ASLEEP: 1
4. FEELING TIRED OR HAVING LITTLE ENERGY: 2
6. FEELING BAD ABOUT YOURSELF - OR THAT YOU ARE A FAILURE OR HAVE LET YOURSELF OR YOUR FAMILY DOWN: 0
SUM OF ALL RESPONSES TO PHQ QUESTIONS 1-9: 6
1. LITTLE INTEREST OR PLEASURE IN DOING THINGS: 0
SUM OF ALL RESPONSES TO PHQ QUESTIONS 1-9: 6
5. POOR APPETITE OR OVEREATING: 0
10. IF YOU CHECKED OFF ANY PROBLEMS, HOW DIFFICULT HAVE THESE PROBLEMS MADE IT FOR YOU TO DO YOUR WORK, TAKE CARE OF THINGS AT HOME, OR GET ALONG WITH OTHER PEOPLE: 0
SUM OF ALL RESPONSES TO PHQ QUESTIONS 1-9: 6
SUM OF ALL RESPONSES TO PHQ QUESTIONS 1-9: 6
9. THOUGHTS THAT YOU WOULD BE BETTER OFF DEAD, OR OF HURTING YOURSELF: 0
8. MOVING OR SPEAKING SO SLOWLY THAT OTHER PEOPLE COULD HAVE NOTICED. OR THE OPPOSITE, BEING SO FIGETY OR RESTLESS THAT YOU HAVE BEEN MOVING AROUND A LOT MORE THAN USUAL: 0

## 2022-11-03 ASSESSMENT — SOCIAL DETERMINANTS OF HEALTH (SDOH): HOW HARD IS IT FOR YOU TO PAY FOR THE VERY BASICS LIKE FOOD, HOUSING, MEDICAL CARE, AND HEATING?: NOT HARD AT ALL

## 2022-11-03 ASSESSMENT — ENCOUNTER SYMPTOMS
SHORTNESS OF BREATH: 0
ABDOMINAL PAIN: 0
BACK PAIN: 0
COUGH: 0

## 2022-11-03 NOTE — PROGRESS NOTES
[address] PRIMARY CARE  [address]   [address]  [address] [address]  Dept: [phone]  Dept Fax: [phone]    Taihsa Louis is a 79 y.o. female who presentstoday for her medical conditions/complaints as noted below.   Taisha Louis is c/o of  Chief Complaint   Patient presents with    3 Month Follow-Up    Chronic Pain    Other     Sore on Bottom clearing up getting better            HPI:     Presents with daughters for 3 month recheck on chronic conditions  BP well controlled  Unable to obtain weight  In wheelchair and unable to stand  Severe pain in bilateral hips    Has met with pain management- was suggested to begin PT  Unable to do PT, has been chair bound d/t pain  Has tried norco in the past d/t flushing  Has tolerated percocet in the past  Will give short term rx to see how this works, advised to use med as little as possible  Also discussed referral to ortho for severe hip arthritis- will consider    Wound to bilateral buttocks improved  Scabbing noted  Will continue use of barrier cream    Denies any other problems/concerns      Hemoglobin A1C (%)   Date Value   07/18/2022 5.1   09/03/2020 5.6   03/22/2019 5.6             ( goal A1C is < 7)   No results found for: LABMICR  LDL Cholesterol (mg/dL)   Date Value   07/18/2022 52   06/21/2021 37   05/10/2018 70     LDL Calculated (mg/dL)   Date Value   12/07/2016 76       (goal LDL is <100)   AST (U/L)   Date Value   07/18/2022 14     ALT (U/L)   Date Value   07/18/2022 7     BUN (mg/dL)   Date Value   07/18/2022 23     BP Readings from Last 3 Encounters:   11/03/22 128/84   07/18/22 122/74   10/18/21 104/72          ([id number])    Past Medical History:   Diagnosis Date    Anemia     Cataract     Cerebral artery occlusion with cerebral infarction ([address]     TIA    CHF (congestive heart failure) [facility]     QUESTIONABLE    Depression     Diabetes mellitus ([facility] [facility]     Eczema     Gout     Hearing loss     Hiatal hernia History of blood transfusion     Hyperlipidemia     Hypertension     PONV (postoperative nausea and vomiting)     Rectal urgency     Stress incontinence, female       Past Surgical History:   Procedure Laterality Date    ABDOMEN SURGERY  09/03/2020    egd/exploratory lap/reduction of paraesopheal hernia/mesh bridge of hiatel hernia defect/gastropexy x 2/kocherization of duodenum    APPENDECTOMY      CATARACT REMOVAL WITH IMPLANT Bilateral     COLONOSCOPY      EYE SURGERY      HYSTERECTOMY (CERVIX STATUS UNKNOWN)      JOINT REPLACEMENT      LAPAROTOMY N/A 9/3/2020    LAPAROTOMY EXPLORATORY, REDUCTION PARESOPHOGEAL HERNIA CONTAINING COLON AND ENTIRE STOMACH, MESH BRIDGE OF HIATAL DEFECT, GASTROPEXY X2, KOCHERIZATION OF DUODENUM performed by Sussy Espino DO at [facility], [address] Left 11/6/2018    HIP TOTAL ARTHROPLASTY MINIMALLY INVASIVE ASI performed by Cassie Ruiz MD at [facility]. N/A 9/3/2020    EGD ESOPHAGOGASTRODUODENOSCOPY performed by Mara Wooten MD at [facility] History   Problem Relation Age of Onset    Heart Disease Mother     High Blood Pressure Mother     Cancer Father     Colon Cancer Father           Social History     Tobacco Use    Smoking status: Never    Smokeless tobacco: Never   Substance Use Topics    Alcohol use: No      Current Outpatient Medications   Medication Sig Dispense Refill    meclizine (ANTIVERT) 12.5 MG tablet TAKE 1 TABLET BY MOUTH THREE TIMES DAILY AS NEEDED FOR DIZZINESS 30 tablet 0    KLOR-CON M20 20 MEQ extended release tablet Take 1 tablet by mouth once daily 90 tablet 3    oxyCODONE-acetaminophen (PERCOCET) 5-325 MG per tablet Take 1 tablet by mouth every 6 hours as needed for Pain for up to 5 days. Intended supply: 5 days.  Take lowest dose possible to manage pain 20 tablet 0    pravastatin (PRAVACHOL) 40 MG tablet Take 1 tablet by mouth once daily 90 tablet 0    traZODone (DESYREL) 50 MG tablet TAKE 1 TABLET BY MOUTH NIGHTLY AS NEEDED FOR SLEEP 30 tablet 0    memantine (NAMENDA) 10 MG tablet Take 1 tablet by mouth once daily 30 tablet 5    citalopram (CELEXA) 40 MG tablet TAKE 1 TABLET BY MOUTH IN THE MORNING 30 tablet 5    lisinopril (PRINIVIL;ZESTRIL) 20 MG tablet Take 1 tablet by mouth once daily 90 tablet 0    furosemide (LASIX) 40 MG tablet Take 1 tablet by mouth once daily 90 tablet 0    donepezil (ARICEPT) 5 MG tablet Take 1 tablet by mouth nightly 30 tablet 3    Control Gel Formula Dressing (DUODERM CGF DRESSING) MISC Apply 1 each topically daily 20 g 3    Zinc Oxide 10 % OINT Apply 1 applicator topically daily 57 g 1    Lift Chair MISC 1 each by Does not apply route continuous 1 each 0    famotidine (PEPCID) 20 MG tablet Take 1 tablet by mouth daily 60 tablet 3    allopurinol (ZYLOPRIM) 100 MG tablet Take 1 tablet by mouth daily 90 tablet 3    Lift Chair MISC 1 each by Does not apply route continuous 1 each 0    docusate sodium (COLACE) 100 MG capsule Take 1 capsule by mouth 2 times daily as needed for Constipation 60 capsule 3    Handicap Placard MISC by Does not apply route Expires 11/2024 1 each 0    Multiple Vitamins-Minerals (THERAPEUTIC MULTIVITAMIN-MINERALS) tablet Take 1 tablet by mouth daily      Misc. Devices MISC Shower transfer bench 1 Device 0    Misc. Devices MISC Adult pull up briefs  Dispense 100 100 Device 0    ferrous sulfate 325 (65 Fe) MG tablet Take 325 mg by mouth 2 times daily       Acetaminophen 325 MG CAPS Take 1 capsule by mouth daily as needed       Blood Pressure KIT 1 kit by Does not apply route daily 1 kit 0     No current facility-administered medications for this visit.      Allergies   Allergen Reactions    Codeine     Keflex [Cephalexin] Itching, Swelling and Rash       Health Maintenance   Topic Date Due    Shingles vaccine (2 of 3) 08/07/2015    COVID-19 Vaccine (3 - Booster for Pfizer series) 03/31/2021    Flu vaccine (1) 08/01/2022    Annual Wellness Visit (AWV) 10/19/2022    DTaP/Tdap/Td vaccine (1 - Tdap) 11/24/2022 (Originally 4/16/1959)    Lipids  07/18/2023    Depression Monitoring  07/18/2023    DEXA (modify frequency per FRAX score)  Completed    Pneumococcal 65+ years Vaccine  Completed    Hepatitis A vaccine  Aged Out    Hib vaccine  Aged Out    Meningococcal (ACWY) vaccine  Aged Out       Subjective:      Review of Systems   Constitutional:  Negative for chills, fatigue and fever. HENT:  Negative for congestion. Eyes:  Negative for visual disturbance. Respiratory:  Negative for cough and shortness of breath. Cardiovascular:  Negative for chest pain and palpitations. Gastrointestinal:  Negative for abdominal pain. Genitourinary:  Negative for dysuria. Musculoskeletal:  Positive for arthralgias and gait problem. Negative for back pain. Neurological:  Negative for dizziness, numbness and headaches. Psychiatric/Behavioral:  Positive for sleep disturbance. Negative for self-injury and suicidal ideas. The patient is not nervous/anxious. Objective:     Physical Exam  Vitals and nursing note reviewed. Constitutional:       Appearance: She is well-developed. HENT:      Head: Normocephalic and atraumatic. Eyes:      Pupils: Pupils are equal, round, and reactive to light. Cardiovascular:      Rate and Rhythm: Normal rate and regular rhythm. Heart sounds: Normal heart sounds. Pulmonary:      Effort: Pulmonary effort is normal.      Breath sounds: Normal breath sounds. Abdominal:      General: Bowel sounds are normal.      Palpations: Abdomen is soft. Tenderness: There is no abdominal tenderness. Musculoskeletal:         General: Normal range of motion. Cervical back: Normal range of motion and neck supple. Skin:     General: Skin is warm and dry. Neurological:      Mental Status: She is alert and oriented to person, place, and time. Psychiatric:         Behavior: Behavior normal.         Thought Content:  Thought content normal.         Judgment: Judgment normal.     /84   Pulse 74   Resp 13   Ht 5' 5\" (1.651 m)   SpO2 98%   BMI 29.12 kg/m²     Assessment:       Diagnosis Orders   1. Primary osteoarthritis of left hip  oxyCODONE-acetaminophen (PERCOCET) 5-325 MG per tablet      2. Essential hypertension  KLOR-CON M20 20 MEQ extended release tablet                Plan:      Return in about 3 months (around 2/3/2023) for AWV. Hip pain- Rx given for percocet with instruction for use. Encouraged referral to ortho. Follow up in 3 months for recheck/earlier if needed. Orders Placed This Encounter   Medications    meclizine (ANTIVERT) 12.5 MG tablet     Sig: TAKE 1 TABLET BY MOUTH THREE TIMES DAILY AS NEEDED FOR DIZZINESS     Dispense:  30 tablet     Refill:  0    KLOR-CON M20 20 MEQ extended release tablet     Sig: Take 1 tablet by mouth once daily     Dispense:  90 tablet     Refill:  3    oxyCODONE-acetaminophen (PERCOCET) 5-325 MG per tablet     Sig: Take 1 tablet by mouth every 6 hours as needed for Pain for up to 5 days. Intended supply: 5 days. Take lowest dose possible to manage pain     Dispense:  20 tablet     Refill:  0     Reduce doses taken as pain becomes manageable       Patient given educational materials - see patient instructions. Discussed use, benefit, and side effects of prescribed medications. All patientquestions answered. Pt voiced understanding. Reviewed health maintenance. Instructedto continue current medications, diet and exercise. Patient agreed with treatmentplan. Follow up as directed.      Electronicallysigned by KAREEM Stacy CNP on 11/3/2022 at 12:53 PM

## 2022-11-11 DIAGNOSIS — M16.12 PRIMARY OSTEOARTHRITIS OF LEFT HIP: ICD-10-CM

## 2022-11-11 NOTE — TELEPHONE ENCOUNTER
Negar Hadley states pt is doing very well, no reaction, is no longer crying from the pain.  Requesting a refill

## 2022-11-11 NOTE — TELEPHONE ENCOUNTER
----- Message from [facility] sent at 11/11/2022  3:39 PM EST -----  Subject: Refill Request    QUESTIONS  Name of Medication? oxyCODONE-acetaminophen (PERCOCET) 5-325 MG per tablet  Patient-reported dosage and instructions? 5-325 Mg tablet; take one tablet   by mouth in the morning and at night   How many days do you have left? 2  Preferred Pharmacy? [address]  Pharmacy phone number (if available)? [phone]  Additional Information for Provider? Pt is doing really well on   medication. No reactions, Nice to have her comfortable   ---------------------------------------------------------------------------  --------------  CALL BACK INFO  What is the best way for the office to contact you? OK to leave message on   voicemail  Preferred Call Back Phone Number? [phone]  ---------------------------------------------------------------------------  --------------  SCRIPT ANSWERS  Relationship to Patient? Other  Representative Name? Lacie olmstead   Is the Representative on the appropriate HIPAA document in Epic?  Yes

## 2022-11-13 RX ORDER — OXYCODONE HYDROCHLORIDE AND ACETAMINOPHEN 5; 325 MG/1; MG/1
1 TABLET ORAL EVERY 6 HOURS PRN
Qty: 20 TABLET | Refills: 0 | Status: SHIPPED | OUTPATIENT
Start: 2022-11-13 | End: 2022-11-23 | Stop reason: SDUPTHER

## 2022-11-16 RX ORDER — TRAZODONE HYDROCHLORIDE 50 MG/1
50 TABLET ORAL NIGHTLY PRN
Qty: 30 TABLET | Refills: 0 | Status: SHIPPED | OUTPATIENT
Start: 2022-11-16

## 2022-11-23 DIAGNOSIS — M16.12 PRIMARY OSTEOARTHRITIS OF LEFT HIP: ICD-10-CM

## 2022-11-23 RX ORDER — OXYCODONE HYDROCHLORIDE AND ACETAMINOPHEN 5; 325 MG/1; MG/1
1 TABLET ORAL EVERY 6 HOURS PRN
Qty: 20 TABLET | Refills: 0 | Status: SHIPPED | OUTPATIENT
Start: 2022-11-23 | End: 2022-12-01 | Stop reason: SDUPTHER

## 2022-12-01 DIAGNOSIS — M16.12 PRIMARY OSTEOARTHRITIS OF LEFT HIP: ICD-10-CM

## 2022-12-01 RX ORDER — LISINOPRIL 20 MG/1
TABLET ORAL
Qty: 90 TABLET | Refills: 0 | Status: SHIPPED | OUTPATIENT
Start: 2022-12-01

## 2022-12-01 RX ORDER — OXYCODONE HYDROCHLORIDE AND ACETAMINOPHEN 5; 325 MG/1; MG/1
1 TABLET ORAL EVERY 6 HOURS PRN
Qty: 20 TABLET | Refills: 0 | Status: SHIPPED | OUTPATIENT
Start: 2022-12-01 | End: 2022-12-06

## 2022-12-01 RX ORDER — DONEPEZIL HYDROCHLORIDE 5 MG/1
5 TABLET, FILM COATED ORAL NIGHTLY
Qty: 30 TABLET | Refills: 0 | Status: SHIPPED | OUTPATIENT
Start: 2022-12-01

## 2022-12-12 DIAGNOSIS — M16.12 PRIMARY OSTEOARTHRITIS OF LEFT HIP: ICD-10-CM

## 2022-12-12 RX ORDER — OXYCODONE HYDROCHLORIDE AND ACETAMINOPHEN 5; 325 MG/1; MG/1
1 TABLET ORAL EVERY 6 HOURS PRN
Qty: 20 TABLET | Refills: 0 | Status: SHIPPED | OUTPATIENT
Start: 2022-12-12 | End: 2022-12-17

## 2022-12-21 DIAGNOSIS — M16.12 PRIMARY OSTEOARTHRITIS OF LEFT HIP: ICD-10-CM

## 2022-12-22 RX ORDER — OXYCODONE HYDROCHLORIDE AND ACETAMINOPHEN 5; 325 MG/1; MG/1
1 TABLET ORAL EVERY 6 HOURS PRN
Qty: 20 TABLET | Refills: 0 | Status: SHIPPED | OUTPATIENT
Start: 2022-12-22 | End: 2023-01-01

## 2023-01-02 DIAGNOSIS — I10 ESSENTIAL HYPERTENSION: ICD-10-CM

## 2023-01-02 DIAGNOSIS — M16.12 PRIMARY OSTEOARTHRITIS OF LEFT HIP: ICD-10-CM

## 2023-01-03 DIAGNOSIS — M16.12 PRIMARY OSTEOARTHRITIS OF LEFT HIP: ICD-10-CM

## 2023-01-03 RX ORDER — OXYCODONE HYDROCHLORIDE AND ACETAMINOPHEN 5; 325 MG/1; MG/1
1 TABLET ORAL EVERY 6 HOURS PRN
Qty: 20 TABLET | Refills: 0 | Status: SHIPPED | OUTPATIENT
Start: 2023-01-03 | End: 2023-01-13

## 2023-01-03 RX ORDER — DONEPEZIL HYDROCHLORIDE 5 MG/1
5 TABLET, FILM COATED ORAL NIGHTLY
Qty: 30 TABLET | Refills: 0 | Status: SHIPPED | OUTPATIENT
Start: 2023-01-03

## 2023-01-03 RX ORDER — FUROSEMIDE 40 MG/1
40 TABLET ORAL DAILY
Qty: 90 TABLET | Refills: 0 | Status: SHIPPED | OUTPATIENT
Start: 2023-01-03

## 2023-01-03 RX ORDER — TRAZODONE HYDROCHLORIDE 50 MG/1
50 TABLET ORAL NIGHTLY PRN
Qty: 30 TABLET | Refills: 0 | Status: SHIPPED | OUTPATIENT
Start: 2023-01-03

## 2023-01-03 RX ORDER — POTASSIUM CHLORIDE 1500 MG/1
TABLET, EXTENDED RELEASE ORAL
Qty: 90 TABLET | Refills: 3 | Status: SHIPPED | OUTPATIENT
Start: 2023-01-03

## 2023-01-10 ENCOUNTER — TELEPHONE (OUTPATIENT)
Dept: PRIMARY CARE CLINIC | Age: 83
End: 2023-01-10

## 2023-01-10 NOTE — TELEPHONE ENCOUNTER
Miracle Cintron of [facility] Office of Aging called and said Pt is due for a annual through [employer] and asked if Harry Moran has input. Advised no, Pt has appt for medicare well visit on 2/9/23.

## 2023-01-13 DIAGNOSIS — M16.12 PRIMARY OSTEOARTHRITIS OF LEFT HIP: ICD-10-CM

## 2023-01-13 RX ORDER — OXYCODONE HYDROCHLORIDE AND ACETAMINOPHEN 5; 325 MG/1; MG/1
1 TABLET ORAL 2 TIMES DAILY PRN
Qty: 60 TABLET | Refills: 0 | Status: SHIPPED | OUTPATIENT
Start: 2023-01-13 | End: 2023-02-12

## 2023-01-13 RX ORDER — TRAZODONE HYDROCHLORIDE 50 MG/1
50 TABLET ORAL NIGHTLY PRN
Qty: 30 TABLET | Refills: 0 | Status: SHIPPED | OUTPATIENT
Start: 2023-01-13

## 2023-01-19 DIAGNOSIS — E78.2 MIXED HYPERLIPIDEMIA: ICD-10-CM

## 2023-01-19 RX ORDER — PRAVASTATIN SODIUM 40 MG
40 TABLET ORAL DAILY
Qty: 90 TABLET | Refills: 3 | Status: SHIPPED | OUTPATIENT
Start: 2023-01-19

## 2023-01-19 RX ORDER — MECLIZINE HCL 12.5 MG/1
TABLET ORAL
Qty: 30 TABLET | Refills: 0 | Status: SHIPPED | OUTPATIENT
Start: 2023-01-19

## 2023-02-02 RX ORDER — ALLOPURINOL 100 MG/1
TABLET ORAL
Qty: 90 TABLET | Refills: 0 | Status: SHIPPED | OUTPATIENT
Start: 2023-02-02

## 2023-02-02 RX ORDER — DONEPEZIL HYDROCHLORIDE 5 MG/1
5 TABLET, FILM COATED ORAL NIGHTLY
Qty: 30 TABLET | Refills: 0 | Status: SHIPPED | OUTPATIENT
Start: 2023-02-02

## 2023-02-12 DIAGNOSIS — M16.12 PRIMARY OSTEOARTHRITIS OF LEFT HIP: ICD-10-CM

## 2023-02-13 RX ORDER — OXYCODONE HYDROCHLORIDE AND ACETAMINOPHEN 5; 325 MG/1; MG/1
1 TABLET ORAL 2 TIMES DAILY PRN
Qty: 60 TABLET | Refills: 0 | Status: SHIPPED | OUTPATIENT
Start: 2023-02-13 | End: 2023-03-15

## 2023-02-15 RX ORDER — FUROSEMIDE 40 MG/1
40 TABLET ORAL DAILY
Qty: 90 TABLET | Refills: 0 | Status: SHIPPED | OUTPATIENT
Start: 2023-02-15

## 2023-02-15 RX ORDER — MECLIZINE HCL 12.5 MG/1
TABLET ORAL
Qty: 30 TABLET | Refills: 0 | Status: SHIPPED | OUTPATIENT
Start: 2023-02-15

## 2023-02-15 RX ORDER — DONEPEZIL HYDROCHLORIDE 5 MG/1
5 TABLET, FILM COATED ORAL NIGHTLY
Qty: 30 TABLET | Refills: 0 | Status: SHIPPED | OUTPATIENT
Start: 2023-02-15

## 2023-02-22 RX ORDER — TRAZODONE HYDROCHLORIDE 50 MG/1
50 TABLET ORAL NIGHTLY PRN
Qty: 30 TABLET | Refills: 0 | Status: SHIPPED | OUTPATIENT
Start: 2023-02-22

## 2023-03-02 ENCOUNTER — OFFICE VISIT (OUTPATIENT)
Dept: PRIMARY CARE CLINIC | Age: 83
End: 2023-03-02
Payer: COMMERCIAL

## 2023-03-02 VITALS
BODY MASS INDEX: 26.33 KG/M2 | WEIGHT: 158 LBS | HEIGHT: 65 IN | DIASTOLIC BLOOD PRESSURE: 82 MMHG | OXYGEN SATURATION: 93 % | HEART RATE: 73 BPM | SYSTOLIC BLOOD PRESSURE: 128 MMHG

## 2023-03-02 DIAGNOSIS — Z00.00 ENCOUNTER FOR GENERAL ADULT MEDICAL EXAMINATION W/O ABNORMAL FINDINGS: Primary | ICD-10-CM

## 2023-03-02 DIAGNOSIS — I63.9 CEREBROVASCULAR ACCIDENT (CVA), UNSPECIFIED MECHANISM (HCC): ICD-10-CM

## 2023-03-02 DIAGNOSIS — M25.551 RIGHT HIP PAIN: ICD-10-CM

## 2023-03-02 DIAGNOSIS — F03.90 DEMENTIA WITHOUT BEHAVIORAL DISTURBANCE, PSYCHOTIC DISTURBANCE, MOOD DISTURBANCE, OR ANXIETY, UNSPECIFIED DEMENTIA SEVERITY, UNSPECIFIED DEMENTIA TYPE (HCC): ICD-10-CM

## 2023-03-02 DIAGNOSIS — E44.0 MODERATE MALNUTRITION (HCC): ICD-10-CM

## 2023-03-02 DIAGNOSIS — E11.9 TYPE 2 DIABETES MELLITUS WITHOUT COMPLICATION, WITHOUT LONG-TERM CURRENT USE OF INSULIN (HCC): ICD-10-CM

## 2023-03-02 PROCEDURE — G8484 FLU IMMUNIZE NO ADMIN: HCPCS | Performed by: NURSE PRACTITIONER

## 2023-03-02 PROCEDURE — G0439 PPPS, SUBSEQ VISIT: HCPCS | Performed by: NURSE PRACTITIONER

## 2023-03-02 PROCEDURE — 1123F ACP DISCUSS/DSCN MKR DOCD: CPT | Performed by: NURSE PRACTITIONER

## 2023-03-02 PROCEDURE — 3074F SYST BP LT 130 MM HG: CPT | Performed by: NURSE PRACTITIONER

## 2023-03-02 PROCEDURE — 3079F DIAST BP 80-89 MM HG: CPT | Performed by: NURSE PRACTITIONER

## 2023-03-02 RX ORDER — NYSTATIN 100000 [USP'U]/G
POWDER TOPICAL
Qty: 60 G | Refills: 4 | Status: SHIPPED | OUTPATIENT
Start: 2023-03-02

## 2023-03-02 RX ORDER — DOCUSATE SODIUM 100 MG/1
100 CAPSULE, LIQUID FILLED ORAL 2 TIMES DAILY PRN
Qty: 60 CAPSULE | Refills: 3 | Status: SHIPPED | OUTPATIENT
Start: 2023-03-02

## 2023-03-02 SDOH — ECONOMIC STABILITY: FOOD INSECURITY: WITHIN THE PAST 12 MONTHS, YOU WORRIED THAT YOUR FOOD WOULD RUN OUT BEFORE YOU GOT MONEY TO BUY MORE.: NEVER TRUE

## 2023-03-02 SDOH — ECONOMIC STABILITY: FOOD INSECURITY: WITHIN THE PAST 12 MONTHS, THE FOOD YOU BOUGHT JUST DIDN'T LAST AND YOU DIDN'T HAVE MONEY TO GET MORE.: NEVER TRUE

## 2023-03-02 SDOH — ECONOMIC STABILITY: HOUSING INSECURITY
IN THE LAST 12 MONTHS, WAS THERE A TIME WHEN YOU DID NOT HAVE A STEADY PLACE TO SLEEP OR SLEPT IN A SHELTER (INCLUDING NOW)?: NO

## 2023-03-02 ASSESSMENT — PATIENT HEALTH QUESTIONNAIRE - PHQ9
10. IF YOU CHECKED OFF ANY PROBLEMS, HOW DIFFICULT HAVE THESE PROBLEMS MADE IT FOR YOU TO DO YOUR WORK, TAKE CARE OF THINGS AT HOME, OR GET ALONG WITH OTHER PEOPLE: 2
SUM OF ALL RESPONSES TO PHQ QUESTIONS 1-9: 10
3. TROUBLE FALLING OR STAYING ASLEEP: 1
SUM OF ALL RESPONSES TO PHQ QUESTIONS 1-9: 10
SUM OF ALL RESPONSES TO PHQ9 QUESTIONS 1 & 2: 6
SUM OF ALL RESPONSES TO PHQ QUESTIONS 1-9: 10
5. POOR APPETITE OR OVEREATING: 0
1. LITTLE INTEREST OR PLEASURE IN DOING THINGS: 3
7. TROUBLE CONCENTRATING ON THINGS, SUCH AS READING THE NEWSPAPER OR WATCHING TELEVISION: 1
4. FEELING TIRED OR HAVING LITTLE ENERGY: 2
SUM OF ALL RESPONSES TO PHQ QUESTIONS 1-9: 10
6. FEELING BAD ABOUT YOURSELF - OR THAT YOU ARE A FAILURE OR HAVE LET YOURSELF OR YOUR FAMILY DOWN: 0
2. FEELING DOWN, DEPRESSED OR HOPELESS: 3
9. THOUGHTS THAT YOU WOULD BE BETTER OFF DEAD, OR OF HURTING YOURSELF: 0
8. MOVING OR SPEAKING SO SLOWLY THAT OTHER PEOPLE COULD HAVE NOTICED. OR THE OPPOSITE, BEING SO FIGETY OR RESTLESS THAT YOU HAVE BEEN MOVING AROUND A LOT MORE THAN USUAL: 0

## 2023-03-02 NOTE — PROGRESS NOTES
Medicare Annual Wellness Visit    Candance Gene is here for Medicare AWV (Would like ears checked) and Other (States always has rx to blood transfusions-face swells up, hives)    Assessment & Plan   Encounter for general adult medical examination w/o abnormal findings  Type 2 diabetes mellitus without complication, without long-term current use of insulin (HCC)  Moderate malnutrition ([facility]  Dementia without behavioral disturbance, psychotic disturbance, mood disturbance, or anxiety, unspecified dementia severity, unspecified dementia type ([address]  Cerebrovascular accident (CVA), unspecified mechanism ([facility]  Right hip pain    Recommendations for Preventive Services Due: see orders and patient instructions/AVS.  Recommended screening schedule for the next 5-10 years is provided to the patient in written form: see Patient Instructions/AVS.     Return in about 3 months (around 6/2/2023) for recheck. Subjective   The following acute and/or chronic problems were also addressed today:  See below    Patient's complete Health Risk Assessment and screening values have been reviewed and are found in Flowsheets. The following problems were reviewed today and where indicated follow up appointments were made and/or referrals ordered. Positive Risk Factor Screenings with Interventions:    Fall Risk:  Do you feel unsteady or are you worried about falling? : (!) yes  2 or more falls in past year?: no  Fall with injury in past year?: no     Interventions:    Patient declines any further evaluation or treatment     Depression:  PHQ-2 Score: 6  PHQ-9 Total Score: 10    Interpretation:   1-4 = minimal  5-9 = mild  10-14 = moderate  15-19 = moderately severe  20-27 = severe    Interventions:  Patient declines any further evaluation or treatment           Opioid Risk: (Low risk score <55) Opioid risk score: 12    Patient is low risk for opioid use disorder or overdose.   Last PDMP Tanner Wakefield as Reviewed:  Review User Review Instant Review Result   NYLANOEMI ILIANA 3/2/2023 10:09 AM     Reviewed PDMP [1]                                 Objective   Vitals:    03/02/23 0950   BP: 128/82   Pulse: 73   SpO2: 93%   Weight: 158 lb (71.7 kg)   Height: 5' 5\" (1.651 m)      Body mass index is 26.29 kg/m². General Appearance: alert and oriented to person, place and time, well developed and well- nourished, in no acute distress  Skin: warm and dry, no rash or erythema  Head: normocephalic and atraumatic  Eyes: pupils equal, round, and reactive to light, extraocular eye movements intact, conjunctivae normal  ENT: tympanic membrane, external ear and ear canal normal bilaterally, nose without deformity, nasal mucosa and turbinates normal without polyps  Neck: supple and non-tender without mass, no thyromegaly or thyroid nodules, no cervical lymphadenopathy  Pulmonary/Chest: clear to auscultation bilaterally- no wheezes, rales or rhonchi, normal air movement, no respiratory distress  Cardiovascular: normal rate, regular rhythm, normal S1 and S2, no murmurs, rubs, clicks, or gallops, distal pulses intact, no carotid bruits  Abdomen: soft, non-tender, non-distended, normal bowel sounds, no masses or organomegaly  Extremities: no cyanosis, clubbing or edema  Musculoskeletal: normal range of motion, no joint swelling, deformity or tenderness  Neurologic: reflexes normal and symmetric, no cranial nerve deficit, gait, coordination and speech normal       Allergies   Allergen Reactions    Codeine     Keflex [Cephalexin] Itching, Swelling and Rash     Prior to Visit Medications    Medication Sig Taking? Authorizing Provider   nystatin (MYCOSTATIN) 775461 UNIT/GM powder Apply 3 times daily.  Yes Tanner Drop, APRN - CNP   docusate sodium (COLACE) 100 MG capsule Take 1 capsule by mouth 2 times daily as needed for Constipation Yes Tanner Drop, APRN - CNP   traZODone (DESYREL) 50 MG tablet TAKE 1 TABLET BY MOUTH NIGHTLY AS NEEDED FOR SLEEP Yes Nico Settler KAREEM Melgoza CNP   furosemide (LASIX) 40 MG tablet Take 1 tablet by mouth daily Yes KAREEM Rivero CNP   meclizine (ANTIVERT) 12.5 MG tablet TAKE 1 TABLET BY MOUTH THREE TIMES DAILY AS NEEDED FOR DIZZINESS Yes KAREEM Rivero CNP   donepezil (ARICEPT) 5 MG tablet Take 1 tablet by mouth nightly Yes KAREEM Rivero CNP   oxyCODONE-acetaminophen (PERCOCET) 5-325 MG per tablet Take 1 tablet by mouth 2 times daily as needed for Pain for up to 30 days. Intended supply: 30 days.  Take lowest dose possible to manage pain Max Daily Amount: 2 tablets Yes KAREEM Rivero CNP   allopurinol (ZYLOPRIM) 100 MG tablet Take 1 tablet by mouth once daily Yes KAREEM Rivero CNP   pravastatin (PRAVACHOL) 40 MG tablet Take 1 tablet by mouth daily Yes KAREEM Rivero CNP   Handicap Placard MISC by Does not apply route Expires 11/2024 Yes KAREEM Rivero CNP   KLOR-CON M20 20 MEQ extended release tablet Take 1 tablet by mouth once daily Yes KAREEM Rivero CNP   lisinopril (PRINIVIL;ZESTRIL) 20 MG tablet Take 1 tablet by mouth once daily Yes KAREEM Rivero CNP   memantine (NAMENDA) 10 MG tablet Take 1 tablet by mouth once daily Yes Leonarda Mohs, APRN - CNP   citalopram (CELEXA) 40 MG tablet TAKE 1 TABLET BY MOUTH IN THE MORNING Yes KAREEM Aguirre CNP   Control Gel Formula Dressing (DUODERM CGF DRESSING) MISC Apply 1 each topically daily Yes KAREEM Rivero CNP   Zinc Oxide 10 % OINT Apply 1 applicator topically daily Yes KAREEM Rivero CNP   Lift Chair MISC 1 each by Does not apply route continuous Yes KAREEM Rivero CNP   famotidine (PEPCID) 20 MG tablet Take 1 tablet by mouth daily Yes KAREEM Rivero CNP   Lift Chair MISC 1 each by Does not apply route continuous Yes Humphreys Maple, APRN - CNP   Multiple Vitamins-Minerals (THERAPEUTIC MULTIVITAMIN-MINERALS) tablet Take 1 tablet by mouth daily Yes Historical Provider, MD   Misc. Devices MISC Shower transfer bench Yes Trixie KAREEM Murphy CNP   Misc.  Devices MISC Adult pull up briefs  Dispense 100 Yes Ruth Holland KAREEM Phoenix CNP   ferrous sulfate 325 (65 Fe) MG tablet Take 325 mg by mouth 2 times daily  Yes Historical Provider, MD   Acetaminophen 325 MG CAPS Take 1 capsule by mouth daily as needed  Yes Historical Provider, MD   Blood Pressure KIT 1 kit by Does not apply route daily Yes KAREEM Gonzalez CNP       CareTeam (Including outside providers/suppliers regularly involved in providing care):   Patient Care Team:  KAREEM Gonzalez CNP as PCP - General (Nurse Practitioner)  KAREEM Gonzalez CNP as PCP - Empaneled Provider     Reviewed and updated this visit:  Tobacco  Allergies  Meds  Problems  Med Hx  Surg Hx  Soc Hx  Fam Hx        Presents with daughters for AWV  BP well controlled  Has gained 8lb since 7/2022  In wheelchair during exam    Chronic bilateral hip pain, using percocet BID  This keeps her pain managed    Would like ears checked for wax, bilaterally clear    Concern for open area on bottom, previously improved with use of nystatin  Rx given and daughters will monitor    C/o constipation and large stool  Rx given for colace    Denies any other problems/concerns  Follow up in 3 months for recheck/earlier if needed           KAREEM Meza CNP

## 2023-03-08 RX ORDER — LISINOPRIL 20 MG/1
TABLET ORAL
Qty: 90 TABLET | Refills: 0 | Status: SHIPPED | OUTPATIENT
Start: 2023-03-08

## 2023-03-10 DIAGNOSIS — F01.50 VASCULAR DEMENTIA WITHOUT BEHAVIORAL DISTURBANCE (HCC): ICD-10-CM

## 2023-03-10 RX ORDER — MEMANTINE HYDROCHLORIDE 10 MG/1
TABLET ORAL
Qty: 30 TABLET | Refills: 0 | Status: SHIPPED | OUTPATIENT
Start: 2023-03-10

## 2023-03-10 RX ORDER — CITALOPRAM 40 MG/1
40 TABLET ORAL DAILY
Qty: 30 TABLET | Refills: 0 | Status: SHIPPED | OUTPATIENT
Start: 2023-03-10

## 2023-03-12 RX ORDER — DONEPEZIL HYDROCHLORIDE 5 MG/1
5 TABLET, FILM COATED ORAL NIGHTLY
Qty: 30 TABLET | Refills: 0 | Status: SHIPPED | OUTPATIENT
Start: 2023-03-12

## 2023-03-13 DIAGNOSIS — M16.12 PRIMARY OSTEOARTHRITIS OF LEFT HIP: ICD-10-CM

## 2023-03-13 RX ORDER — OXYCODONE HYDROCHLORIDE AND ACETAMINOPHEN 5; 325 MG/1; MG/1
1 TABLET ORAL 2 TIMES DAILY PRN
Qty: 60 TABLET | Refills: 0 | Status: SHIPPED | OUTPATIENT
Start: 2023-03-13 | End: 2023-04-12 | Stop reason: SDUPTHER

## 2023-03-28 RX ORDER — MECLIZINE HCL 12.5 MG/1
TABLET ORAL
Qty: 30 TABLET | Refills: 0 | Status: SHIPPED | OUTPATIENT
Start: 2023-03-28

## 2023-04-06 DIAGNOSIS — F01.50 VASCULAR DEMENTIA WITHOUT BEHAVIORAL DISTURBANCE (HCC): ICD-10-CM

## 2023-04-06 RX ORDER — TRAZODONE HYDROCHLORIDE 50 MG/1
50 TABLET ORAL NIGHTLY PRN
Qty: 30 TABLET | Refills: 0 | Status: SHIPPED | OUTPATIENT
Start: 2023-04-06

## 2023-04-06 RX ORDER — MEMANTINE HYDROCHLORIDE 10 MG/1
TABLET ORAL
Qty: 30 TABLET | Refills: 0 | Status: SHIPPED | OUTPATIENT
Start: 2023-04-06

## 2023-04-27 ENCOUNTER — TELEPHONE (OUTPATIENT)
Dept: PRIMARY CARE CLINIC | Age: 83
End: 2023-04-27

## 2023-04-27 DIAGNOSIS — F01.50 VASCULAR DEMENTIA WITHOUT BEHAVIORAL DISTURBANCE (HCC): ICD-10-CM

## 2023-04-27 DIAGNOSIS — M25.551 RIGHT HIP PAIN: Primary | ICD-10-CM

## 2023-04-27 NOTE — TELEPHONE ENCOUNTER
Kateryna [employer] [employer] [place] from [facility] called stating patient is getting PT with them and the physical therapist reported that patient is complaining of a lot of right hip pain and they were wondering if [facility] would be willing to order an x-ray. If so, orders can be faxed to them at [phone].  And their phone is [phone]

## 2023-04-28 RX ORDER — MECLIZINE HCL 12.5 MG/1
12.5 TABLET ORAL 3 TIMES DAILY PRN
Qty: 90 TABLET | Refills: 3 | Status: SHIPPED | OUTPATIENT
Start: 2023-04-28

## 2023-04-28 RX ORDER — MEMANTINE HYDROCHLORIDE 10 MG/1
10 TABLET ORAL DAILY
Qty: 30 TABLET | Refills: 0 | Status: SHIPPED | OUTPATIENT
Start: 2023-04-28

## 2023-04-28 RX ORDER — FAMOTIDINE 20 MG/1
20 TABLET, FILM COATED ORAL DAILY
Qty: 60 TABLET | Refills: 3 | Status: SHIPPED | OUTPATIENT
Start: 2023-04-28

## 2023-05-02 DIAGNOSIS — M25.551 RIGHT HIP PAIN: ICD-10-CM

## 2023-05-04 DIAGNOSIS — M25.551 RIGHT HIP PAIN: Primary | ICD-10-CM

## 2023-05-09 RX ORDER — ALLOPURINOL 100 MG/1
TABLET ORAL
Qty: 90 TABLET | Refills: 0 | Status: SHIPPED | OUTPATIENT
Start: 2023-05-09

## 2023-05-10 DIAGNOSIS — M16.12 PRIMARY OSTEOARTHRITIS OF LEFT HIP: ICD-10-CM

## 2023-05-10 RX ORDER — OXYCODONE HYDROCHLORIDE AND ACETAMINOPHEN 5; 325 MG/1; MG/1
1 TABLET ORAL 2 TIMES DAILY PRN
Qty: 60 TABLET | Refills: 0 | Status: SHIPPED | OUTPATIENT
Start: 2023-05-10 | End: 2023-06-09

## 2023-05-11 RX ORDER — TRAZODONE HYDROCHLORIDE 50 MG/1
50 TABLET ORAL NIGHTLY PRN
Qty: 30 TABLET | Refills: 0 | Status: SHIPPED | OUTPATIENT
Start: 2023-05-11

## 2023-05-26 RX ORDER — FUROSEMIDE 40 MG/1
40 TABLET ORAL DAILY
Qty: 90 TABLET | Refills: 0 | Status: SHIPPED | OUTPATIENT
Start: 2023-05-26

## 2023-05-26 RX ORDER — DONEPEZIL HYDROCHLORIDE 5 MG/1
5 TABLET, FILM COATED ORAL NIGHTLY
Qty: 30 TABLET | Refills: 0 | Status: SHIPPED | OUTPATIENT
Start: 2023-05-26

## 2023-06-05 RX ORDER — LISINOPRIL 20 MG/1
TABLET ORAL
Qty: 90 TABLET | Refills: 0 | Status: SHIPPED | OUTPATIENT
Start: 2023-06-05

## 2023-06-07 DIAGNOSIS — M16.12 PRIMARY OSTEOARTHRITIS OF LEFT HIP: ICD-10-CM

## 2023-06-07 RX ORDER — CITALOPRAM 40 MG/1
40 TABLET ORAL DAILY
Qty: 30 TABLET | Refills: 0 | Status: SHIPPED | OUTPATIENT
Start: 2023-06-07

## 2023-06-07 RX ORDER — DONEPEZIL HYDROCHLORIDE 5 MG/1
5 TABLET, FILM COATED ORAL NIGHTLY
Qty: 30 TABLET | Refills: 0 | Status: SHIPPED | OUTPATIENT
Start: 2023-06-07

## 2023-06-07 RX ORDER — OXYCODONE HYDROCHLORIDE AND ACETAMINOPHEN 5; 325 MG/1; MG/1
1 TABLET ORAL 2 TIMES DAILY PRN
Qty: 60 TABLET | Refills: 0 | Status: SHIPPED | OUTPATIENT
Start: 2023-06-07 | End: 2023-07-07

## 2023-06-26 RX ORDER — FUROSEMIDE 40 MG/1
TABLET ORAL
Qty: 90 TABLET | Refills: 0 | Status: SHIPPED | OUTPATIENT
Start: 2023-06-26

## 2023-07-04 DIAGNOSIS — M16.12 PRIMARY OSTEOARTHRITIS OF LEFT HIP: ICD-10-CM

## 2023-07-05 RX ORDER — OXYCODONE HYDROCHLORIDE AND ACETAMINOPHEN 5; 325 MG/1; MG/1
1 TABLET ORAL 2 TIMES DAILY PRN
Qty: 60 TABLET | Refills: 0 | Status: SHIPPED | OUTPATIENT
Start: 2023-07-05 | End: 2023-08-04

## 2023-07-05 RX ORDER — CITALOPRAM 40 MG/1
TABLET ORAL
Qty: 30 TABLET | Refills: 5 | Status: SHIPPED | OUTPATIENT
Start: 2023-07-05

## 2023-07-11 RX ORDER — TRAZODONE HYDROCHLORIDE 50 MG/1
50 TABLET ORAL NIGHTLY PRN
Qty: 30 TABLET | Refills: 0 | Status: SHIPPED | OUTPATIENT
Start: 2023-07-11

## 2023-07-25 ENCOUNTER — TELEPHONE (OUTPATIENT)
Dept: PRIMARY CARE CLINIC | Age: 83
End: 2023-07-25

## 2023-07-25 RX ORDER — DONEPEZIL HYDROCHLORIDE 5 MG/1
5 TABLET, FILM COATED ORAL NIGHTLY
Qty: 30 TABLET | Refills: 0 | Status: SHIPPED | OUTPATIENT
Start: 2023-07-25

## 2023-07-25 NOTE — TELEPHONE ENCOUNTER
Patient medication for lidocaine 5% patches was not approved by medicare part D prescription coverage for pain in right hip . They state covered codes would be post shingles pain, type 2 diabetic neuropathy, or cancer. Has patient ever mentioned any of these concerns?  If so, we could add the diagnosis to chart and submit an appeal.

## 2023-07-29 DIAGNOSIS — E78.2 MIXED HYPERLIPIDEMIA: ICD-10-CM

## 2023-07-29 RX ORDER — ALLOPURINOL 100 MG/1
100 TABLET ORAL DAILY
Qty: 90 TABLET | Refills: 0 | Status: SHIPPED | OUTPATIENT
Start: 2023-07-29

## 2023-07-29 RX ORDER — PRAVASTATIN SODIUM 40 MG
40 TABLET ORAL DAILY
Qty: 90 TABLET | Refills: 3 | Status: SHIPPED | OUTPATIENT
Start: 2023-07-29

## 2023-07-29 RX ORDER — MECLIZINE HCL 12.5 MG/1
12.5 TABLET ORAL 3 TIMES DAILY PRN
Qty: 90 TABLET | Refills: 3 | Status: SHIPPED | OUTPATIENT
Start: 2023-07-29

## 2023-07-31 RX ORDER — DONEPEZIL HYDROCHLORIDE 5 MG/1
5 TABLET, FILM COATED ORAL NIGHTLY
Qty: 30 TABLET | Refills: 0 | Status: SHIPPED | OUTPATIENT
Start: 2023-07-31

## 2023-08-03 DIAGNOSIS — M16.12 PRIMARY OSTEOARTHRITIS OF LEFT HIP: ICD-10-CM

## 2023-08-03 RX ORDER — OXYCODONE HYDROCHLORIDE AND ACETAMINOPHEN 5; 325 MG/1; MG/1
1 TABLET ORAL 2 TIMES DAILY PRN
Qty: 60 TABLET | Refills: 0 | Status: SHIPPED | OUTPATIENT
Start: 2023-08-03 | End: 2023-09-02

## 2023-08-04 RX ORDER — ALLOPURINOL 100 MG/1
TABLET ORAL
Qty: 90 TABLET | Refills: 0 | OUTPATIENT
Start: 2023-08-04

## 2023-08-22 DIAGNOSIS — F01.50 VASCULAR DEMENTIA WITHOUT BEHAVIORAL DISTURBANCE (HCC): ICD-10-CM

## 2023-08-22 RX ORDER — TRAZODONE HYDROCHLORIDE 50 MG/1
50 TABLET ORAL NIGHTLY PRN
Qty: 30 TABLET | Refills: 0 | Status: SHIPPED | OUTPATIENT
Start: 2023-08-22

## 2023-08-22 RX ORDER — MEMANTINE HYDROCHLORIDE 10 MG/1
TABLET ORAL
Qty: 30 TABLET | Refills: 0 | Status: SHIPPED | OUTPATIENT
Start: 2023-08-22

## 2023-08-28 RX ORDER — LISINOPRIL 20 MG/1
TABLET ORAL
Qty: 90 TABLET | Refills: 0 | Status: SHIPPED | OUTPATIENT
Start: 2023-08-28 | End: 2023-08-31 | Stop reason: SDUPTHER

## 2023-08-31 RX ORDER — FUROSEMIDE 40 MG/1
40 TABLET ORAL DAILY
Qty: 90 TABLET | Refills: 0 | Status: SHIPPED | OUTPATIENT
Start: 2023-08-31

## 2023-08-31 RX ORDER — LISINOPRIL 20 MG/1
20 TABLET ORAL DAILY
Qty: 90 TABLET | Refills: 3 | Status: SHIPPED | OUTPATIENT
Start: 2023-08-31

## 2023-08-31 RX ORDER — LISINOPRIL 20 MG/1
TABLET ORAL
Qty: 90 TABLET | Refills: 0 | OUTPATIENT
Start: 2023-08-31

## 2023-09-01 DIAGNOSIS — M16.12 PRIMARY OSTEOARTHRITIS OF LEFT HIP: ICD-10-CM

## 2023-09-01 RX ORDER — OXYCODONE HYDROCHLORIDE AND ACETAMINOPHEN 5; 325 MG/1; MG/1
1 TABLET ORAL 2 TIMES DAILY PRN
Qty: 60 TABLET | Refills: 0 | Status: SHIPPED | OUTPATIENT
Start: 2023-09-01 | End: 2023-10-01

## 2023-09-17 DIAGNOSIS — F01.50 VASCULAR DEMENTIA WITHOUT BEHAVIORAL DISTURBANCE (HCC): ICD-10-CM

## 2023-09-18 RX ORDER — TRAZODONE HYDROCHLORIDE 50 MG/1
50 TABLET ORAL NIGHTLY PRN
Qty: 30 TABLET | Refills: 0 | Status: SHIPPED | OUTPATIENT
Start: 2023-09-18

## 2023-09-18 RX ORDER — MEMANTINE HYDROCHLORIDE 10 MG/1
TABLET ORAL
Qty: 30 TABLET | Refills: 0 | Status: SHIPPED | OUTPATIENT
Start: 2023-09-18

## 2023-09-28 DIAGNOSIS — M16.12 PRIMARY OSTEOARTHRITIS OF LEFT HIP: ICD-10-CM

## 2023-09-28 RX ORDER — OXYCODONE HYDROCHLORIDE AND ACETAMINOPHEN 5; 325 MG/1; MG/1
1 TABLET ORAL 2 TIMES DAILY PRN
Qty: 60 TABLET | Refills: 0 | OUTPATIENT
Start: 2023-09-28 | End: 2023-10-28

## 2023-10-03 DIAGNOSIS — M16.12 PRIMARY OSTEOARTHRITIS OF LEFT HIP: ICD-10-CM

## 2023-10-03 RX ORDER — LACTOBACILLUS ACIDOPHILUS 500MM CELL
1 CAPSULE ORAL DAILY
Qty: 90 CAPSULE | Refills: 3 | Status: SHIPPED | OUTPATIENT
Start: 2023-10-03

## 2023-10-03 RX ORDER — OXYCODONE HYDROCHLORIDE AND ACETAMINOPHEN 5; 325 MG/1; MG/1
1 TABLET ORAL 2 TIMES DAILY PRN
Qty: 60 TABLET | Refills: 0 | Status: SHIPPED | OUTPATIENT
Start: 2023-10-03 | End: 2023-11-02

## 2023-10-16 RX ORDER — DONEPEZIL HYDROCHLORIDE 5 MG/1
5 TABLET, FILM COATED ORAL NIGHTLY
Qty: 30 TABLET | Refills: 0 | Status: SHIPPED | OUTPATIENT
Start: 2023-10-16

## 2023-10-24 DIAGNOSIS — F01.50 VASCULAR DEMENTIA WITHOUT BEHAVIORAL DISTURBANCE (HCC): ICD-10-CM

## 2023-10-24 RX ORDER — TRAZODONE HYDROCHLORIDE 50 MG/1
50 TABLET ORAL NIGHTLY PRN
Qty: 30 TABLET | Refills: 0 | Status: SHIPPED | OUTPATIENT
Start: 2023-10-24

## 2023-10-24 RX ORDER — MEMANTINE HYDROCHLORIDE 10 MG/1
10 TABLET ORAL DAILY
Qty: 30 TABLET | Refills: 0 | Status: SHIPPED | OUTPATIENT
Start: 2023-10-24

## 2023-10-25 ENCOUNTER — OFFICE VISIT (OUTPATIENT)
Dept: PRIMARY CARE CLINIC | Age: 83
End: 2023-10-25
Payer: COMMERCIAL

## 2023-10-25 VITALS
SYSTOLIC BLOOD PRESSURE: 114 MMHG | DIASTOLIC BLOOD PRESSURE: 70 MMHG | OXYGEN SATURATION: 97 % | RESPIRATION RATE: 14 BRPM | HEART RATE: 88 BPM

## 2023-10-25 DIAGNOSIS — Z23 NEED FOR INFLUENZA VACCINATION: ICD-10-CM

## 2023-10-25 DIAGNOSIS — F41.8 DEPRESSION WITH ANXIETY: ICD-10-CM

## 2023-10-25 DIAGNOSIS — M25.551 RIGHT HIP PAIN: ICD-10-CM

## 2023-10-25 DIAGNOSIS — M16.12 PRIMARY OSTEOARTHRITIS OF LEFT HIP: Primary | ICD-10-CM

## 2023-10-25 PROCEDURE — 99214 OFFICE O/P EST MOD 30 MIN: CPT | Performed by: NURSE PRACTITIONER

## 2023-10-25 PROCEDURE — 3078F DIAST BP <80 MM HG: CPT | Performed by: NURSE PRACTITIONER

## 2023-10-25 PROCEDURE — 1090F PRES/ABSN URINE INCON ASSESS: CPT | Performed by: NURSE PRACTITIONER

## 2023-10-25 PROCEDURE — 90694 VACC AIIV4 NO PRSRV 0.5ML IM: CPT | Performed by: NURSE PRACTITIONER

## 2023-10-25 PROCEDURE — 3074F SYST BP LT 130 MM HG: CPT | Performed by: NURSE PRACTITIONER

## 2023-10-25 PROCEDURE — 1036F TOBACCO NON-USER: CPT | Performed by: NURSE PRACTITIONER

## 2023-10-25 PROCEDURE — G0008 ADMIN INFLUENZA VIRUS VAC: HCPCS | Performed by: NURSE PRACTITIONER

## 2023-10-25 PROCEDURE — 1123F ACP DISCUSS/DSCN MKR DOCD: CPT | Performed by: NURSE PRACTITIONER

## 2023-10-25 PROCEDURE — G8420 CALC BMI NORM PARAMETERS: HCPCS | Performed by: NURSE PRACTITIONER

## 2023-10-25 PROCEDURE — G8427 DOCREV CUR MEDS BY ELIG CLIN: HCPCS | Performed by: NURSE PRACTITIONER

## 2023-10-25 PROCEDURE — G8400 PT W/DXA NO RESULTS DOC: HCPCS | Performed by: NURSE PRACTITIONER

## 2023-10-25 PROCEDURE — G8484 FLU IMMUNIZE NO ADMIN: HCPCS | Performed by: NURSE PRACTITIONER

## 2023-10-25 RX ORDER — DULOXETIN HYDROCHLORIDE 30 MG/1
30 CAPSULE, DELAYED RELEASE ORAL DAILY
Qty: 30 CAPSULE | Refills: 0 | Status: SHIPPED | OUTPATIENT
Start: 2023-10-25

## 2023-10-25 SDOH — ECONOMIC STABILITY: INCOME INSECURITY: HOW HARD IS IT FOR YOU TO PAY FOR THE VERY BASICS LIKE FOOD, HOUSING, MEDICAL CARE, AND HEATING?: NOT HARD AT ALL

## 2023-10-25 SDOH — ECONOMIC STABILITY: FOOD INSECURITY: WITHIN THE PAST 12 MONTHS, THE FOOD YOU BOUGHT JUST DIDN'T LAST AND YOU DIDN'T HAVE MONEY TO GET MORE.: NEVER TRUE

## 2023-10-25 SDOH — ECONOMIC STABILITY: FOOD INSECURITY: WITHIN THE PAST 12 MONTHS, YOU WORRIED THAT YOUR FOOD WOULD RUN OUT BEFORE YOU GOT MONEY TO BUY MORE.: NEVER TRUE

## 2023-10-25 ASSESSMENT — ENCOUNTER SYMPTOMS
COUGH: 0
ABDOMINAL PAIN: 0
SHORTNESS OF BREATH: 0
BACK PAIN: 1

## 2023-10-25 ASSESSMENT — PATIENT HEALTH QUESTIONNAIRE - PHQ9
SUM OF ALL RESPONSES TO PHQ QUESTIONS 1-9: 0
4. FEELING TIRED OR HAVING LITTLE ENERGY: 0
8. MOVING OR SPEAKING SO SLOWLY THAT OTHER PEOPLE COULD HAVE NOTICED. OR THE OPPOSITE, BEING SO FIGETY OR RESTLESS THAT YOU HAVE BEEN MOVING AROUND A LOT MORE THAN USUAL: 0
SUM OF ALL RESPONSES TO PHQ QUESTIONS 1-9: 0
SUM OF ALL RESPONSES TO PHQ9 QUESTIONS 1 & 2: 0
7. TROUBLE CONCENTRATING ON THINGS, SUCH AS READING THE NEWSPAPER OR WATCHING TELEVISION: 0
10. IF YOU CHECKED OFF ANY PROBLEMS, HOW DIFFICULT HAVE THESE PROBLEMS MADE IT FOR YOU TO DO YOUR WORK, TAKE CARE OF THINGS AT HOME, OR GET ALONG WITH OTHER PEOPLE: 0
6. FEELING BAD ABOUT YOURSELF - OR THAT YOU ARE A FAILURE OR HAVE LET YOURSELF OR YOUR FAMILY DOWN: 0
3. TROUBLE FALLING OR STAYING ASLEEP: 0
SUM OF ALL RESPONSES TO PHQ QUESTIONS 1-9: 0
SUM OF ALL RESPONSES TO PHQ QUESTIONS 1-9: 0
2. FEELING DOWN, DEPRESSED OR HOPELESS: 0
1. LITTLE INTEREST OR PLEASURE IN DOING THINGS: 0
5. POOR APPETITE OR OVEREATING: 0
9. THOUGHTS THAT YOU WOULD BE BETTER OFF DEAD, OR OF HURTING YOURSELF: 0

## 2023-10-25 ASSESSMENT — COLUMBIA-SUICIDE SEVERITY RATING SCALE - C-SSRS
4. HAVE YOU HAD THESE THOUGHTS AND HAD SOME INTENTION OF ACTING ON THEM?: NO
3. HAVE YOU BEEN THINKING ABOUT HOW YOU MIGHT KILL YOURSELF?: NO
7. DID THIS OCCUR IN THE LAST THREE MONTHS: NO

## 2023-10-25 NOTE — PROGRESS NOTES
Visits Requested:   1        Orders Placed This Encounter   Medications    DULoxetine (CYMBALTA) 30 MG extended release capsule     Sig: Take 1 capsule by mouth daily     Dispense:  30 capsule     Refill:  0       Patient given educational materials - see patient instructions. Discussed use, benefit, and side effects of prescribed medications. All patientquestions answered. Pt voiced understanding. Reviewed health maintenance. Instructedto continue current medications, diet and exercise. Patient agreed with treatmentplan. Follow up as directed.      Electronicallysigned by KAREEM Polanco CNP on 10/25/2023 at 11:27 AM

## 2023-10-27 RX ORDER — ALLOPURINOL 100 MG/1
100 TABLET ORAL DAILY
Qty: 90 TABLET | Refills: 0 | Status: SHIPPED | OUTPATIENT
Start: 2023-10-27

## 2023-11-01 DIAGNOSIS — M16.12 PRIMARY OSTEOARTHRITIS OF LEFT HIP: ICD-10-CM

## 2023-11-01 RX ORDER — DOCUSATE SODIUM 100 MG/1
100 CAPSULE, LIQUID FILLED ORAL 2 TIMES DAILY PRN
Qty: 60 CAPSULE | Refills: 3 | Status: SHIPPED | OUTPATIENT
Start: 2023-11-01

## 2023-11-01 RX ORDER — OXYCODONE HYDROCHLORIDE AND ACETAMINOPHEN 5; 325 MG/1; MG/1
1 TABLET ORAL 2 TIMES DAILY PRN
Qty: 60 TABLET | Refills: 0 | Status: SHIPPED | OUTPATIENT
Start: 2023-11-01 | End: 2023-12-01

## 2023-11-01 RX ORDER — LACTOBACILLUS ACIDOPHILUS 500MM CELL
1 CAPSULE ORAL DAILY
Qty: 90 CAPSULE | Refills: 3 | Status: SHIPPED | OUTPATIENT
Start: 2023-11-01

## 2023-11-17 RX ORDER — DONEPEZIL HYDROCHLORIDE 5 MG/1
5 TABLET, FILM COATED ORAL NIGHTLY
Qty: 30 TABLET | Refills: 0 | Status: SHIPPED | OUTPATIENT
Start: 2023-11-17

## 2023-11-19 DIAGNOSIS — F01.50 VASCULAR DEMENTIA WITHOUT BEHAVIORAL DISTURBANCE (HCC): ICD-10-CM

## 2023-11-20 RX ORDER — TRAZODONE HYDROCHLORIDE 50 MG/1
50 TABLET ORAL NIGHTLY PRN
Qty: 30 TABLET | Refills: 0 | Status: SHIPPED | OUTPATIENT
Start: 2023-11-20

## 2023-11-20 RX ORDER — DULOXETIN HYDROCHLORIDE 30 MG/1
30 CAPSULE, DELAYED RELEASE ORAL DAILY
Qty: 30 CAPSULE | Refills: 0 | Status: SHIPPED | OUTPATIENT
Start: 2023-11-20

## 2023-11-20 RX ORDER — MEMANTINE HYDROCHLORIDE 10 MG/1
10 TABLET ORAL DAILY
Qty: 30 TABLET | Refills: 0 | Status: SHIPPED | OUTPATIENT
Start: 2023-11-20

## 2023-11-29 DIAGNOSIS — F01.50 VASCULAR DEMENTIA WITHOUT BEHAVIORAL DISTURBANCE (HCC): ICD-10-CM

## 2023-11-29 DIAGNOSIS — M16.12 PRIMARY OSTEOARTHRITIS OF LEFT HIP: ICD-10-CM

## 2023-11-29 RX ORDER — TRAZODONE HYDROCHLORIDE 50 MG/1
50 TABLET ORAL NIGHTLY PRN
Qty: 30 TABLET | Refills: 0 | Status: SHIPPED | OUTPATIENT
Start: 2023-11-29

## 2023-11-29 RX ORDER — DULOXETIN HYDROCHLORIDE 30 MG/1
30 CAPSULE, DELAYED RELEASE ORAL DAILY
Qty: 30 CAPSULE | Refills: 0 | Status: SHIPPED | OUTPATIENT
Start: 2023-11-29

## 2023-11-29 RX ORDER — MEMANTINE HYDROCHLORIDE 10 MG/1
10 TABLET ORAL DAILY
Qty: 30 TABLET | Refills: 0 | Status: SHIPPED | OUTPATIENT
Start: 2023-11-29

## 2023-11-29 RX ORDER — FUROSEMIDE 40 MG/1
40 TABLET ORAL DAILY
Qty: 90 TABLET | Refills: 0 | Status: SHIPPED | OUTPATIENT
Start: 2023-11-29

## 2023-11-29 RX ORDER — DONEPEZIL HYDROCHLORIDE 5 MG/1
5 TABLET, FILM COATED ORAL NIGHTLY
Qty: 30 TABLET | Refills: 0 | Status: SHIPPED | OUTPATIENT
Start: 2023-11-29

## 2023-11-29 RX ORDER — OXYCODONE HYDROCHLORIDE AND ACETAMINOPHEN 5; 325 MG/1; MG/1
1 TABLET ORAL 2 TIMES DAILY PRN
Qty: 60 TABLET | Refills: 0 | Status: SHIPPED | OUTPATIENT
Start: 2023-11-29 | End: 2023-12-29

## 2023-12-29 DIAGNOSIS — M16.12 PRIMARY OSTEOARTHRITIS OF LEFT HIP: ICD-10-CM

## 2023-12-29 DIAGNOSIS — F01.50 VASCULAR DEMENTIA WITHOUT BEHAVIORAL DISTURBANCE (HCC): ICD-10-CM

## 2023-12-29 RX ORDER — DONEPEZIL HYDROCHLORIDE 5 MG/1
5 TABLET, FILM COATED ORAL NIGHTLY
Qty: 30 TABLET | Refills: 0 | Status: SHIPPED | OUTPATIENT
Start: 2023-12-29

## 2023-12-29 RX ORDER — LACTOBACILLUS ACIDOPHILUS 500MM CELL
1 CAPSULE ORAL DAILY
Qty: 90 CAPSULE | Refills: 3 | Status: SHIPPED | OUTPATIENT
Start: 2023-12-29

## 2023-12-29 RX ORDER — OXYCODONE HYDROCHLORIDE AND ACETAMINOPHEN 5; 325 MG/1; MG/1
1 TABLET ORAL 2 TIMES DAILY PRN
Qty: 60 TABLET | Refills: 0 | Status: SHIPPED | OUTPATIENT
Start: 2023-12-29 | End: 2024-01-28

## 2023-12-29 RX ORDER — TRAZODONE HYDROCHLORIDE 50 MG/1
50 TABLET ORAL NIGHTLY PRN
Qty: 30 TABLET | Refills: 0 | Status: SHIPPED | OUTPATIENT
Start: 2023-12-29

## 2023-12-29 RX ORDER — MEMANTINE HYDROCHLORIDE 10 MG/1
10 TABLET ORAL DAILY
Qty: 30 TABLET | Refills: 0 | Status: SHIPPED | OUTPATIENT
Start: 2023-12-29

## 2023-12-29 RX ORDER — DOCUSATE SODIUM 100 MG/1
100 CAPSULE, LIQUID FILLED ORAL 2 TIMES DAILY PRN
Qty: 60 CAPSULE | Refills: 3 | Status: SHIPPED | OUTPATIENT
Start: 2023-12-29

## 2024-01-15 RX ORDER — MECLIZINE HCL 12.5 MG/1
12.5 TABLET ORAL 3 TIMES DAILY PRN
Qty: 90 TABLET | Refills: 3 | Status: SHIPPED | OUTPATIENT
Start: 2024-01-15

## 2024-01-15 RX ORDER — LACTOBACILLUS ACIDOPHILUS 500MM CELL
1 CAPSULE ORAL DAILY
Qty: 90 CAPSULE | Refills: 3 | Status: SHIPPED | OUTPATIENT
Start: 2024-01-15

## 2024-01-15 RX ORDER — ALLOPURINOL 100 MG/1
100 TABLET ORAL DAILY
Qty: 90 TABLET | Refills: 0 | Status: SHIPPED | OUTPATIENT
Start: 2024-01-15

## 2024-01-18 DIAGNOSIS — I10 ESSENTIAL HYPERTENSION: ICD-10-CM

## 2024-01-18 RX ORDER — POTASSIUM CHLORIDE 1500 MG/1
TABLET, EXTENDED RELEASE ORAL
Qty: 90 TABLET | Refills: 0 | Status: SHIPPED | OUTPATIENT
Start: 2024-01-18

## 2024-01-22 RX ORDER — DULOXETIN HYDROCHLORIDE 30 MG/1
30 CAPSULE, DELAYED RELEASE ORAL DAILY
Qty: 30 CAPSULE | Refills: 0 | Status: SHIPPED | OUTPATIENT
Start: 2024-01-22

## 2024-01-25 RX ORDER — ALLOPURINOL 100 MG/1
100 TABLET ORAL DAILY
Qty: 90 TABLET | Refills: 0 | OUTPATIENT
Start: 2024-01-25

## 2024-01-28 DIAGNOSIS — M16.12 PRIMARY OSTEOARTHRITIS OF LEFT HIP: ICD-10-CM

## 2024-01-29 RX ORDER — OXYCODONE HYDROCHLORIDE AND ACETAMINOPHEN 5; 325 MG/1; MG/1
1 TABLET ORAL 2 TIMES DAILY PRN
Qty: 60 TABLET | Refills: 0 | Status: SHIPPED | OUTPATIENT
Start: 2024-01-29 | End: 2024-02-28

## 2024-01-29 RX ORDER — TRAZODONE HYDROCHLORIDE 50 MG/1
50 TABLET ORAL NIGHTLY PRN
Qty: 30 TABLET | Refills: 0 | Status: SHIPPED | OUTPATIENT
Start: 2024-01-29

## 2024-01-29 RX ORDER — NYSTATIN 100000 [USP'U]/G
POWDER TOPICAL
Qty: 60 G | Refills: 4 | Status: SHIPPED | OUTPATIENT
Start: 2024-01-29

## 2024-01-29 RX ORDER — IBUPROFEN 800 MG/1
800 TABLET ORAL
Qty: 90 TABLET | Refills: 5 | Status: SHIPPED | OUTPATIENT
Start: 2024-01-29

## 2024-02-14 RX ORDER — FAMOTIDINE 20 MG/1
20 TABLET, FILM COATED ORAL DAILY
Qty: 60 TABLET | Refills: 0 | Status: SHIPPED | OUTPATIENT
Start: 2024-02-14

## 2024-02-19 RX ORDER — DONEPEZIL HYDROCHLORIDE 5 MG/1
5 TABLET, FILM COATED ORAL NIGHTLY
Qty: 30 TABLET | Refills: 0 | Status: SHIPPED | OUTPATIENT
Start: 2024-02-19

## 2024-02-22 RX ORDER — TRAZODONE HYDROCHLORIDE 50 MG/1
50 TABLET ORAL NIGHTLY PRN
Qty: 30 TABLET | Refills: 0 | Status: SHIPPED | OUTPATIENT
Start: 2024-02-22

## 2024-02-29 DIAGNOSIS — M16.12 PRIMARY OSTEOARTHRITIS OF LEFT HIP: ICD-10-CM

## 2024-02-29 RX ORDER — DOCUSATE SODIUM 100 MG/1
100 CAPSULE, LIQUID FILLED ORAL 2 TIMES DAILY PRN
Qty: 60 CAPSULE | Refills: 3 | Status: SHIPPED | OUTPATIENT
Start: 2024-02-29

## 2024-02-29 RX ORDER — FUROSEMIDE 40 MG/1
40 TABLET ORAL DAILY
Qty: 90 TABLET | Refills: 0 | Status: SHIPPED | OUTPATIENT
Start: 2024-02-29

## 2024-02-29 RX ORDER — TRAZODONE HYDROCHLORIDE 50 MG/1
50 TABLET ORAL NIGHTLY PRN
Qty: 30 TABLET | Refills: 0 | Status: SHIPPED | OUTPATIENT
Start: 2024-02-29

## 2024-02-29 RX ORDER — FAMOTIDINE 20 MG/1
20 TABLET, FILM COATED ORAL DAILY
Qty: 60 TABLET | Refills: 0 | Status: SHIPPED | OUTPATIENT
Start: 2024-02-29

## 2024-02-29 RX ORDER — OXYCODONE HYDROCHLORIDE AND ACETAMINOPHEN 5; 325 MG/1; MG/1
1 TABLET ORAL 2 TIMES DAILY PRN
Qty: 60 TABLET | Refills: 0 | Status: SHIPPED | OUTPATIENT
Start: 2024-02-29 | End: 2024-03-30

## 2024-03-12 ENCOUNTER — TELEPHONE (OUTPATIENT)
Dept: PRIMARY CARE CLINIC | Age: 84
End: 2024-03-12

## 2024-03-12 NOTE — TELEPHONE ENCOUNTER
Jessica-patient's caretaker called, patient is having a lot of belching and heartburn and Tums are not helping at all.  Jessica is asking if patient can take Omeprazole 20 mg daily.  She states she has some and would like to have patient try-she has appt on 3/20/2024 with you.  Please advise.

## 2024-03-19 DIAGNOSIS — F01.50 VASCULAR DEMENTIA WITHOUT BEHAVIORAL DISTURBANCE (HCC): ICD-10-CM

## 2024-03-19 RX ORDER — MEMANTINE HYDROCHLORIDE 10 MG/1
10 TABLET ORAL DAILY
Qty: 30 TABLET | Refills: 0 | Status: SHIPPED | OUTPATIENT
Start: 2024-03-19 | End: 2024-03-20 | Stop reason: SDUPTHER

## 2024-03-19 RX ORDER — DULOXETIN HYDROCHLORIDE 30 MG/1
30 CAPSULE, DELAYED RELEASE ORAL DAILY
Qty: 30 CAPSULE | Refills: 0 | Status: SHIPPED | OUTPATIENT
Start: 2024-03-19 | End: 2024-03-20 | Stop reason: SDUPTHER

## 2024-03-19 RX ORDER — DONEPEZIL HYDROCHLORIDE 5 MG/1
5 TABLET, FILM COATED ORAL NIGHTLY
Qty: 30 TABLET | Refills: 0 | Status: SHIPPED | OUTPATIENT
Start: 2024-03-19 | End: 2024-03-20 | Stop reason: SDUPTHER

## 2024-03-20 DIAGNOSIS — F01.50 VASCULAR DEMENTIA WITHOUT BEHAVIORAL DISTURBANCE (HCC): ICD-10-CM

## 2024-03-20 RX ORDER — MEMANTINE HYDROCHLORIDE 10 MG/1
10 TABLET ORAL DAILY
Qty: 30 TABLET | Refills: 0 | Status: SHIPPED | OUTPATIENT
Start: 2024-03-20

## 2024-03-20 RX ORDER — MECLIZINE HCL 12.5 MG/1
12.5 TABLET ORAL 3 TIMES DAILY PRN
Qty: 90 TABLET | Refills: 3 | Status: SHIPPED | OUTPATIENT
Start: 2024-03-20

## 2024-03-20 RX ORDER — DULOXETIN HYDROCHLORIDE 30 MG/1
30 CAPSULE, DELAYED RELEASE ORAL DAILY
Qty: 30 CAPSULE | Refills: 0 | Status: SHIPPED | OUTPATIENT
Start: 2024-03-20

## 2024-03-20 RX ORDER — DONEPEZIL HYDROCHLORIDE 5 MG/1
5 TABLET, FILM COATED ORAL NIGHTLY
Qty: 30 TABLET | Refills: 0 | Status: SHIPPED | OUTPATIENT
Start: 2024-03-20

## 2024-03-29 DIAGNOSIS — M16.12 PRIMARY OSTEOARTHRITIS OF LEFT HIP: ICD-10-CM

## 2024-03-29 RX ORDER — OXYCODONE HYDROCHLORIDE AND ACETAMINOPHEN 5; 325 MG/1; MG/1
1 TABLET ORAL 2 TIMES DAILY PRN
Qty: 60 TABLET | Refills: 0 | Status: SHIPPED | OUTPATIENT
Start: 2024-03-29 | End: 2024-04-28

## 2024-04-08 DIAGNOSIS — F01.50 VASCULAR DEMENTIA WITHOUT BEHAVIORAL DISTURBANCE (HCC): ICD-10-CM

## 2024-04-08 DIAGNOSIS — E78.2 MIXED HYPERLIPIDEMIA: ICD-10-CM

## 2024-04-09 RX ORDER — DULOXETIN HYDROCHLORIDE 30 MG/1
30 CAPSULE, DELAYED RELEASE ORAL DAILY
Qty: 30 CAPSULE | Refills: 0 | Status: SHIPPED | OUTPATIENT
Start: 2024-04-09

## 2024-04-09 RX ORDER — MEMANTINE HYDROCHLORIDE 10 MG/1
10 TABLET ORAL DAILY
Qty: 30 TABLET | Refills: 0 | Status: SHIPPED | OUTPATIENT
Start: 2024-04-09

## 2024-04-09 RX ORDER — PRAVASTATIN SODIUM 40 MG
40 TABLET ORAL DAILY
Qty: 90 TABLET | Refills: 3 | Status: SHIPPED | OUTPATIENT
Start: 2024-04-09

## 2024-04-09 RX ORDER — ALLOPURINOL 100 MG/1
100 TABLET ORAL DAILY
Qty: 90 TABLET | Refills: 0 | Status: SHIPPED | OUTPATIENT
Start: 2024-04-09

## 2024-04-09 RX ORDER — DONEPEZIL HYDROCHLORIDE 5 MG/1
5 TABLET, FILM COATED ORAL NIGHTLY
Qty: 30 TABLET | Refills: 0 | Status: SHIPPED | OUTPATIENT
Start: 2024-04-09

## 2024-04-09 RX ORDER — LACTOBACILLUS ACIDOPHILUS 500MM CELL
1 CAPSULE ORAL DAILY
Qty: 90 CAPSULE | Refills: 3 | Status: SHIPPED | OUTPATIENT
Start: 2024-04-09

## 2024-04-09 RX ORDER — FAMOTIDINE 20 MG/1
20 TABLET, FILM COATED ORAL DAILY
Qty: 60 TABLET | Refills: 0 | Status: SHIPPED | OUTPATIENT
Start: 2024-04-09

## 2024-04-15 DIAGNOSIS — I10 ESSENTIAL HYPERTENSION: ICD-10-CM

## 2024-04-15 RX ORDER — POTASSIUM CHLORIDE 1500 MG/1
TABLET, EXTENDED RELEASE ORAL
Qty: 90 TABLET | Refills: 0 | Status: SHIPPED | OUTPATIENT
Start: 2024-04-15

## 2024-04-24 ENCOUNTER — TELEPHONE (OUTPATIENT)
Dept: PRIMARY CARE CLINIC | Age: 84
End: 2024-04-24

## 2024-04-24 ENCOUNTER — OFFICE VISIT (OUTPATIENT)
Dept: PRIMARY CARE CLINIC | Age: 84
End: 2024-04-24
Payer: COMMERCIAL

## 2024-04-24 VITALS
BODY MASS INDEX: 27.83 KG/M2 | HEIGHT: 64 IN | DIASTOLIC BLOOD PRESSURE: 64 MMHG | RESPIRATION RATE: 13 BRPM | HEART RATE: 61 BPM | SYSTOLIC BLOOD PRESSURE: 112 MMHG | WEIGHT: 163 LBS | OXYGEN SATURATION: 93 %

## 2024-04-24 DIAGNOSIS — L89.43: ICD-10-CM

## 2024-04-24 DIAGNOSIS — B49 FUNGAL INFECTION: ICD-10-CM

## 2024-04-24 DIAGNOSIS — F01.50 VASCULAR DEMENTIA WITHOUT BEHAVIORAL DISTURBANCE (HCC): ICD-10-CM

## 2024-04-24 DIAGNOSIS — M25.552 BILATERAL HIP PAIN: ICD-10-CM

## 2024-04-24 DIAGNOSIS — E78.5 HYPERLIPIDEMIA, UNSPECIFIED HYPERLIPIDEMIA TYPE: ICD-10-CM

## 2024-04-24 DIAGNOSIS — M25.551 BILATERAL HIP PAIN: ICD-10-CM

## 2024-04-24 DIAGNOSIS — I10 ESSENTIAL HYPERTENSION: Primary | ICD-10-CM

## 2024-04-24 DIAGNOSIS — M25.511 CHRONIC RIGHT SHOULDER PAIN: ICD-10-CM

## 2024-04-24 DIAGNOSIS — E78.2 MIXED HYPERLIPIDEMIA: ICD-10-CM

## 2024-04-24 DIAGNOSIS — H91.90 HEARING LOSS, UNSPECIFIED HEARING LOSS TYPE, UNSPECIFIED LATERALITY: ICD-10-CM

## 2024-04-24 DIAGNOSIS — G89.29 CHRONIC RIGHT SHOULDER PAIN: ICD-10-CM

## 2024-04-24 PROCEDURE — G8427 DOCREV CUR MEDS BY ELIG CLIN: HCPCS | Performed by: NURSE PRACTITIONER

## 2024-04-24 PROCEDURE — G8400 PT W/DXA NO RESULTS DOC: HCPCS | Performed by: NURSE PRACTITIONER

## 2024-04-24 PROCEDURE — 3074F SYST BP LT 130 MM HG: CPT | Performed by: NURSE PRACTITIONER

## 2024-04-24 PROCEDURE — 99214 OFFICE O/P EST MOD 30 MIN: CPT | Performed by: NURSE PRACTITIONER

## 2024-04-24 PROCEDURE — 1090F PRES/ABSN URINE INCON ASSESS: CPT | Performed by: NURSE PRACTITIONER

## 2024-04-24 PROCEDURE — 3078F DIAST BP <80 MM HG: CPT | Performed by: NURSE PRACTITIONER

## 2024-04-24 PROCEDURE — G2211 COMPLEX E/M VISIT ADD ON: HCPCS | Performed by: NURSE PRACTITIONER

## 2024-04-24 PROCEDURE — G8419 CALC BMI OUT NRM PARAM NOF/U: HCPCS | Performed by: NURSE PRACTITIONER

## 2024-04-24 PROCEDURE — 1123F ACP DISCUSS/DSCN MKR DOCD: CPT | Performed by: NURSE PRACTITIONER

## 2024-04-24 PROCEDURE — 1036F TOBACCO NON-USER: CPT | Performed by: NURSE PRACTITIONER

## 2024-04-24 RX ORDER — DULOXETIN HYDROCHLORIDE 60 MG/1
60 CAPSULE, DELAYED RELEASE ORAL DAILY
Qty: 90 CAPSULE | Refills: 1 | Status: SHIPPED | OUTPATIENT
Start: 2024-04-24

## 2024-04-24 RX ORDER — OMEPRAZOLE 20 MG/1
20 CAPSULE, DELAYED RELEASE ORAL
Qty: 90 CAPSULE | Refills: 1 | Status: SHIPPED | OUTPATIENT
Start: 2024-04-24

## 2024-04-24 RX ORDER — CLOTRIMAZOLE AND BETAMETHASONE DIPROPIONATE 10; .64 MG/G; MG/G
CREAM TOPICAL
Qty: 45 G | Refills: 3 | Status: SHIPPED | OUTPATIENT
Start: 2024-04-24

## 2024-04-24 SDOH — ECONOMIC STABILITY: FOOD INSECURITY: WITHIN THE PAST 12 MONTHS, THE FOOD YOU BOUGHT JUST DIDN'T LAST AND YOU DIDN'T HAVE MONEY TO GET MORE.: NEVER TRUE

## 2024-04-24 SDOH — ECONOMIC STABILITY: FOOD INSECURITY: WITHIN THE PAST 12 MONTHS, YOU WORRIED THAT YOUR FOOD WOULD RUN OUT BEFORE YOU GOT MONEY TO BUY MORE.: NEVER TRUE

## 2024-04-24 SDOH — ECONOMIC STABILITY: INCOME INSECURITY: HOW HARD IS IT FOR YOU TO PAY FOR THE VERY BASICS LIKE FOOD, HOUSING, MEDICAL CARE, AND HEATING?: NOT HARD AT ALL

## 2024-04-24 ASSESSMENT — PATIENT HEALTH QUESTIONNAIRE - PHQ9
9. THOUGHTS THAT YOU WOULD BE BETTER OFF DEAD, OR OF HURTING YOURSELF: NOT AT ALL
4. FEELING TIRED OR HAVING LITTLE ENERGY: NEARLY EVERY DAY
10. IF YOU CHECKED OFF ANY PROBLEMS, HOW DIFFICULT HAVE THESE PROBLEMS MADE IT FOR YOU TO DO YOUR WORK, TAKE CARE OF THINGS AT HOME, OR GET ALONG WITH OTHER PEOPLE: VERY DIFFICULT
SUM OF ALL RESPONSES TO PHQ QUESTIONS 1-9: 16
5. POOR APPETITE OR OVEREATING: MORE THAN HALF THE DAYS
8. MOVING OR SPEAKING SO SLOWLY THAT OTHER PEOPLE COULD HAVE NOTICED. OR THE OPPOSITE, BEING SO FIGETY OR RESTLESS THAT YOU HAVE BEEN MOVING AROUND A LOT MORE THAN USUAL: MORE THAN HALF THE DAYS
SUM OF ALL RESPONSES TO PHQ QUESTIONS 1-9: 16
3. TROUBLE FALLING OR STAYING ASLEEP: MORE THAN HALF THE DAYS
SUM OF ALL RESPONSES TO PHQ QUESTIONS 1-9: 16
1. LITTLE INTEREST OR PLEASURE IN DOING THINGS: MORE THAN HALF THE DAYS
SUM OF ALL RESPONSES TO PHQ9 QUESTIONS 1 & 2: 5
SUM OF ALL RESPONSES TO PHQ QUESTIONS 1-9: 16
6. FEELING BAD ABOUT YOURSELF - OR THAT YOU ARE A FAILURE OR HAVE LET YOURSELF OR YOUR FAMILY DOWN: SEVERAL DAYS
7. TROUBLE CONCENTRATING ON THINGS, SUCH AS READING THE NEWSPAPER OR WATCHING TELEVISION: SEVERAL DAYS
2. FEELING DOWN, DEPRESSED OR HOPELESS: NEARLY EVERY DAY

## 2024-04-24 ASSESSMENT — ENCOUNTER SYMPTOMS
ABDOMINAL PAIN: 0
BACK PAIN: 1
COUGH: 0
SHORTNESS OF BREATH: 0

## 2024-04-24 NOTE — PROGRESS NOTES
[facility]  [facility] PRIMARY CARE  [address] [place] [address] [address]  [address]  [facility] [address]  Dept: [phone]  Dept Fax: [phone]    Mable Kemp is a 84 y.o. female who presentstoday for her medical conditions/complaints as noted below.  Mable Kemp is c/o of  Chief Complaint   Patient presents with    3 Month Follow-Up    Depression    Discuss Medications     Depression, pain medication     Hip Pain     Right hip pain has increased     Tremors     On right hand     Shoulder Pain     Right shoulder pain ROM is getting worse     Toe Pain     Right toe pain has fungus    Wound Check     Sore on tailbone     Referral - General     For hearing test          HPI:     Presents with live in aid for 3 month recheck- past due  Multiple concerns  BP well controlled  Stated weight  Using wheelchair    Increase in feelings of anxiety and depression  Will increase cymbalta dose  Using trazodone as well at night for sleep  Denies any side effects with use of med    Concern for fungal infection in between toes  Increase in redness/itching  Has appt with podiatry next week  Requesting antifungal cream    Concern for lesion to mid back/buttocks  Has had to meet with wound care in the past  Will refer back to wound care for eval    Concern for hearing loss, bilateral  Will refer to ENT for hearing exam    Increase in pain, bilateral hips, lower back, right shoulder  Using percocet BID, no longer feels pain is controlled  Willing to have PT/OT come to her home for eval and treat  Will refer to pain management for further eval    Denies any other problems/concerns        Hemoglobin A1C (%)   Date Value   07/18/2022 5.1   09/03/2020 5.6   03/22/2019 5.6             ( goal A1C is < 7)   No components found for: \"LABMICR\"  LDL Cholesterol (mg/dL)   Date Value   06/14/2023 42   07/18/2022 52   06/21/2021 37     LDL Calculated (mg/dL)   Date Value   12/07/2016 76       (goal LDL is <100)   AST (U/L)   Date Value

## 2024-04-24 NOTE — TELEPHONE ENCOUNTER
Loan from [facility] called into office stating they got a referral for patient's Home care but they need an actual order giving them the permission to take care of the wound.   Loan is requesting for a wound care order.     Fax: [phone]  Phone: [phone] option [phone]

## 2024-04-26 DIAGNOSIS — M16.12 PRIMARY OSTEOARTHRITIS OF LEFT HIP: ICD-10-CM

## 2024-04-26 RX ORDER — OXYCODONE HYDROCHLORIDE AND ACETAMINOPHEN 5; 325 MG/1; MG/1
1 TABLET ORAL 2 TIMES DAILY PRN
Qty: 60 TABLET | Refills: 0 | Status: SHIPPED | OUTPATIENT
Start: 2024-04-26 | End: 2024-05-26

## 2024-04-26 NOTE — TELEPHONE ENCOUNTER
Please read Pt comment, \"Pt would like to know if she still needs to take her med   as she will be seeing her pain mgmt doc in Aug.\"        Last Visit Date: 4/24/2024   Next Visit Date: 7/24/2024

## 2024-04-26 NOTE — TELEPHONE ENCOUNTER
Liz called into office to check if pcp will provide order for wound care.   Writer gave her provider's instruction about wound care and she verbalized understanding

## 2024-04-26 NOTE — TELEPHONE ENCOUNTER
She was referral to PM as she told me the meds twice daily were not controlling her pain  She does not have to take them if she does not want to

## 2024-04-26 NOTE — TELEPHONE ENCOUNTER
----- Message from Nadja Saldana sent at 4/26/2024 11:48 AM EDT -----  Subject: Medication Problem     Medication: oxyCODONE-acetaminophen (PERCOCET) 5-325 MG per tablet  Dosage: 2 day  Ordering Provider: Norma Dennis    Question/Problem: Pt would like to know if she still needs to take her med   as she will be seeing her pain mgmt doc in Aug.      Pharmacy: [facility] [address] [place] [address] LEONOR COE   [phone] - F [phone]    ---------------------------------------------------------------------------  --------------  CALL BACK INFO  [phone]; Do not leave any message, patient will call back for answer  ---------------------------------------------------------------------------  --------------    SCRIPT ANSWERS  Relationship to Patient: Guardian  Representative Name: thor  Is the representative on the Communication Release of Information (KASHMIR)   form in Epic: Yes

## 2024-04-29 NOTE — TELEPHONE ENCOUNTER
Jessica pt's Aid called office to asked if the information about patient not taking her pain medication was true. Writer confirmed with her that it was true because her PCP informed patient she does not have to take medication if she do not need them. She verbalized understand and she is asked for patient's number changed to her daughter's because patient get confused with medical words sometimes and forget things but since she is not on pt's HIPAA form writer will have to confirm with daughter before changing. Writer called daughter but no answer.

## 2024-05-14 NOTE — TELEPHONE ENCOUNTER
Keily from [facility] called stated that patient now has a wound on the right Buttock . Keily stated that they can't get Triad right now and asking if she should use neosporin or Desitin with ABD pad .     Writer spoke with PCP she would like [facility] to use Desitin and ABD pad if it does not get better she would suggest Wound Care .       Call to Keily she stated she would need order faxed to [phone]      Pended for you

## 2024-05-28 DIAGNOSIS — M16.12 PRIMARY OSTEOARTHRITIS OF LEFT HIP: ICD-10-CM

## 2024-05-28 RX ORDER — OXYCODONE HYDROCHLORIDE AND ACETAMINOPHEN 5; 325 MG/1; MG/1
1 TABLET ORAL 2 TIMES DAILY PRN
Qty: 60 TABLET | Refills: 0 | Status: SHIPPED | OUTPATIENT
Start: 2024-05-28 | End: 2024-06-27

## 2024-05-28 RX ORDER — DOCUSATE SODIUM 100 MG/1
100 CAPSULE, LIQUID FILLED ORAL 2 TIMES DAILY PRN
Qty: 60 CAPSULE | Refills: 3 | Status: SHIPPED | OUTPATIENT
Start: 2024-05-28

## 2024-05-28 NOTE — TELEPHONE ENCOUNTER
Last OV:  4/24/2024    Next OV: 7/24/2024    Pharmacy:   [facility] [address] [address] [address]  [place] [phone] - F [phone]  [address] [address]  [facility] [address]  Phone: [phone] Fax: [phone]       Confirmed? Yes

## 2024-06-12 RX ORDER — FUROSEMIDE 40 MG/1
40 TABLET ORAL DAILY
Qty: 90 TABLET | Refills: 0 | Status: SHIPPED | OUTPATIENT
Start: 2024-06-12

## 2024-06-12 RX ORDER — DONEPEZIL HYDROCHLORIDE 5 MG/1
5 TABLET, FILM COATED ORAL NIGHTLY
Qty: 30 TABLET | Refills: 0 | Status: SHIPPED | OUTPATIENT
Start: 2024-06-12

## 2024-06-19 DIAGNOSIS — F01.50 VASCULAR DEMENTIA WITHOUT BEHAVIORAL DISTURBANCE (HCC): ICD-10-CM

## 2024-06-19 RX ORDER — NYSTATIN 100000 [USP'U]/G
POWDER TOPICAL
Qty: 60 G | Refills: 0 | Status: SHIPPED | OUTPATIENT
Start: 2024-06-19

## 2024-06-19 RX ORDER — MEMANTINE HYDROCHLORIDE 10 MG/1
10 TABLET ORAL DAILY
Qty: 30 TABLET | Refills: 0 | Status: SHIPPED | OUTPATIENT
Start: 2024-06-19

## 2024-06-19 RX ORDER — TRAZODONE HYDROCHLORIDE 50 MG/1
50 TABLET ORAL NIGHTLY PRN
Qty: 30 TABLET | Refills: 0 | Status: SHIPPED | OUTPATIENT
Start: 2024-06-19

## 2024-06-19 RX ORDER — DULOXETIN HYDROCHLORIDE 30 MG/1
30 CAPSULE, DELAYED RELEASE ORAL DAILY
Qty: 30 CAPSULE | Refills: 0 | Status: SHIPPED | OUTPATIENT
Start: 2024-06-19

## 2024-07-02 DIAGNOSIS — M16.12 PRIMARY OSTEOARTHRITIS OF LEFT HIP: ICD-10-CM

## 2024-07-02 RX ORDER — OXYCODONE HYDROCHLORIDE AND ACETAMINOPHEN 5; 325 MG/1; MG/1
1 TABLET ORAL 2 TIMES DAILY PRN
Qty: 60 TABLET | Refills: 0 | Status: SHIPPED | OUTPATIENT
Start: 2024-07-02 | End: 2024-08-01

## 2024-07-02 NOTE — TELEPHONE ENCOUNTER
Last OV:  4/24/2024    Next OV: 7/24/2024    Pharmacy:   [facility] [address] [address] [address]  [place] [phone] - F [phone]  [address] [address]  [facility] [address]  Phone: [phone] Fax: [phone]       Confirmed? Yes

## 2024-07-03 RX ORDER — MECLIZINE HCL 12.5 MG/1
12.5 TABLET ORAL 3 TIMES DAILY PRN
Qty: 90 TABLET | Refills: 3 | Status: SHIPPED | OUTPATIENT
Start: 2024-07-03

## 2024-07-03 RX ORDER — DONEPEZIL HYDROCHLORIDE 5 MG/1
5 TABLET, FILM COATED ORAL NIGHTLY
Qty: 30 TABLET | Refills: 0 | Status: SHIPPED | OUTPATIENT
Start: 2024-07-03

## 2024-07-10 ENCOUNTER — TELEPHONE (OUTPATIENT)
Dept: PRIMARY CARE CLINIC | Age: 84
End: 2024-07-10

## 2024-07-10 NOTE — TELEPHONE ENCOUNTER
Daisha with [facility] called stating pt has a cough with wheezing.     Caller stated no fever or chills.    Caller would like to know if PCP wants pt to have chest xray.

## 2024-07-12 ENCOUNTER — TELEPHONE (OUTPATIENT)
Dept: PRIMARY CARE CLINIC | Age: 84
End: 2024-07-12

## 2024-07-12 NOTE — TELEPHONE ENCOUNTER
The patient's live in aid called stating that the wound care nurse that comes to help the patient noticed that the patient is having some wheezing, but that she is not having difficulty breathing.    Writer office the patient's aid the soonest available appointment with PCP,  but she states that the patient does not do morning appointments.  Writer advised the patient's aid that the next available appointment int he afternoon is in 10/2024.  With this writer advised the patient's aid to be seen at a local walk in clinic or urgent care.  Patient's aid states that they do not have transportation to do so.    Patient's aid asking if something can be sent in, though she states the last time this happened the patient was given prednisone and had a reaction to the medication.    Please advise.

## 2024-07-13 DIAGNOSIS — I10 ESSENTIAL HYPERTENSION: ICD-10-CM

## 2024-07-15 RX ORDER — POTASSIUM CHLORIDE 1500 MG/1
TABLET, EXTENDED RELEASE ORAL
Qty: 90 TABLET | Refills: 0 | Status: SHIPPED | OUTPATIENT
Start: 2024-07-15

## 2024-07-15 RX ORDER — FAMOTIDINE 20 MG/1
20 TABLET, FILM COATED ORAL DAILY
Qty: 60 TABLET | Refills: 0 | Status: SHIPPED | OUTPATIENT
Start: 2024-07-15

## 2024-07-15 RX ORDER — ALLOPURINOL 100 MG/1
100 TABLET ORAL DAILY
Qty: 90 TABLET | Refills: 0 | Status: SHIPPED | OUTPATIENT
Start: 2024-07-15

## 2024-07-17 ENCOUNTER — OFFICE VISIT (OUTPATIENT)
Dept: FAMILY MEDICINE CLINIC | Age: 84
End: 2024-07-17
Payer: COMMERCIAL

## 2024-07-17 VITALS
HEART RATE: 62 BPM | DIASTOLIC BLOOD PRESSURE: 82 MMHG | SYSTOLIC BLOOD PRESSURE: 118 MMHG | TEMPERATURE: 97 F | OXYGEN SATURATION: 98 % | RESPIRATION RATE: 14 BRPM

## 2024-07-17 DIAGNOSIS — J40 BRONCHITIS: Primary | ICD-10-CM

## 2024-07-17 DIAGNOSIS — I10 ESSENTIAL HYPERTENSION: ICD-10-CM

## 2024-07-17 DIAGNOSIS — R09.81 NASAL CONGESTION: ICD-10-CM

## 2024-07-17 PROCEDURE — 1090F PRES/ABSN URINE INCON ASSESS: CPT | Performed by: NURSE PRACTITIONER

## 2024-07-17 PROCEDURE — G8427 DOCREV CUR MEDS BY ELIG CLIN: HCPCS | Performed by: NURSE PRACTITIONER

## 2024-07-17 PROCEDURE — 3079F DIAST BP 80-89 MM HG: CPT | Performed by: NURSE PRACTITIONER

## 2024-07-17 PROCEDURE — G8400 PT W/DXA NO RESULTS DOC: HCPCS | Performed by: NURSE PRACTITIONER

## 2024-07-17 PROCEDURE — G8419 CALC BMI OUT NRM PARAM NOF/U: HCPCS | Performed by: NURSE PRACTITIONER

## 2024-07-17 PROCEDURE — 1036F TOBACCO NON-USER: CPT | Performed by: NURSE PRACTITIONER

## 2024-07-17 PROCEDURE — 3074F SYST BP LT 130 MM HG: CPT | Performed by: NURSE PRACTITIONER

## 2024-07-17 PROCEDURE — 1123F ACP DISCUSS/DSCN MKR DOCD: CPT | Performed by: NURSE PRACTITIONER

## 2024-07-17 PROCEDURE — 99213 OFFICE O/P EST LOW 20 MIN: CPT | Performed by: NURSE PRACTITIONER

## 2024-07-17 RX ORDER — ALLOPURINOL 100 MG/1
100 TABLET ORAL DAILY
Qty: 90 TABLET | Refills: 0 | OUTPATIENT
Start: 2024-07-17

## 2024-07-17 RX ORDER — POTASSIUM CHLORIDE 1500 MG/1
TABLET, EXTENDED RELEASE ORAL
Qty: 90 TABLET | Refills: 0 | OUTPATIENT
Start: 2024-07-17

## 2024-07-17 RX ORDER — AZITHROMYCIN 250 MG/1
TABLET, FILM COATED ORAL
Qty: 1 PACKET | Refills: 0 | Status: SHIPPED | OUTPATIENT
Start: 2024-07-17 | End: 2024-07-27

## 2024-07-17 RX ORDER — FLUTICASONE PROPIONATE 50 MCG
2 SPRAY, SUSPENSION (ML) NASAL DAILY
Qty: 16 G | Refills: 0 | Status: SHIPPED | OUTPATIENT
Start: 2024-07-17

## 2024-07-17 RX ORDER — BENZONATATE 100 MG/1
100 CAPSULE ORAL 3 TIMES DAILY PRN
Qty: 30 CAPSULE | Refills: 0 | Status: SHIPPED | OUTPATIENT
Start: 2024-07-17 | End: 2024-07-27

## 2024-07-17 ASSESSMENT — ENCOUNTER SYMPTOMS
COUGH: 1
WHEEZING: 1
SORE THROAT: 0
VOMITING: 0
NAUSEA: 0

## 2024-07-17 NOTE — PROGRESS NOTES
- 102.9 fL    MCH 29.5 25.2 - 33.5 pg    MCHC 30.6 28.4 - 34.8 g/dL    RDW 13.3 11.8 - 14.4 %    Platelets 221 138 - 453 k/uL    MPV 11.8 8.1 - 13.5 fL    NRBC Automated 0.0 0.0 per 100 WBC   Comprehensive Metabolic Panel   Result Value Ref Range    Glucose 90 70 - 99 mg/dL    BUN 30 (H) 8 - 23 mg/dL    Creatinine 1.79 (H) 0.50 - 0.90 mg/dL    Est, Glom Filt Rate 28 (L) >60 mL/min/1.73m2    Calcium 8.4 (L) 8.6 - 10.4 mg/dL    Sodium 141 135 - 144 mmol/L    Potassium 4.0 3.7 - 5.3 mmol/L    Chloride 103 98 - 107 mmol/L    CO2 22 20 - 31 mmol/L    Anion Gap 16 9 - 17 mmol/L    Alkaline Phosphatase 139 (H) 35 - 104 U/L    ALT <5 (L) 5 - 33 U/L    AST 11 <32 U/L    Total Bilirubin 0.3 0.3 - 1.2 mg/dL    Total Protein 6.8 6.4 - 8.3 g/dL    Albumin 3.6 3.5 - 5.2 g/dL    Albumin/Globulin Ratio 1.1 1.0 - 2.5     Pt is well hydrated, not toxic and in no acute distress.   Based on the patient's history and exam will treat as bronchitis.   The patient does not have clinical findings suggestive of pneumonia.  There is no abnormal vital signs (pulse is not greater than 100/ minute, respirations are not greater than 24/ minute, temperature is not greater than 38 degrees Celsius, or oxygen saturation less than 95 percent) There is no tachypnea, rales, or signs of parenchymal consolidation on exam. There are no changes in mental status or behavioral changes.    Pt to fill and take medications as prescribed.  Rest, increase fluids.  Return if no improvement in symptoms.  Go to the ER for any emergent concern.     Patient given educational materials - see patientinstructions.  Discussed use, benefit, and side effects of prescribed medications.  All patient questions answered. Pt verbalized understanding.  Instructed to continue current medications, diet and exercise.  Patient agreed with treatment plan. Follow up as directed.     Electronically signed by KAREEM IZAGUIRRE CNP on 7/17/2024 at 10:52 AM

## 2024-07-18 RX ORDER — IBUPROFEN 800 MG/1
800 TABLET ORAL
Qty: 90 TABLET | Refills: 0 | Status: SHIPPED | OUTPATIENT
Start: 2024-07-18

## 2024-07-30 ENCOUNTER — TELEPHONE (OUTPATIENT)
Dept: PRIMARY CARE CLINIC | Age: 84
End: 2024-07-30

## 2024-07-30 DIAGNOSIS — I10 ESSENTIAL HYPERTENSION: Primary | ICD-10-CM

## 2024-07-30 NOTE — TELEPHONE ENCOUNTER
Spoke with Jessica again, they declined evaluation, per the message below they just want a referral to a cardiologist.     Both Jessica and Patient state patient is in no distress and if things change they will go to the ER.     Please advise

## 2024-07-30 NOTE — TELEPHONE ENCOUNTER
Writer called and spoke with patient and Jessica (HIPAA) and they were calling due to the nurse from [facility] telling them last week and this week that patient is having \"irregular heartbeats\"     Patient denies SOB, chest pain, Jessica did state patient is sometimes dizzy and will faint but not often.     Patient states she feels completely fine and they would like to know if they can get a referral to cardiology. Writer did advise if patient experiences chest pain, SOB, fainting or dizzy spells then she needs to present in the ER asap.    Patient and Stevie verbalized understanding and said they would wait to hear back from PCP    Please advise

## 2024-07-30 NOTE — TELEPHONE ENCOUNTER
----- Message from Anali Jackson sent at 7/30/2024  2:26 PM EDT -----  Regarding: ECC Escalation To Practice  ECC Escalation To Practice      Type of Escalation: Red Flag Symptom  --------------------------------------------------------------------------------------------------------------------------    Information for Provider:   Norma Dennis APRN - CNP  PCP - General    Patient is looking for appointment for: Symptom IRREGULAR HEART BEAT  Reasons for Message: Unable to reach practice     Additional Information PATIENT IS CURRENTLY EXPERIENCING AN IRREGULAR HEART BEAT  --------------------------------------------------------------------------------------------------------------------------    Relationship to Patient: Guardian     Call Back Info: OK to respond with electronic message via GitHub portal (only for patients who have registered GitHub account)  / VOICEMAIL  Preferred Call Back Number: Phone [phone]

## 2024-08-01 ENCOUNTER — TELEPHONE (OUTPATIENT)
Age: 84
End: 2024-08-01

## 2024-08-02 DIAGNOSIS — M16.12 PRIMARY OSTEOARTHRITIS OF LEFT HIP: ICD-10-CM

## 2024-08-02 RX ORDER — OXYCODONE HYDROCHLORIDE AND ACETAMINOPHEN 5; 325 MG/1; MG/1
1 TABLET ORAL 2 TIMES DAILY PRN
Qty: 60 TABLET | Refills: 0 | Status: SHIPPED | OUTPATIENT
Start: 2024-08-02 | End: 2024-09-01

## 2024-08-02 NOTE — TELEPHONE ENCOUNTER
Last Visit Date: 4/24/2024   Next Visit Date: 1/2/2025   Pharmacy:   [facility] [address] [facility] [address] [address]  [place] [phone] - F [phone]  [address] [address]  [facility] [address]  Phone: [phone] Fax: [phone]

## 2024-08-07 NOTE — TELEPHONE ENCOUNTER
3rd and final attempt to schedule new appt - left voice mail on all available numbers for callback.

## 2024-08-11 DIAGNOSIS — F01.50 VASCULAR DEMENTIA WITHOUT BEHAVIORAL DISTURBANCE (HCC): ICD-10-CM

## 2024-08-12 RX ORDER — MEMANTINE HYDROCHLORIDE 10 MG/1
10 TABLET ORAL DAILY
Qty: 30 TABLET | Refills: 0 | Status: SHIPPED | OUTPATIENT
Start: 2024-08-12

## 2024-08-12 RX ORDER — DULOXETIN HYDROCHLORIDE 30 MG/1
30 CAPSULE, DELAYED RELEASE ORAL DAILY
Qty: 30 CAPSULE | Refills: 0 | Status: SHIPPED | OUTPATIENT
Start: 2024-08-12

## 2024-08-14 ENCOUNTER — TELEPHONE (OUTPATIENT)
Dept: PRIMARY CARE CLINIC | Age: 84
End: 2024-08-14

## 2024-08-14 DIAGNOSIS — L89.322 PRESSURE INJURY OF LEFT BUTTOCK, STAGE 2 (HCC): Primary | ICD-10-CM

## 2024-08-14 NOTE — TELEPHONE ENCOUNTER
Does she have current measurements/staging?  I will need this for referral  Also does patient prefer [facility] or [facility]

## 2024-08-14 NOTE — TELEPHONE ENCOUNTER
Daisha from [facility] called into office stating patient needs a referral to wound care because the wound on her buttock keeps on decreasing and increasing regularly.     Please advise   Thanks

## 2024-08-15 NOTE — TELEPHONE ENCOUNTER
Call made to the [facility] Nurse to inform them of their PCP's instructions, writer unable to leave a voice message due to the [facility] Nurse's number answering the call but not saying anything then phone hung up.

## 2024-08-15 NOTE — TELEPHONE ENCOUNTER
Daisha states the wound is stage 2 and on the left buttock is 7.4 with 0.1 depth and on the right 5.4 with 0.1 depth.     She states patient can go to [facility] since it is closer to her house.    Thanks

## 2024-08-17 DIAGNOSIS — B49 FUNGAL INFECTION: ICD-10-CM

## 2024-08-17 RX ORDER — CLOTRIMAZOLE AND BETAMETHASONE DIPROPIONATE 10; .64 MG/G; MG/G
CREAM TOPICAL
Qty: 45 G | Refills: 0 | Status: SHIPPED | OUTPATIENT
Start: 2024-08-17

## 2024-09-03 DIAGNOSIS — M16.12 PRIMARY OSTEOARTHRITIS OF LEFT HIP: ICD-10-CM

## 2024-09-03 RX ORDER — OXYCODONE AND ACETAMINOPHEN 5; 325 MG/1; MG/1
1 TABLET ORAL 2 TIMES DAILY PRN
Qty: 60 TABLET | Refills: 0 | OUTPATIENT
Start: 2024-09-03 | End: 2024-10-03

## 2024-09-03 NOTE — TELEPHONE ENCOUNTER
Last OV:  4/24/2024    Next OV: 1/2/2025    Pharmacy:   [facility] [address] - [address] [address]  [place] [phone] - F [phone]  [address] [address]  [facility] [address]  Phone: [phone] Fax: [phone]       Confirmed? Yes

## 2024-09-05 ENCOUNTER — HOSPITAL ENCOUNTER (OUTPATIENT)
Dept: WOUND CARE | Age: 84
Discharge: HOME | End: 2024-09-05

## 2024-09-06 ENCOUNTER — TELEPHONE (OUTPATIENT)
Dept: PRIMARY CARE CLINIC | Age: 84
End: 2024-09-06

## 2024-09-06 DIAGNOSIS — M16.12 PRIMARY OSTEOARTHRITIS OF LEFT HIP: Primary | ICD-10-CM

## 2024-09-06 DIAGNOSIS — M16.12 PRIMARY OSTEOARTHRITIS OF LEFT HIP: ICD-10-CM

## 2024-09-06 RX ORDER — OXYCODONE AND ACETAMINOPHEN 5; 325 MG/1; MG/1
1 TABLET ORAL 2 TIMES DAILY PRN
Qty: 60 TABLET | Refills: 0 | Status: SHIPPED | OUTPATIENT
Start: 2024-09-06 | End: 2024-10-06

## 2024-09-09 RX ORDER — FUROSEMIDE 40 MG
40 TABLET ORAL DAILY
Qty: 90 TABLET | Refills: 0 | Status: SHIPPED | OUTPATIENT
Start: 2024-09-09

## 2024-09-11 ENCOUNTER — HOSPITAL ENCOUNTER (OUTPATIENT)
Dept: WOUND CARE | Age: 84
Discharge: HOME | End: 2024-09-11

## 2024-09-11 ENCOUNTER — TELEPHONE (OUTPATIENT)
Dept: WOUND CARE | Age: 84
End: 2024-09-11

## 2024-09-11 DIAGNOSIS — F01.50 VASCULAR DEMENTIA WITHOUT BEHAVIORAL DISTURBANCE (HCC): ICD-10-CM

## 2024-09-11 RX ORDER — DULOXETIN HYDROCHLORIDE 30 MG/1
30 CAPSULE, DELAYED RELEASE ORAL DAILY
Qty: 30 CAPSULE | Refills: 0 | Status: SHIPPED | OUTPATIENT
Start: 2024-09-11

## 2024-09-11 RX ORDER — MEMANTINE HYDROCHLORIDE 10 MG/1
10 TABLET ORAL DAILY
Qty: 30 TABLET | Refills: 0 | Status: SHIPPED | OUTPATIENT
Start: 2024-09-11

## 2024-09-11 RX ORDER — DONEPEZIL HYDROCHLORIDE 5 MG/1
5 TABLET, FILM COATED ORAL NIGHTLY
Qty: 30 TABLET | Refills: 0 | Status: SHIPPED | OUTPATIENT
Start: 2024-09-11

## 2024-09-16 ENCOUNTER — OFFICE VISIT (OUTPATIENT)
Dept: PAIN MANAGEMENT | Age: 84
End: 2024-09-16
Payer: COMMERCIAL

## 2024-09-16 VITALS — WEIGHT: 163 LBS | HEIGHT: 64 IN | BODY MASS INDEX: 27.83 KG/M2

## 2024-09-16 DIAGNOSIS — M25.511 CHRONIC PAIN OF BOTH SHOULDERS: ICD-10-CM

## 2024-09-16 DIAGNOSIS — M25.552 BILATERAL HIP PAIN: ICD-10-CM

## 2024-09-16 DIAGNOSIS — M25.512 CHRONIC PAIN OF BOTH SHOULDERS: ICD-10-CM

## 2024-09-16 DIAGNOSIS — G89.29 CHRONIC PAIN OF BOTH SHOULDERS: ICD-10-CM

## 2024-09-16 DIAGNOSIS — M25.551 BILATERAL HIP PAIN: ICD-10-CM

## 2024-09-16 DIAGNOSIS — Z79.891 CHRONIC USE OF OPIATE FOR THERAPEUTIC PURPOSE: Primary | ICD-10-CM

## 2024-09-16 PROCEDURE — 99204 OFFICE O/P NEW MOD 45 MIN: CPT | Performed by: STUDENT IN AN ORGANIZED HEALTH CARE EDUCATION/TRAINING PROGRAM

## 2024-09-16 PROCEDURE — 1123F ACP DISCUSS/DSCN MKR DOCD: CPT | Performed by: STUDENT IN AN ORGANIZED HEALTH CARE EDUCATION/TRAINING PROGRAM

## 2024-09-16 PROCEDURE — G8419 CALC BMI OUT NRM PARAM NOF/U: HCPCS | Performed by: STUDENT IN AN ORGANIZED HEALTH CARE EDUCATION/TRAINING PROGRAM

## 2024-09-16 PROCEDURE — 1090F PRES/ABSN URINE INCON ASSESS: CPT | Performed by: STUDENT IN AN ORGANIZED HEALTH CARE EDUCATION/TRAINING PROGRAM

## 2024-09-16 PROCEDURE — 1036F TOBACCO NON-USER: CPT | Performed by: STUDENT IN AN ORGANIZED HEALTH CARE EDUCATION/TRAINING PROGRAM

## 2024-09-16 PROCEDURE — G8427 DOCREV CUR MEDS BY ELIG CLIN: HCPCS | Performed by: STUDENT IN AN ORGANIZED HEALTH CARE EDUCATION/TRAINING PROGRAM

## 2024-09-16 PROCEDURE — G8400 PT W/DXA NO RESULTS DOC: HCPCS | Performed by: STUDENT IN AN ORGANIZED HEALTH CARE EDUCATION/TRAINING PROGRAM

## 2024-09-19 ENCOUNTER — HOSPITAL ENCOUNTER (OUTPATIENT)
Dept: WOUND CARE | Age: 84
Discharge: HOME | End: 2024-09-19
Payer: COMMERCIAL

## 2024-09-19 VITALS
HEART RATE: 78 BPM | HEIGHT: 65 IN | RESPIRATION RATE: 20 BRPM | SYSTOLIC BLOOD PRESSURE: 171 MMHG | DIASTOLIC BLOOD PRESSURE: 79 MMHG | BODY MASS INDEX: 29.99 KG/M2 | WEIGHT: 180 LBS | TEMPERATURE: 97.5 F

## 2024-09-19 DIAGNOSIS — Z99.3 WHEELCHAIR DEPENDENCE: ICD-10-CM

## 2024-09-19 DIAGNOSIS — L89.323 PRESSURE ULCER OF LEFT BUTTOCK, STAGE 3 (HCC): Primary | ICD-10-CM

## 2024-09-19 PROCEDURE — 97597 DBRDMT OPN WND 1ST 20 CM/<: CPT | Performed by: NURSE PRACTITIONER

## 2024-09-19 PROCEDURE — 99203 OFFICE O/P NEW LOW 30 MIN: CPT | Performed by: NURSE PRACTITIONER

## 2024-09-19 PROCEDURE — 99213 OFFICE O/P EST LOW 20 MIN: CPT

## 2024-09-19 PROCEDURE — 97597 DBRDMT OPN WND 1ST 20 CM/<: CPT

## 2024-09-19 RX ORDER — LIDOCAINE HYDROCHLORIDE 40 MG/ML
SOLUTION TOPICAL ONCE
Status: DISCONTINUED | OUTPATIENT
Start: 2024-09-19 | End: 2024-09-20 | Stop reason: HOSPADM

## 2024-09-19 RX ORDER — LIDOCAINE HYDROCHLORIDE 20 MG/ML
JELLY TOPICAL ONCE
OUTPATIENT
Start: 2024-09-19 | End: 2024-09-19

## 2024-09-19 RX ORDER — LIDOCAINE HYDROCHLORIDE 40 MG/ML
SOLUTION TOPICAL ONCE
OUTPATIENT
Start: 2024-09-19 | End: 2024-09-19

## 2024-09-19 ASSESSMENT — ENCOUNTER SYMPTOMS
COUGH: 0
SHORTNESS OF BREATH: 0
NAUSEA: 0
RHINORRHEA: 0
VOMITING: 0
DIARRHEA: 0

## 2024-09-19 ASSESSMENT — PAIN SCALES - GENERAL: PAINLEVEL_OUTOF10: 0

## 2024-09-27 RX ORDER — DOCUSATE SODIUM 100 MG/1
100 CAPSULE, LIQUID FILLED ORAL 2 TIMES DAILY PRN
Qty: 60 CAPSULE | Refills: 3 | Status: SHIPPED | OUTPATIENT
Start: 2024-09-27

## 2024-09-27 RX ORDER — TRAZODONE HYDROCHLORIDE 50 MG/1
50 TABLET, FILM COATED ORAL NIGHTLY PRN
Qty: 30 TABLET | Refills: 0 | Status: SHIPPED | OUTPATIENT
Start: 2024-09-27

## 2024-09-30 DIAGNOSIS — M16.12 PRIMARY OSTEOARTHRITIS OF LEFT HIP: ICD-10-CM

## 2024-09-30 NOTE — DISCHARGE INSTRUCTIONS
[facility] WOUND and HYPERBARIC TREATMENT  CENTER                            Visit  Discharge Instructions / Physician Orders  DATE:10/3/24     Home Care: [facility]     SUPPLIES ORDERED THRU:     [facility]                DATE LAST SUPPLIED  9/19/2024     Wound Location left buttocks     Cleanse with: Liquid antibacterial soap and water, rinse well      Dressing Orders:  Primary dressing  triad cream, sacral mepilex border                      Secondary dressing                           secure with           x 30days     Frequency:  every 3 days     Additional Orders: Increase protein to diet (meat, cheese, eggs, fish, peanut butter, nuts and beans)  Multivitamin daily     OFFLOADING [] YES  TYPE:                  [] NA     Order placed for Roho Cushion, may also pruchase gel cushion from [employer].        Weekly wound care visits until determined otherwise.     Antibiotic therapy-wound care related YES [] NO [] NA[]     MY CHART []     Smart Device  []      HYPERBARIC TREATMENT-                TREATMENT #                          Your next appointment with the Wound Care Center is in 2 weeks                                                                                                   (Please note your next appointment above and if you are unable to keep, kindly give a 24 hour notice. Thank you.)  If more than 15 min late we cannot guarantee you will be seen due to clinician schedule  Per Policy, Excessive cancellation will call for dismissal from program.  If you experience any of the following, please call the Wound Care Center during business hours:  [phone]     * Increase in Pain  * Temperature over 101  * Increase in drainage from your wound  * Drainage with a foul odor  * Bleeding  * Increase in swelling  * Need for compression bandage changes due to slippage, breakthrough drainage.     If you need medical attention outside of the business hours of the Wound Care Centers please contact your PCP or

## 2024-10-01 ENCOUNTER — FOLLOWUP TELEPHONE ENCOUNTER (OUTPATIENT)
Dept: WOUND CARE | Age: 84
End: 2024-10-01

## 2024-10-01 RX ORDER — OXYCODONE AND ACETAMINOPHEN 5; 325 MG/1; MG/1
1 TABLET ORAL 2 TIMES DAILY PRN
Qty: 60 TABLET | Refills: 0 | OUTPATIENT
Start: 2024-10-01 | End: 2024-10-31

## 2024-10-01 RX ORDER — LACTOBACILLUS ACIDOPHILUS 500MM CELL
1 CAPSULE ORAL DAILY
Qty: 90 CAPSULE | Refills: 3 | Status: SHIPPED | OUTPATIENT
Start: 2024-10-01

## 2024-10-01 NOTE — PROGRESS NOTES
Pt's live in aid called [facility] to cancel apt scheduled on 10/3/24. She stated she had to have surgery and is unable to get pt to her apt. Pt's daughter is able to bring pt on 10/9/24 and requesting an apt on that date. Rescheduled 10/9/24 1430.

## 2024-10-03 ENCOUNTER — HOSPITAL ENCOUNTER (OUTPATIENT)
Dept: WOUND CARE | Age: 84
Discharge: HOME | End: 2024-10-03

## 2024-10-07 DIAGNOSIS — M16.12 PRIMARY OSTEOARTHRITIS OF LEFT HIP: ICD-10-CM

## 2024-10-07 RX ORDER — OXYCODONE AND ACETAMINOPHEN 5; 325 MG/1; MG/1
1 TABLET ORAL 2 TIMES DAILY PRN
Qty: 60 TABLET | Refills: 0 | OUTPATIENT
Start: 2024-10-07 | End: 2024-11-06

## 2024-10-07 RX ORDER — DONEPEZIL HYDROCHLORIDE 5 MG/1
5 TABLET, FILM COATED ORAL NIGHTLY
Qty: 30 TABLET | Refills: 0 | Status: SHIPPED | OUTPATIENT
Start: 2024-10-07 | End: 2024-10-11 | Stop reason: SDUPTHER

## 2024-10-07 RX ORDER — DONEPEZIL HYDROCHLORIDE 5 MG/1
5 TABLET, FILM COATED ORAL NIGHTLY
Qty: 30 TABLET | Refills: 0 | Status: SHIPPED | OUTPATIENT
Start: 2024-10-07 | End: 2024-10-07 | Stop reason: SDUPTHER

## 2024-10-09 ENCOUNTER — FOLLOWUP TELEPHONE ENCOUNTER (OUTPATIENT)
Dept: WOUND CARE | Age: 84
End: 2024-10-09

## 2024-10-09 NOTE — PROGRESS NOTES
Pt no called no showed for [facility] apt today. Called pt, voicemail left asking her to call clinic to reschedule.

## 2024-10-09 NOTE — TELEPHONE ENCOUNTER
Patient's daughter notified about needing to complete UDS from Dr Varghese in pain management. Writer explained that we cannot manage patient's pain when she is in pain management or she could risk getting discharged from the program.     She mentioned that the next appt isnt until 10/25/24 and wants to know what to do. She explained that patient is in a wheelchair    Writer explained that she needs to drop a urine sample so she will need to go to a lab to complete the UDS and then contact Dr Varghese from there. She verbalized understanding

## 2024-10-11 DIAGNOSIS — F01.50 VASCULAR DEMENTIA WITHOUT BEHAVIORAL DISTURBANCE (HCC): ICD-10-CM

## 2024-10-11 RX ORDER — DONEPEZIL HYDROCHLORIDE 5 MG/1
5 TABLET, FILM COATED ORAL NIGHTLY
Qty: 30 TABLET | Refills: 0 | Status: SHIPPED | OUTPATIENT
Start: 2024-10-11

## 2024-10-11 RX ORDER — FAMOTIDINE 20 MG/1
20 TABLET, FILM COATED ORAL DAILY
Qty: 60 TABLET | Refills: 0 | Status: SHIPPED | OUTPATIENT
Start: 2024-10-11

## 2024-10-11 RX ORDER — MEMANTINE HYDROCHLORIDE 10 MG/1
10 TABLET ORAL DAILY
Qty: 30 TABLET | Refills: 0 | Status: SHIPPED | OUTPATIENT
Start: 2024-10-11

## 2024-10-14 RX ORDER — DULOXETIN HYDROCHLORIDE 30 MG/1
30 CAPSULE, DELAYED RELEASE ORAL DAILY
Qty: 30 CAPSULE | Refills: 0 | Status: ON HOLD | OUTPATIENT
Start: 2024-10-14

## 2024-10-15 NOTE — DISCHARGE INSTRUCTIONS
[facility] WOUND and HYPERBARIC TREATMENT  CENTER                            Visit  Discharge Instructions / Physician Orders  DATE:10/14/24     Home Care: [facility]-may discharge     SUPPLIES ORDERED THRU:     [employer]                DATE LAST SUPPLIED  9/19/2024     Wound Location left buttocks-healed     Cleanse with: Liquid antibacterial soap and water, rinse well      Dressing Orders:  Primary dressing  triad cream                    Secondary dressing                           secure with           x 30days     Frequency:  Daily and as needed after hygiene     Additional Orders: Increase protein to diet (meat, cheese, eggs, fish, peanut butter, nuts and beans)  Multivitamin daily     OFFLOADING [x] YES  TYPE:                  [] NA     Order placed for Roho Cushion, may also pruchase gel cushion from [employer].        Weekly wound care visits until determined otherwise.     Antibiotic therapy-wound care related YES [] NO [x] NA[]     MY CHART []     Smart Device  []      HYPERBARIC TREATMENT-                TREATMENT #                          Your next appointment with the Wound Care Center is Call if needed                                                                                                   (Please note your next appointment above and if you are unable to keep, kindly give a 24 hour notice. Thank you.)  If more than 15 min late we cannot guarantee you will be seen due to clinician schedule  Per Policy, Excessive cancellation will call for dismissal from program.  If you experience any of the following, please call the Wound Care Center during business hours:  [phone]     * Increase in Pain  * Temperature over 101  * Increase in drainage from your wound  * Drainage with a foul odor  * Bleeding  * Increase in swelling  * Need for compression bandage changes due to slippage, breakthrough drainage.     If you need medical attention outside of the business hours of the Wound Care Centers

## 2024-10-16 ENCOUNTER — HOSPITAL ENCOUNTER (OUTPATIENT)
Dept: WOUND CARE | Age: 84
Discharge: HOME | End: 2024-10-16
Payer: COMMERCIAL

## 2024-10-16 VITALS
SYSTOLIC BLOOD PRESSURE: 160 MMHG | DIASTOLIC BLOOD PRESSURE: 83 MMHG | TEMPERATURE: 97.7 F | RESPIRATION RATE: 20 BRPM | HEART RATE: 92 BPM | BODY MASS INDEX: 29.99 KG/M2 | WEIGHT: 180 LBS | HEIGHT: 65 IN

## 2024-10-16 DIAGNOSIS — I10 ESSENTIAL HYPERTENSION: ICD-10-CM

## 2024-10-16 DIAGNOSIS — L89.323 PRESSURE ULCER OF LEFT BUTTOCK, STAGE 3 (HCC): Primary | ICD-10-CM

## 2024-10-16 DIAGNOSIS — Z99.3 WHEELCHAIR DEPENDENCE: ICD-10-CM

## 2024-10-16 PROCEDURE — 99212 OFFICE O/P EST SF 10 MIN: CPT | Performed by: NURSE PRACTITIONER

## 2024-10-16 PROCEDURE — 99213 OFFICE O/P EST LOW 20 MIN: CPT

## 2024-10-16 RX ORDER — LIDOCAINE HYDROCHLORIDE 40 MG/ML
SOLUTION TOPICAL ONCE
Status: CANCELLED | OUTPATIENT
Start: 2024-10-16 | End: 2024-10-16

## 2024-10-16 RX ORDER — POTASSIUM CHLORIDE 1500 MG/1
TABLET, EXTENDED RELEASE ORAL
Qty: 90 TABLET | Refills: 0 | Status: ON HOLD | OUTPATIENT
Start: 2024-10-16

## 2024-10-16 RX ORDER — LIDOCAINE HYDROCHLORIDE 20 MG/ML
JELLY TOPICAL ONCE
Status: CANCELLED | OUTPATIENT
Start: 2024-10-16 | End: 2024-10-16

## 2024-10-16 RX ORDER — ALLOPURINOL 100 MG/1
100 TABLET ORAL DAILY
Qty: 90 TABLET | Refills: 0 | Status: ON HOLD | OUTPATIENT
Start: 2024-10-16

## 2024-10-16 RX ORDER — LIDOCAINE HYDROCHLORIDE 40 MG/ML
SOLUTION TOPICAL ONCE
Status: DISCONTINUED | OUTPATIENT
Start: 2024-10-16 | End: 2024-10-16

## 2024-10-16 ASSESSMENT — ENCOUNTER SYMPTOMS
SHORTNESS OF BREATH: 0
NAUSEA: 0
COUGH: 0
VOMITING: 0
DIARRHEA: 0
RHINORRHEA: 0

## 2024-10-16 ASSESSMENT — PAIN SCALES - GENERAL: PAINLEVEL_OUTOF10: 0

## 2024-10-16 NOTE — PROGRESS NOTES
[facility] Wound Care Center   Progress Note and Procedure Note      Mable Kemp  MEDICAL RECORD NUMBER:  [id number]  AGE: 84 y.o.   GENDER: female  : 1940  EPISODE DATE:  10/16/2024    Subjective:     Chief Complaint   Patient presents with    Wound Check     Left butocks         HISTORY of PRESENT ILLNESS HPI     Mable Kemp is a 84 y.o. female who presents today for wound/ulcer evaluation.   History of Wound Context: presents with daughter to follow up on left buttock pressure ulcer that is healed.   Wound/Ulcer Pain Timing/Severity: none  Quality of pain: N/A  Severity:  0 / 10   Modifying Factors: None  Associated Signs/Symptoms: none    Ulcer Identification:  Ulcer Type: pressure  Contributing Factors: diabetes, chronic pressure, decreased mobility, shear force, incontinence of stool, and incontinence of urine    Wound: N/A        PAST MEDICAL HISTORY        Diagnosis Date    Anemia     Arthritis     Cataract     Cerebral artery occlusion with cerebral infarction (HCC)     TIA    CHF (congestive heart failure) (HCC)     QUESTIONABLE    Depression     Diabetes mellitus (HCC)     Eczema     Gout     Hearing loss     Hiatal hernia     History of blood transfusion     Hyperlipidemia     Hypertension     PONV (postoperative nausea and vomiting)     Rectal urgency     Stress incontinence, female        PAST SURGICAL HISTORY    Past Surgical History:   Procedure Laterality Date    ABDOMEN SURGERY  2020    egd/exploratory lap/reduction of paraesopheal hernia/mesh bridge of hiatel hernia defect/gastropexy x 2/kocherization of duodenum    APPENDECTOMY      CATARACT REMOVAL WITH IMPLANT Bilateral     COLONOSCOPY      EYE SURGERY      HYSTERECTOMY (CERVIX STATUS UNKNOWN)      JOINT REPLACEMENT      LAPAROTOMY N/A 9/3/2020    LAPAROTOMY EXPLORATORY, REDUCTION PARESOPHOGEAL HERNIA CONTAINING COLON AND ENTIRE STOMACH, MESH BRIDGE OF HIATAL DEFECT, GASTROPEXY X2, KOCHERIZATION OF DUODENUM

## 2024-10-20 ENCOUNTER — HOSPITAL ENCOUNTER (INPATIENT)
Age: 84
LOS: 4 days | Discharge: NURSING, REHAB OR LONG-TERM CARE | DRG: 280 | End: 2024-10-24
Attending: EMERGENCY MEDICINE | Admitting: INTERNAL MEDICINE
Payer: COMMERCIAL

## 2024-10-20 ENCOUNTER — APPOINTMENT (OUTPATIENT)
Dept: GENERAL RADIOLOGY | Age: 84
DRG: 280 | End: 2024-10-20
Payer: COMMERCIAL

## 2024-10-20 DIAGNOSIS — I21.4 NSTEMI (NON-ST ELEVATED MYOCARDIAL INFARCTION) (HCC): Primary | ICD-10-CM

## 2024-10-20 DIAGNOSIS — I24.9 ACUTE CORONARY SYNDROME (HCC): ICD-10-CM

## 2024-10-20 LAB
ALBUMIN SERPL-MCNC: 3.6 G/DL (ref 3.5–5.2)
ALP SERPL-CCNC: 138 U/L (ref 35–104)
ALT SERPL-CCNC: 11 U/L (ref 10–35)
ANION GAP SERPL CALCULATED.3IONS-SCNC: 10 MMOL/L (ref 9–16)
ANTI-XA UNFRAC HEPARIN: <0.1 IU/L (ref 0.3–0.7)
AST SERPL-CCNC: 19 U/L (ref 10–35)
BASOPHILS # BLD: 0.1 K/UL (ref 0–0.2)
BASOPHILS NFR BLD: 1 % (ref 0–2)
BILIRUB SERPL-MCNC: 0.2 MG/DL (ref 0–1.2)
BNP SERPL-MCNC: ABNORMAL PG/ML (ref 0–300)
BUN SERPL-MCNC: 27 MG/DL (ref 8–23)
CALCIUM SERPL-MCNC: 8.4 MG/DL (ref 8.6–10.4)
CHLORIDE SERPL-SCNC: 108 MMOL/L (ref 98–107)
CO2 SERPL-SCNC: 23 MMOL/L (ref 20–31)
CREAT SERPL-MCNC: 1.7 MG/DL (ref 0.7–1.2)
EOSINOPHIL # BLD: 0.6 K/UL (ref 0–0.4)
EOSINOPHILS RELATIVE PERCENT: 7 % (ref 0–4)
ERYTHROCYTE [DISTWIDTH] IN BLOOD BY AUTOMATED COUNT: 14.3 % (ref 11.5–14.9)
FLUAV RNA RESP QL NAA+PROBE: NOT DETECTED
FLUBV RNA RESP QL NAA+PROBE: NOT DETECTED
GFR, ESTIMATED: 29 ML/MIN/1.73M2
GLUCOSE SERPL-MCNC: 106 MG/DL (ref 74–99)
HCT VFR BLD AUTO: 31.7 % (ref 36–46)
HGB BLD-MCNC: 10.5 G/DL (ref 12–16)
INR PPP: 1.1
LIPASE SERPL-CCNC: 37 U/L (ref 13–60)
LYMPHOCYTES NFR BLD: 2.3 K/UL (ref 1–4.8)
LYMPHOCYTES RELATIVE PERCENT: 25 % (ref 24–44)
MAGNESIUM SERPL-MCNC: 1.7 MG/DL (ref 1.6–2.4)
MCH RBC QN AUTO: 29.7 PG (ref 26–34)
MCHC RBC AUTO-ENTMCNC: 33.1 G/DL (ref 31–37)
MCV RBC AUTO: 89.8 FL (ref 80–100)
MONOCYTES NFR BLD: 0.8 K/UL (ref 0.1–1.3)
MONOCYTES NFR BLD: 9 % (ref 1–7)
NEUTROPHILS NFR BLD: 58 % (ref 36–66)
NEUTS SEG NFR BLD: 5.3 K/UL (ref 1.3–9.1)
PARTIAL THROMBOPLASTIN TIME: 37.3 SEC (ref 24–36)
PLATELET # BLD AUTO: 171 K/UL (ref 150–450)
PMV BLD AUTO: 10.1 FL (ref 6–12)
POTASSIUM SERPL-SCNC: 4 MMOL/L (ref 3.7–5.3)
PROT SERPL-MCNC: 6.6 G/DL (ref 6.6–8.7)
PROTHROMBIN TIME: 14.7 SEC (ref 11.8–14.6)
RBC # BLD AUTO: 3.53 M/UL (ref 4–5.2)
SARS-COV-2 RNA RESP QL NAA+PROBE: NOT DETECTED
SODIUM SERPL-SCNC: 141 MMOL/L (ref 136–145)
SOURCE: NORMAL
SPECIMEN DESCRIPTION: NORMAL
TROPONIN I SERPL HS-MCNC: 247 NG/L (ref 0–14)
TROPONIN I SERPL HS-MCNC: 279 NG/L (ref 0–14)
WBC OTHER # BLD: 9.1 K/UL (ref 3.5–11)

## 2024-10-20 PROCEDURE — 2060000000 HC ICU INTERMEDIATE R&B

## 2024-10-20 PROCEDURE — 83690 ASSAY OF LIPASE: CPT

## 2024-10-20 PROCEDURE — 80053 COMPREHEN METABOLIC PANEL: CPT

## 2024-10-20 PROCEDURE — 93005 ELECTROCARDIOGRAM TRACING: CPT | Performed by: EMERGENCY MEDICINE

## 2024-10-20 PROCEDURE — 6360000002 HC RX W HCPCS

## 2024-10-20 PROCEDURE — 83880 ASSAY OF NATRIURETIC PEPTIDE: CPT

## 2024-10-20 PROCEDURE — 6360000002 HC RX W HCPCS: Performed by: EMERGENCY MEDICINE

## 2024-10-20 PROCEDURE — 36415 COLL VENOUS BLD VENIPUNCTURE: CPT

## 2024-10-20 PROCEDURE — 0202U NFCT DS 22 TRGT SARS-COV-2: CPT

## 2024-10-20 PROCEDURE — 87636 SARSCOV2 & INF A&B AMP PRB: CPT

## 2024-10-20 PROCEDURE — 83735 ASSAY OF MAGNESIUM: CPT

## 2024-10-20 PROCEDURE — 99285 EMERGENCY DEPT VISIT HI MDM: CPT

## 2024-10-20 PROCEDURE — 85610 PROTHROMBIN TIME: CPT

## 2024-10-20 PROCEDURE — 84484 ASSAY OF TROPONIN QUANT: CPT

## 2024-10-20 PROCEDURE — 85730 THROMBOPLASTIN TIME PARTIAL: CPT

## 2024-10-20 PROCEDURE — 71045 X-RAY EXAM CHEST 1 VIEW: CPT

## 2024-10-20 PROCEDURE — 85025 COMPLETE CBC W/AUTO DIFF WBC: CPT

## 2024-10-20 PROCEDURE — 85520 HEPARIN ASSAY: CPT

## 2024-10-20 RX ORDER — MEMANTINE HYDROCHLORIDE 10 MG/1
10 TABLET ORAL DAILY
Status: DISCONTINUED | OUTPATIENT
Start: 2024-10-21 | End: 2024-10-24 | Stop reason: HOSPADM

## 2024-10-20 RX ORDER — HEPARIN SODIUM 10000 [USP'U]/100ML
5-30 INJECTION, SOLUTION INTRAVENOUS CONTINUOUS
Status: DISCONTINUED | OUTPATIENT
Start: 2024-10-20 | End: 2024-10-22

## 2024-10-20 RX ORDER — PRAVASTATIN SODIUM 40 MG
40 TABLET ORAL DAILY
Status: DISCONTINUED | OUTPATIENT
Start: 2024-10-21 | End: 2024-10-21

## 2024-10-20 RX ORDER — ALLOPURINOL 100 MG/1
100 TABLET ORAL DAILY
Status: DISCONTINUED | OUTPATIENT
Start: 2024-10-21 | End: 2024-10-24 | Stop reason: HOSPADM

## 2024-10-20 RX ORDER — PANTOPRAZOLE SODIUM 40 MG/1
40 TABLET, DELAYED RELEASE ORAL
Status: DISCONTINUED | OUTPATIENT
Start: 2024-10-21 | End: 2024-10-24 | Stop reason: HOSPADM

## 2024-10-20 RX ORDER — DOCUSATE SODIUM 100 MG/1
100 CAPSULE, LIQUID FILLED ORAL 2 TIMES DAILY PRN
Status: DISCONTINUED | OUTPATIENT
Start: 2024-10-20 | End: 2024-10-24 | Stop reason: HOSPADM

## 2024-10-20 RX ORDER — ACETAMINOPHEN 325 MG/1
650 TABLET ORAL EVERY 6 HOURS PRN
Status: DISCONTINUED | OUTPATIENT
Start: 2024-10-20 | End: 2024-10-24 | Stop reason: HOSPADM

## 2024-10-20 RX ORDER — FUROSEMIDE 10 MG/ML
20 INJECTION INTRAMUSCULAR; INTRAVENOUS ONCE
Status: COMPLETED | OUTPATIENT
Start: 2024-10-20 | End: 2024-10-20

## 2024-10-20 RX ORDER — LISINOPRIL 20 MG/1
20 TABLET ORAL DAILY
Status: DISCONTINUED | OUTPATIENT
Start: 2024-10-21 | End: 2024-10-21

## 2024-10-20 RX ORDER — ONDANSETRON 2 MG/ML
4 INJECTION INTRAMUSCULAR; INTRAVENOUS EVERY 6 HOURS PRN
Status: DISCONTINUED | OUTPATIENT
Start: 2024-10-20 | End: 2024-10-24 | Stop reason: HOSPADM

## 2024-10-20 RX ORDER — POLYETHYLENE GLYCOL 3350 17 G/17G
17 POWDER, FOR SOLUTION ORAL DAILY PRN
Status: DISCONTINUED | OUTPATIENT
Start: 2024-10-20 | End: 2024-10-24 | Stop reason: HOSPADM

## 2024-10-20 RX ORDER — HEPARIN SODIUM 1000 [USP'U]/ML
2000 INJECTION, SOLUTION INTRAVENOUS; SUBCUTANEOUS PRN
Status: DISCONTINUED | OUTPATIENT
Start: 2024-10-20 | End: 2024-10-22

## 2024-10-20 RX ORDER — SODIUM CHLORIDE 0.9 % (FLUSH) 0.9 %
5-40 SYRINGE (ML) INJECTION EVERY 12 HOURS SCHEDULED
Status: DISCONTINUED | OUTPATIENT
Start: 2024-10-20 | End: 2024-10-24 | Stop reason: HOSPADM

## 2024-10-20 RX ORDER — FERROUS SULFATE 325(65) MG
325 TABLET ORAL 2 TIMES DAILY
Status: DISCONTINUED | OUTPATIENT
Start: 2024-10-21 | End: 2024-10-24 | Stop reason: HOSPADM

## 2024-10-20 RX ORDER — DULOXETIN HYDROCHLORIDE 30 MG/1
30 CAPSULE, DELAYED RELEASE ORAL DAILY
Status: DISCONTINUED | OUTPATIENT
Start: 2024-10-21 | End: 2024-10-24 | Stop reason: HOSPADM

## 2024-10-20 RX ORDER — LANOLIN ALCOHOL/MO/W.PET/CERES
3 CREAM (GRAM) TOPICAL NIGHTLY PRN
Status: DISCONTINUED | OUTPATIENT
Start: 2024-10-20 | End: 2024-10-24 | Stop reason: HOSPADM

## 2024-10-20 RX ORDER — BISACODYL 10 MG
10 SUPPOSITORY, RECTAL RECTAL DAILY PRN
Status: DISCONTINUED | OUTPATIENT
Start: 2024-10-20 | End: 2024-10-24 | Stop reason: HOSPADM

## 2024-10-20 RX ORDER — SODIUM CHLORIDE 0.9 % (FLUSH) 0.9 %
10 SYRINGE (ML) INJECTION PRN
Status: DISCONTINUED | OUTPATIENT
Start: 2024-10-20 | End: 2024-10-24 | Stop reason: HOSPADM

## 2024-10-20 RX ORDER — SODIUM CHLORIDE 9 MG/ML
INJECTION, SOLUTION INTRAVENOUS PRN
Status: DISCONTINUED | OUTPATIENT
Start: 2024-10-20 | End: 2024-10-24 | Stop reason: HOSPADM

## 2024-10-20 RX ORDER — HEPARIN SODIUM 1000 [USP'U]/ML
4000 INJECTION, SOLUTION INTRAVENOUS; SUBCUTANEOUS ONCE
Status: COMPLETED | OUTPATIENT
Start: 2024-10-20 | End: 2024-10-20

## 2024-10-20 RX ORDER — TRAZODONE HYDROCHLORIDE 50 MG/1
50 TABLET, FILM COATED ORAL NIGHTLY PRN
Status: DISCONTINUED | OUTPATIENT
Start: 2024-10-20 | End: 2024-10-24 | Stop reason: HOSPADM

## 2024-10-20 RX ORDER — ACETAMINOPHEN 650 MG/1
650 SUPPOSITORY RECTAL EVERY 6 HOURS PRN
Status: DISCONTINUED | OUTPATIENT
Start: 2024-10-20 | End: 2024-10-24 | Stop reason: HOSPADM

## 2024-10-20 RX ORDER — DONEPEZIL HYDROCHLORIDE 5 MG/1
5 TABLET, FILM COATED ORAL NIGHTLY
Status: DISCONTINUED | OUTPATIENT
Start: 2024-10-20 | End: 2024-10-24 | Stop reason: HOSPADM

## 2024-10-20 RX ORDER — POTASSIUM CHLORIDE 1500 MG/1
20 TABLET, EXTENDED RELEASE ORAL
Status: DISCONTINUED | OUTPATIENT
Start: 2024-10-21 | End: 2024-10-24

## 2024-10-20 RX ORDER — ONDANSETRON 4 MG/1
4 TABLET, ORALLY DISINTEGRATING ORAL EVERY 8 HOURS PRN
Status: DISCONTINUED | OUTPATIENT
Start: 2024-10-20 | End: 2024-10-24 | Stop reason: HOSPADM

## 2024-10-20 RX ORDER — HEPARIN SODIUM 1000 [USP'U]/ML
4000 INJECTION, SOLUTION INTRAVENOUS; SUBCUTANEOUS PRN
Status: DISCONTINUED | OUTPATIENT
Start: 2024-10-20 | End: 2024-10-22

## 2024-10-20 RX ORDER — FAMOTIDINE 20 MG/1
20 TABLET, FILM COATED ORAL DAILY
Status: DISCONTINUED | OUTPATIENT
Start: 2024-10-21 | End: 2024-10-24 | Stop reason: HOSPADM

## 2024-10-20 RX ORDER — MECLIZINE HYDROCHLORIDE 25 MG/1
12.5 TABLET ORAL 3 TIMES DAILY PRN
Status: DISCONTINUED | OUTPATIENT
Start: 2024-10-20 | End: 2024-10-24 | Stop reason: HOSPADM

## 2024-10-20 RX ADMIN — HEPARIN SODIUM 4000 UNITS: 1000 INJECTION INTRAVENOUS; SUBCUTANEOUS at 21:57

## 2024-10-20 RX ADMIN — HEPARIN SODIUM 12 UNITS/KG/HR: 10000 INJECTION, SOLUTION INTRAVENOUS at 21:59

## 2024-10-20 RX ADMIN — FUROSEMIDE 20 MG: 10 INJECTION, SOLUTION INTRAMUSCULAR; INTRAVENOUS at 23:14

## 2024-10-20 NOTE — ED PROVIDER NOTES
elevated myocardial infarction) (HCC)          DISPOSITION/PLAN   DISPOSITION Admitted 10/20/2024 08:42:15 PM  Condition at Disposition: Data Unavailable      OUTPATIENT FOLLOW UP THE PATIENT:  No follow-up provider specified.    DO Amy Diego Nathan R, DO  10/20/24 0210

## 2024-10-21 ENCOUNTER — APPOINTMENT (OUTPATIENT)
Dept: NUCLEAR MEDICINE | Age: 84
DRG: 280 | End: 2024-10-21
Payer: COMMERCIAL

## 2024-10-21 ENCOUNTER — APPOINTMENT (OUTPATIENT)
Age: 84
DRG: 280 | End: 2024-10-21
Attending: INTERNAL MEDICINE
Payer: COMMERCIAL

## 2024-10-21 LAB
ANION GAP SERPL CALCULATED.3IONS-SCNC: 14 MMOL/L (ref 9–16)
ANTI-XA UNFRAC HEPARIN: 0.59 IU/L (ref 0.3–0.7)
ANTI-XA UNFRAC HEPARIN: 0.66 IU/L (ref 0.3–0.7)
B PARAP IS1001 DNA NPH QL NAA+NON-PROBE: NOT DETECTED
B PERT DNA SPEC QL NAA+PROBE: NOT DETECTED
BASOPHILS # BLD: 0.1 K/UL (ref 0–0.2)
BASOPHILS NFR BLD: 1 % (ref 0–2)
BUN SERPL-MCNC: 25 MG/DL (ref 8–23)
C PNEUM DNA NPH QL NAA+NON-PROBE: NOT DETECTED
CALCIUM SERPL-MCNC: 8 MG/DL (ref 8.6–10.4)
CHLORIDE SERPL-SCNC: 107 MMOL/L (ref 98–107)
CO2 SERPL-SCNC: 18 MMOL/L (ref 20–31)
CREAT SERPL-MCNC: 1.7 MG/DL (ref 0.7–1.2)
D DIMER PPP FEU-MCNC: 1.09 UG/ML FEU (ref 0–0.59)
ECHO AO ASC DIAM: 3.4 CM
ECHO AO ASCENDING AORTA INDEX: 1.8 CM/M2
ECHO AO ROOT DIAM: 3 CM
ECHO AO ROOT INDEX: 1.59 CM/M2
ECHO AR MAX VEL PISA: 4.1 M/S
ECHO AV AREA PEAK VELOCITY: 2.3 CM2
ECHO AV AREA VTI: 2.1 CM2
ECHO AV AREA/BSA PEAK VELOCITY: 1.2 CM2/M2
ECHO AV AREA/BSA VTI: 1.1 CM2/M2
ECHO AV MEAN GRADIENT: 2 MMHG
ECHO AV MEAN VELOCITY: 0.7 M/S
ECHO AV PEAK GRADIENT: 4 MMHG
ECHO AV PEAK VELOCITY: 1.1 M/S
ECHO AV REGURGITANT PHT: 314 MS
ECHO AV VELOCITY RATIO: 0.55
ECHO AV VTI: 22.6 CM
ECHO BSA: 1.93 M2
ECHO EST RA PRESSURE: 3 MMHG
ECHO LA AREA 2C: 30.1 CM2
ECHO LA AREA 4C: 22.6 CM2
ECHO LA DIAMETER INDEX: 2.33 CM/M2
ECHO LA DIAMETER: 4.4 CM
ECHO LA MAJOR AXIS: 5.9 CM
ECHO LA MINOR AXIS: 7.1 CM
ECHO LA TO AORTIC ROOT RATIO: 1.47
ECHO LA VOL BP: 94 ML (ref 22–52)
ECHO LA VOL MOD A2C: 103 ML (ref 22–52)
ECHO LA VOL MOD A4C: 71 ML (ref 22–52)
ECHO LA VOL/BSA BIPLANE: 50 ML/M2 (ref 16–34)
ECHO LA VOLUME INDEX MOD A2C: 54 ML/M2 (ref 16–34)
ECHO LA VOLUME INDEX MOD A4C: 38 ML/M2 (ref 16–34)
ECHO LV E' LATERAL VELOCITY: 10 CM/S
ECHO LV E' SEPTAL VELOCITY: 3.3 CM/S
ECHO LV EDV A2C: 91 ML
ECHO LV EDV A4C: 100 ML
ECHO LV EDV INDEX A4C: 53 ML/M2
ECHO LV EDV NDEX A2C: 48 ML/M2
ECHO LV EJECTION FRACTION A2C: 23 %
ECHO LV EJECTION FRACTION A4C: 24 %
ECHO LV EJECTION FRACTION BIPLANE: 25 % (ref 55–100)
ECHO LV ESV A2C: 70 ML
ECHO LV ESV A4C: 76 ML
ECHO LV ESV INDEX A2C: 37 ML/M2
ECHO LV ESV INDEX A4C: 40 ML/M2
ECHO LV FRACTIONAL SHORTENING: 11 % (ref 28–44)
ECHO LV INTERNAL DIMENSION DIASTOLE INDEX: 2.33 CM/M2
ECHO LV INTERNAL DIMENSION DIASTOLIC: 4.4 CM (ref 3.9–5.3)
ECHO LV INTERNAL DIMENSION SYSTOLIC INDEX: 2.06 CM/M2
ECHO LV INTERNAL DIMENSION SYSTOLIC: 3.9 CM
ECHO LV IVSD: 1.2 CM (ref 0.6–0.9)
ECHO LV MASS 2D: 191.3 G (ref 67–162)
ECHO LV MASS INDEX 2D: 101.2 G/M2 (ref 43–95)
ECHO LV POSTERIOR WALL DIASTOLIC: 1.2 CM (ref 0.6–0.9)
ECHO LV RELATIVE WALL THICKNESS RATIO: 0.55
ECHO LVOT AREA: 3.8 CM2
ECHO LVOT AV VTI INDEX: 0.55
ECHO LVOT DIAM: 2.2 CM
ECHO LVOT MEAN GRADIENT: 1 MMHG
ECHO LVOT PEAK GRADIENT: 2 MMHG
ECHO LVOT PEAK VELOCITY: 0.6 M/S
ECHO LVOT STROKE VOLUME INDEX: 24.9 ML/M2
ECHO LVOT SV: 47.1 ML
ECHO LVOT VTI: 12.4 CM
ECHO MV A VELOCITY: 0.63 M/S
ECHO MV AREA VTI: 1.7 CM2
ECHO MV E DECELERATION TIME (DT): 113 MS
ECHO MV E VELOCITY: 1.1 M/S
ECHO MV E/A RATIO: 1.75
ECHO MV E/E' LATERAL: 11
ECHO MV E/E' RATIO (AVERAGED): 22.17
ECHO MV E/E' SEPTAL: 33.33
ECHO MV LVOT VTI INDEX: 2.27
ECHO MV MAX VELOCITY: 1.3 M/S
ECHO MV MEAN GRADIENT: 2 MMHG
ECHO MV MEAN VELOCITY: 0.7 M/S
ECHO MV PEAK GRADIENT: 7 MMHG
ECHO MV VTI: 28.1 CM
ECHO RA AREA 4C: 14.3 CM2
ECHO RA END SYSTOLIC VOLUME APICAL 4 CHAMBER INDEX BSA: 19 ML/M2
ECHO RA VOLUME: 35 ML
ECHO RIGHT VENTRICULAR SYSTOLIC PRESSURE (RVSP): 47 MMHG
ECHO RV BASAL DIMENSION: 3.7 CM
ECHO RV TAPSE: 1.8 CM (ref 1.7–?)
ECHO TV REGURGITANT MAX VELOCITY: 3.3 M/S
ECHO TV REGURGITANT PEAK GRADIENT: 41 MMHG
EKG ATRIAL RATE: 78 BPM
EKG P AXIS: 57 DEGREES
EKG P-R INTERVAL: 250 MS
EKG Q-T INTERVAL: 400 MS
EKG Q-T INTERVAL: 432 MS
EKG QRS DURATION: 110 MS
EKG QRS DURATION: 120 MS
EKG QTC CALCULATION (BAZETT): 484 MS
EKG QTC CALCULATION (BAZETT): 492 MS
EKG R AXIS: -29 DEGREES
EKG R AXIS: -34 DEGREES
EKG T AXIS: 106 DEGREES
EKG T AXIS: 83 DEGREES
EKG VENTRICULAR RATE: 78 BPM
EKG VENTRICULAR RATE: 88 BPM
EOSINOPHIL # BLD: 0.6 K/UL (ref 0–0.4)
EOSINOPHILS RELATIVE PERCENT: 4 % (ref 0–4)
ERYTHROCYTE [DISTWIDTH] IN BLOOD BY AUTOMATED COUNT: 14.7 % (ref 11.5–14.9)
FLUAV RNA NPH QL NAA+NON-PROBE: NOT DETECTED
FLUBV RNA NPH QL NAA+NON-PROBE: NOT DETECTED
GFR, ESTIMATED: 31 ML/MIN/1.73M2
GLUCOSE SERPL-MCNC: 103 MG/DL (ref 74–99)
HADV DNA NPH QL NAA+NON-PROBE: NOT DETECTED
HCOV 229E RNA NPH QL NAA+NON-PROBE: NOT DETECTED
HCOV HKU1 RNA NPH QL NAA+NON-PROBE: NOT DETECTED
HCOV NL63 RNA NPH QL NAA+NON-PROBE: NOT DETECTED
HCOV OC43 RNA NPH QL NAA+NON-PROBE: NOT DETECTED
HCT VFR BLD AUTO: 32.5 % (ref 36–46)
HGB BLD-MCNC: 10.3 G/DL (ref 12–16)
HMPV RNA NPH QL NAA+NON-PROBE: NOT DETECTED
HPIV1 RNA NPH QL NAA+NON-PROBE: NOT DETECTED
HPIV2 RNA NPH QL NAA+NON-PROBE: NOT DETECTED
HPIV3 RNA NPH QL NAA+NON-PROBE: NOT DETECTED
HPIV4 RNA NPH QL NAA+NON-PROBE: NOT DETECTED
LACTATE BLDV-SCNC: 0.7 MMOL/L (ref 0.5–2.2)
LYMPHOCYTES NFR BLD: 3 K/UL (ref 1–4.8)
LYMPHOCYTES RELATIVE PERCENT: 23 % (ref 24–44)
M PNEUMO DNA NPH QL NAA+NON-PROBE: NOT DETECTED
MCH RBC QN AUTO: 28.8 PG (ref 26–34)
MCHC RBC AUTO-ENTMCNC: 31.6 G/DL (ref 31–37)
MCV RBC AUTO: 91.1 FL (ref 80–100)
MONOCYTES NFR BLD: 1.2 K/UL (ref 0.1–1.3)
MONOCYTES NFR BLD: 9 % (ref 1–7)
NEUTROPHILS NFR BLD: 63 % (ref 36–66)
NEUTS SEG NFR BLD: 8.1 K/UL (ref 1.3–9.1)
PLATELET # BLD AUTO: 178 K/UL (ref 150–450)
PMV BLD AUTO: 10.2 FL (ref 6–12)
POTASSIUM SERPL-SCNC: 3.9 MMOL/L (ref 3.7–5.3)
PROCALCITONIN SERPL-MCNC: 0.12 NG/ML (ref 0–0.09)
RBC # BLD AUTO: 3.57 M/UL (ref 4–5.2)
RSV RNA NPH QL NAA+NON-PROBE: NOT DETECTED
RV+EV RNA NPH QL NAA+NON-PROBE: NOT DETECTED
SARS-COV-2 RNA NPH QL NAA+NON-PROBE: NOT DETECTED
SODIUM SERPL-SCNC: 139 MMOL/L (ref 136–145)
SPECIMEN DESCRIPTION: NORMAL
TROPONIN I SERPL HS-MCNC: 246 NG/L (ref 0–14)
TROPONIN I SERPL HS-MCNC: 260 NG/L (ref 0–14)
WBC OTHER # BLD: 13 K/UL (ref 3.5–11)

## 2024-10-21 PROCEDURE — 2580000003 HC RX 258: Performed by: NURSE PRACTITIONER

## 2024-10-21 PROCEDURE — 94640 AIRWAY INHALATION TREATMENT: CPT

## 2024-10-21 PROCEDURE — 2500000003 HC RX 250 WO HCPCS: Performed by: INTERNAL MEDICINE

## 2024-10-21 PROCEDURE — 36415 COLL VENOUS BLD VENIPUNCTURE: CPT

## 2024-10-21 PROCEDURE — 93010 ELECTROCARDIOGRAM REPORT: CPT | Performed by: INTERNAL MEDICINE

## 2024-10-21 PROCEDURE — 93306 TTE W/DOPPLER COMPLETE: CPT | Performed by: INTERNAL MEDICINE

## 2024-10-21 PROCEDURE — 78582 LUNG VENTILAT&PERFUS IMAGING: CPT

## 2024-10-21 PROCEDURE — 2060000000 HC ICU INTERMEDIATE R&B

## 2024-10-21 PROCEDURE — 2580000003 HC RX 258

## 2024-10-21 PROCEDURE — 80048 BASIC METABOLIC PNL TOTAL CA: CPT

## 2024-10-21 PROCEDURE — 2580000003 HC RX 258: Performed by: INTERNAL MEDICINE

## 2024-10-21 PROCEDURE — 85520 HEPARIN ASSAY: CPT

## 2024-10-21 PROCEDURE — 3430000000 HC RX DIAGNOSTIC RADIOPHARMACEUTICAL: Performed by: NURSE PRACTITIONER

## 2024-10-21 PROCEDURE — 84145 PROCALCITONIN (PCT): CPT

## 2024-10-21 PROCEDURE — A9540 TC99M MAA: HCPCS | Performed by: NURSE PRACTITIONER

## 2024-10-21 PROCEDURE — 6370000000 HC RX 637 (ALT 250 FOR IP)

## 2024-10-21 PROCEDURE — 83605 ASSAY OF LACTIC ACID: CPT

## 2024-10-21 PROCEDURE — 84484 ASSAY OF TROPONIN QUANT: CPT

## 2024-10-21 PROCEDURE — 6360000002 HC RX W HCPCS: Performed by: INTERNAL MEDICINE

## 2024-10-21 PROCEDURE — 93306 TTE W/DOPPLER COMPLETE: CPT

## 2024-10-21 PROCEDURE — 99223 1ST HOSP IP/OBS HIGH 75: CPT | Performed by: INTERNAL MEDICINE

## 2024-10-21 PROCEDURE — 6370000000 HC RX 637 (ALT 250 FOR IP): Performed by: INTERNAL MEDICINE

## 2024-10-21 PROCEDURE — 94761 N-INVAS EAR/PLS OXIMETRY MLT: CPT

## 2024-10-21 PROCEDURE — 93005 ELECTROCARDIOGRAM TRACING: CPT | Performed by: INTERNAL MEDICINE

## 2024-10-21 PROCEDURE — A9539 TC99M PENTETATE: HCPCS | Performed by: NURSE PRACTITIONER

## 2024-10-21 PROCEDURE — 85025 COMPLETE CBC W/AUTO DIFF WBC: CPT

## 2024-10-21 PROCEDURE — 85379 FIBRIN DEGRADATION QUANT: CPT

## 2024-10-21 PROCEDURE — 94664 DEMO&/EVAL PT USE INHALER: CPT

## 2024-10-21 PROCEDURE — 6360000002 HC RX W HCPCS

## 2024-10-21 PROCEDURE — 2500000003 HC RX 250 WO HCPCS

## 2024-10-21 RX ORDER — METOPROLOL TARTRATE 1 MG/ML
5 INJECTION, SOLUTION INTRAVENOUS EVERY 6 HOURS PRN
Status: DISCONTINUED | OUTPATIENT
Start: 2024-10-21 | End: 2024-10-24 | Stop reason: HOSPADM

## 2024-10-21 RX ORDER — METOPROLOL SUCCINATE 50 MG/1
50 TABLET, EXTENDED RELEASE ORAL DAILY
Status: DISCONTINUED | OUTPATIENT
Start: 2024-10-21 | End: 2024-10-21

## 2024-10-21 RX ORDER — MAGNESIUM SULFATE HEPTAHYDRATE 40 MG/ML
2000 INJECTION, SOLUTION INTRAVENOUS ONCE
Status: DISCONTINUED | OUTPATIENT
Start: 2024-10-21 | End: 2024-10-21

## 2024-10-21 RX ORDER — KIT FOR THE PREPARATION OF TECHNETIUM TC 99M PENTETATE 20 MG/1
30 INJECTION, POWDER, LYOPHILIZED, FOR SOLUTION INTRAVENOUS; RESPIRATORY (INHALATION)
Status: COMPLETED | OUTPATIENT
Start: 2024-10-21 | End: 2024-10-21

## 2024-10-21 RX ORDER — OXYCODONE AND ACETAMINOPHEN 5; 325 MG/1; MG/1
1 TABLET ORAL EVERY 12 HOURS PRN
Status: DISCONTINUED | OUTPATIENT
Start: 2024-10-21 | End: 2024-10-24 | Stop reason: HOSPADM

## 2024-10-21 RX ORDER — LEVALBUTEROL INHALATION SOLUTION 0.63 MG/3ML
0.31 SOLUTION RESPIRATORY (INHALATION) EVERY 8 HOURS PRN
Status: DISCONTINUED | OUTPATIENT
Start: 2024-10-21 | End: 2024-10-24 | Stop reason: HOSPADM

## 2024-10-21 RX ORDER — METOPROLOL SUCCINATE 50 MG/1
50 TABLET, EXTENDED RELEASE ORAL DAILY
Status: DISCONTINUED | OUTPATIENT
Start: 2024-10-21 | End: 2024-10-24 | Stop reason: HOSPADM

## 2024-10-21 RX ORDER — METOPROLOL TARTRATE 1 MG/ML
5 INJECTION, SOLUTION INTRAVENOUS ONCE
Status: COMPLETED | OUTPATIENT
Start: 2024-10-21 | End: 2024-10-21

## 2024-10-21 RX ORDER — PRAVASTATIN SODIUM 40 MG
40 TABLET ORAL NIGHTLY
Status: DISCONTINUED | OUTPATIENT
Start: 2024-10-22 | End: 2024-10-22

## 2024-10-21 RX ORDER — DEXTROMETHORPHAN POLISTIREX 30 MG/5ML
60 SUSPENSION ORAL NIGHTLY PRN
Status: DISCONTINUED | OUTPATIENT
Start: 2024-10-21 | End: 2024-10-24 | Stop reason: HOSPADM

## 2024-10-21 RX ORDER — AZITHROMYCIN 250 MG/1
250 TABLET, FILM COATED ORAL DAILY
Status: DISCONTINUED | OUTPATIENT
Start: 2024-10-22 | End: 2024-10-24 | Stop reason: HOSPADM

## 2024-10-21 RX ORDER — MAGNESIUM SULFATE 1 G/100ML
1000 INJECTION INTRAVENOUS ONCE
Status: COMPLETED | OUTPATIENT
Start: 2024-10-21 | End: 2024-10-21

## 2024-10-21 RX ORDER — SODIUM CHLORIDE 0.9 % (FLUSH) 0.9 %
10 SYRINGE (ML) INJECTION PRN
Status: DISCONTINUED | OUTPATIENT
Start: 2024-10-21 | End: 2024-10-24 | Stop reason: HOSPADM

## 2024-10-21 RX ORDER — FUROSEMIDE 10 MG/ML
20 INJECTION INTRAMUSCULAR; INTRAVENOUS DAILY
Status: DISCONTINUED | OUTPATIENT
Start: 2024-10-21 | End: 2024-10-22

## 2024-10-21 RX ORDER — AZITHROMYCIN 250 MG/1
500 TABLET, FILM COATED ORAL DAILY
Status: COMPLETED | OUTPATIENT
Start: 2024-10-21 | End: 2024-10-21

## 2024-10-21 RX ORDER — LOSARTAN POTASSIUM 50 MG/1
50 TABLET ORAL DAILY
Status: DISCONTINUED | OUTPATIENT
Start: 2024-10-22 | End: 2024-10-24 | Stop reason: HOSPADM

## 2024-10-21 RX ORDER — OXYCODONE AND ACETAMINOPHEN 5; 325 MG/1; MG/1
1 TABLET ORAL ONCE
Status: DISCONTINUED | OUTPATIENT
Start: 2024-10-21 | End: 2024-10-24 | Stop reason: HOSPADM

## 2024-10-21 RX ADMIN — ALLOPURINOL 100 MG: 100 TABLET ORAL at 08:53

## 2024-10-21 RX ADMIN — IPRATROPIUM BROMIDE 0.5 MG: 0.5 SOLUTION RESPIRATORY (INHALATION) at 15:29

## 2024-10-21 RX ADMIN — ANTI-FUNGAL POWDER MICONAZOLE NITRATE TALC FREE: 1.42 POWDER TOPICAL at 08:54

## 2024-10-21 RX ADMIN — DONEPEZIL HYDROCHLORIDE 5 MG: 5 TABLET, FILM COATED ORAL at 01:11

## 2024-10-21 RX ADMIN — AZITHROMYCIN DIHYDRATE 500 MG: 250 TABLET ORAL at 12:33

## 2024-10-21 RX ADMIN — SODIUM CHLORIDE, PRESERVATIVE FREE 10 ML: 5 INJECTION INTRAVENOUS at 10:53

## 2024-10-21 RX ADMIN — LISINOPRIL 20 MG: 20 TABLET ORAL at 08:53

## 2024-10-21 RX ADMIN — ANTI-FUNGAL POWDER MICONAZOLE NITRATE TALC FREE: 1.42 POWDER TOPICAL at 21:38

## 2024-10-21 RX ADMIN — FERROUS SULFATE TAB 325 MG (65 MG ELEMENTAL FE) 325 MG: 325 (65 FE) TAB at 08:53

## 2024-10-21 RX ADMIN — IPRATROPIUM BROMIDE 0.5 MG: 0.5 SOLUTION RESPIRATORY (INHALATION) at 20:38

## 2024-10-21 RX ADMIN — Medication 60 MG: at 05:13

## 2024-10-21 RX ADMIN — OXYCODONE HYDROCHLORIDE AND ACETAMINOPHEN 1 TABLET: 5; 325 TABLET ORAL at 00:32

## 2024-10-21 RX ADMIN — METOPROLOL TARTRATE 5 MG: 1 INJECTION, SOLUTION INTRAVENOUS at 05:59

## 2024-10-21 RX ADMIN — KIT FOR THE PREPARATION OF TECHNETIUM TC 99M PENTETATE 42.2 MILLICURIE: 20 INJECTION, POWDER, LYOPHILIZED, FOR SOLUTION INTRAVENOUS; RESPIRATORY (INHALATION) at 10:36

## 2024-10-21 RX ADMIN — METOPROLOL TARTRATE 5 MG: 1 INJECTION, SOLUTION INTRAVENOUS at 03:39

## 2024-10-21 RX ADMIN — METHYLPREDNISOLONE SODIUM SUCCINATE 40 MG: 40 INJECTION, POWDER, LYOPHILIZED, FOR SOLUTION INTRAMUSCULAR; INTRAVENOUS at 21:38

## 2024-10-21 RX ADMIN — DONEPEZIL HYDROCHLORIDE 5 MG: 5 TABLET, FILM COATED ORAL at 21:38

## 2024-10-21 RX ADMIN — PANTOPRAZOLE SODIUM 40 MG: 40 TABLET, DELAYED RELEASE ORAL at 05:13

## 2024-10-21 RX ADMIN — SODIUM CHLORIDE, PRESERVATIVE FREE 10 ML: 5 INJECTION INTRAVENOUS at 00:00

## 2024-10-21 RX ADMIN — FAMOTIDINE 20 MG: 20 TABLET, FILM COATED ORAL at 08:53

## 2024-10-21 RX ADMIN — ANTI-FUNGAL POWDER MICONAZOLE NITRATE TALC FREE: 1.42 POWDER TOPICAL at 00:00

## 2024-10-21 RX ADMIN — DULOXETINE HYDROCHLORIDE 30 MG: 30 CAPSULE, DELAYED RELEASE ORAL at 08:53

## 2024-10-21 RX ADMIN — FUROSEMIDE 20 MG: 10 INJECTION, SOLUTION INTRAMUSCULAR; INTRAVENOUS at 12:32

## 2024-10-21 RX ADMIN — METHYLPREDNISOLONE SODIUM SUCCINATE 40 MG: 40 INJECTION, POWDER, LYOPHILIZED, FOR SOLUTION INTRAMUSCULAR; INTRAVENOUS at 12:34

## 2024-10-21 RX ADMIN — IPRATROPIUM BROMIDE 0.5 MG: 0.5 SOLUTION RESPIRATORY (INHALATION) at 11:59

## 2024-10-21 RX ADMIN — MEMANTINE 10 MG: 10 TABLET ORAL at 08:53

## 2024-10-21 RX ADMIN — METOPROLOL SUCCINATE 50 MG: 50 TABLET, EXTENDED RELEASE ORAL at 06:22

## 2024-10-21 RX ADMIN — POTASSIUM CHLORIDE 20 MEQ: 1500 TABLET, EXTENDED RELEASE ORAL at 08:53

## 2024-10-21 RX ADMIN — Medication 8.8 MILLICURIE: at 10:53

## 2024-10-21 RX ADMIN — SODIUM CHLORIDE, PRESERVATIVE FREE 10 ML: 5 INJECTION INTRAVENOUS at 21:38

## 2024-10-21 RX ADMIN — SODIUM CHLORIDE, PRESERVATIVE FREE 10 ML: 5 INJECTION INTRAVENOUS at 08:54

## 2024-10-21 RX ADMIN — FERROUS SULFATE TAB 325 MG (65 MG ELEMENTAL FE) 325 MG: 325 (65 FE) TAB at 16:54

## 2024-10-21 RX ADMIN — MAGNESIUM SULFATE HEPTAHYDRATE 1000 MG: 1 INJECTION, SOLUTION INTRAVENOUS at 03:44

## 2024-10-21 RX ADMIN — PRAVASTATIN SODIUM 40 MG: 40 TABLET ORAL at 08:53

## 2024-10-21 ASSESSMENT — PAIN - FUNCTIONAL ASSESSMENT: PAIN_FUNCTIONAL_ASSESSMENT: ACTIVITIES ARE NOT PREVENTED

## 2024-10-21 ASSESSMENT — PAIN DESCRIPTION - DESCRIPTORS
DESCRIPTORS: SHARP
DESCRIPTORS: ACHING

## 2024-10-21 ASSESSMENT — PAIN DESCRIPTION - ORIENTATION
ORIENTATION: RIGHT

## 2024-10-21 ASSESSMENT — PAIN SCALES - GENERAL
PAINLEVEL_OUTOF10: 2
PAINLEVEL_OUTOF10: 7
PAINLEVEL_OUTOF10: 3
PAINLEVEL_OUTOF10: 7

## 2024-10-21 ASSESSMENT — PAIN DESCRIPTION - LOCATION
LOCATION: HIP
LOCATION: HIP

## 2024-10-21 ASSESSMENT — PAIN DESCRIPTION - FREQUENCY: FREQUENCY: CONTINUOUS

## 2024-10-21 ASSESSMENT — PAIN DESCRIPTION - ONSET: ONSET: ON-GOING

## 2024-10-21 ASSESSMENT — PAIN DESCRIPTION - PAIN TYPE: TYPE: CHRONIC PAIN

## 2024-10-21 NOTE — H&P
PM   Result Value Ref Range    Ventricular Rate 78 BPM    Atrial Rate 78 BPM    P-R Interval 250 ms    QRS Duration 120 ms    Q-T Interval 432 ms    QTc Calculation (Bazett) 492 ms    P Axis 57 degrees    R Axis -29 degrees    T Axis 83 degrees   COVID-19 & Influenza Combo    Collection Time: 10/20/24  6:40 PM    Specimen: Nasopharyngeal Swab   Result Value Ref Range    Specimen Description .NASOPHARYNGEAL SWAB     Source .NASOPHARYNGEAL SWAB     SARS-CoV-2 RNA, RT PCR Not Detected Not Detected    Influenza A Not Detected Not Detected    Influenza B Not Detected Not Detected   Troponin    Collection Time: 10/20/24  7:30 PM   Result Value Ref Range    Troponin, High Sensitivity 247 (HH) 0 - 14 ng/L   Troponin    Collection Time: 10/20/24  9:30 PM   Result Value Ref Range    Troponin, High Sensitivity 279 (HH) 0 - 14 ng/L   APTT    Collection Time: 10/20/24  9:30 PM   Result Value Ref Range    APTT 37.3 (H) 24.0 - 36.0 sec   Anti-Xa, Unfractionated Heparin    Collection Time: 10/20/24  9:30 PM   Result Value Ref Range    Anti-XA Unfrac Heparin <0.10 (L) 0.30 - 0.70 IU/L   Troponin    Collection Time: 10/21/24 12:02 AM   Result Value Ref Range    Troponin, High Sensitivity 246 (HH) 0 - 14 ng/L   EKG 12 Lead    Collection Time: 10/21/24  3:21 AM   Result Value Ref Range    Ventricular Rate 88 BPM    QRS Duration 110 ms    Q-T Interval 400 ms    QTc Calculation (Bazett) 484 ms    R Axis -34 degrees    T Axis 106 degrees   Basic Metabolic Panel w/ Reflex to MG    Collection Time: 10/21/24  3:34 AM   Result Value Ref Range    Sodium 139 136 - 145 mmol/L    Potassium 3.9 3.7 - 5.3 mmol/L    Chloride 107 98 - 107 mmol/L    CO2 18 (L) 20 - 31 mmol/L    Anion Gap 14 9 - 16 mmol/L    Glucose 103 (H) 74 - 99 mg/dL    BUN 25 (H) 8 - 23 mg/dL    Creatinine 1.7 (H) 0.7 - 1.2 mg/dL    Est, Glom Filt Rate 31 (L) >60 mL/min/1.73m2    Calcium 8.0 (L) 8.6 - 10.4 mg/dL   CBC with auto differential    Collection Time: 10/21/24  3:34 AM

## 2024-10-21 NOTE — ACP (ADVANCE CARE PLANNING)
Advance Care Planning     Advance Care Planning Activator (Inpatient)  Conversation Note      Date of ACP Conversation: 10/21/2024     Conversation Conducted with: Patient with Decision Making Capacity    ACP Activator: Nayely Olson RN        Health Care Decision Maker:     Current Designated Health Care Decision Maker:     Primary Decision Maker: Justo *Tonia moore - [phone]  Click here to complete Healthcare Decision Makers including section of the Healthcare Decision Maker Relationship (ie \"Primary\")      Care Preferences    Ventilation:  \"If you were in your present state of health and suddenly became very ill and were unable to breathe on your own, what would your preference be about the use of a ventilator (breathing machine) if it were available to you?\"      Would the patient desire the use of ventilator (breathing machine)?: yes    \"If your health worsens and it becomes clear that your chance of recovery is unlikely, what would your preference be about the use of a ventilator (breathing machine) if it were available to you?\"     Would the patient desire the use of ventilator (breathing machine)?: Yes      Resuscitation  \"CPR works best to restart the heart when there is a sudden event, like a heart attack, in someone who is otherwise healthy. Unfortunately, CPR does not typically restart the heart for people who have serious health conditions or who are very sick.\"    \"In the event your heart stopped as a result of an underlying serious health condition, would you want attempts to be made to restart your heart (answer \"yes\" for attempt to resuscitate) or would you prefer a natural death (answer \"no\" for do not attempt to resuscitate)?\" yes       [] Yes   [] No   Educated Patient / Decision Maker regarding differences between Advance Directives and portable DNR orders.    Length of ACP Conversation in minutes:      Conversation Outcomes:  ACP discussion completed    Follow-up plan:    []

## 2024-10-21 NOTE — ED NOTES
Admission Dx: NSTEMI    Pts Chief Complaints on Arrival: DIFFICULTY BREATHING    ADL's - Total assistance    Pending Diagnostics:  N/A    Residence PTA: single story home    Special Considerations/Circumstances:  N/A    Vitals: Current vital signs:  /68   Pulse 83   Temp 97.7 °F (36.5 °C)   Resp 16   Ht 1.651 m (5' 5\")   Wt 81.6 kg (180 lb)   SpO2 96%   BMI 29.95 kg/m²

## 2024-10-21 NOTE — CONSULTS
Date:   10/21/2024  Patient name: Mable Kemp  Date of admission:  10/20/2024  6:14 PM  MRN:   [id number]  YOB: 1940  PCP: Norma Dennis APRN - CNP    Reason for Admission: NSTEMI (non-ST elevated myocardial infarction) (HCC) [I21.4]    Cardiology consult       Referring physician: Dr Geoff GODWIN Vaibhav    Impression    Non-ST elevation MI  Episodes of supraventricular tachycardia  Coronary artery calcification as per CT chest 9/3/2020  Severely reduced LV systolic function ejection fraction 20 to 25%, global hypokinesis  Grade 3 LV diastolic dysfunction  Right ventricular systolic pressure 45 to 50 mmHg  Moderate aortic regurgitation, moderate mitral regurgitation, mild to moderate tricuspid regurgitation  Hypertension  Hyperlipidemia  Diabetes mellitus  History of cerebral artery occlusion  Large hiatal hernia  Anemia history of blood transfusion  Chronic kidney disease  History of 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  She also had a fever and chills and body ache and a left hip pain.  On admission blood pressure was 106/70 heart rate 80, oxygen saturation 98% temperature 97.7 and respiratory rate 20  ECG showed sinus rhythm, first-degree heart block, nonspecific T wave changes, voltage criteria for LVH.  Chest x-ray showed changes consistent with bronchitis.  Lab work on admission  High-sensitivity

## 2024-10-21 NOTE — CARE COORDINATION
Case Management Assessment  Initial Evaluation    Date/Time of Evaluation: 10/21/2024 1:22 PM  Assessment Completed by: Nayely Olson RN    If patient is discharged prior to next notation, then this note serves as note for discharge by case management.    Patient Name: Mable Kemp                   YOB: 1940  Diagnosis: NSTEMI (non-ST elevated myocardial infarction) (HCC) [I21.4]                   Date / Time: 10/20/2024  6:14 PM    Patient Admission Status: Inpatient   Readmission Risk (Low < 19, Mod (19-27), High > 27): Readmission Risk Score: 15.4    Current PCP: Norma Dennis APRN - CNP  PCP verified by CM? No    Chart Reviewed: Yes      History Provided by: Patient  Patient Orientation: Alert and Oriented    Patient Cognition: Alert    Hospitalization in the last 30 days (Readmission):  No    If yes, Readmission Assessment in CM Navigator will be completed.    Advance Directives:      Code Status: Full Code   Patient's Primary Decision Maker is: Named in Scanned ACP Document    Primary Decision Maker: Justo *hipaa,Tonia - Child - [phone]    Discharge Planning:    Patient lives with: Alone Type of Home: Apartment  Primary Care Giver: Self  Patient Support Systems include: Children   Current Financial resources: Medicare  Current community resources:    Current services prior to admission: Meals On Wheels            Current DME:              Type of Home Care services:  Aide Services    ADLS  Prior functional level: Independent in ADLs/IADLs  Current functional level: Independent in ADLs/IADLs    PT AM-PAC:   /24  OT AM-PAC:   /24    Family can provide assistance at DC: Yes  Would you like Case Management to discuss the discharge plan with any other family members/significant others, and if so, who? Yes  Plans to Return to Present Housing: Yes  Other Identified Issues/Barriers to RETURNING to current housing: no  Potential Assistance needed at discharge: Meals On Wheels

## 2024-10-22 LAB
ANION GAP SERPL CALCULATED.3IONS-SCNC: 12 MMOL/L (ref 9–16)
ANTI-XA UNFRAC HEPARIN: 0.59 IU/L (ref 0.3–0.7)
BASOPHILS # BLD: 0 K/UL (ref 0–0.2)
BASOPHILS NFR BLD: 0 % (ref 0–2)
BUN SERPL-MCNC: 32 MG/DL (ref 8–23)
CALCIUM SERPL-MCNC: 8.5 MG/DL (ref 8.6–10.4)
CHLORIDE SERPL-SCNC: 106 MMOL/L (ref 98–107)
CO2 SERPL-SCNC: 22 MMOL/L (ref 20–31)
CREAT SERPL-MCNC: 1.8 MG/DL (ref 0.7–1.2)
EKG ATRIAL RATE: 73 BPM
EKG P AXIS: 38 DEGREES
EKG P-R INTERVAL: 284 MS
EKG Q-T INTERVAL: 448 MS
EKG QRS DURATION: 108 MS
EKG QTC CALCULATION (BAZETT): 493 MS
EKG R AXIS: -35 DEGREES
EKG T AXIS: 68 DEGREES
EKG VENTRICULAR RATE: 73 BPM
EOSINOPHIL # BLD: 0 K/UL (ref 0–0.4)
EOSINOPHILS RELATIVE PERCENT: 0 % (ref 0–4)
ERYTHROCYTE [DISTWIDTH] IN BLOOD BY AUTOMATED COUNT: 14.1 % (ref 11.5–14.9)
GFR, ESTIMATED: 27 ML/MIN/1.73M2
GLUCOSE SERPL-MCNC: 161 MG/DL (ref 74–99)
HCT VFR BLD AUTO: 28.3 % (ref 36–46)
HGB BLD-MCNC: 9.4 G/DL (ref 12–16)
LYMPHOCYTES NFR BLD: 1.1 K/UL (ref 1–4.8)
LYMPHOCYTES RELATIVE PERCENT: 22 % (ref 24–44)
MCH RBC QN AUTO: 29.6 PG (ref 26–34)
MCHC RBC AUTO-ENTMCNC: 33.3 G/DL (ref 31–37)
MCV RBC AUTO: 88.8 FL (ref 80–100)
MONOCYTES NFR BLD: 0.1 K/UL (ref 0.1–1.3)
MONOCYTES NFR BLD: 2 % (ref 1–7)
NEUTROPHILS NFR BLD: 76 % (ref 36–66)
NEUTS SEG NFR BLD: 3.9 K/UL (ref 1.3–9.1)
PLATELET # BLD AUTO: 153 K/UL (ref 150–450)
PMV BLD AUTO: 9.8 FL (ref 6–12)
POTASSIUM SERPL-SCNC: 4.9 MMOL/L (ref 3.7–5.3)
RBC # BLD AUTO: 3.18 M/UL (ref 4–5.2)
SODIUM SERPL-SCNC: 140 MMOL/L (ref 136–145)
WBC OTHER # BLD: 5.1 K/UL (ref 3.5–11)

## 2024-10-22 PROCEDURE — 6360000002 HC RX W HCPCS: Performed by: INTERNAL MEDICINE

## 2024-10-22 PROCEDURE — 51798 US URINE CAPACITY MEASURE: CPT

## 2024-10-22 PROCEDURE — 80048 BASIC METABOLIC PNL TOTAL CA: CPT

## 2024-10-22 PROCEDURE — 6370000000 HC RX 637 (ALT 250 FOR IP): Performed by: INTERNAL MEDICINE

## 2024-10-22 PROCEDURE — 99232 SBSQ HOSP IP/OBS MODERATE 35: CPT | Performed by: INTERNAL MEDICINE

## 2024-10-22 PROCEDURE — 36415 COLL VENOUS BLD VENIPUNCTURE: CPT

## 2024-10-22 PROCEDURE — 93010 ELECTROCARDIOGRAM REPORT: CPT | Performed by: INTERNAL MEDICINE

## 2024-10-22 PROCEDURE — 6370000000 HC RX 637 (ALT 250 FOR IP)

## 2024-10-22 PROCEDURE — 2060000000 HC ICU INTERMEDIATE R&B

## 2024-10-22 PROCEDURE — 94761 N-INVAS EAR/PLS OXIMETRY MLT: CPT

## 2024-10-22 PROCEDURE — 2580000003 HC RX 258: Performed by: NURSE PRACTITIONER

## 2024-10-22 PROCEDURE — 2580000003 HC RX 258

## 2024-10-22 PROCEDURE — 6360000002 HC RX W HCPCS

## 2024-10-22 PROCEDURE — 94640 AIRWAY INHALATION TREATMENT: CPT

## 2024-10-22 PROCEDURE — 85025 COMPLETE CBC W/AUTO DIFF WBC: CPT

## 2024-10-22 PROCEDURE — 2580000003 HC RX 258: Performed by: INTERNAL MEDICINE

## 2024-10-22 PROCEDURE — 85520 HEPARIN ASSAY: CPT

## 2024-10-22 RX ORDER — BUMETANIDE 0.25 MG/ML
2 INJECTION, SOLUTION INTRAMUSCULAR; INTRAVENOUS DAILY
Status: DISCONTINUED | OUTPATIENT
Start: 2024-10-22 | End: 2024-10-23

## 2024-10-22 RX ORDER — ATORVASTATIN CALCIUM 40 MG/1
40 TABLET, FILM COATED ORAL NIGHTLY
Status: DISCONTINUED | OUTPATIENT
Start: 2024-10-22 | End: 2024-10-24 | Stop reason: HOSPADM

## 2024-10-22 RX ORDER — MAGNESIUM SULFATE 1 G/100ML
1000 INJECTION INTRAVENOUS ONCE
Status: COMPLETED | OUTPATIENT
Start: 2024-10-22 | End: 2024-10-22

## 2024-10-22 RX ADMIN — METOPROLOL SUCCINATE 50 MG: 50 TABLET, EXTENDED RELEASE ORAL at 08:50

## 2024-10-22 RX ADMIN — ANTI-FUNGAL POWDER MICONAZOLE NITRATE TALC FREE: 1.42 POWDER TOPICAL at 21:41

## 2024-10-22 RX ADMIN — MAGNESIUM SULFATE HEPTAHYDRATE 1000 MG: 1 INJECTION, SOLUTION INTRAVENOUS at 18:16

## 2024-10-22 RX ADMIN — IPRATROPIUM BROMIDE 0.5 MG: 0.5 SOLUTION RESPIRATORY (INHALATION) at 15:17

## 2024-10-22 RX ADMIN — SODIUM CHLORIDE, PRESERVATIVE FREE 10 ML: 5 INJECTION INTRAVENOUS at 09:55

## 2024-10-22 RX ADMIN — HEPARIN SODIUM 12 UNITS/KG/HR: 10000 INJECTION, SOLUTION INTRAVENOUS at 00:15

## 2024-10-22 RX ADMIN — POTASSIUM CHLORIDE 20 MEQ: 1500 TABLET, EXTENDED RELEASE ORAL at 08:50

## 2024-10-22 RX ADMIN — ALLOPURINOL 100 MG: 100 TABLET ORAL at 08:50

## 2024-10-22 RX ADMIN — OXYCODONE HYDROCHLORIDE AND ACETAMINOPHEN 1 TABLET: 5; 325 TABLET ORAL at 02:24

## 2024-10-22 RX ADMIN — FUROSEMIDE 20 MG: 10 INJECTION, SOLUTION INTRAMUSCULAR; INTRAVENOUS at 08:50

## 2024-10-22 RX ADMIN — PANTOPRAZOLE SODIUM 40 MG: 40 TABLET, DELAYED RELEASE ORAL at 05:35

## 2024-10-22 RX ADMIN — METHYLPREDNISOLONE SODIUM SUCCINATE 40 MG: 40 INJECTION, POWDER, LYOPHILIZED, FOR SOLUTION INTRAMUSCULAR; INTRAVENOUS at 08:49

## 2024-10-22 RX ADMIN — ATORVASTATIN CALCIUM 40 MG: 40 TABLET, FILM COATED ORAL at 21:40

## 2024-10-22 RX ADMIN — FERROUS SULFATE TAB 325 MG (65 MG ELEMENTAL FE) 325 MG: 325 (65 FE) TAB at 08:50

## 2024-10-22 RX ADMIN — IPRATROPIUM BROMIDE 0.5 MG: 0.5 SOLUTION RESPIRATORY (INHALATION) at 11:27

## 2024-10-22 RX ADMIN — SODIUM CHLORIDE, PRESERVATIVE FREE 10 ML: 5 INJECTION INTRAVENOUS at 21:39

## 2024-10-22 RX ADMIN — IPRATROPIUM BROMIDE 0.5 MG: 0.5 SOLUTION RESPIRATORY (INHALATION) at 08:27

## 2024-10-22 RX ADMIN — SODIUM CHLORIDE, PRESERVATIVE FREE 10 ML: 5 INJECTION INTRAVENOUS at 08:50

## 2024-10-22 RX ADMIN — AZITHROMYCIN DIHYDRATE 250 MG: 250 TABLET ORAL at 08:50

## 2024-10-22 RX ADMIN — FERROUS SULFATE TAB 325 MG (65 MG ELEMENTAL FE) 325 MG: 325 (65 FE) TAB at 16:11

## 2024-10-22 RX ADMIN — DULOXETINE HYDROCHLORIDE 30 MG: 30 CAPSULE, DELAYED RELEASE ORAL at 08:50

## 2024-10-22 RX ADMIN — LOSARTAN POTASSIUM 50 MG: 50 TABLET, FILM COATED ORAL at 08:50

## 2024-10-22 RX ADMIN — BUMETANIDE 2 MG: 0.25 INJECTION, SOLUTION INTRAMUSCULAR; INTRAVENOUS at 09:55

## 2024-10-22 RX ADMIN — METHYLPREDNISOLONE SODIUM SUCCINATE 40 MG: 40 INJECTION, POWDER, LYOPHILIZED, FOR SOLUTION INTRAMUSCULAR; INTRAVENOUS at 21:40

## 2024-10-22 RX ADMIN — DONEPEZIL HYDROCHLORIDE 5 MG: 5 TABLET, FILM COATED ORAL at 21:40

## 2024-10-22 RX ADMIN — IPRATROPIUM BROMIDE 0.5 MG: 0.5 SOLUTION RESPIRATORY (INHALATION) at 19:34

## 2024-10-22 RX ADMIN — MEMANTINE 10 MG: 10 TABLET ORAL at 08:50

## 2024-10-22 RX ADMIN — ANTI-FUNGAL POWDER MICONAZOLE NITRATE TALC FREE: 1.42 POWDER TOPICAL at 08:51

## 2024-10-22 RX ADMIN — FAMOTIDINE 20 MG: 20 TABLET, FILM COATED ORAL at 08:50

## 2024-10-22 ASSESSMENT — PAIN DESCRIPTION - LOCATION: LOCATION: HIP

## 2024-10-22 ASSESSMENT — PAIN DESCRIPTION - ORIENTATION: ORIENTATION: RIGHT

## 2024-10-22 ASSESSMENT — PAIN DESCRIPTION - FREQUENCY: FREQUENCY: CONTINUOUS

## 2024-10-22 ASSESSMENT — PAIN SCALES - GENERAL
PAINLEVEL_OUTOF10: 0
PAINLEVEL_OUTOF10: 7
PAINLEVEL_OUTOF10: 2

## 2024-10-22 ASSESSMENT — PAIN DESCRIPTION - DESCRIPTORS: DESCRIPTORS: ACHING

## 2024-10-22 ASSESSMENT — PAIN DESCRIPTION - ONSET: ONSET: ON-GOING

## 2024-10-22 ASSESSMENT — PAIN DESCRIPTION - PAIN TYPE: TYPE: CHRONIC PAIN

## 2024-10-22 ASSESSMENT — PAIN - FUNCTIONAL ASSESSMENT: PAIN_FUNCTIONAL_ASSESSMENT: PREVENTS OR INTERFERES SOME ACTIVE ACTIVITIES AND ADLS

## 2024-10-22 NOTE — CARE COORDINATION
ONGOING DISCHARGE PLAN:    Patient is alert and oriented x4.    Spoke with patient regarding discharge plan and patient confirms that plan is still to discharge to [facility]    Patient is agreeable to discharge to a facility    IV Bumex will switch to oral tomorrow     Will need a pre cert for discharge     Will continue to follow for additional discharge needs.    If patient is discharged prior to next notation, then this note serves as note for discharge by case management.    Electronically signed by Nayely Olson RN on 10/22/2024 at 12:25 PM

## 2024-10-22 NOTE — DISCHARGE INSTR - COC
02/03/2021    Influenza Virus Vaccine 10/05/2017    Influenza, AFLURIA (age 3 y+), FLUZONE, (age 6 mo+), Quadv MDV, 0.5mL 09/12/2018    Influenza, FLUAD, (age 65 y+), IM, Quadv, 0.5mL 10/18/2021, 10/25/2023    Influenza, FLUZONE High Dose, (age 65 y+), IM, Trivalent PF, 0.5mL 10/04/2017, 09/05/2019    PPD Test 11/28/2018, 12/07/2018, 09/12/2020, 09/21/2020    Pneumococcal, PCV-13, PREVNAR 13, (age 6w+), IM, 0.5mL 06/15/2017, 10/13/2020, 10/14/2020    Pneumococcal, PPSV23, PNEUMOVAX 23, (age 2y+), SC/IM, 0.5mL 06/26/2018    TDaP, ADACEL (age 10y-64y), BOOSTRIX (age 10y+), IM, 0.5mL 12/19/2023    Zoster Live (Zostavax) 04/10/2015, 06/12/2015       Active Problems:  Patient Active Problem List   Diagnosis Code    Depression with anxiety F41.8    Essential hypertension I10    Mixed hyperlipidemia E78.2    Primary osteoarthritis of left hip M16.12    Cerebrovascular accident (CVA) ([facility]) I63.9    Continuous leakage of urine N39.45    Paraesophageal hernia K44.9    Hypokalemia E87.6    Normocytic anemia D64.9    S/P exploratory laparotomy Z98.890    Dementia ([facility]) F03.90    Moderate malnutrition ([facility]) E44.0    Type 2 diabetes mellitus without complication, without long-term current use of insulin ([facility]) E11.9    Right hip pain M25.551    Chronic low back pain M54.50, G89.29    Wheelchair dependence Z99.3    NSTEMI (non-ST elevated myocardial infarction) ([facility]) I21.4       Isolation/Infection:   Isolation            No Isolation          Patient Infection Status       None to display                     Nurse Assessment:  Last Vital Signs: /67   Pulse 65   Temp 98.1 °F (36.7 °C) (Oral)   Resp 16   Ht 1.651 m (5' 5\")   Wt 81.6 kg (180 lb)   SpO2 98%   BMI 29.95 kg/m²     Last documented pain score (0-10 scale): Pain Level: 2  Last Weight:   Wt Readings from Last 1 Encounters:   10/20/24 81.6 kg (180 lb)     Mental Status:  oriented and alert    IV Access:  - None    Nursing Mobility/ADLs:  Walking

## 2024-10-23 PROCEDURE — 97530 THERAPEUTIC ACTIVITIES: CPT

## 2024-10-23 PROCEDURE — 6370000000 HC RX 637 (ALT 250 FOR IP): Performed by: INTERNAL MEDICINE

## 2024-10-23 PROCEDURE — 6370000000 HC RX 637 (ALT 250 FOR IP)

## 2024-10-23 PROCEDURE — 2580000003 HC RX 258

## 2024-10-23 PROCEDURE — 2580000003 HC RX 258: Performed by: INTERNAL MEDICINE

## 2024-10-23 PROCEDURE — 6360000002 HC RX W HCPCS: Performed by: INTERNAL MEDICINE

## 2024-10-23 PROCEDURE — 94761 N-INVAS EAR/PLS OXIMETRY MLT: CPT

## 2024-10-23 PROCEDURE — 99232 SBSQ HOSP IP/OBS MODERATE 35: CPT | Performed by: INTERNAL MEDICINE

## 2024-10-23 PROCEDURE — 97167 OT EVAL HIGH COMPLEX 60 MIN: CPT

## 2024-10-23 PROCEDURE — 2060000000 HC ICU INTERMEDIATE R&B

## 2024-10-23 PROCEDURE — 94640 AIRWAY INHALATION TREATMENT: CPT

## 2024-10-23 RX ORDER — BUMETANIDE 2 MG/1
2 TABLET ORAL DAILY
Qty: 30 TABLET | Refills: 1 | Status: SHIPPED | OUTPATIENT
Start: 2024-10-24

## 2024-10-23 RX ORDER — ATORVASTATIN CALCIUM 40 MG/1
40 TABLET, FILM COATED ORAL NIGHTLY
Qty: 90 TABLET | Refills: 3 | Status: SHIPPED | OUTPATIENT
Start: 2024-10-23

## 2024-10-23 RX ORDER — METOPROLOL SUCCINATE 50 MG/1
50 TABLET, EXTENDED RELEASE ORAL DAILY
Qty: 30 TABLET | Refills: 3 | Status: SHIPPED | OUTPATIENT
Start: 2024-10-24

## 2024-10-23 RX ORDER — LOSARTAN POTASSIUM 50 MG/1
50 TABLET ORAL DAILY
Qty: 30 TABLET | Refills: 3 | Status: SHIPPED | OUTPATIENT
Start: 2024-10-24

## 2024-10-23 RX ORDER — BUMETANIDE 1 MG/1
2 TABLET ORAL DAILY
Status: DISCONTINUED | OUTPATIENT
Start: 2024-10-24 | End: 2024-10-24 | Stop reason: HOSPADM

## 2024-10-23 RX ADMIN — LOSARTAN POTASSIUM 50 MG: 50 TABLET, FILM COATED ORAL at 08:25

## 2024-10-23 RX ADMIN — SODIUM CHLORIDE, PRESERVATIVE FREE 10 ML: 5 INJECTION INTRAVENOUS at 20:25

## 2024-10-23 RX ADMIN — IPRATROPIUM BROMIDE 0.5 MG: 0.5 SOLUTION RESPIRATORY (INHALATION) at 07:28

## 2024-10-23 RX ADMIN — ANTI-FUNGAL POWDER MICONAZOLE NITRATE TALC FREE: 1.42 POWDER TOPICAL at 08:40

## 2024-10-23 RX ADMIN — IPRATROPIUM BROMIDE 0.5 MG: 0.5 SOLUTION RESPIRATORY (INHALATION) at 11:00

## 2024-10-23 RX ADMIN — BUMETANIDE 2 MG: 0.25 INJECTION, SOLUTION INTRAMUSCULAR; INTRAVENOUS at 08:28

## 2024-10-23 RX ADMIN — METHYLPREDNISOLONE SODIUM SUCCINATE 40 MG: 40 INJECTION, POWDER, LYOPHILIZED, FOR SOLUTION INTRAMUSCULAR; INTRAVENOUS at 08:29

## 2024-10-23 RX ADMIN — FERROUS SULFATE TAB 325 MG (65 MG ELEMENTAL FE) 325 MG: 325 (65 FE) TAB at 18:24

## 2024-10-23 RX ADMIN — DULOXETINE HYDROCHLORIDE 30 MG: 30 CAPSULE, DELAYED RELEASE ORAL at 08:24

## 2024-10-23 RX ADMIN — AZITHROMYCIN DIHYDRATE 250 MG: 250 TABLET ORAL at 08:24

## 2024-10-23 RX ADMIN — PANTOPRAZOLE SODIUM 40 MG: 40 TABLET, DELAYED RELEASE ORAL at 06:28

## 2024-10-23 RX ADMIN — POTASSIUM CHLORIDE 20 MEQ: 1500 TABLET, EXTENDED RELEASE ORAL at 08:24

## 2024-10-23 RX ADMIN — METOPROLOL SUCCINATE 50 MG: 50 TABLET, EXTENDED RELEASE ORAL at 08:24

## 2024-10-23 RX ADMIN — ALLOPURINOL 100 MG: 100 TABLET ORAL at 08:24

## 2024-10-23 RX ADMIN — FAMOTIDINE 20 MG: 20 TABLET, FILM COATED ORAL at 08:25

## 2024-10-23 RX ADMIN — DONEPEZIL HYDROCHLORIDE 5 MG: 5 TABLET, FILM COATED ORAL at 20:17

## 2024-10-23 RX ADMIN — MEMANTINE 10 MG: 10 TABLET ORAL at 08:25

## 2024-10-23 RX ADMIN — METHYLPREDNISOLONE SODIUM SUCCINATE 40 MG: 40 INJECTION, POWDER, LYOPHILIZED, FOR SOLUTION INTRAMUSCULAR; INTRAVENOUS at 20:18

## 2024-10-23 RX ADMIN — SODIUM CHLORIDE, PRESERVATIVE FREE 10 ML: 5 INJECTION INTRAVENOUS at 08:35

## 2024-10-23 RX ADMIN — ANTI-FUNGAL POWDER MICONAZOLE NITRATE TALC FREE: 1.42 POWDER TOPICAL at 20:18

## 2024-10-23 RX ADMIN — ATORVASTATIN CALCIUM 40 MG: 40 TABLET, FILM COATED ORAL at 20:17

## 2024-10-23 RX ADMIN — OXYCODONE HYDROCHLORIDE AND ACETAMINOPHEN 1 TABLET: 5; 325 TABLET ORAL at 09:28

## 2024-10-23 RX ADMIN — FERROUS SULFATE TAB 325 MG (65 MG ELEMENTAL FE) 325 MG: 325 (65 FE) TAB at 08:25

## 2024-10-23 ASSESSMENT — PAIN SCALES - GENERAL
PAINLEVEL_OUTOF10: 8
PAINLEVEL_OUTOF10: 10
PAINLEVEL_OUTOF10: 8
PAINLEVEL_OUTOF10: 10
PAINLEVEL_OUTOF10: 5

## 2024-10-23 ASSESSMENT — PAIN DESCRIPTION - LOCATION
LOCATION: ABDOMEN

## 2024-10-23 ASSESSMENT — PAIN DESCRIPTION - DESCRIPTORS: DESCRIPTORS: ACHING

## 2024-10-23 NOTE — CARE COORDINATION
Writer arranged for pt to be transported by [facility] at 300 pm. Discharge cancelled no auth yet.

## 2024-10-24 VITALS
RESPIRATION RATE: 18 BRPM | SYSTOLIC BLOOD PRESSURE: 128 MMHG | WEIGHT: 180 LBS | BODY MASS INDEX: 29.99 KG/M2 | TEMPERATURE: 97.9 F | HEIGHT: 65 IN | DIASTOLIC BLOOD PRESSURE: 65 MMHG | OXYGEN SATURATION: 99 % | HEART RATE: 66 BPM

## 2024-10-24 LAB
ANION GAP SERPL CALCULATED.3IONS-SCNC: 12 MMOL/L (ref 9–16)
BUN SERPL-MCNC: 51 MG/DL (ref 8–23)
CALCIUM SERPL-MCNC: 8.3 MG/DL (ref 8.6–10.4)
CHLORIDE SERPL-SCNC: 102 MMOL/L (ref 98–107)
CO2 SERPL-SCNC: 19 MMOL/L (ref 20–31)
CREAT SERPL-MCNC: 2.4 MG/DL (ref 0.7–1.2)
GFR, ESTIMATED: 19 ML/MIN/1.73M2
GLUCOSE SERPL-MCNC: 158 MG/DL (ref 74–99)
MAGNESIUM SERPL-MCNC: 2.1 MG/DL (ref 1.6–2.4)
POTASSIUM SERPL-SCNC: 5.1 MMOL/L (ref 3.7–5.3)
SODIUM SERPL-SCNC: 133 MMOL/L (ref 136–145)

## 2024-10-24 PROCEDURE — 2580000003 HC RX 258

## 2024-10-24 PROCEDURE — 83735 ASSAY OF MAGNESIUM: CPT

## 2024-10-24 PROCEDURE — 6370000000 HC RX 637 (ALT 250 FOR IP)

## 2024-10-24 PROCEDURE — 36415 COLL VENOUS BLD VENIPUNCTURE: CPT

## 2024-10-24 PROCEDURE — 94761 N-INVAS EAR/PLS OXIMETRY MLT: CPT

## 2024-10-24 PROCEDURE — 97163 PT EVAL HIGH COMPLEX 45 MIN: CPT

## 2024-10-24 PROCEDURE — 97530 THERAPEUTIC ACTIVITIES: CPT

## 2024-10-24 PROCEDURE — 94640 AIRWAY INHALATION TREATMENT: CPT

## 2024-10-24 PROCEDURE — 6370000000 HC RX 637 (ALT 250 FOR IP): Performed by: INTERNAL MEDICINE

## 2024-10-24 PROCEDURE — 80048 BASIC METABOLIC PNL TOTAL CA: CPT

## 2024-10-24 PROCEDURE — 6360000002 HC RX W HCPCS: Performed by: INTERNAL MEDICINE

## 2024-10-24 PROCEDURE — 99232 SBSQ HOSP IP/OBS MODERATE 35: CPT | Performed by: INTERNAL MEDICINE

## 2024-10-24 RX ADMIN — IPRATROPIUM BROMIDE 0.5 MG: 0.5 SOLUTION RESPIRATORY (INHALATION) at 10:55

## 2024-10-24 RX ADMIN — FAMOTIDINE 20 MG: 20 TABLET, FILM COATED ORAL at 08:25

## 2024-10-24 RX ADMIN — PANTOPRAZOLE SODIUM 40 MG: 40 TABLET, DELAYED RELEASE ORAL at 06:13

## 2024-10-24 RX ADMIN — MEMANTINE 10 MG: 10 TABLET ORAL at 08:25

## 2024-10-24 RX ADMIN — AZITHROMYCIN DIHYDRATE 250 MG: 250 TABLET ORAL at 08:25

## 2024-10-24 RX ADMIN — ALLOPURINOL 100 MG: 100 TABLET ORAL at 08:25

## 2024-10-24 RX ADMIN — FERROUS SULFATE TAB 325 MG (65 MG ELEMENTAL FE) 325 MG: 325 (65 FE) TAB at 08:25

## 2024-10-24 RX ADMIN — SODIUM CHLORIDE, PRESERVATIVE FREE 10 ML: 5 INJECTION INTRAVENOUS at 08:29

## 2024-10-24 RX ADMIN — DULOXETINE HYDROCHLORIDE 30 MG: 30 CAPSULE, DELAYED RELEASE ORAL at 08:25

## 2024-10-24 RX ADMIN — ANTI-FUNGAL POWDER MICONAZOLE NITRATE TALC FREE: 1.42 POWDER TOPICAL at 08:28

## 2024-10-24 RX ADMIN — IPRATROPIUM BROMIDE 0.5 MG: 0.5 SOLUTION RESPIRATORY (INHALATION) at 07:34

## 2024-10-24 RX ADMIN — METOPROLOL SUCCINATE 50 MG: 50 TABLET, EXTENDED RELEASE ORAL at 08:24

## 2024-10-24 NOTE — CARE COORDINATION
ONGOING DISCHARGE  NOTE:      Writer reviewed notes, Patient is agreeable to discharge to [facility]  [address] [place] [address]  P: [phone]  F: [phone]      Discharge is pending pre cert.  Pre cert was started on 10/23      Hold bumex    Losartan    Stuart vs pre renal azotemia on ckd  Baseline is  1.7 now 2.4    Will continue to follow for additional discharge needs.     If patient is discharged prior to next notation, then this note serves as note for discharge by case management.    Electronically signed by Nayely Olson RN on 10/24/2024 at 11:31 AM

## 2024-10-24 NOTE — CARE COORDINATION
Patient will discharge to [facility]  [address] [place] [address]  P: [phone]  F: [phone]   at  1500  via [facility].   JE faxed to facility.  Patient/Family informed of discharge and is agreeable.  Bedside RN notified, Call report to [phone].        Spoke to Daughter Lacie regarding discharge.  Lacie and Patient both agreeable to discharge.

## 2024-10-24 NOTE — PLAN OF CARE
Problem: Chronic Conditions and Co-morbidities  Goal: Patient's chronic conditions and co-morbidity symptoms are monitored and maintained or improved  10/21/2024 1512 by Aminah Dorantes RN  Outcome: Progressing  Flowsheets (Taken 10/21/2024 0916)  Care Plan - Patient's Chronic Conditions and Co-Morbidity Symptoms are Monitored and Maintained or Improved:   Monitor and assess patient's chronic conditions and comorbid symptoms for stability, deterioration, or improvement   Collaborate with multidisciplinary team to address chronic and comorbid conditions and prevent exacerbation or deterioration   Update acute care plan with appropriate goals if chronic or comorbid symptoms are exacerbated and prevent overall improvement and discharge     Problem: Discharge Planning  Goal: Discharge to home or other facility with appropriate resources  10/21/2024 1512 by Aminah Dorantes RN  Outcome: Progressing  Flowsheets (Taken 10/21/2024 0916)  Discharge to home or other facility with appropriate resources:   Identify barriers to discharge with patient and caregiver   Arrange for needed discharge resources and transportation as appropriate   Identify discharge learning needs (meds, wound care, etc)   Refer to discharge planning if patient needs post-hospital services based on physician order or complex needs related to functional status, cognitive ability or social support system     Problem: Pain  Goal: Verbalizes/displays adequate comfort level or baseline comfort level  10/21/2024 1512 by Aminah Dorantes RN  Outcome: Progressing     Problem: Skin/Tissue Integrity  Goal: Absence of new skin breakdown  Description: 1.  Monitor for areas of redness and/or skin breakdown  2.  Assess vascular access sites hourly  3.  Every 4-6 hours minimum:  Change oxygen saturation probe site  4.  Every 4-6 hours:  If on nasal continuous positive airway pressure, respiratory therapy assess nares and determine need for appliance change or resting 
  Problem: Chronic Conditions and Co-morbidities  Goal: Patient's chronic conditions and co-morbidity symptoms are monitored and maintained or improved  10/21/2024 2255 by Leatha Prado RN  Outcome: Progressing  Flowsheets (Taken 10/21/2024 2255)  Care Plan - Patient's Chronic Conditions and Co-Morbidity Symptoms are Monitored and Maintained or Improved:   Monitor and assess patient's chronic conditions and comorbid symptoms for stability, deterioration, or improvement   Collaborate with multidisciplinary team to address chronic and comorbid conditions and prevent exacerbation or deterioration   Update acute care plan with appropriate goals if chronic or comorbid symptoms are exacerbated and prevent overall improvement and discharge     Problem: Discharge Planning  Goal: Discharge to home or other facility with appropriate resources  10/21/2024 2255 by Leatha Prado RN  Outcome: Progressing  Flowsheets (Taken 10/21/2024 2255)  Discharge to home or other facility with appropriate resources: Identify barriers to discharge with patient and caregiver     Problem: Pain  Goal: Verbalizes/displays adequate comfort level or baseline comfort level  10/21/2024 2255 by Leatha Prado RN  Outcome: Progressing  Flowsheets (Taken 10/21/2024 2255)  Verbalizes/displays adequate comfort level or baseline comfort level:   Encourage patient to monitor pain and request assistance   Assess pain using appropriate pain scale     Problem: Skin/Tissue Integrity  Goal: Absence of new skin breakdown  Description: 1.  Monitor for areas of redness and/or skin breakdown  2.  Assess vascular access sites hourly  3.  Every 4-6 hours minimum:  Change oxygen saturation probe site  4.  Every 4-6 hours:  If on nasal continuous positive airway pressure, respiratory therapy assess nares and determine need for appliance change or resting period.  10/21/2024 2255 by Leatha Prado RN  Outcome: Progressing     Problem: ABCDS Injury 
  Problem: Chronic Conditions and Co-morbidities  Goal: Patient's chronic conditions and co-morbidity symptoms are monitored and maintained or improved  10/22/2024 1122 by Qi Means RN  Outcome: Progressing  Flowsheets (Taken 10/22/2024 0850)  Care Plan - Patient's Chronic Conditions and Co-Morbidity Symptoms are Monitored and Maintained or Improved: Monitor and assess patient's chronic conditions and comorbid symptoms for stability, deterioration, or improvement   Monitoring.   Problem: Discharge Planning  Goal: Discharge to home or other facility with appropriate resources  10/22/2024 1122 by Qi Means RN  Outcome: Progressing  Flowsheets (Taken 10/22/2024 0850)  Discharge to home or other facility with appropriate resources: Identify barriers to discharge with patient and caregiver   Pt to discharge back to home with in home aide.   Problem: Pain  Goal: Verbalizes/displays adequate comfort level or baseline comfort level  10/22/2024 1122 by Qi Means RN  Outcome: Progressing  Flowsheets (Taken 10/22/2024 0730)  Verbalizes/displays adequate comfort level or baseline comfort level: Encourage patient to monitor pain and request assistance   Pt continues to have chronic pain to right hip. Prn pain medications and repositioning helpful for pain control.   Problem: Skin/Tissue Integrity  Goal: Absence of new skin breakdown  Description: 1.  Monitor for areas of redness and/or skin breakdown  2.  Assess vascular access sites hourly  3.  Every 4-6 hours minimum:  Change oxygen saturation probe site  4.  Every 4-6 hours:  If on nasal continuous positive airway pressure, respiratory therapy assess nares and determine need for appliance change or resting period.  10/22/2024 1122 by Qi Means RN  Outcome: Progressing   Monitoring. Dressing intact to coccyx. Rotating q2h and prn for comfort.   Problem: ABCDS Injury Assessment  Goal: Absence of physical injury  10/22/2024 1122 by Qi Means 
  Problem: Chronic Conditions and Co-morbidities  Goal: Patient's chronic conditions and co-morbidity symptoms are monitored and maintained or improved  10/24/2024 0436 by Arvin Romero RN  Outcome: Progressing     Problem: Pain  Goal: Verbalizes/displays adequate comfort level or baseline comfort level  10/24/2024 0436 by Arvin Romero, RN  Outcome: Progressing     
  Problem: Chronic Conditions and Co-morbidities  Goal: Patient's chronic conditions and co-morbidity symptoms are monitored and maintained or improved  Outcome: Progressing     Problem: Discharge Planning  Goal: Discharge to home or other facility with appropriate resources  Outcome: Progressing     Problem: Pain  Goal: Verbalizes/displays adequate comfort level or baseline comfort level  Outcome: Progressing     Problem: Skin/Tissue Integrity  Goal: Absence of new skin breakdown  Description: 1.  Monitor for areas of redness and/or skin breakdown  2.  Assess vascular access sites hourly  3.  Every 4-6 hours minimum:  Change oxygen saturation probe site  4.  Every 4-6 hours:  If on nasal continuous positive airway pressure, respiratory therapy assess nares and determine need for appliance change or resting period.  Outcome: Progressing     Problem: ABCDS Injury Assessment  Goal: Absence of physical injury  Outcome: Progressing     Problem: Safety - Adult  Goal: Free from fall injury  Outcome: Progressing     
  Problem: Chronic Conditions and Co-morbidities  Goal: Patient's chronic conditions and co-morbidity symptoms are monitored and maintained or improved  Outcome: Progressing  Flowsheets (Taken 10/21/2024 0638)  Care Plan - Patient's Chronic Conditions and Co-Morbidity Symptoms are Monitored and Maintained or Improved: Monitor and assess patient's chronic conditions and comorbid symptoms for stability, deterioration, or improvement     Problem: Discharge Planning  Goal: Discharge to home or other facility with appropriate resources  Outcome: Progressing  Flowsheets (Taken 10/21/2024 0638)  Discharge to home or other facility with appropriate resources:   Identify barriers to discharge with patient and caregiver   Identify discharge learning needs (meds, wound care, etc)   Arrange for needed discharge resources and transportation as appropriate     Problem: Pain  Goal: Verbalizes/displays adequate comfort level or baseline comfort level  Outcome: Progressing  Flowsheets (Taken 10/21/2024 0638)  Verbalizes/displays adequate comfort level or baseline comfort level:   Assess pain using appropriate pain scale   Notify Licensed Independent Practitioner if interventions unsuccessful or patient reports new pain     Problem: Skin/Tissue Integrity  Goal: Absence of new skin breakdown  Description: 1.  Monitor for areas of redness and/or skin breakdown  2.  Assess vascular access sites hourly  3.  Every 4-6 hours minimum:  Change oxygen saturation probe site  4.  Every 4-6 hours:  If on nasal continuous positive airway pressure, respiratory therapy assess nares and determine need for appliance change or resting period.  Outcome: Progressing     Problem: ABCDS Injury Assessment  Goal: Absence of physical injury  Outcome: Progressing  Flowsheets (Taken 10/21/2024 0638)  Absence of Physical Injury: Implement safety measures based on patient assessment     Problem: Safety - Adult  Goal: Free from fall injury  Outcome: 
BRONCHOSPASM/BRONCHOCONSTRICTION   [x]  IMPROVE AERATION/BREATH SOUNDS  [x]   ADMINISTER BRONCHODILATOR THERAPY AS APPROPRIATE  [x]   ASSESS BREATH SOUNDS  []   IMPLEMENT AEROSOL/MDI PROTOCOL  [x]   PATIENT EDUCATION AS NEEDED    PROVIDE ADEQUATE OXYGENATION WITH ACCEPTABLE SP02/ABG'S  [x]  IDENTIFY APPROPRIATE OXYGEN THERAPY  [x]   MONITOR SP02/ABG'S AS NEEDED   [x]   PATIENT EDUCATION AS NEEDED      
Clarissa Alvarenga RN  Outcome: Progressing  10/22/2024 1122 by Qi Means RN  Outcome: Progressing  Flowsheets (Taken 10/22/2024 0827)  Absence of Physical Injury: Implement safety measures based on patient assessment     Problem: Safety - Adult  Goal: Free from fall injury  10/23/2024 0034 by Clarissa Alvarenga, RN  Outcome: Progressing  10/22/2024 1122 by Qi Means RN  Outcome: Progressing  Flowsheets (Taken 10/22/2024 0827)  Free From Fall Injury: Instruct family/caregiver on patient safety

## 2024-10-24 NOTE — FLOWSHEET NOTE
10/24/24 1139   Treatment Team Notification   Reason for Communication Evaluate   Name of Team Member Notified Dr. Reynoso   Treatment Team Role Attending Provider   Method of Communication Secure Message   Response Waiting for response   Notification Time 1138     9 beat run V-tach @ 1131 asymptomatic  AM labs mag-2.1,    K-5.1

## 2024-10-24 NOTE — PROGRESS NOTES
[facility]   IN-PATIENT SERVICE   [facility]    HISTORY AND PHYSICAL EXAMINATION            Date:   10/24/2024  Patient name:  Mable Kemp  Date of admission:  10/20/2024  6:14 PM  MRN:   [id number]  Account:  [id number]  YOB: 1940  PCP:    Norma Dennis APRN - CNP  Room:   2110/2110-  Code Status:    Full Code    Chief Complaint:     Chief Complaint   Patient presents with    Cough    Nasal Congestion     Complains of cough for approx 3 days, using over the counter cough and cold    Shortness of Breath       History Obtained From:     patient    History of Present Illness:     The patient is a 84 y.o.   /  female who presents with Cough, Nasal Congestion (Complains of cough for approx 3 days, using over the counter cough and cold), and Shortness of Breath   and she is admitted to the hospital for the management of      Mable Kemp is a 84 y.o.  /  female with a history of heart failure, TIA, eczema, hyperlipidemia, hypertension, and dementia who presents with and is admitted to the hospital for the management of NSTEMI (non-ST elevated myocardial infarction) (HCC).     According to patient, and her daughter states that she has been having profuse coughing with shortness of breath.  It is improved with cold medication and lying flat makes it worse.  Patient has had fevers, chills, and bodyaches accompanied by chronic left hip pain.  The patient lives with her daughter in an apartment and was being seen by [facility] for a pressure ulcer on her coccyx.     Upon presentation to the ER the patient was normotensive.  BUN was 27 creatinine was 1.7 which is at the patient's baseline.  Her BNP was 15,180.  Base noted in 2022 was 2451.  Initial troponin was 247 rising to 279.  Third troponin was downtrending at 246.  Chest x-ray was notable for bronchitis.  ER plan for CT PE, however VQ scan will be necessary due to decreased GFR.  Heparin 
    [facility] [place]   IN-PATIENT SERVICE   [facility]    HISTORY AND PHYSICAL EXAMINATION            Date:   10/23/2024  Patient name:  Mable Kemp  Date of admission:  10/20/2024  6:14 PM  MRN:   [id number]  Account:  [id number]  YOB: 1940  PCP:    Norma Dennis APRN - CNP  Room:   2110/2110-  Code Status:    Full Code    Chief Complaint:     Chief Complaint   Patient presents with    Cough    Nasal Congestion     Complains of cough for approx 3 days, using over the counter cough and cold    Shortness of Breath       History Obtained From:     patient    History of Present Illness:     The patient is a 84 y.o.   /  female who presents with Cough, Nasal Congestion (Complains of cough for approx 3 days, using over the counter cough and cold), and Shortness of Breath   and she is admitted to the hospital for the management of      Mable Kemp is a 84 y.o.  /  female with a history of heart failure, TIA, eczema, hyperlipidemia, hypertension, and dementia who presents with and is admitted to the hospital for the management of NSTEMI (non-ST elevated myocardial infarction) (HCC).     According to patient, and her daughter states that she has been having profuse coughing with shortness of breath.  It is improved with cold medication and lying flat makes it worse.  Patient has had fevers, chills, and bodyaches accompanied by chronic left hip pain.  The patient lives with her daughter in an apartment and was being seen by [facility] for a pressure ulcer on her coccyx.     Upon presentation to the ER the patient was normotensive.  BUN was 27 creatinine was 1.7 which is at the patient's baseline.  Her BNP was 15,180.  Base noted in 2022 was 2451.  Initial troponin was 247 rising to 279.  Third troponin was downtrending at 246.  Chest x-ray was notable for bronchitis.  ER plan for CT PE, however VQ scan will be necessary due to decreased GFR.  Heparin 
   10/22/24 1715   Encounter Summary   Encounter Overview/Reason Volunteer Encounter   Service Provided For Patient   Last Encounter  10/22/24   Rituals, Rites and Sacraments   Type [employer] Communion       
   10/23/24 0853   Encounter Summary   Encounter Overview/Reason Loneliness/Social Isolation       
   10/23/24 1600   Encounter Summary   Encounter Overview/Reason Volunteer Encounter   Service Provided For Patient   Last Encounter  10/23/24   Rituals, Rites and Sacraments   Type [employer]ri[employer] Communion   Assessment/Intervention/Outcome   Intervention Prayer (assurance of)/[place]       
   10/23/24 1926   Encounter Summary   Encounter Overview/Reason Loneliness/Social Isolation       
BRONCHOSPASM/BRONCHOCONSTRICTION     [x]         IMPROVE AERATION/BREATH SOUNDS  [x]   ADMINISTER BRONCHODILATOR THERAPY AS APPROPRIATE  [x]   ASSESS BREATH SOUNDS  []   IMPLEMENT AEROSOL/MDI PROTOCOL  [x]   PATIENT EDUCATION AS NEEDED    
Date:   10/22/2024  Patient name: Mable Kemp  Date of admission:  10/20/2024  6:14 PM  MRN:   [id number]  YOB: 1940  PCP: Norma Dennis APRN - CNP    Reason for Admission: NSTEMI (non-ST elevated myocardial infarction) (HCC) [I21.4]    Cardiology follow-up: Congestive heart failure systolic and diastolic acute on chronic severely reduced LV systolic function ejection fraction 15 to 20%       Referring physician: Dr Geoff GODWIN Vaibhav     Impression     Non-ST elevation MI  Episodes of supraventricular tachycardia improved with the beta-blockers  Coronary artery calcification as per CT chest 9/3/2020  Severely reduced LV systolic function ejection fraction 20 %, global hypokinesis  Grade 3 LV diastolic dysfunction  Right ventricular systolic pressure 45 to 50 mmHg  Moderate aortic regurgitation, moderate mitral regurgitation, mild to moderate tricuspid regurgitation  Hypertension  Hyperlipidemia  Diabetes mellitus  History of cerebral artery occlusion  Large hiatal hernia, status post  Anemia history of blood transfusion  Chronic kidney disease serum creatinine 1.7  History of large type IV paraesophageal hiatal hernia with organoaxial position of the stomach, entire stomach in the chest as per CT chest on 9/3/2020  Very poor mobility patient has not walked for more than 1 year since she has had left hip surgery  Severe right hip deformity and pain  Severe right shoulder deformity limited movement     Past surgical history  Abdominal surgery , reduction of paraesophageal hernia/mesh bridge of hiatal hernia defect, appendectomy, cataract removal, hysterectomy right hip surgery,  EGD, colonoscopy    History of present illness  84-year-old female who lives with her daughter in an apartment with a past medical history of CHF, TIA, hyperlipidemia hypertension, dementia, coccygeal pressure ulcers who presents with increasing cough, shortness of breath ongoing for more than 2 days prior to admit.  She 
Date:   10/23/2024  Patient name: Mable Kemp  Date of admission:  10/20/2024  6:14 PM  MRN:   [id number]  YOB: 1940  PCP: Norma Dennis APRN - CNP    Reason for Admission: NSTEMI (non-ST elevated myocardial infarction) (HCC) [I21.4]    Cardiology follow-up: Congestive heart failure systolic and diastolic acute on chronic severely reduced LV systolic function ejection fraction 15 to 20%        Referring physician: Dr Tellez S Vaibhav     Impression     Non-ST elevation MI  Episodes of supraventricular tachycardia improved with the beta-blockers  1 episode of nonsustained VT 7 beats heart rate 130  12/22/2024, serum potassium was 4 point  Coronary artery calcification as per CT chest 9/3/2020  Severely reduced LV systolic function ejection fraction 20 %, global hypokinesis  Grade 3 LV diastolic dysfunction  Right ventricular systolic pressure 45 to 50 mmHg  Moderate aortic regurgitation, moderate mitral regurgitation, mild to moderate tricuspid regurgitation  Hypertension  Hyperlipidemia  Diabetes mellitus  History of cerebral artery occlusion  Large hiatal hernia, status post  Anemia history of blood transfusion  Chronic kidney disease serum creatinine 1.7  History of large type IV paraesophageal hiatal hernia with organoaxial position of the stomach, entire stomach in the chest as per CT chest on 9/3/2020  Very poor mobility patient has not walked for more than 1 year since she has had left hip surgery  Severe right hip deformity and pain  Severe right shoulder deformity limited movement    Past surgical history  Abdominal surgery , reduction of paraesophageal hernia/mesh bridge of hiatal hernia defect, appendectomy, cataract removal, hysterectomy right hip surgery,  EGD, colonoscopy     History of present illness  84-year-old female who lives with her daughter in an apartment with a past medical history of CHF, TIA, hyperlipidemia hypertension, dementia, coccygeal pressure ulcers who presents 
Discussed with Dr Reynoso  Ef is 20 percent or less  Family dont want cath  Change to oral lipitor 40 dc pravastatin  Dc heparin drip  Pt bed bound,low risk of dvt due to prolonged immobilization for a year  Change to bumex  Geoff TATO Esposito MD  10/22/2024    
Dr Reynoso updated via telephone on patient status and recent troponin level. -140s.See new orders.   
Dr. Osvaldo garcia with patient receiving hip X-Ray out patient. Charge nurse notified and will change order  
Dr. Reynoso notified of patients frequent cardiac episodes with HRs ranging between 60-130s. No new orders at this time.   
Dr. Riley stated it was ok for the right hip xray to be placed as in outpt order. Writer placed the order under outpt order and printed out the order. It was placed in the d/c packet.   
HEPARIN DRIP  Date   10/21/24 @ 1120  Anti-Xa  0.59    Plt 178 K  Anti-Xa 0.3-0.7 units/mL No bolus. No change in rate.   Continue:    Handoff : 12 Units/kg/hr : 9.8 mL/hr : IntraVENous 10/21/24 0703 10/21/24 0704     ILIANA Cedeno.Ph.  10/21/2024  12:05 PM      
Occupational Therapy  Facility/Department: [facility] PROGRESSIVE CARE  Daily Treatment Note  NAME: Mable Kemp  : 1940  MRN: [id number]    Date of Service: 10/24/2024    RN reports patient is medically stable for therapy treatment this date.    Chart reviewed prior to treatment and patient is agreeable for therapy.  All lines intact and patient positioned comfortably at end of treatment.  All patient needs addressed prior to ending therapy session.      Discharge Recommendations:  Patient would benefit from continued therapy after discharge  Pt currently functioning below baseline.  Recommend daily inpatient skilled therapy at time of discharge to maximize long term outcomes and prevent re-admission. Please refer to AM-PAC score for current level of function.  OT Equipment Recommendations  Equipment Needed:  (CTA)    Patient Diagnosis(es): The primary encounter diagnosis was NSTEMI (non-ST elevated myocardial infarction) (HCC). A diagnosis of Acute coronary syndrome (HCC) was also pertinent to this visit.     Assessment   Activity Tolerance: Patient limited by pain;Patient limited by fatigue;Other (comment) (Fear/anxiety.)  Discharge Recommendations: Patient would benefit from continued therapy after discharge  Equipment Needed:  (CTA)     Plan  Occupational Therapy Plan  Times Per Week: 3-4x/wk, 1x/day  Current Treatment Recommendations: Strengthening;ROM;Functional mobility training;Endurance training;Safety education & training;Patient/Caregiver education & training;Equipment evaluation, education, & procurement;Positioning;Self-Care / ADL;Pain management;Co-Treatment;Cognitive reorientation;Cognitive/Perceptual training    Restrictions  Restrictions/Precautions  Restrictions/Precautions: Fall Risk;General Precautions  Required Braces or Orthoses?: No  Implants present? :  (TAHIR)  Position Activity Restriction  Other position/activity restrictions: Telemetry, ex cath.    Subjective  Subjective  Subjective: Pt in 
Patient arrived to room 2110 and moved to PCU bed . Patient placed on cardiac monitor and assessment completed. Patient oriented to room. Bed locked in lowest position with side rails in place. Call light within reach. Patient has no belongings. States they are in the possession of daughter Tonia.   
Patients HR increased to 170 BPM. A fib RvR on tele. Obtained a stat EKG which showed Atrial fib HR 88. Luz NP at the bedside. Ordered 1 mg magnesium. Dr Reynoso notified on patient status over the telephone. Additional orders for stat 5 mg IV lopressor. Patient also started on 50 mg metpoprolol XR daily.    
Per Therapy, patient complaining of 10/10 R Hip pain when moving to a sitting position. Discussed with Dr. Riley.   Xray R hip (2-3 view) order entered.   
Pharmacy monitoring of Heparin infusion  Heparin Infusion New Order    Patient on heparin for:   elevated troponin    Was patient on previous oral anticoagulant/dose?:  no , Confirmed with RN/Pt/Pts family/Surescripts?: yes checked dispense report and home med list    Factor Xa inhibitor/LMWH use within the past 72 hours? NO  If yes, date of last administration: N/A    Recent Labs     10/20/24  1725   HGB 10.5*   INR 1.1          Heparin to be monitored using anti-Xa for duration of therapy.(aPTT should be used for patients who have received a DOAC in the past 72 hours)?      Have admin instructions been updated to reflect appropriate protocol? YES    Have appropriate labs been ordered for corresponding protocol? YES   *Discontinue anti-Xa lab monitoring if using aPTT protocol    Is the starting rate/last rate adjustment appropriate? Yes 12 units/kg/hr (weight 81.6 kg) is less than 1000 unit/hr max    Concurrent anticoagulants: none  (Note it is not appropriate to be on most anticoagulants while on a heparin drip)    Review platelets from the past few days.  Any concerns?: No    Please record any appropriate additional notes here:    
Physical Therapy  Facility/Department: [facility] PROGRESSIVE CARE  Physical Therapy Initial Assessment    Name: Mable Kemp  : 1940  MRN: [id number]  Date of Service: 10/24/2024    Discharge Recommendations:  Patient would benefit from continued therapy after discharge   PT Equipment Recommendations  Equipment Needed: No      Patient Diagnosis(es): The primary encounter diagnosis was NSTEMI (non-ST elevated myocardial infarction) (HCC). A diagnosis of Acute coronary syndrome (HCC) was also pertinent to this visit.  Past Medical History:  has a past medical history of Anemia, Arthritis, Cataract, Cerebral artery occlusion with cerebral infarction (HCC), CHF (congestive heart failure) (HCC), Depression, Diabetes mellitus (HCC), Eczema, Gout, Hearing loss, Hiatal hernia, History of blood transfusion, Hyperlipidemia, Hypertension, PONV (postoperative nausea and vomiting), Rectal urgency, and Stress incontinence, female.  Past Surgical History:  has a past surgical history that includes Hysterectomy; Appendectomy; eye surgery; Cataract removal with implant (Bilateral); Colonoscopy; joint replacement; pr arthrp acetblr/prox fem prostc agrft/algrft (Left, 2018); Abdomen surgery (2020); Upper gastrointestinal endoscopy (N/A, 9/3/2020); and laparotomy (N/A, 9/3/2020).    Assessment  Assessment: Pt most limited by R hip pain. Pt reports pivoting and sitting up at home but in a great deal of pain currently. Pt would benefit from continued PT services.  Treatment Diagnosis: impaired functional mobility 2* R hip pain  Specific Instructions for Next Treatment: co tx, check imaging, core, therex, encourage active bed mobility  Therapy Prognosis: Guarded  Decision Making: High Complexity  Exam: ROM, MMT, bed mobility, balance, endurance  Clinical Presentation: Pt cooperative and agreeable but high levels of pain  Barriers to Learning: pain  Requires PT Follow-Up: Yes  Activity Tolerance  Activity Tolerance: Patient 
Placing all meds on hold - PT discharging to SNF per Lola 11:46AM 10/23/24  
Pt noted to have 15 beat run of vtach at 0120. Updated Dr. Reynoso. Check magnesium and potassium in the morning 10/24.  
Pt noted to have 7 beat run of vtach at 1702. See paper chart. Updated Dr. Reynoso via phone. Give one gram of magnesium ivpb x1 and then recheck potassium and magnesium in am 10/23.   
Pt noted to have only 50 ml out of external female catheter after administration of ivp lasix and bumex. Bladder scanned patient, scan noted 315. Brief dry.    Updated Dr. Riley on call for Dr. Esposito. Awaiting response.       Per Dr. Riley, continue to monitor urine output and chart it.    No new orders.     
Repeat troponin 246. Luz GOYAL notified. No new orders at this time.   
Spiritual Health History and Assessment/Progress Note  [facility]    (P) Loneliness/Social Isolation,  ,  ,      Name: Mable Kemp MRN: [id number]    Age: 84 y.o.     Sex: female   Language: English   Christianity: Spiritism   NSTEMI (non-ST elevated myocardial infarction) (HCC)     Date: 10/21/2024              Pt was lying quietly watching tv; she responded pleasantly to writer's introduction. Pt talked about having family members but shared that the sister she was closest to passed away fairly recently. Writer provided listening presence, emotional support, and pt welcomed prayer.                  Spiritual Assessment began in [facility] PROGRESSIVE CARE        Referral/Consult From: (P) Rounding   Encounter Overview/Reason: (P) Loneliness/Social Isolation  Service Provided For: (P) Patient    [place], Belief, Meaning:   Patient identifies as spiritual  Family/Friends No family/friends present      Importance and Influence:  Patient has no beliefs influential to healthcare decision-making identified during this visit  Family/Friends No family/friends present    Community:  Patient feels well-supported. Support system includes: Extended family  Family/Friends No family/friends present    Assessment and Plan of Care:     Patient Interventions include: Facilitated expression of thoughts and feelings, Explored spiritual coping/struggle/distress, Affirmed coping skills/support systems, and Provided sacramental/Mandaeism ritual  Family/Friends Interventions include: No family/friends present    Patient Plan of Care: Spiritual Care available upon further referral  Family/Friends Plan of Care: No family/friends present    Electronically signed by Chaplain Shi on 10/21/2024 at 8:26 PM   
Writer called [facility] to give report and a recording answered and said party not available.      Writer will attempt to call again time permitting     1418-- 2nd attempt to call report, left voicemail with unit phone number requesting a call back    3182-  JERSON Quintanilla called back to receive report.  PT situation, background, assessment, recommendations and current vitals relayed to RN at [facility]. All questions answered      
extended release capsule Take 1 capsule by mouth once daily 10/14/24   Norma Dennis APRN - CNP   donepezil (ARICEPT) 5 MG tablet Take 1 tablet by mouth nightly 10/11/24   Norma Dennis APRN - CNP   memantine (NAMENDA) 10 MG tablet Take 1 tablet by mouth daily 10/11/24   Norma Dennis APRN - CNP   famotidine (PEPCID) 20 MG tablet Take 1 tablet by mouth once daily 10/11/24   Norma Dennis APRN - CNP   Acidophilus Lactobacillus CAPS Take 1 capsule by mouth daily 10/1/24   Norma Dennis APRN - CNP   docusate sodium (COLACE) 100 MG capsule Take 1 capsule by mouth 2 times daily as needed for Constipation 9/27/24   Norma Dennis APRN - CNP   traZODone (DESYREL) 50 MG tablet Take 1 tablet by mouth nightly as needed for Sleep 9/27/24   Norma Dennis APRN - CNP   meclizine (ANTIVERT) 12.5 MG tablet Take 1 tablet by mouth 3 times daily as needed for Dizziness 7/3/24   Meredith Hankins APRN - CNP   nystatin (MYCOSTATIN) 813135 UNIT/GM powder APPLY  POWDER TOPICALLY THREE TIMES DAILY 6/19/24   Norma Dennis APRN - CNP   Zinc Oxide 10 % OINT Apply on wound as needed with ABD pad and Tape 5/14/24   Norma Dennis APRN - CNP   omeprazole (PRILOSEC) 20 MG delayed release capsule Take 1 capsule by mouth every morning (before breakfast) 4/24/24   Norma Dennis APRN - CNP   pravastatin (PRAVACHOL) 40 MG tablet Take 1 tablet by mouth daily 4/9/24   Norma Dennis APRN - CNP   lisinopril (PRINIVIL;ZESTRIL) 20 MG tablet Take 1 tablet by mouth daily 8/31/23   Norma Dennis APRN - CNP   Misc. Devices (RAISED TOILET SEAT) MISC 1 each by Does not apply route daily 3/6/23   Norma Dennis APRN - CNP   Handicap Placard MISC by Does not apply route Expires 11/2024 1/3/23   Norma Dennis APRN - CNP   Zinc Oxide 10 % OINT Apply 1 applicator topically daily 7/18/22   Norma Dennis APRN - CNP   Lift Chair MISC 1 each by Does not apply route 
atraumatic.  Neck: supple, symmetrical, trachea midline  Lungs: diminished breath sounds bibasilar  Heart: regular rate and rhythm  Abdomen:  Obese soft bowel sounds present  Extremities:  Mild edema both thighs  Neurologic: Mental status: Awake communicating well    EKG: Sinus rhythm first-degree heart block, voltage criteria for LVH, nonspecific T wave change.  ECHO: reviewed.   Ejection fraction: 20%, grade 3 diastolic dysfunction, moderate MR, aortic regurgitation and tricuspid regurgitation RVSP 45 to 50 mmHg  Stress Test: not obtained.  Cardiac Angiography: not obtained.    Assessment / Acute Cardiac Problems:     Congestive heart failure systolic and diastolic ejection fraction 20% acute on chronic  Improvement in the pulmonary congestion with the diuresis  Episodes of supraventricular tachycardia improved with the beta-blocker  Episodes of nonsustained V. tach  Mild to moderate pulmonary hypertension  Coronary artery calcification  No history of angina no history of coronary intervention  Chronic kidney disease  Chronic anemia  Patient is bedbound has not walked for more than a year since she has had left hip surgery  History of hypertension and hyperlipidemia no diabetes mellitus  Severe right hip pain, deformed right shoulder limited movement    10/24/2020 for increasing BUN/creatinine, diuretics and losartan put on hold      Patient Active Problem List:     Depression with anxiety     Essential hypertension     Mixed hyperlipidemia     Primary osteoarthritis of left hip     Cerebrovascular accident (CVA) (HCC)     Continuous leakage of urine     Paraesophageal hernia     Hypokalemia     Normocytic anemia     S/P exploratory laparotomy     Dementia (HCC)     Moderate malnutrition (HCC)     Type 2 diabetes mellitus without complication, without long-term current use of insulin (HCC)     Right hip pain     Chronic low back pain     Wheelchair dependence     NSTEMI (non-ST elevated myocardial infarction) 
by mouth daily 8/31/23   Norma Dennis APRN - CNP   Misc. Devices (RAISED TOILET SEAT) MIS 1 each by Does not apply route daily 3/6/23   Norma Dennis APRN - CNP   Handicap Placard MISC by Does not apply route Expires 11/2024 1/3/23   Norma Dennis APRN - CNP   Zinc Oxide 10 % OINT Apply 1 applicator topically daily 7/18/22   Norma Dennis APRN - CNP   Lift Chair MISC 1 each by Does not apply route continuous 7/14/22   Norma Dennis APRN - CNP   Lift Chair MISC 1 each by Does not apply route continuous 12/3/21   Norma Dennis APRN - CNP   Multiple Vitamins-Minerals (THERAPEUTIC MULTIVITAMIN-MINERALS) tablet Take 1 tablet by mouth daily    Provider, MD Karl   Misc. Devices MISC Shower transfer bench 1/18/19   Ruth Bueno APRN - CNP   Misc. Devices MISC Adult pull up briefs  Dispense 100 1/18/19   Ruth Bueno APRN - CNP   ferrous sulfate 325 (65 Fe) MG tablet Take 1 tablet by mouth in the morning and 1 tablet in the evening.    Provider, MD Karl   Blood Pressure KIT 1 kit by Does not apply route daily 9/12/18   Norma Dennis APRN - CNP        Allergies:     Codeine and Keflex [cephalexin]    Social History:     Tobacco:    reports that she has never smoked. She has never used smokeless tobacco.  Alcohol:      reports no history of alcohol use.  Drug Use:  reports no history of drug use.    Family History:     Family History   Problem Relation Age of Onset    Heart Disease Mother     High Blood Pressure Mother     Cancer Father     Colon Cancer Father        Review of Systems:     Positive and Negative as described in HPI.    CONSTITUTIONAL:  negative for fevers, chills, sweats, fatigue, weight loss  HEENT:  negative for vision, hearing changes, runny nose, throat pain  RESPIRATORY: Positive for shortness of breath and cough CARDIOVASCULAR:  negative for chest pain, palpitations.  GASTROINTESTINAL:  negative for nausea, vomiting, diarrhea, 
Dependent/Total  Toileting Skilled Clinical Factors: Ex cath, brief.  Functional Mobility: Unable to assess (Comment)  Functional Mobility Skilled Clinical Factors: Pt non ambulatory for ~ 1 year. Pt reporting completing sit pivots to w/c from bed for appointments with A from aides.  Additional Comments: ADLs based on clinical observation and reasoning unless otherwise stated. Pt limited by decreased endurance, strength, anxiety, and activity tolerance.    Vision  Vision: Impaired  Vision Exceptions:  (Pt has glasses, but only wore them for driving)  Hearing  Hearing: Exceptions to WFL  Hearing Exceptions:  (Pt reports [place] concerns with L ear intermitten.)  Cognition  Overall Cognitive Status: Exceptions  Arousal/Alertness: Appears intact  Following Commands: Follows one step commands with repetition  Attention Span: Appears intact  Safety Judgement: Decreased awareness of need for safety;Decreased awareness of need for assistance  Problem Solving: Assistance required to implement solutions;Assistance required to generate solutions;Assistance required to identify errors made;Assistance required to correct errors made  Insights: Decreased awareness of deficits  Initiation: Requires cues for all  Sequencing: Requires cues for all  Cognition Comment: Pt fearful throughout with increased anxiety.  Orientation  Overall Orientation Status: Within Functional Limits  Orientation Level: Oriented to person;Oriented to place;Oriented to time;Oriented to situation    Education Given To: Patient  Education Provided: Role of Therapy;Transfer Training;Plan of Care;Energy Conservation;Fall Prevention Strategies;Mobility Training;ADL Adaptive Strategies  Education Provided Comments: OT role, POC, safety, importance of continued mobility, OOB activity, call light use, ECT, AE/DME, d/c planning.  Education Method: Verbal;Demonstration  Barriers to Learning: None  Education Outcome: Continued education needed     AM-PAC - ADL  AM-PAC

## 2024-11-13 RX ORDER — DULOXETIN HYDROCHLORIDE 30 MG/1
30 CAPSULE, DELAYED RELEASE ORAL DAILY
Qty: 30 CAPSULE | Refills: 0 | Status: SHIPPED | OUTPATIENT
Start: 2024-11-13

## 2024-11-15 ENCOUNTER — APPOINTMENT (OUTPATIENT)
Dept: CT IMAGING | Age: 84
End: 2024-11-15
Payer: COMMERCIAL

## 2024-11-15 ENCOUNTER — APPOINTMENT (OUTPATIENT)
Dept: GENERAL RADIOLOGY | Age: 84
End: 2024-11-15
Payer: COMMERCIAL

## 2024-11-15 ENCOUNTER — HOSPITAL ENCOUNTER (EMERGENCY)
Age: 84
Discharge: NURSING, REHAB OR LONG-TERM CARE | End: 2024-11-16
Attending: STUDENT IN AN ORGANIZED HEALTH CARE EDUCATION/TRAINING PROGRAM
Payer: COMMERCIAL
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
## 2024-11-15 LAB
ALBUMIN SERPL-MCNC: 3.7 G/DL (ref 3.5–5.2)
ALP SERPL-CCNC: 173 U/L (ref 35–104)
ALT SERPL-CCNC: 12 U/L (ref 10–35)
ANION GAP SERPL CALCULATED.3IONS-SCNC: 11 MMOL/L (ref 9–16)
AST SERPL-CCNC: 22 U/L (ref 10–35)
BASOPHILS # BLD: 0.1 K/UL (ref 0–0.2)
BASOPHILS NFR BLD: 1 % (ref 0–2)
BILIRUB SERPL-MCNC: 0.2 MG/DL (ref 0–1.2)
BUN SERPL-MCNC: 28 MG/DL (ref 8–23)
CALCIUM SERPL-MCNC: 8.7 MG/DL (ref 8.6–10.4)
CHLORIDE SERPL-SCNC: 103 MMOL/L (ref 98–107)
CO2 SERPL-SCNC: 26 MMOL/L (ref 20–31)
CREAT SERPL-MCNC: 1.9 MG/DL (ref 0.7–1.2)
EOSINOPHIL # BLD: 0.2 K/UL (ref 0–0.4)
EOSINOPHILS RELATIVE PERCENT: 3 % (ref 0–4)
ERYTHROCYTE [DISTWIDTH] IN BLOOD BY AUTOMATED COUNT: 14.4 % (ref 11.5–14.9)
GFR, ESTIMATED: 26 ML/MIN/1.73M2
GLUCOSE SERPL-MCNC: 139 MG/DL (ref 74–99)
HCT VFR BLD AUTO: 35.2 % (ref 36–46)
HGB BLD-MCNC: 11.8 G/DL (ref 12–16)
LYMPHOCYTES NFR BLD: 2 K/UL (ref 1–4.8)
LYMPHOCYTES RELATIVE PERCENT: 20 % (ref 24–44)
MCH RBC QN AUTO: 29.3 PG (ref 26–34)
MCHC RBC AUTO-ENTMCNC: 33.7 G/DL (ref 31–37)
MCV RBC AUTO: 87 FL (ref 80–100)
MONOCYTES NFR BLD: 0.7 K/UL (ref 0.1–1.3)
MONOCYTES NFR BLD: 7 % (ref 1–7)
NEUTROPHILS NFR BLD: 69 % (ref 36–66)
NEUTS SEG NFR BLD: 7 K/UL (ref 1.3–9.1)
PLATELET # BLD AUTO: 202 K/UL (ref 150–450)
PMV BLD AUTO: 9.6 FL (ref 6–12)
POTASSIUM SERPL-SCNC: 4.3 MMOL/L (ref 3.7–5.3)
PROT SERPL-MCNC: 6.7 G/DL (ref 6.6–8.7)
RBC # BLD AUTO: 4.04 M/UL (ref 4–5.2)
SODIUM SERPL-SCNC: 140 MMOL/L (ref 136–145)
TROPONIN I SERPL HS-MCNC: 189 NG/L (ref 0–14)
TROPONIN I SERPL HS-MCNC: 204 NG/L (ref 0–14)
WBC OTHER # BLD: 10 K/UL (ref 3.5–11)

## 2024-11-15 PROCEDURE — 72125 CT NECK SPINE W/O DYE: CPT

## 2024-11-15 PROCEDURE — 96374 THER/PROPH/DIAG INJ IV PUSH: CPT

## 2024-11-15 PROCEDURE — 72131 CT LUMBAR SPINE W/O DYE: CPT

## 2024-11-15 PROCEDURE — 72128 CT CHEST SPINE W/O DYE: CPT

## 2024-11-15 PROCEDURE — 73562 X-RAY EXAM OF KNEE 3: CPT

## 2024-11-15 PROCEDURE — 84484 ASSAY OF TROPONIN QUANT: CPT

## 2024-11-15 PROCEDURE — 72170 X-RAY EXAM OF PELVIS: CPT

## 2024-11-15 PROCEDURE — 71250 CT THORAX DX C-: CPT

## 2024-11-15 PROCEDURE — 80053 COMPREHEN METABOLIC PANEL: CPT

## 2024-11-15 PROCEDURE — 71045 X-RAY EXAM CHEST 1 VIEW: CPT

## 2024-11-15 PROCEDURE — 36415 COLL VENOUS BLD VENIPUNCTURE: CPT

## 2024-11-15 PROCEDURE — 6360000002 HC RX W HCPCS

## 2024-11-15 PROCEDURE — 93005 ELECTROCARDIOGRAM TRACING: CPT

## 2024-11-15 PROCEDURE — 70450 CT HEAD/BRAIN W/O DYE: CPT

## 2024-11-15 PROCEDURE — 85025 COMPLETE CBC W/AUTO DIFF WBC: CPT

## 2024-11-15 PROCEDURE — 99285 EMERGENCY DEPT VISIT HI MDM: CPT

## 2024-11-15 RX ORDER — FENTANYL CITRATE 0.05 MG/ML
50 INJECTION, SOLUTION INTRAMUSCULAR; INTRAVENOUS ONCE
Status: COMPLETED | OUTPATIENT
Start: 2024-11-16 | End: 2024-11-15

## 2024-11-15 RX ADMIN — FENTANYL CITRATE 50 MCG: 0.05 INJECTION, SOLUTION INTRAMUSCULAR; INTRAVENOUS at 23:58

## 2024-11-15 ASSESSMENT — PAIN - FUNCTIONAL ASSESSMENT: PAIN_FUNCTIONAL_ASSESSMENT: 0-10

## 2024-11-15 ASSESSMENT — PAIN DESCRIPTION - LOCATION
LOCATION: HEAD
LOCATION: LEG

## 2024-11-15 ASSESSMENT — PAIN DESCRIPTION - DESCRIPTORS: DESCRIPTORS: TENDER

## 2024-11-15 ASSESSMENT — PAIN SCALES - GENERAL
PAINLEVEL_OUTOF10: 10
PAINLEVEL_OUTOF10: 10

## 2024-11-15 ASSESSMENT — LIFESTYLE VARIABLES
HOW MANY STANDARD DRINKS CONTAINING ALCOHOL DO YOU HAVE ON A TYPICAL DAY: PATIENT DOES NOT DRINK
HOW OFTEN DO YOU HAVE A DRINK CONTAINING ALCOHOL: NEVER

## 2024-11-15 ASSESSMENT — PAIN DESCRIPTION - ORIENTATION: ORIENTATION: LEFT;RIGHT

## 2024-11-16 VITALS
DIASTOLIC BLOOD PRESSURE: 69 MMHG | WEIGHT: 145 LBS | SYSTOLIC BLOOD PRESSURE: 167 MMHG | BODY MASS INDEX: 23.3 KG/M2 | HEIGHT: 66 IN | HEART RATE: 93 BPM | TEMPERATURE: 97.9 F | OXYGEN SATURATION: 97 % | RESPIRATION RATE: 20 BRPM

## 2024-11-16 LAB
BILIRUB UR QL STRIP: NEGATIVE
CLARITY UR: CLEAR
COLOR UR: YELLOW
COMMENT: NORMAL
GLUCOSE UR STRIP-MCNC: NEGATIVE MG/DL
HGB UR QL STRIP.AUTO: NEGATIVE
KETONES UR STRIP-MCNC: NEGATIVE MG/DL
LEUKOCYTE ESTERASE UR QL STRIP: NEGATIVE
NITRITE UR QL STRIP: NEGATIVE
PH UR STRIP: 6.5 [PH] (ref 5–8)
PROT UR STRIP-MCNC: NEGATIVE MG/DL
SP GR UR STRIP: 1.01 (ref 1–1.03)
UROBILINOGEN UR STRIP-ACNC: NORMAL EU/DL (ref 0–1)

## 2024-11-16 PROCEDURE — 81003 URINALYSIS AUTO W/O SCOPE: CPT

## 2024-11-16 ASSESSMENT — ENCOUNTER SYMPTOMS
BACK PAIN: 0
DIARRHEA: 0
NAUSEA: 0
SHORTNESS OF BREATH: 0
COUGH: 0
VOMITING: 0
ABDOMINAL PAIN: 0

## 2024-11-16 ASSESSMENT — PAIN DESCRIPTION - LOCATION: LOCATION: LEG

## 2024-11-16 ASSESSMENT — PAIN DESCRIPTION - ORIENTATION: ORIENTATION: LEFT

## 2024-11-16 ASSESSMENT — PAIN SCALES - GENERAL: PAINLEVEL_OUTOF10: 10

## 2024-11-16 NOTE — ED PROVIDER NOTES
[facility] ED  Emergency Department Encounter  Emergency Medicine Resident     Pt Name:Mable Kemp  MRN: [id number]  Birthdate 1940  Date of evaluation: 11/16/24  PCP:  Norma Dennis APRN - CNP  Note Started: 12:38 AM EST      CHIEF COMPLAINT       Chief Complaint   Patient presents with    Fall    Head Injury       HISTORY OF PRESENT ILLNESS  (Location/Symptom, Timing/Onset, Context/Setting, Quality, Duration, Modifying Factors, Severity.)      Mable Kemp is a 84 y.o. female who presents with superficial frontal scalp swelling status post mechanical fall.  Patient is bedbound and currently resides at the nursing facility.  Patient states that she was in a seated position trying to reach over the table when she suddenly experienced a mechanical fall.  Patient sustained abrasion to her left knee and is also complaining of right-sided hip pain.  Denies any blood thinner use, LOC.    PAST MEDICAL / SURGICAL / SOCIAL / FAMILY HISTORY      has a past medical history of Anemia, Arthritis, Cataract, Cerebral artery occlusion with cerebral infarction (HCC), CHF (congestive heart failure) (HCC), Depression, Diabetes mellitus (HCC), Eczema, Gout, Hearing loss, Hiatal hernia, History of blood transfusion, Hyperlipidemia, Hypertension, PONV (postoperative nausea and vomiting), Rectal urgency, and Stress incontinence, female.       has a past surgical history that includes Hysterectomy; Appendectomy; eye surgery; Cataract removal with implant (Bilateral); Colonoscopy; joint replacement; pr arthrp acetblr/prox fem prostc agrft/algrft (Left, 11/6/2018); Abdomen surgery (09/03/2020); Upper gastrointestinal endoscopy (N/A, 9/3/2020); and laparotomy (N/A, 9/3/2020).      Social History     Socioeconomic History    Marital status:      Spouse name: Not on file    Number of children: 1    Years of education: Not on file    Highest education level: Not on file   Occupational History    Not on file   Tobacco

## 2024-11-16 NOTE — ED PROVIDER NOTES
EMERGENCY DEPARTMENT ENCOUNTER   ATTENDING ATTESTATION     Pt Name: Mable Kemp  MRN: [id number]  Birthdate 1940  Date of evaluation: 11/15/24       Mable Kemp is a 84 y.o. female who presents with Fall and Head Injury    Patient presents from nursing facility after she fell out of her chair and hit her head    Will obtain labs and imaging    MDM:     Workup reveals elevated troponins which appear chronic for the patient    We did discuss with the patient and family she would not want any cardiac catheterization or other measures.  She is DNR CC    Discussed with her cardiologist Dr. Reynoso who agrees no further intervention or admission needed    No traumatic injuries identified from the fall    Discussed with the family and the patient she would be like to be discharged back to her nursing facility    Vitals:   Vitals:    11/15/24 2129 11/15/24 2141 11/15/24 2200 11/15/24 2253   BP: (!) 156/59 (!) 159/58 (!) 145/89 (!) 163/72   Pulse: 83  77 83   Resp: 18  19 18   Temp:       SpO2: 91%  98%    Weight:       Height:             I personally saw and examined the patient. I have reviewed and agree with the resident's findings, including all diagnostic interpretations and treatment plan as written. I was present for the key portions of any procedures performed and the inclusive time noted for any critical care statement.    Ramone Ring MD  Attending Emergency Physician           Ramone Ring MD  11/15/24 2052

## 2024-11-16 NOTE — ED TRIAGE NOTES
Mode of arrival (squad #, walk in, police, etc) : EMS        Chief complaint(s): Fall, Head pain/injury        Arrival Note (brief scenario, treatment PTA, etc).: Pt. Came from [place] in EMS with c/o fall and head pain. Pt. Was sitting in her chair when she fell, pt. Thinks she hit her head on the floor but does not remember. Pt. Has hematoma on right upper head, and abrasion on left knee. Pt. States head is 10/10 and denies other pain at this time. Patient uses walker at baseline. Pt. Arrived to ER with C-collar in place.        C= \"Have you ever felt that you should Cut down on your drinking?\"  No  A= \"Have people Annoyed you by criticizing your drinking?\"  No  G= \"Have you ever felt bad or Guilty about your drinking?\"  No  E= \"Have you ever had a drink as an Eye-opener first thing in the morning to steady your nerves or to help a hangover?\"  No      Deferred []      Reason for deferring: N/A    *If yes to two or more: probable alcohol abuse.*

## 2024-11-16 NOTE — ED NOTES
Writer walked in room and patient had removed her C-collar. Writer informed patient on importance of wearing c-collar and replaced it.

## 2024-11-18 LAB
EKG ATRIAL RATE: 72 BPM
EKG P AXIS: -16 DEGREES
EKG P-R INTERVAL: 256 MS
EKG Q-T INTERVAL: 444 MS
EKG QRS DURATION: 114 MS
EKG QTC CALCULATION (BAZETT): 486 MS
EKG R AXIS: -45 DEGREES
EKG T AXIS: 119 DEGREES
EKG VENTRICULAR RATE: 72 BPM

## 2024-11-18 PROCEDURE — 93010 ELECTROCARDIOGRAM REPORT: CPT | Performed by: INTERNAL MEDICINE

## 2024-11-21 ENCOUNTER — OFFICE VISIT (OUTPATIENT)
Age: 84
End: 2024-11-21

## 2024-11-21 VITALS — SYSTOLIC BLOOD PRESSURE: 91 MMHG | HEART RATE: 96 BPM | OXYGEN SATURATION: 94 % | DIASTOLIC BLOOD PRESSURE: 62 MMHG

## 2024-11-21 DIAGNOSIS — I50.23 ACUTE ON CHRONIC SYSTOLIC CONGESTIVE HEART FAILURE (HCC): Primary | ICD-10-CM

## 2024-11-21 NOTE — PROGRESS NOTES
Cardiology Office follow up            CC: Patient is here to establish cardiac care.     HPI  Mable Kemp is here to establish cardiac care.  She was recently admitted to [facility] where she was followed by Dr Reynoso. She was found to have acute HFrEF. TTE showed severe cardiomyopathy with LVEF 20-25%. Patent refused cardiac cath as per documentation. Had been fairly stable since discharge. Denies any chest pain. No further palpitations after increase in dose of toprol xl. Functional capacity is quite limited. No significant edema. Reports compliance with her meds.       Past Medical:  Past Medical History:   Diagnosis Date    Anemia     Arthritis     Cataract     Cerebral artery occlusion with cerebral infarction (HCC)     TIA    CHF (congestive heart failure) (HCC)     QUESTIONABLE    Depression     Diabetes mellitus (HCC)     Eczema     Gout     Hearing loss     Hiatal hernia     History of blood transfusion     Hyperlipidemia     Hypertension     PONV (postoperative nausea and vomiting)     Rectal urgency     Stress incontinence, female        Past Surgical:  Past Surgical History:   Procedure Laterality Date    ABDOMEN SURGERY  09/03/2020    egd/exploratory lap/reduction of paraesopheal hernia/mesh bridge of hiatel hernia defect/gastropexy x 2/kocherization of duodenum    APPENDECTOMY      CATARACT REMOVAL WITH IMPLANT Bilateral     COLONOSCOPY      EYE SURGERY      HYSTERECTOMY (CERVIX STATUS UNKNOWN)      JOINT REPLACEMENT      LAPAROTOMY N/A 9/3/2020    LAPAROTOMY EXPLORATORY, REDUCTION PARESOPHOGEAL HERNIA CONTAINING COLON AND ENTIRE STOMACH, MESH BRIDGE OF HIATAL DEFECT, GASTROPEXY X2, KOCHERIZATION OF DUODENUM performed by Matteo Delgado DO at [facility] OR    RI ARTHRP ACETBLR/PROX FEM PROSTC AGRFT/ALGRFT Left 11/6/2018    HIP TOTAL ARTHROPLASTY MINIMALLY INVASIVE ASI performed by Corey Hansen MD at [facility] OR    UPPER GASTROINTESTINAL ENDOSCOPY N/A 9/3/2020    EGD ESOPHAGOGASTRODUODENOSCOPY

## 2024-11-25 RX ORDER — LISINOPRIL 20 MG/1
20 TABLET ORAL DAILY
Qty: 90 TABLET | Refills: 0 | Status: SHIPPED | OUTPATIENT
Start: 2024-11-25

## 2024-11-25 NOTE — TELEPHONE ENCOUNTER
Last OV:  4/24/2024    Next OV: 1/2/2025    Pharmacy:   [facility] [address] - [address]  [place] [phone] - F [phone]  [address]  [facility] [address]  Phone: [phone] Fax: [phone]       Confirmed? Yes

## 2024-12-03 ENCOUNTER — TELEPHONE (OUTPATIENT)
Dept: PRIMARY CARE CLINIC | Age: 84
End: 2024-12-03

## 2024-12-03 NOTE — TELEPHONE ENCOUNTER
Silvia from the [facility] called to verify pt's last appt with PCP and to inform that pt has moved into long term care at [facility].

## 2024-12-13 RX ORDER — FAMOTIDINE 20 MG/1
20 TABLET, FILM COATED ORAL DAILY
Qty: 60 TABLET | Refills: 0 | Status: SHIPPED | OUTPATIENT
Start: 2024-12-13

## 2025-01-13 RX ORDER — ALLOPURINOL 100 MG/1
100 TABLET ORAL DAILY
Qty: 90 TABLET | Refills: 0 | Status: SHIPPED | OUTPATIENT
Start: 2025-01-13

## 2025-02-24 ENCOUNTER — TELEPHONE (OUTPATIENT)
Age: 85
End: 2025-02-24

## 2025-02-24 NOTE — TELEPHONE ENCOUNTER
Cancelled 2/25 appointment with daughter Tonia (HIPAA) since ECHO ordered 12/1/24 has not been completed.     Gave central scheduling phone number to schedule ECHO - Tonia is going to contact Lia to see what to do to get ECHO completed. Once ECHO complete - will reschedule appointment.     Writer will follow up with Tonia later on in week to follow up and reschedule appointment.      Tonia voiced understanding

## 2025-03-17 NOTE — TELEPHONE ENCOUNTER
Called Tonia patients daughter (HIPAA) and followed up about ECHO being completed for 3/21 appointment.     Tonia stated the ECHO has not been completed yet, and would like the appointment with Meredith Ibarra NP to be cancelled. Tonia stated will work with facility to schedule and complete ECHO before rescheduling again.     All parties voiced understanding. Office will wait for call to reschedule once ready.

## 2025-07-11 ENCOUNTER — TELEPHONE (OUTPATIENT)
Dept: PRIMARY CARE CLINIC | Age: 85
End: 2025-07-11

## 2025-07-11 NOTE — TELEPHONE ENCOUNTER
Tanner called for an update on patient death certificate   States she emailed and she dropped this off certificate yesterday 07/11/25    Certificate is in inbox

## 2025-07-14 ENCOUNTER — TELEPHONE (OUTPATIENT)
Dept: PRIMARY CARE CLINIC | Age: 85
End: 2025-07-14

## 2025-07-14 NOTE — TELEPHONE ENCOUNTER
home employee came at 8 am and was given the death certificate. It was not scanned into the chart.

## (undated) DEVICE — Device

## (undated) DEVICE — GOWN,AURORA,NONREINFORCED,LARGE: Brand: MEDLINE

## (undated) DEVICE — TOWEL,OR,DSP,ST,NATURAL,DLX,4/PK,20PK/CS: Brand: MEDLINE

## (undated) DEVICE — SUTURE VCRL SZ 3-0 L27IN ABSRB UD L26MM SH 1/2 CIR J416H

## (undated) DEVICE — DRAIN SURG L18IN DIA025IN 100% SIL RADPQ FOR CLS WND DRNAGE

## (undated) DEVICE — AGENT HEMSTAT W2XL14IN OXIDIZED REGENERATED CELOS ABSRB FOR

## (undated) DEVICE — GAUZE,PACKING STRIP,IODOFORM,2"X5YD,STRL: Brand: CURAD

## (undated) DEVICE — SET HNDPC W COAX BNE CLN TIP SUCT TB BTTRY PWR DISPOSABLE

## (undated) DEVICE — GAUZE,SPONGE,FLUFF,6"X6.75",STRL,5/TRAY: Brand: MEDLINE

## (undated) DEVICE — GLOVE SURG SZ 85 STD WHT LTX SYN POLYMER BEAD REINF ANTI RL

## (undated) DEVICE — KIT AUTOTRNS APPL AERO 2 SET SYR 2 TIP FOR PLT SEP SYS GPS

## (undated) DEVICE — COVER,MAYO STAND,STERILE: Brand: MEDLINE

## (undated) DEVICE — CATHETER F BLLN 5CC 20FR INF CTRL 2 W SIL ALLY AND HYDRGEL

## (undated) DEVICE — PENROSE DRAIN 18 X .5" SILICONE: Brand: MEDLINE

## (undated) DEVICE — 3M™ IOBAN™ 2 ANTIMICROBIAL INCISE DRAPE 6651EZ: Brand: IOBAN™ 2

## (undated) DEVICE — SUTURE ETHBND EXCEL SZ 0 L30IN NONABSORBABLE GRN CT1 L36MM X424H

## (undated) DEVICE — GAUZE,PACKING STRIP,IODOFORM,1/2"X5YD,ST: Brand: CURAD

## (undated) DEVICE — Z INACTIVE USE 2660664 SOLUTION IRRIG 3000ML 0.9% SOD CHL USP UROMATIC PLAS CONT

## (undated) DEVICE — DUAL CUT SAGITTAL BLADE

## (undated) DEVICE — STRIP WND PK W0.25INXL5YD IODO GZ TIGHTLY WVN CURAD

## (undated) DEVICE — SUTURE ETHLN SZ 3-0 L18IN NONABSORBABLE BLK FS-1 L24MM 3/8 663H

## (undated) DEVICE — 3M™ WARMING BLANKET, UPPER BODY, 10 PER CASE, 42268: Brand: BAIR HUGGER™

## (undated) DEVICE — SOLUTION IV IRRIG WATER 1000ML POUR BRL 2F7114

## (undated) DEVICE — KIT SEP W/ BLD DRAW TB SYR NDL TRNQT PD

## (undated) DEVICE — SUTURE NONABSORBABLE MONOFILAMENT 3-0 PS-1 18 IN BLK ETHILON 1663H

## (undated) DEVICE — PAD,ABDOMINAL,5"X9",ST,LF,25/BX: Brand: MEDLINE INDUSTRIES, INC.

## (undated) DEVICE — GAUZE,PACKING STRIP,IODOFORM,1"X5YD,STRL: Brand: CURAD

## (undated) DEVICE — APPLICATOR MEDICATED 26 CC SOLUTION HI LT ORNG CHLORAPREP

## (undated) DEVICE — GLOVE ORANGE PI 8   MSG9080

## (undated) DEVICE — SUTURE PDS II SZ 2-0 L27IN ABSRB VLT SH L26MM 1/2 CIR Z317H

## (undated) DEVICE — ZIPPERED TOGA, 2X LARGE: Brand: FLYTE, SURGICOOL

## (undated) DEVICE — Z DUPLICATE USE 2624755 KIT NEG PRSS DSG W/ PRSS INDIC PTCH STRP 45ML CANSTR CARR

## (undated) DEVICE — COVER,TABLE,HEAVY DUTY,50"X90",STRL: Brand: MEDLINE

## (undated) DEVICE — RELOAD STPL L75MM OPN H3.8MM CLS 1.5MM WIRE DIA0.2MM REG

## (undated) DEVICE — FORCEP REPROC BIOP HOT 2.8MM MIN WORK CHANL RADL JAW 4

## (undated) DEVICE — STOCKINETTE ORTH W6XL48IN OFF WHT SGL PLY UNBLEACHED COT RIB

## (undated) DEVICE — SUTURE STRATAFIX SPRL MCRYL + SZ 2 0 L27IN ABSRB UD W NDL SXMP1B419

## (undated) DEVICE — SUTURE VCRL SZ 2-0 L27IN ABSRB VLT L26MM SH 1/2 CIR J317H

## (undated) DEVICE — PLUG,CATHETER,DRAINAGE PROTECTOR,TUBE: Brand: MEDLINE

## (undated) DEVICE — STAPLER INT L75MM CUT LN L73MM STPL LN L77MM BLU B FRM 8

## (undated) DEVICE — METER,URINE,400ML,DRAIN BAG,L/F,LL: Brand: MEDLINE

## (undated) DEVICE — SOLUTION IV IRRIG POUR BRL 0.9% SODIUM CHL 2F7124

## (undated) DEVICE — COVER,C-ARM,41X74: Brand: MEDLINE

## (undated) DEVICE — DRAPE,REIN 53X77,STERILE: Brand: MEDLINE

## (undated) DEVICE — COVER,MAYO STAND,XL,STERILE: Brand: MEDLINE

## (undated) DEVICE — TOTAL TRAY, 16FR 10ML SIL FOLEY, URN: Brand: MEDLINE

## (undated) DEVICE — SUTURE STRATAFIX SYMMETRIC PDS + SZ 1 L18IN ABSRB VLT L48MM SXPP1A400

## (undated) DEVICE — GLOVE ORANGE PI 7   MSG9070

## (undated) DEVICE — SEALER ENDOSCP NANO COAT OPN DIV CRV L JAW LIGASURE IMPACT

## (undated) DEVICE — 4-PORT MANIFOLD: Brand: NEPTUNE 2

## (undated) DEVICE — BANDAGE,SELF ADHRNT,COFLEX,4"X5YD,STRL: Brand: COLABEL

## (undated) DEVICE — GOWN,AURORA,NONRNF,XL,30/CS: Brand: MEDLINE

## (undated) DEVICE — ELECTRODE PT RET AD L9FT HI MOIST COND ADH HYDRGEL CORDED

## (undated) DEVICE — SUTURE PDS II SZ 0 L60IN ABSRB VLT L65MM TP-1 1/2 CIR Z991G

## (undated) DEVICE — GLOVE SURG SZ 8 L12IN FNGR THK13MIL BRN LTX SYN POLYMER W